# Patient Record
Sex: MALE | Race: BLACK OR AFRICAN AMERICAN | Employment: OTHER | URBAN - METROPOLITAN AREA
[De-identification: names, ages, dates, MRNs, and addresses within clinical notes are randomized per-mention and may not be internally consistent; named-entity substitution may affect disease eponyms.]

---

## 2017-02-01 RX ORDER — METOPROLOL TARTRATE 50 MG/1
TABLET ORAL
Qty: 60 TAB | Refills: 4 | Status: SHIPPED | OUTPATIENT
Start: 2017-02-01 | End: 2017-06-08 | Stop reason: SDUPTHER

## 2017-02-10 RX ORDER — LEVETIRACETAM 1000 MG/1
TABLET ORAL
Qty: 60 TAB | Refills: 3 | Status: SHIPPED | OUTPATIENT
Start: 2017-02-10 | End: 2017-06-08 | Stop reason: SDUPTHER

## 2017-03-08 ENCOUNTER — OFFICE VISIT (OUTPATIENT)
Dept: INTERNAL MEDICINE CLINIC | Age: 72
End: 2017-03-08

## 2017-03-08 VITALS
RESPIRATION RATE: 16 BRPM | HEART RATE: 58 BPM | DIASTOLIC BLOOD PRESSURE: 66 MMHG | WEIGHT: 171 LBS | TEMPERATURE: 97.6 F | BODY MASS INDEX: 27.48 KG/M2 | OXYGEN SATURATION: 98 % | HEIGHT: 66 IN | SYSTOLIC BLOOD PRESSURE: 130 MMHG

## 2017-03-08 DIAGNOSIS — E66.9 DIABETES MELLITUS TYPE 2 IN OBESE (HCC): Primary | ICD-10-CM

## 2017-03-08 DIAGNOSIS — I10 ESSENTIAL HYPERTENSION, MALIGNANT: ICD-10-CM

## 2017-03-08 DIAGNOSIS — C61 PROSTATE CANCER (HCC): ICD-10-CM

## 2017-03-08 DIAGNOSIS — E11.69 DIABETES MELLITUS TYPE 2 IN OBESE (HCC): Primary | ICD-10-CM

## 2017-03-08 DIAGNOSIS — R56.9 SEIZURE (HCC): ICD-10-CM

## 2017-03-08 LAB
ALBUMIN UR QL STRIP: 10 MG/L
ANION GAP SERPL CALC-SCNC: NORMAL MMOL/L
CHOLEST SERPL-MCNC: 113 MG/DL
CREATININE, URINE POC: 50 MG/DL
HBA1C MFR BLD HPLC: 6.6 %
HDLC SERPL-MCNC: 49 MG/DL
LDL CHOLESTEROL POC: 45
MICROALBUMIN/CREAT RATIO POC: <30 MG/G
NON-HDL GOAL (POC): 64
TCHOL/HDL RATIO (POC): 2.3
TRIGL SERPL-MCNC: 94 MG/DL

## 2017-03-08 NOTE — PROGRESS NOTES
Taran Maldonado is a 67 y.o. male and presents with Diabetes; Cholesterol Problem; and Hypertension  . Subjective:    Diabetes Mellitus Review:  He has diabetes mellitus. Diabetic ROS - medication compliance: compliant all of the time, diabetic diet compliance: compliant all of the time, home glucose monitoring: is performed. Known diabetic complications: none  Cardiovascular risk factors: family history, dyslipidemia, diabetes mellitus, obesity, hypertension  Current diabetic medications include oral agents/insulin   Eye exam current (within one year): no  Weight trend: stable  Prior visit with dietician: no  Current diet: \"healthy\" diet  in general  Current exercise: walking  Current monitoring regimen: home blood tests - daily  Home blood sugar records: trend: stable  Any episodes of hypoglycemia? no  Is He on ACE inhibitor or angiotensin II receptor blocker? Yes     Hypertension Review:  The patient has hypertension . Diet and Lifestyle: generally follows a low sodium diet, exercises sporadically  Home BP Monitoring: is not measured at home. Pertinent ROS: taking medications as instructed, no medication side effects noted, no TIA's, no chest pain on exertion, no dyspnea on exertion, no swelling of ankles. Dyslipidemia Review:  Patient presents for evaluation of lipids. Compliance with treatment thus far has been excellent. A repeat fasting lipid profile was done. The patient does not use medications that may worsen dyslipidemias . The patient exercises sporadically. The patient is not known to have coexisting coronary artery disease    Seizure Review:  Patient presents for evaluation of seizures. Patient has been stable without any new seizures reported. Patient has tolerated his medication thus far. Gout Review:  Patient has gout, primarily affecting the foot. The patient has been stable with no recent joint pains  Symptoms onset: problem is longstanding.   Rheumatological ROS: using NSAID medication regularly, daily. Response to treatment plan: symptoms have progressed to a point and plateaued. The most recent uric acid was normal.       Review of Systems  Constitutional: negative for fevers, chills, anorexia and weight loss  Eyes:   negative for visual disturbance and irritation  ENT:   negative for tinnitus,sore throat,nasal congestion,ear pains. hoarseness  Respiratory:  negative for cough, hemoptysis, dyspnea,wheezing  CV:   negative for chest pain, palpitations, lower extremity edema  GI:   negative for nausea, vomiting, diarrhea, abdominal pain,melena  Endo:               negative for polyuria,polydipsia,polyphagia,heat intolerance  Genitourinary: negative for frequency, dysuria and hematuria  Integument:  negative for rash and pruritus  Hematologic:  negative for easy bruising and gum/nose bleeding  Musculoskel: negative for myalgias, arthralgias, back pain, muscle weakness, joint pain  Neurological:  negative for headaches, dizziness, vertigo, memory problems and gait   Behavl/Psych: negative for feelings of anxiety, depression, mood changes    Past Medical History:   Diagnosis Date    Allergic rhinitis, cause unspecified 4/18/2011    Alzheimer's disease     Arthritis     GOUT    Cancer [facility] 09/20/2016    PROSTATE    Chronic obstructive pulmonary disease ([address]     CHRONIC BRONCHITIS    Congestive heart failure, unspecified 4/18/2011    (ON 9/20/16 PT & DTR STATE THEY DON'T REMEMBER THIS DIAGNOSIS)    Coronary Artery Disease 4/18/2011    Diabetes Mellitus Type I 4/18/2011    Erectile Dysfunction 4/18/2011    Hemorrhoids 4/18/2011    Hypertension     Seizures ([address]     STARTED 2005; \"BLACK-OUT SEIZURES,ABSENCE SEIZURES\", DTR SATES    Stroke ([facility] [address]     MULTIPLE MINISTROKES, DTR REPORTS; LEFT-SIDED WEAKNESS    Thromboembolus ([address]     left leg  (NO PE)    Unspecified asthma(493.90) 4/18/2011     Past Surgical History:   Procedure Laterality Date    CARDIAC SURG PROCEDURE UNLIST 2006    CABG    VASCULAR SURGERY PROCEDURE UNLIST      PERIPHERAL ANGIOGRAM, STENTS LEFT LEG     Social History     Social History    Marital status:      Spouse name: N/A    Number of children: N/A    Years of education: N/A     Social History Main Topics    Smoking status: Former Smoker     Packs/day: 2.00     Years: 30.00    Smokeless tobacco: Former User     Quit date: 9/20/1990    Alcohol use No      Comment: IN PAST, MODERATE ALCOHOL.  WEEKENDS ONLY    Drug use: No    Sexual activity: Yes     Partners: Female     Birth control/ protection: None     Other Topics Concern    None     Social History Narrative     Family History   Problem Relation Age of Onset    Diabetes Mother     Hypertension Mother     Diabetes Father     Anesth Problems Neg Hx      Current Outpatient Prescriptions   Medication Sig Dispense Refill    levETIRAcetam 1,000 mg tablet take 1 tablet by mouth twice a day 60 Tab 3    metoprolol tartrate (LOPRESSOR) 50 mg tablet take 1 tablet by mouth twice a day 60 Tab 4    bumetanide (BUMEX) 1 mg tablet take 1 tablet by mouth once daily 30 Tab 12    VIMPAT 100 mg tab tablet take 1 tablet by mouth twice a day 60 Tab 5    amLODIPine (NORVASC) 5 mg tablet take 1 tablet by mouth once daily 90 Tab 3    metFORMIN (GLUCOPHAGE) 1,000 mg tablet take 1 tablet twice a day with food 60 Tab 12    insulin glargine (LANTUS SOLOSTAR) 100 unit/mL (3 mL) pen inject 20 units subcutaneously daily 30 mL 6    glucose blood VI test strips (ONETOUCH ULTRA TEST) strip Test 2 times daily    ONETOUCH ULTRA 2 TEST STRIPS ONLY 100 Strip 11    allopurinol (ZYLOPRIM) 100 mg tablet take 1 tablet by mouth once daily 30 Tab 12    pravastatin (PRAVACHOL) 40 mg tablet take 1 tablet once daily 30 Tab 9    losartan (COZAAR) 100 mg tablet take 1 tablet by mouth once daily 30 Tab 10    ACCU-CHEK ALEXIS PLUS TEST STRP strip TEST BLOOD SUGARS 2 TIMES DAILY 100 Strip PRN    Lancets misc Patient test 3 x daily Dm 250 1 Package 11    HYDROcodone-acetaminophen (NORCO) 5-325 mg per tablet Take 1 Tab by mouth every four (4) hours as needed for Pain. Max Daily Amount: 6 Tabs. 20 Tab 0     No Known Allergies    Objective:  Visit Vitals    /66    Pulse (!) 58    Temp 97.6 °F (36.4 °C) (Oral)    Resp 16    Ht 5' 6\" (1.676 m)    Wt 171 lb (77.6 kg)    SpO2 98%    BMI 27.6 kg/m2     Physical Exam:   General appearance - alert, well appearing, and in no distress  Mental status - alert, oriented to person, place, and time  EYE-CAROL, EOMI, corneas normal, no foreign bodies  ENT-ENT exam normal, no neck nodes or sinus tenderness  Nose - normal and patent, no erythema, discharge or polyps  Mouth - mucous membranes moist, pharynx normal without lesions  Neck - supple, no significant adenopathy   Chest - clear to auscultation, no wheezes, rales or rhonchi, symmetric air entry   Heart - normal rate, regular rhythm, normal S1, S2, no murmurs, rubs, clicks or gallops   Abdomen - soft, nontender, nondistended, no masses or organomegaly  Lymph- no adenopathy palpable  Ext-peripheral pulses normal, no pedal edema, no clubbing or cyanosis  Skin-Warm and dry. no hyperpigmentation, vitiligo, or suspicious lesions  Neuro -alert, oriented, normal speech, no focal findings or movement disorder noted  Neck-normal C-spine, no tenderness, full ROM without pain  Several abrasions to the hands and knees noted.     Results for orders placed or performed in visit on 03/08/17   AMB POC GLUCOSE BLOOD, BY GLUCOSE MONITORING DEVICE   Result Value Ref Range    Anion gap     AMB POC HEMOGLOBIN A1C   Result Value Ref Range    Hemoglobin A1c (POC) 6.6 %   AMB POC LIPID PROFILE   Result Value Ref Range    Cholesterol (POC) 113     Triglycerides (POC) 94     HDL Cholesterol (POC) 49     LDL Cholesterol (POC) 45     Non-HDL Goal (POC) 64     TChol/HDL Ratio (POC) 2.3    AMB POC URINE, MICROALBUMIN, SEMIQUANT (3 RESULTS)   Result Value Ref Range ALBUMIN, URINE POC 10 Negative mg/L    CREATININE, URINE POC 50 mg/dL    Microalbumin/creat ratio (POC) <30 mg/g       Assessment/Plan:    ICD-10-CM ICD-9-CM    1. Diabetes mellitus type 2 in obese (HCC) E11.9 250.00 AMB POC GLUCOSE BLOOD, BY GLUCOSE MONITORING DEVICE    E66.9 278.00 AMB POC HEMOGLOBIN A1C      AMB POC LIPID PROFILE      AMB POC URINE, MICROALBUMIN, SEMIQUANT (3 RESULTS)   2. Essential hypertension, malignant I10 401.0 AMB POC GLUCOSE BLOOD, BY GLUCOSE MONITORING DEVICE      AMB POC HEMOGLOBIN A1C      AMB POC LIPID PROFILE      AMB POC URINE, MICROALBUMIN, SEMIQUANT (3 RESULTS)   3. Seizure ([facility]) R56.9 780.39    4. Prostate cancer ([facility] C61 185      Orders Placed This Encounter    AMB POC GLUCOSE BLOOD, BY GLUCOSE MONITORING DEVICE    AMB POC HEMOGLOBIN A1C    AMB POC LIPID PROFILE    AMB POC URINE, MICROALBUMIN, SEMIQUANT (3 RESULTS)     lose weight, increase physical activity, follow low fat diet, follow low salt diet,Take 81mg aspirin daily  Patient Instructions   Compliance 360 Activation    Thank you for requesting access to Compliance 360. Please follow the instructions below to securely access and download your online medical record. Compliance 360 allows you to send messages to your doctor, view your test results, renew your prescriptions, schedule appointments, and more. How Do I Sign Up? 1. In your internet browser, go to [email or web address]  2. Click on the First Time User? Click Here link in the Sign In box. You will be redirect to the New Member Sign Up page. 3. Enter your Compliance 360 Access Code exactly as it appears below. You will not need to use this code after youve completed the sign-up process. If you do not sign up before the expiration date, you must request a new code. Compliance 360 Access Code: Activation code not generated  Current Compliance 360 Status: Patient Declined (This is the date your Compliance 360 access code will )    4.  Enter the last four digits of your Social Security Number (xxxx) and Date of Birth (mm/dd/yyyy) as indicated and click Submit. You will be taken to the next sign-up page. 5. Create a EZMovet ID. This will be your Higgle login ID and cannot be changed, so think of one that is secure and easy to remember. 6. Create a Higgle password. You can change your password at any time. 7. Enter your Password Reset Question and Answer. This can be used at a later time if you forget your password. 8. Enter your e-mail address. You will receive e-mail notification when new information is available in [address] 9. Click Sign Up. You can now view and download portions of your medical record. 10. Click the Download Summary menu link to download a portable copy of your medical information. Additional Information    If you have questions, please visit the Frequently Asked Questions section of the Higgle website at [email or web address] Remember, Higgle is NOT to be used for urgent needs. For medical emergencies, dial 911. Follow-up Disposition:  Return in about 3 months (around 6/8/2017), or if symptoms worsen or fail to improve. I have reviewed with the patient details of the assessment and plan and all questions were answered. Relevent patient education was performed    An After Visit Summary was printed and given to the patient.

## 2017-03-08 NOTE — MR AVS SNAPSHOT
Visit Information Date & Time Provider Department Dept. Phone Encounter #  
 3/8/2017  9:15 AM Wing Ashanti MD [facility] [phone] [id number] Follow-up Instructions Return in about 3 months (around 6/8/2017), or if symptoms worsen or fail to improve. Upcoming Health Maintenance Date Due  
 EYE EXAM RETINAL OR DILATED Q1 5/28/2016 MICROALBUMIN Q1 10/30/2016 FOBT Q 1 YEAR AGE 50-75 4/12/2017 FOOT EXAM Q1 4/12/2017 MEDICARE YEARLY EXAM 4/13/2017 HEMOGLOBIN A1C Q6M 4/30/2017 GLAUCOMA SCREENING Q2Y 5/28/2017 LIPID PANEL Q1 10/31/2017 Pneumococcal 65+ High/Highest Risk (2 of 2 - PPSV23) 11/1/2017 DTaP/Tdap/Td series (2 - Td) 12/3/2024 Allergies as of 3/8/2017  Review Complete On: 3/8/2017 By: Wing Ashanti MD  
 No Known Allergies Current Immunizations  Reviewed on 10/31/2016 Name Date Influenza High Dose Vaccine PF 10/31/2016, 10/30/2015, 12/3/2014 Influenza Vaccine Split 11/1/2012 Pneumococcal Vaccine (Unspecified Type) 11/1/2012 TB Skin Test (PPD) Intradermal 8/22/2014 Tdap 12/3/2014 Not reviewed this visit You Were Diagnosed With   
  
 Codes Comments Diabetes mellitus type 2 in obese (HCC)    -  Primary ICD-10-CM: E11.9, E66.9 ICD-9-CM: 250.00, 278.00 Essential hypertension, malignant     ICD-10-CM: I10 
ICD-9-CM: 401.0 Seizure ([address]     ICD-10-CM: R56.9 ICD-9-CM: 780.39 Prostate cancer [facility])     ICD-10-CM: X97 ICD-9-CM: 230 Vitals BP Pulse Temp Resp Height(growth percentile) Weight(growth percentile) 130/66 (!) 58 97.6 °F (36.4 °C) (Oral) 16 5' 6\" (1.676 m) 171 lb (77.6 kg) SpO2 BMI Smoking Status 98% 27.6 kg/m2 Former Smoker BMI and BSA Data Body Mass Index Body Surface Area  
 27.6 kg/m 2 1.9 m 2 Preferred Pharmacy Pharmacy Name Phone [id number] [address] Nikita Mota [phone] Your Updated Medication List  
  
   
This list is accurate as of: 3/8/17 11:02 AM.  Always use your most recent med list.  
  
  
  
  
 * ACCU-CHEK ALEXIS PLUS TEST STRP strip Generic drug:  glucose blood VI test strips TEST BLOOD SUGARS 2 TIMES DAILY * glucose blood VI test strips strip Commonly known as:  ONETOUCH ULTRA TEST Test 2 times daily  ONETOUCH ULTRA 2 TEST STRIPS ONLY  
  
 allopurinol 100 mg tablet Commonly known as:  ZYLOPRIM  
take 1 tablet by mouth once daily  
  
 amLODIPine 5 mg tablet Commonly known as:  NORVASC  
take 1 tablet by mouth once daily  
  
 bumetanide 1 mg tablet Commonly known as:  BUMEX  
take 1 tablet by mouth once daily HYDROcodone-acetaminophen 5-325 mg per tablet Commonly known as:  Wayna Glaze Take 1 Tab by mouth every four (4) hours as needed for Pain. Max Daily Amount: 6 Tabs. insulin glargine 100 unit/mL (3 mL) pen Commonly known as:  LANTUS SOLOSTAR  
inject 20 units subcutaneously daily Lancets Misc Patient test 3 x daily  Dm 250  
  
 levETIRAcetam 1,000 mg tablet  
take 1 tablet by mouth twice a day  
  
 losartan 100 mg tablet Commonly known as:  COZAAR  
take 1 tablet by mouth once daily  
  
 metFORMIN 1,000 mg tablet Commonly known as:  GLUCOPHAGE  
take 1 tablet twice a day with food  
  
 metoprolol tartrate 50 mg tablet Commonly known as:  LOPRESSOR  
take 1 tablet by mouth twice a day  
  
 pravastatin 40 mg tablet Commonly known as:  PRAVACHOL  
take 1 tablet once daily VIMPAT 100 mg Tab tablet Generic drug:  lacosamide  
take 1 tablet by mouth twice a day * Notice: This list has 2 medication(s) that are the same as other medications prescribed for you. Read the directions carefully, and ask your doctor or other care provider to review them with you. We Performed the Following AMB POC GLUCOSE BLOOD, BY GLUCOSE MONITORING DEVICE [17635 CPT(R)] AMB POC HEMOGLOBIN A1C [80023 CPT(R)] AMB POC LIPID PROFILE [26632 CPT(R)] AMB POC URINE, MICROALBUMIN, SEMIQUANT (3 RESULTS) [69816 CPT(R)] Follow-up Instructions Return in about 3 months (around 2017), or if symptoms worsen or fail to improve. Patient Instructions SearchForcehart Activation Thank you for requesting access to Conversion Logic. Please follow the instructions below to securely access and download your online medical record. Conversion Logic allows you to send messages to your doctor, view your test results, renew your prescriptions, schedule appointments, and more. How Do I Sign Up? 1. In your internet browser, go to [email or web address] 
2. Click on the First Time User? Click Here link in the Sign In box. You will be redirect to the New Member Sign Up page. 3. Enter your Conversion Logic Access Code exactly as it appears below. You will not need to use this code after youve completed the sign-up process. If you do not sign up before the expiration date, you must request a new code. Conversion Logic Access Code: Activation code not generated Current Conversion Logic Status: Patient Declined (This is the date your Conversion Logic access code will ) 4. Enter the last four digits of your Social Security Number (xxxx) and Date of Birth (mm/dd/yyyy) as indicated and click Submit. You will be taken to the next sign-up page. 5. Create a Conversion Logic ID. This will be your Conversion Logic login ID and cannot be changed, so think of one that is secure and easy to remember. 6. Create a Conversion Logic password. You can change your password at any time. 7. Enter your Password Reset Question and Answer. This can be used at a later time if you forget your password. 8. Enter your e-mail address. You will receive e-mail notification when new information is available in [address] 9. Click Sign Up. You can now view and download portions of your medical record. 10. Click the Download Summary menu link to download a portable copy of your medical information. Additional Information If you have questions, please visit the Frequently Asked Questions section of the Business Exchange website at [email or web address] [email or web address] Remember, Business Exchange is NOT to be used for urgent needs. For medical emergencies, dial 911. Please provide this summary of care documentation to your next provider. Your [facility] care clinician [facility]. If you have any questions after today's visit, please call [phone].

## 2017-03-08 NOTE — PATIENT INSTRUCTIONS
[email or web address][email or web address] Activation    Thank you for requesting access to Image Metrics. Please follow the instructions below to securely access and download your online medical record. Image Metrics allows you to send messages to your doctor, view your test results, renew your prescriptions, schedule appointments, and more. How Do I Sign Up? 1. In your internet browser, go to [email or web address]  2. Click on the First Time User? Click Here link in the Sign In box. You will be redirect to the New Member Sign Up page. 3. Enter your Image Metrics Access Code exactly as it appears below. You will not need to use this code after youve completed the sign-up process. If you do not sign up before the expiration date, you must request a new code. Image Metrics Access Code: Activation code not generated  Current Image Metrics Status: Patient Declined (This is the date your Image Metrics access code will )    4. Enter the last four digits of your Social Security Number (xxxx) and Date of Birth (mm/dd/yyyy) as indicated and click Submit. You will be taken to the next sign-up page. 5. Create a Image Metrics ID. This will be your Image Metrics login ID and cannot be changed, so think of one that is secure and easy to remember. 6. Create a Image Metrics password. You can change your password at any time. 7. Enter your Password Reset Question and Answer. This can be used at a later time if you forget your password. 8. Enter your e-mail address. You will receive e-mail notification when new information is available in [address] 9. Click Sign Up. You can now view and download portions of your medical record. 10. Click the Download Summary menu link to download a portable copy of your medical information. Additional Information    If you have questions, please visit the Frequently Asked Questions section of the Image Metrics website at [email or web address] Remember, Image Metrics is NOT to be used for urgent needs. For medical emergencies, dial 911.

## 2017-03-13 RX ORDER — LOSARTAN POTASSIUM 100 MG/1
TABLET ORAL
Qty: 30 TAB | Refills: 10 | Status: SHIPPED | OUTPATIENT
Start: 2017-03-13 | End: 2017-08-25 | Stop reason: SDUPTHER

## 2017-06-08 ENCOUNTER — OFFICE VISIT (OUTPATIENT)
Dept: INTERNAL MEDICINE CLINIC | Age: 72
End: 2017-06-08

## 2017-06-08 VITALS
DIASTOLIC BLOOD PRESSURE: 70 MMHG | HEART RATE: 60 BPM | BODY MASS INDEX: 27.16 KG/M2 | SYSTOLIC BLOOD PRESSURE: 140 MMHG | OXYGEN SATURATION: 97 % | TEMPERATURE: 97.7 F | WEIGHT: 169 LBS | RESPIRATION RATE: 19 BRPM | HEIGHT: 66 IN

## 2017-06-08 DIAGNOSIS — R56.9 SEIZURE (HCC): ICD-10-CM

## 2017-06-08 DIAGNOSIS — E11.69 DIABETES MELLITUS TYPE 2 IN OBESE (HCC): Primary | ICD-10-CM

## 2017-06-08 DIAGNOSIS — I10 ESSENTIAL HYPERTENSION, MALIGNANT: ICD-10-CM

## 2017-06-08 DIAGNOSIS — C61 PROSTATE CANCER (HCC): ICD-10-CM

## 2017-06-08 DIAGNOSIS — E66.9 DIABETES MELLITUS TYPE 2 IN OBESE (HCC): Primary | ICD-10-CM

## 2017-06-08 DIAGNOSIS — Z00.00 MEDICARE ANNUAL WELLNESS VISIT, SUBSEQUENT: ICD-10-CM

## 2017-06-08 DIAGNOSIS — Z12.11 SCREENING FOR COLON CANCER: ICD-10-CM

## 2017-06-08 LAB
CHOLEST SERPL-MCNC: <100 MG/DL
GLUCOSE POC: 188 MG/DL
HBA1C MFR BLD HPLC: 5.8 %
HDLC SERPL-MCNC: 42 MG/DL
LDL CHOLESTEROL POC: NORMAL
NON-HDL GOAL (POC): NORMAL
TCHOL/HDL RATIO (POC): NORMAL
TRIGL SERPL-MCNC: 130 MG/DL

## 2017-06-08 RX ORDER — LEVETIRACETAM 1000 MG/1
TABLET ORAL
Qty: 60 TAB | Refills: 3 | Status: ON HOLD | OUTPATIENT
Start: 2017-06-08 | End: 2020-08-10

## 2017-06-08 RX ORDER — PRAVASTATIN SODIUM 40 MG/1
TABLET ORAL
Qty: 30 TAB | Refills: 9 | Status: SHIPPED | OUTPATIENT
Start: 2017-06-08

## 2017-06-08 RX ORDER — METOPROLOL TARTRATE 50 MG/1
TABLET ORAL
Qty: 60 TAB | Refills: 4 | Status: ON HOLD | OUTPATIENT
Start: 2017-06-08 | End: 2020-09-04 | Stop reason: SDUPTHER

## 2017-06-08 RX ORDER — LACOSAMIDE 100 MG/1
TABLET ORAL
Qty: 60 TAB | Refills: 5 | Status: ON HOLD | OUTPATIENT
Start: 2017-06-08 | End: 2020-08-10

## 2017-06-08 NOTE — PROGRESS NOTES
Christie Stoddard is a 67 y.o. male and presents with Diabetes; Coronary Artery Disease; and Annual Wellness Visit  . Subjective:    Diabetes Mellitus Review:  He has diabetes mellitus. Diabetic ROS - medication compliance: compliant all of the time, diabetic diet compliance: compliant all of the time, home glucose monitoring: is performed. Known diabetic complications: none  Cardiovascular risk factors: family history, dyslipidemia, diabetes mellitus, obesity, hypertension  Current diabetic medications include oral agents/insulin   Eye exam current (within one year): no  Weight trend: stable  Prior visit with dietician: no  Current diet: \"healthy\" diet  in general  Current exercise: walking  Current monitoring regimen: home blood tests - daily  Home blood sugar records: trend: stable  Any episodes of hypoglycemia? no  Is He on ACE inhibitor or angiotensin II receptor blocker? Yes     Hypertension Review:  The patient has hypertension . Diet and Lifestyle: generally follows a low sodium diet, exercises sporadically  Home BP Monitoring: is not measured at home. Pertinent ROS: taking medications as instructed, no medication side effects noted, no TIA's, no chest pain on exertion, no dyspnea on exertion, no swelling of ankles. Dyslipidemia Review:  Patient presents for evaluation of lipids. Compliance with treatment thus far has been excellent. A repeat fasting lipid profile was done. The patient does not use medications that may worsen dyslipidemias . The patient exercises sporadically. The patient is not known to have coexisting coronary artery disease    Seizure Review:  Patient presents for evaluation of seizures. Patient has been stable without any new seizures reported. Patient has tolerated his medication thus far. Gout Review:  Patient has gout, primarily affecting the foot. The patient has been stable with no recent joint pains  Symptoms onset: problem is longstanding.   Rheumatological ROS: using NSAID medication regularly, daily. Response to treatment plan: symptoms have progressed to a point and plateaued. The most recent uric acid was normal.     He has a history of prostate cancer. he has been treated. Coronary Disease Review:  Patient complains of no chest pain today. There has not been the need to use NTG. Previous cardiac testing has included: Electrocardiogram (EKG), Echocardiogram, valve replacement. Veda Garcia is a 67 y.o. male and presents for annual Medicare Wellness Visit. Problem List: Reviewed with patient and discussed risk factors. Patient Active Problem List   Diagnosis Code    Allergic rhinitis, cause unspecified J30.9    Hemorrhoids K64.9    Unspecified asthma J45.909    Coronary Artery Disease I25.9    Erectile Dysfunction N52.9    Diabetes mellitus type 2 in obese (HCC) E11.9, E66.9    Encephalopathy, unspecified G93.40    Hx of completed stroke Z86.73    Seizure ([address] R56.9    Acute respiratory failure ([address] H28.60    Diastolic CHF, chronic ([address] I50.32    Essential hypertension, malignant I10    Stroke ([address] I63.9    Complex partial seizures evolving to generalized tonic-clonic seizures ([address] G40.209    Prostate cancer ([address] C61       Current medical providers:  Patient Care Team:  Reyes Covert., MD as PCP - General (Internal Medicine)  Reuben Landa LPN as Nurse Navigator  Zion Don RN as Nurse Navigator    PSH: Reviewed with patient  Past Surgical History:   Procedure Laterality Date    CARDIAC SURG PROCEDURE UNLIST  2006    CABG    VASCULAR SURGERY PROCEDURE UNLIST      PERIPHERAL ANGIOGRAM, STENTS LEFT LEG        SH: Reviewed with patient  Social History   Substance Use Topics    Smoking status: Former Smoker     Packs/day: 2.00     Years: 30.00    Smokeless tobacco: Former User     Quit date: 9/20/1990    Alcohol use No      Comment: IN PAST, MODERATE ALCOHOL.  WEEKENDS ONLY       FH: Reviewed with patient  Family History Problem Relation Age of Onset    Diabetes Mother     Hypertension Mother     Diabetes Father    [de-identified] Anesth Problems Neg Hx        Medications/Allergies: Reviewed with patient  Current Outpatient Prescriptions on File Prior to Visit   Medication Sig Dispense Refill    losartan (COZAAR) 100 mg tablet take 1 tablet by mouth once daily 30 Tab 10    levETIRAcetam 1,000 mg tablet take 1 tablet by mouth twice a day 60 Tab 3    metoprolol tartrate (LOPRESSOR) 50 mg tablet take 1 tablet by mouth twice a day 60 Tab 4    bumetanide (BUMEX) 1 mg tablet take 1 tablet by mouth once daily 30 Tab 12    VIMPAT 100 mg tab tablet take 1 tablet by mouth twice a day 60 Tab 5    amLODIPine (NORVASC) 5 mg tablet take 1 tablet by mouth once daily 90 Tab 3    metFORMIN (GLUCOPHAGE) 1,000 mg tablet take 1 tablet twice a day with food 60 Tab 12    insulin glargine (LANTUS SOLOSTAR) 100 unit/mL (3 mL) pen inject 20 units subcutaneously daily 30 mL 6    glucose blood VI test strips (ONETOUCH ULTRA TEST) strip Test 2 times daily    ONETOUCH ULTRA 2 TEST STRIPS ONLY 100 Strip 11    allopurinol (ZYLOPRIM) 100 mg tablet take 1 tablet by mouth once daily 30 Tab 12    pravastatin (PRAVACHOL) 40 mg tablet take 1 tablet once daily 30 Tab 9    ACCU-CHEK ALEXIS PLUS TEST STRP strip TEST BLOOD SUGARS 2 TIMES DAILY 100 Strip PRN    Lancets misc Patient test 3 x daily  Dm 250 1 Package 11    HYDROcodone-acetaminophen (NORCO) 5-325 mg per tablet Take 1 Tab by mouth every four (4) hours as needed for Pain. Max Daily Amount: 6 Tabs. 20 Tab 0     No current facility-administered medications on file prior to visit. No Known Allergies    Objective:  Visit Vitals    /70    Pulse 60    Temp 97.7 °F (36.5 °C) (Oral)    Resp 19    Ht 5' 6\" (1.676 m)    Wt 169 lb (76.7 kg)    SpO2 97%    BMI 27.28 kg/m2    Body mass index is 27.28 kg/(m^2).     Assessment of cognitive impairment: Alert and oriented x 3    Depression Screen:   PHQ over the last two weeks 6/8/2017   PHQ Not Done Patient Decline   Little interest or pleasure in doing things Not at all   Feeling down, depressed or hopeless Not at all   Total Score PHQ 2 0       Fall Risk Assessment:    Fall Risk Assessment, last 12 mths 6/8/2017   Able to walk? Yes   Fall in past 12 months? No       Functional Ability:   Does the patient exhibit a steady gait? yes   How long did it take the patient to get up and walk from a sitting position? seconds   Is the patient self reliant?  (ie can do own laundry, meals, household chores)  no     Does the patient handle his/her own medications?  no     Does the patient handle his/her own money? no     Is the patients home safe (ie good lighting, handrails on stairs and bath, etc.)? yes     Did you notice or did patient express any hearing difficulties? no     Did you notice or did patient express any vision difficulties?   no     Were distance and reading eye charts used? yes       Advance Care Planning:   Patient was offered the opportunity to discuss advance care planning:  yes     Does patient have an Advance Directive:  yes   If no, did you provide information on Caring Connections?   yes       Plan:      Orders Placed This Encounter    OCCULT BLOOD, IMMUNOASSAY (FIT)    AMB POC LIPID PROFILE    AMB POC GLUCOSE BLOOD, BY GLUCOSE MONITORING DEVICE    AMB POC HEMOGLOBIN A1C       Health Maintenance   Topic Date Due    EYE EXAM RETINAL OR DILATED Q1  05/28/2016    FOOT EXAM Q1  04/12/2017    FOBT Q 1 YEAR AGE 50-75  04/12/2017    GLAUCOMA SCREENING Q2Y  05/28/2017    INFLUENZA AGE 9 TO ADULT  08/01/2017    HEMOGLOBIN A1C Q6M  09/08/2017    Pneumococcal 65+ High/Highest Risk (2 of 2 - PPSV23) 11/01/2017    MICROALBUMIN Q1  03/08/2018    LIPID PANEL Q1  03/08/2018    MEDICARE YEARLY EXAM  06/09/2018    DTaP/Tdap/Td series (2 - Td) 12/03/2024    Hepatitis C Screening  Completed    ZOSTER VACCINE AGE 60>  Addressed       *Patient verbalized understanding and agreement with the plan. A copy of the After Visit Summary with personalized health plan was given to the patient today. Review of Systems  Constitutional: negative for fevers, chills, anorexia and weight loss  Eyes:   negative for visual disturbance and irritation  ENT:   negative for tinnitus,sore throat,nasal congestion,ear pains. hoarseness  Respiratory:  negative for cough, hemoptysis, dyspnea,wheezing  CV:   negative for chest pain, palpitations, lower extremity edema  GI:   negative for nausea, vomiting, diarrhea, abdominal pain,melena  Endo:               negative for polyuria,polydipsia,polyphagia,heat intolerance  Genitourinary: negative for frequency, dysuria and hematuria  Integument:  negative for rash and pruritus  Hematologic:  negative for easy bruising and gum/nose bleeding  Musculoskel: negative for myalgias, arthralgias, back pain, muscle weakness, joint pain  Neurological:  negative for headaches, dizziness, vertigo, memory problems and gait   Behavl/Psych: negative for feelings of anxiety, depression, mood changes    Past Medical History:   Diagnosis Date    Allergic rhinitis, cause unspecified 4/18/2011    Alzheimer's disease     Arthritis     GOUT    Cancer ([address] 09/20/2016    PROSTATE    Chronic obstructive pulmonary disease ([address]     CHRONIC BRONCHITIS    Congestive heart failure, unspecified 4/18/2011    (ON 9/20/16 PT & DTR STATE THEY DON'T REMEMBER THIS DIAGNOSIS)    Coronary Artery Disease 4/18/2011    Diabetes Mellitus Type I 4/18/2011    Erectile Dysfunction 4/18/2011    Hemorrhoids 4/18/2011    Hypertension     Seizures ([address]     STARTED 2005; \"BLACK-OUT SEIZURES,ABSENCE SEIZURES\", DTR SATES    Stroke ([address]     MULTIPLE MINISTROKES, DTR REPORTS; LEFT-SIDED WEAKNESS    Thromboembolus ([address]     left leg  (NO PE)    Unspecified asthma 4/18/2011     Past Surgical History:   Procedure Laterality Date    CARDIAC SURG PROCEDURE UNLIST  2006 CABG    VASCULAR SURGERY PROCEDURE UNLIST      PERIPHERAL ANGIOGRAM, STENTS LEFT LEG     Social History     Social History    Marital status:      Spouse name: N/A    Number of children: N/A    Years of education: N/A     Social History Main Topics    Smoking status: Former Smoker     Packs/day: 2.00     Years: 30.00    Smokeless tobacco: Former User     Quit date: 9/20/1990    Alcohol use No      Comment: IN PAST, MODERATE ALCOHOL.  WEEKENDS ONLY    Drug use: No    Sexual activity: Yes     Partners: Female     Birth control/ protection: None     Other Topics Concern    None     Social History Narrative     Family History   Problem Relation Age of Onset    Diabetes Mother     Hypertension Mother     Diabetes Father     Anesth Problems Neg Hx      Current Outpatient Prescriptions   Medication Sig Dispense Refill    losartan (COZAAR) 100 mg tablet take 1 tablet by mouth once daily 30 Tab 10    levETIRAcetam 1,000 mg tablet take 1 tablet by mouth twice a day 60 Tab 3    metoprolol tartrate (LOPRESSOR) 50 mg tablet take 1 tablet by mouth twice a day 60 Tab 4    bumetanide (BUMEX) 1 mg tablet take 1 tablet by mouth once daily 30 Tab 12    VIMPAT 100 mg tab tablet take 1 tablet by mouth twice a day 60 Tab 5    amLODIPine (NORVASC) 5 mg tablet take 1 tablet by mouth once daily 90 Tab 3    metFORMIN (GLUCOPHAGE) 1,000 mg tablet take 1 tablet twice a day with food 60 Tab 12    insulin glargine (LANTUS SOLOSTAR) 100 unit/mL (3 mL) pen inject 20 units subcutaneously daily 30 mL 6    glucose blood VI test strips (ONETOUCH ULTRA TEST) strip Test 2 times daily    ONETOUCH ULTRA 2 TEST STRIPS ONLY 100 Strip 11    allopurinol (ZYLOPRIM) 100 mg tablet take 1 tablet by mouth once daily 30 Tab 12    pravastatin (PRAVACHOL) 40 mg tablet take 1 tablet once daily 30 Tab 9    ACCU-CHEK ALEXIS PLUS TEST STRP strip TEST BLOOD SUGARS 2 TIMES DAILY 100 Strip PRN    Lancets misc Patient test 3 x daily  Dm 250 1 Package 11    HYDROcodone-acetaminophen (NORCO) 5-325 mg per tablet Take 1 Tab by mouth every four (4) hours as needed for Pain. Max Daily Amount: 6 Tabs. 20 Tab 0     No Known Allergies    Objective:  Visit Vitals    /70    Pulse 60    Temp 97.7 °F (36.5 °C) (Oral)    Resp 19    Ht 5' 6\" (1.676 m)    Wt 169 lb (76.7 kg)    SpO2 97%    BMI 27.28 kg/m2     Physical Exam:   General appearance - alert, well appearing, and in no distress  Mental status - alert, oriented to person, place, and time  EYE-CAROL, EOMI, corneas normal, no foreign bodies  ENT-ENT exam normal, no neck nodes or sinus tenderness  Nose - normal and patent, no erythema, discharge or polyps  Mouth - mucous membranes moist, pharynx normal without lesions  Neck - supple, no significant adenopathy   Chest - clear to auscultation, no wheezes, rales or rhonchi, symmetric air entry   Heart - normal rate, regular rhythm, normal S1, S2, no murmurs, rubs, clicks or gallops   Abdomen - soft, nontender, nondistended, no masses or organomegaly  Lymph- no adenopathy palpable  Ext-peripheral pulses normal, no pedal edema, no clubbing or cyanosis  Skin-Warm and dry. no hyperpigmentation, vitiligo, or suspicious lesions  Neuro -alert, oriented, normal speech, no focal findings or movement disorder noted  Neck-normal C-spine, no tenderness, full ROM without pain  Several abrasions to the hands and knees noted. Results for orders placed or performed in visit on 06/08/17   AMB POC GLUCOSE BLOOD, BY GLUCOSE MONITORING DEVICE   Result Value Ref Range    Glucose  mg/dL   AMB POC HEMOGLOBIN A1C   Result Value Ref Range    Hemoglobin A1c (POC) 5.8 %       Assessment/Plan:    ICD-10-CM ICD-9-CM    1. Diabetes mellitus type 2 in obese (HCC) E11.9 250.00 AMB POC GLUCOSE BLOOD, BY GLUCOSE MONITORING DEVICE    E66.9 278.00 AMB POC HEMOGLOBIN A1C   2.  Essential hypertension, malignant I10 401.0 AMB POC LIPID PROFILE   3. Screening for colon cancer Z12.11 V76.51 OCCULT BLOOD, IMMUNOASSAY (FIT)   4. Seizure ([facility] R56.9 780.39    5. Prostate cancer ([facility] C61 185    6. Medicare annual wellness visit, subsequent Z00.00 V70.0      Orders Placed This Encounter    OCCULT BLOOD, IMMUNOASSAY (FIT)    AMB POC LIPID PROFILE    AMB POC GLUCOSE BLOOD, BY GLUCOSE MONITORING DEVICE    AMB POC HEMOGLOBIN A1C     lose weight, increase physical activity, follow low fat diet, follow low salt diet,Take 81mg aspirin daily  Patient Instructions   MyChart Activation    Thank you for requesting access to [facility]. Please follow the instructions below to securely access and download your online medical record. [facility] allows you to send messages to your doctor, view your test results, renew your prescriptions, schedule appointments, and more. How Do I Sign Up? 1. In your internet browser, go to [email or web address]  2. Click on the First Time User? Click Here link in the Sign In box. You will be redirect to the New Member Sign Up page. 3. Enter your [facility] Access Code exactly as it appears below. You will not need to use this code after youve completed the sign-up process. If you do not sign up before the expiration date, you must request a new code. [facility] Access Code: Activation code not generated  Current [facility] Status: Patient Declined (This is the date your [facility] access code will )    4. Enter the last four digits of your Social Security Number (xxxx) and Date of Birth (mm/dd/yyyy) as indicated and click Submit. You will be taken to the next sign-up page. 5. Create a [facility] ID. This will be your [employer] login ID and cannot be changed, so think of one that is secure and easy to remember. 6. Create a [facility] password. You can change your password at any time. 7. Enter your Password Reset Question and Answer. This can be used at a later time if you forget your password. 8. Enter your e-mail address.  You will receive e-mail notification when new information is available in [employer]. 9. Click Sign Up. You can now view and download portions of your medical record. 10. Click the Download Summary menu link to download a portable copy of your medical information. Additional Information    If you have questions, please visit the Frequently Asked Questions section of the [facility] website at [email or web address] Remember, [facility] is NOT to be used for urgent needs. For medical emergencies, dial 911. Follow-up Disposition:  Return in about 3 months (around 9/8/2017), or if symptoms worsen or fail to improve. I have reviewed with the patient details of the assessment and plan and all questions were answered. Relevent patient education was performed    An After Visit Summary was printed and given to the patient.

## 2017-06-08 NOTE — PATIENT INSTRUCTIONS
[employer] Activation    Thank you for requesting access to [employer] [facility]. Please follow the instructions below to securely access and download your online medical record. [employer] [facility] allows you to send messages to your doctor, view your test results, renew your prescriptions, schedule appointments, and more. How Do I Sign Up? 1. In your internet browser, go to [email or web address]  2. Click on the First Time User? Click Here link in the Sign In box. You will be redirect to the New Member Sign Up page. 3. Enter your [employer] [facility] Access Code exactly as it appears below. You will not need to use this code after youve completed the sign-up process. If you do not sign up before the expiration date, you must request a new code. [employer] [facility] Access Code: Activation code not generated  Current [facility] Status: Patient Declined (This is the date your [employer] access code will )    4. Enter the last four digits of your Social Security Number (xxxx) and Date of Birth (mm/dd/yyyy) as indicated and click Submit. You will be taken to the next sign-up page. 5. Create a [employer] [facility] ID. This will be your [employer] login ID and cannot be changed, so think of one that is secure and easy to remember. 6. Create a [employer] [facility] password. You can change your password at any time. 7. Enter your Password Reset Question and Answer. This can be used at a later time if you forget your password. 8. Enter your e-mail address. You will receive e-mail notification when new information is available in [address] 9. Click Sign Up. You can now view and download portions of your medical record. 10. Click the Download Summary menu link to download a portable copy of your medical information. Additional Information    If you have questions, please visit the Frequently Asked Questions section of the [employer] website at [email or web address] Remember, [employer] [facility] is NOT to be used for urgent needs. For medical emergencies, dial 911.

## 2017-06-08 NOTE — MR AVS SNAPSHOT
Visit Information Date & Time Provider Department Dept. Phone Encounter #  
 6/8/2017  9:30 AM Marycruz Dash MD [facility] [address] [phone] [facility] [phone] [id number] Follow-up Instructions Return in about 3 months (around 9/8/2017), or if symptoms worsen or fail to improve. Follow-up and Disposition History Upcoming Health Maintenance Date Due  
 EYE EXAM RETINAL OR DILATED Q1 5/28/2016 FOOT EXAM Q1 4/12/2017 FOBT Q 1 YEAR AGE 50-75 4/12/2017 GLAUCOMA SCREENING Q2Y 5/28/2017 INFLUENZA AGE 9 TO ADULT 8/1/2017 HEMOGLOBIN A1C Q6M 9/8/2017 Pneumococcal 65+ High/Highest Risk (2 of 2 - PPSV23) 11/1/2017 MICROALBUMIN Q1 3/8/2018 LIPID PANEL Q1 3/8/2018 MEDICARE YEARLY EXAM 6/9/2018 DTaP/Tdap/Td series (2 - Td) 12/3/2024 Allergies as of 6/8/2017  Review Complete On: 6/8/2017 By: Marycruz Dash MD  
 No Known Allergies Current Immunizations  Reviewed on 10/31/2016 Name Date Influenza High Dose Vaccine PF 10/31/2016, 10/30/2015, 12/3/2014 Influenza Vaccine Split 11/1/2012 Pneumococcal Vaccine (Unspecified Type) 11/1/2012 TB Skin Test (PPD) Intradermal 8/22/2014 Tdap 12/3/2014 Not reviewed this visit You Were Diagnosed With   
  
 Codes Comments Diabetes mellitus type 2 in obese (HCC)    -  Primary ICD-10-CM: E11.9, E66.9 ICD-9-CM: 250.00, 278.00 Essential hypertension, malignant     ICD-10-CM: I10 
ICD-9-CM: 401.0 Screening for colon cancer     ICD-10-CM: Z12.11 ICD-9-CM: V76.51 Seizure ([address]     ICD-10-CM: R56.9 ICD-9-CM: 780.39 Prostate cancer [facility])     ICD-10-CM: E08 ICD-9-CM: 185 Medicare annual wellness visit, subsequent     ICD-10-CM: Z00.00 ICD-9-CM: V70.0 Vitals BP Pulse Temp Resp Height(growth percentile) Weight(growth percentile) 140/70 60 97.7 °F (36.5 °C) (Oral) 19 5' 6\" (1.676 m) 169 lb (76.7 kg) SpO2 BMI Smoking Status 97% 27.28 kg/m2 Former Smoker Vitals History BMI and BSA Data Body Mass Index Body Surface Area  
 27.28 kg/m 2 1.89 m 2 Preferred Pharmacy Pharmacy Name Phone [id number] [address] Jose Padilla [phone] Your Updated Medication List  
  
   
This list is accurate as of: 6/8/17 10:34 AM.  Always use your most recent med list.  
  
  
  
  
 * ACCU-CHEK ALEXIS PLUS TEST STRP strip Generic drug:  glucose blood VI test strips TEST BLOOD SUGARS 2 TIMES DAILY * glucose blood VI test strips strip Commonly known as:  ONETOUCH ULTRA TEST Test 2 times daily  ONETOUCH ULTRA 2 TEST STRIPS ONLY  
  
 allopurinol 100 mg tablet Commonly known as:  ZYLOPRIM  
take 1 tablet by mouth once daily  
  
 amLODIPine 5 mg tablet Commonly known as:  NORVASC  
take 1 tablet by mouth once daily  
  
 bumetanide 1 mg tablet Commonly known as:  BUMEX  
take 1 tablet by mouth once daily HYDROcodone-acetaminophen 5-325 mg per tablet Commonly known as:  Irma Mandril Take 1 Tab by mouth every four (4) hours as needed for Pain. Max Daily Amount: 6 Tabs. insulin glargine 100 unit/mL (3 mL) Inpn Commonly known as:  LANTUS SOLOSTAR  
inject 20 units subcutaneously daily Lancets Misc Patient test 3 x daily  Dm 250  
  
 levETIRAcetam 1,000 mg tablet  
take 1 tablet by mouth twice a day  
  
 losartan 100 mg tablet Commonly known as:  COZAAR  
take 1 tablet by mouth once daily  
  
 metFORMIN 1,000 mg tablet Commonly known as:  GLUCOPHAGE  
take 1 tablet twice a day with food  
  
 metoprolol tartrate 50 mg tablet Commonly known as:  LOPRESSOR  
take 1 tablet by mouth twice a day  
  
 pravastatin 40 mg tablet Commonly known as:  PRAVACHOL  
take 1 tablet once daily VIMPAT 100 mg Tab tablet Generic drug:  lacosamide  
take 1 tablet by mouth twice a day * Notice: This list has 2 medication(s) that are the same as other medications prescribed for you. Read the directions carefully, and ask your doctor or other care provider to review them with you. We Performed the Following AMB POC GLUCOSE BLOOD, BY GLUCOSE MONITORING DEVICE [77258 CPT(R)] AMB POC HEMOGLOBIN A1C [11113 CPT(R)] AMB POC LIPID PROFILE [14672 CPT(R)] OCCULT BLOOD, IMMUNOASSAY (FIT) [id number] CPT(R)] Follow-up Instructions Return in about 3 months (around 2017), or if symptoms worsen or fail to improve. Patient Instructions MyChart Activation Thank you for requesting access to INCHRON. Please follow the instructions below to securely access and download your online medical record. INCHRON allows you to send messages to your doctor, view your test results, renew your prescriptions, schedule appointments, and more. How Do I Sign Up? 1. In your internet browser, go to [email or web address]" 
2. Click on the First Time User? Click Here link in the Sign In box. You will be redirect to the New Member Sign Up page. 3. Enter your INCHRON Access Code exactly as it appears below. You will not need to use this code after youve completed the sign-up process. If you do not sign up before the expiration date, you must request a new code. INCHRON Access Code: Activation code not generated Current INCHRON Status: Patient Declined (This is the date your [employer] access code will ) 4. Enter the last four digits of your Social Security Number (xxxx) and Date of Birth (mm/dd/yyyy) as indicated and click Submit. You will be taken to the next sign-up page. 5. Create a INCHRON ID. This will be your INCHRON login ID and cannot be changed, so think of one that is secure and easy to remember. 6. Create a INCHRON password. You can change your password at any time. 7. Enter your Password Reset Question and Answer.  This can be used at a later time if you forget your password. 8. Enter your e-mail address. You will receive e-mail notification when new information is available in [address] 9. Click Sign Up. You can now view and download portions of your medical record. 10. Click the Download Summary menu link to download a portable copy of your medical information. Additional Information If you have questions, please visit the Frequently Asked Questions section of the evocatal website at [email or web address] Remember, evocatal is NOT to be used for urgent needs. For medical emergencies, dial 911. Please provide this summary of care documentation to your next provider. Your primary care clinician is listed as Minerva Wayne. If you have any questions after today's visit, please call [phone].

## 2017-06-16 LAB — HEMOCCULT STL QL IA: POSITIVE

## 2017-06-19 ENCOUNTER — HOSPITAL ENCOUNTER (EMERGENCY)
Age: 72
Discharge: HOME | End: 2017-06-19
Attending: EMERGENCY MEDICINE
Payer: MEDICARE

## 2017-06-19 ENCOUNTER — TELEPHONE (OUTPATIENT)
Dept: INTERNAL MEDICINE CLINIC | Age: 72
End: 2017-06-19

## 2017-06-19 VITALS
WEIGHT: 169 LBS | OXYGEN SATURATION: 100 % | RESPIRATION RATE: 16 BRPM | TEMPERATURE: 98.7 F | DIASTOLIC BLOOD PRESSURE: 59 MMHG | SYSTOLIC BLOOD PRESSURE: 129 MMHG | BODY MASS INDEX: 25.61 KG/M2 | HEIGHT: 68 IN | HEART RATE: 58 BPM

## 2017-06-19 DIAGNOSIS — E16.2 HYPOGLYCEMIA: Primary | ICD-10-CM

## 2017-06-19 DIAGNOSIS — Z86.39 H/O DIABETES MELLITUS: ICD-10-CM

## 2017-06-19 LAB
ANION GAP BLD CALC-SCNC: 17 MMOL/L (ref 5–15)
BUN BLD-MCNC: 24 MG/DL (ref 9–20)
CA-I BLD-MCNC: 0.99 MMOL/L (ref 1.12–1.32)
CHLORIDE BLD-SCNC: 102 MMOL/L (ref 98–107)
CO2 BLD-SCNC: 24 MMOL/L (ref 21–32)
CREAT BLD-MCNC: 1.2 MG/DL (ref 0.6–1.3)
GLUCOSE BLD STRIP.AUTO-MCNC: 137 MG/DL (ref 65–100)
GLUCOSE BLD STRIP.AUTO-MCNC: 138 MG/DL (ref 65–100)
GLUCOSE BLD-MCNC: 147 MG/DL (ref 65–100)
HCT VFR BLD CALC: 46 % (ref 36.6–50.3)
HGB BLD-MCNC: 15.6 GM/DL (ref 12.1–17)
POTASSIUM BLD-SCNC: 4 MMOL/L (ref 3.5–5.1)
SERVICE CMNT-IMP: ABNORMAL
SODIUM BLD-SCNC: 138 MMOL/L (ref 136–145)

## 2017-06-19 PROCEDURE — 99284 EMERGENCY DEPT VISIT MOD MDM: CPT

## 2017-06-19 PROCEDURE — 80047 BASIC METABLC PNL IONIZED CA: CPT

## 2017-06-19 PROCEDURE — 82962 GLUCOSE BLOOD TEST: CPT

## 2017-06-19 RX ORDER — ASPIRIN 325 MG
325 TABLET ORAL DAILY
COMMUNITY
End: 2020-09-04

## 2017-06-19 NOTE — ED TRIAGE NOTES
Pt c/o BG of 48, earlier today, pt states he couldn't get anything to eat so he came here.  Pt denies any other symptoms

## 2017-06-19 NOTE — DISCHARGE INSTRUCTIONS
Learning About Low Blood Sugar (Hypoglycemia) in Diabetes  What is low blood sugar (hypoglycemia)? Hypoglycemia means that your blood sugar is low and your body (especially your brain) is not getting enough fuel. If you have diabetes, your blood sugar can go too low if you take too much of some diabetes medicines. It can also go too low if you miss a meal. And it can happen if you exercise too hard without eating enough food. Some medicines used to treat other health problems can cause low blood sugar too. What are the symptoms? Symptoms of low blood sugar can start quickly. It may take just 10 to 15 minutes. If you have had diabetes for many years, you may not realize that your blood sugar is low until it drops very low. · If your blood sugar level drops below 70 (mild low blood sugar), you may feel tired, anxious, dizzy, weak, shaky, or sweaty. You may have a fast heartbeat or blurry vision. · If your blood sugar level continues to drop (usually below 40), your behavior may change. You may feel more irritable. You may find it hard to concentrate or talk. And you may feel unsteady when you stand or walk. You may become too weak or confused to eat something with sugar to raise your blood sugar level. · If your blood sugar level drops very low (usually below 20), you may pass out (lose consciousness). Or you may have a seizure or stroke. If you have symptoms of severe low blood sugar, you need to get medical care right away. If you had a low blood sugar level during the night, you may wake up tired or with a headache. Or you may sweat so much during the night that your pajamas or sheets are damp when you wake up. How is low blood sugar treated? You can treat low blood sugar by eating or drinking something that has 15 grams of carbohydrate. These should be quick-sugar foods.  Check your blood sugar level again 15 minutes after having a quick-sugar food to make sure your level is getting back to your target range. Here are examples of quick-sugar foods that have 15 grams of carbohydrate:  · 3 to 4 glucose tablets  · 1 tube of glucose gel  · Hard candy (such as 3 Jolly Ranchers or 5 to 7 Life Savers)  · 1 tablespoon honey  · 2 tablespoons of raisins  · ½ cup to ¾ cup (4 to 6 ounces) of fruit juice or regular (not diet) soda  · 1 tablespoon of sugar  · 1 cup of fat-free milk  If you have problems with severe low blood sugar, someone else may have to give you a shot of glucagon. This is a hormone that raises blood sugar levels quickly. How can you prevent low blood sugar? You can take steps to prevent low blood sugar. · Follow your treatment plan. Take your insulin or other diabetes medicine exactly as your doctor prescribed it. Talk with your doctor if you're having low blood sugar often. Your medicine may need to be adjusted if it's causing your low blood sugar. · Check your blood sugar levels often. This helps you find early changes before an emergency happens. · Keep a quick-sugar food with you in case your blood sugar level drops low. · Eat small meals more often so that you don't get too hungry between meals. Don't skip meals. · Balance extra exercise with eating more. Check your blood sugar and learn how it changes after exercise. If your blood sugar stays at a normal level, you may not need to eat after you exercise. · Limit how much alcohol you drink. Alcohol can make low blood sugar go even lower. Don't drink alcohol if you have problems recognizing the early signs of low blood sugar. · Keep a diary of your symptoms. This helps you learn when changes in your body may signal low blood sugar. And keep track of how often you have low blood sugar, including when you last ate and what you ate. This will help you learn what causes your blood sugar to drop. · Learn about diabetes and low blood sugar.  Support groups or a diabetes education center can help you understand how medicines, diet, and exercise affect your blood sugar levels. Since low blood sugar levels can quickly become an emergency, be sure to wear medical alert jewelry, such as a medical alert bracelet. This is to let people know you have diabetes so they can get help for you. You can buy this at most drugstores. And make sure your family, friends, and coworkers know the symptoms of low blood sugar. Teach them what to do to get your sugar level up. Follow-up care is a key part of your treatment and safety. Be sure to make and go to all appointments, and call your doctor if you are having problems. It's also a good idea to know your test results and keep a list of the medicines you take. Where can you learn more? Go to http://benjamin-arcadio.info/. Enter C795 in the search box to learn more about \"Learning About Low Blood Sugar (Hypoglycemia) in Diabetes. \"  Current as of: March 13, 2017  Content Version: 11.3  © 4026-9487 [employer]. Care instructions adapted under license by [employer] (which disclaims liability or warranty for this information). If you have questions about a medical condition or this instruction, always ask your healthcare professional. [place] [address] any warranty or liability for your use of this information. Metformin (By mouth)     Metformin Hydrochloride (met-FOR-min cornelia-droe-KLOR-kandi)  Treats type 2 diabetes. Brand Name(s): Fortamet, Glucophage, Glucophage XR, Glumetza, Riomet   There may be other brand names for this medicine. When This Medicine Should Not Be Used: This medicine is not right for everyone. Do not use if you had an allergic reaction to metformin, or if you have severe kidney problems or metabolic acidosis. How to Use This Medicine:   Liquid, Tablet, Long Acting Tablet  · Take your medicine as directed. Your dose may need to be changed several times to find what works best for you.   · It is best to take this medicine with food or milk.  · Swallow the extended-release tablet whole. Do not crush, break, or chew it. Tell your doctor if you have trouble swallowing the tablets whole. · Measure the oral liquid medicine with a marked measuring spoon, oral syringe, or medicine cup. · Read and follow the patient instructions that come with this medicine. Talk to your doctor or pharmacist if you have any questions. · Missed dose: Take a dose as soon as you remember. If it is almost time for your next dose, wait until then and take a regular dose. Do not take extra medicine to make up for a missed dose. · Store the medicine in a closed container at room temperature, away from heat, moisture, and direct light. Drugs and Foods to Avoid:   Ask your doctor or pharmacist before using any other medicine, including over-the-counter medicines, vitamins, and herbal products. · Some medicines can affect how metformin works. Tell your doctor if you are using any of the following:  ¨ Acetazolamide  ¨ Dichlorphenamide  ¨ Diuretics (water pills)  ¨ Estrogen or birth control pills  ¨ Heart or blood pressure medicine  ¨ Isoniazid  ¨ Nicotinic acid  ¨ Phenothiazine medicine  ¨ Phenytoin  ¨ Steroid medicine  ¨ Thyroid medicine  ¨ Topiramate  ¨ Zonisamide  Warnings While Using This Medicine:   · Tell your doctor if you are pregnant or breastfeeding, or if you have heart or blood vessel disease, heart failure, blood circulation problems, kidney disease, liver disease, anemia, an adrenal gland or pituitary gland disorder, or a vitamin B12 deficiency. Tell your doctor if you had a heart attack. Tell your doctor if you drink alcohol. · Too much of this medicine can cause a rare, but serious condition called lactic acidosis. · Part of the extended-release tablet may pass in your stool. This is normal.  · Make sure any doctor or dentist who treats you knows that you are using this medicine.  You may need to stop using this medicine before you have surgery, an x-ray, CT scan, or other medical test.  · Your doctor will do lab tests at regular visits to check on the effects of this medicine. Keep all appointments. · Keep all medicine out of the reach of children. Never share your medicine with anyone. Possible Side Effects While Using This Medicine:   Call your doctor right away if you notice any of these side effects:  · Allergic reaction: Itching or hives, swelling in your face or hands, swelling or tingling in your mouth or throat, chest tightness, trouble breathing  · Confusion, fast heartbeat, increased hunger, shakiness  · Fever or chills  · Stomach pain, nausea, vomiting, muscle pain or cramping  · Trouble breathing, slow heartbeat, lightheadedness, dizziness  · Unusual tiredness or weakness  If you notice these less serious side effects, talk with your doctor:   · Diarrhea, gas, nausea  If you notice other side effects that you think are caused by this medicine, tell your doctor. Call your doctor for medical advice about side effects. You may report side effects to FDA at 3-035-FDA-0205  © 2017 [address] [employer] Information is for End User's use only and may not be sold, redistributed or otherwise used for commercial purposes. The above information is an  only. It is not intended as medical advice for individual conditions or treatments. Talk to your doctor, nurse or pharmacist before following any medical regimen to see if it is safe and effective for you.

## 2017-06-19 NOTE — ED PROVIDER NOTES
HPI Comments: Robbin Hardin is a 67 y.o. male who presents ambulatory to [facility] ED with cc of dizziness. Pt's wife stated that the pt was ambulating with an abnormal gait and c/o some slight dizziness earlier this afternoon, prompting her to check the pt's blood glucose level. She states that BGL was 30 mg/dL at that time, and subsequently gave the pt some sugar water and orange juice to drink. Pt's wife states noted improvement in BGL to 48 mg/dL, but states that she was referred to the ED by Primary Care Physician for further evaluation. Pt states that he had not eaten lunch this afternoon and notes that last oral intake was breakfast this morning at approximately 0900. Pt states that he takes Metformin 1000 mg BID (last dose at 0930 this morning) as well as Lantus 40 units daily (last dose at 0830 this morning). He denies any recent medication changes and specifically denies any pain, swelling, nausea, or vomiting. PMHx: DM, HTN, CVA, CAD, CHF, COPD, gout, prostate CA, Alzheimer's Disease  Social Hx: - smoking; - EtOH; - illicit drug use    There are no other complains, changes, or physical findings at this time. The history is provided by a relative.         Past Medical History:   Diagnosis Date    Allergic rhinitis, cause unspecified 4/18/2011    Alzheimer's disease     Arthritis     GOUT    Cancer [address] [address] 09/20/2016    PROSTATE    Chronic obstructive pulmonary disease (HCC)     CHRONIC BRONCHITIS    Congestive heart failure, unspecified 4/18/2011    (ON 9/20/16 PT & DTR STATE THEY DON'T REMEMBER THIS DIAGNOSIS)    Coronary Artery Disease 4/18/2011    Diabetes Mellitus Type I 4/18/2011    Erectile Dysfunction 4/18/2011    Hemorrhoids 4/18/2011    Hypertension     Seizures ([address]     STARTED 2005; \"BLACK-OUT Juan Less SEIZURES\", DTR SATES    Stroke ([address]     MULTIPLE MINISTROKES, DTR REPORTS; LEFT-SIDED WEAKNESS    Thromboembolus ([address]     left leg  (NO PE)    Unspecified asthma 4/18/2011 Past Surgical History:   Procedure Laterality Date    CARDIAC SURG PROCEDURE UNLIST  2006    CABG    VASCULAR SURGERY PROCEDURE UNLIST      PERIPHERAL ANGIOGRAM, STENTS LEFT LEG         Family History:   Problem Relation Age of Onset    Diabetes Mother     Hypertension Mother     Diabetes Father     Anesth Problems Neg Hx        Social History     Social History    Marital status:      Spouse name: N/A    Number of children: N/A    Years of education: N/A     Occupational History    Not on file. Social History Main Topics    Smoking status: Former Smoker     Packs/day: 2.00     Years: 30.00    Smokeless tobacco: Former User     Quit date: 9/20/1990    Alcohol use No      Comment: IN PAST, MODERATE ALCOHOL. WEEKENDS ONLY    Drug use: No    Sexual activity: Yes     Partners: Female     Birth control/ protection: None     Other Topics Concern    Not on file     Social History Narrative         ALLERGIES: Review of patient's allergies indicates no known allergies. Review of Systems   Constitutional: Negative for chills and fever. HENT: Negative for congestion and rhinorrhea. Eyes: Negative for visual disturbance. Respiratory: Negative for cough and shortness of breath. Cardiovascular: Negative for chest pain and leg swelling. Gastrointestinal: Negative for abdominal pain, diarrhea, nausea and vomiting. Genitourinary: Negative for difficulty urinating and dysuria. Musculoskeletal: Positive for gait problem. Negative for arthralgias, back pain, joint swelling and myalgias. Skin: Negative for rash. Neurological: Positive for dizziness. Negative for light-headedness and headaches. Vitals:    06/19/17 1537 06/19/17 1634   BP: 170/72 129/59   Pulse: 60 (!) 58   Resp: 16 16   Temp: 98.7 °F (37.1 °C)    SpO2: 100%    Weight: 76.7 kg (169 lb)    Height: 5' 8\" (1.727 m)             Physical Exam   Constitutional: He is oriented to person, place, and time.  He appears well-developed and well-nourished. Awake and alert   Cardiovascular: Normal rate, regular rhythm, normal heart sounds and intact distal pulses. Pulmonary/Chest: Effort normal and breath sounds normal. No respiratory distress. Abdominal: Soft. There is no tenderness. Musculoskeletal: He exhibits no edema. Neurological: He is alert and oriented to person, place, and time. Skin: Skin is warm and dry. Nursing note and vitals reviewed. MDM  Number of Diagnoses or Management Options  H/O diabetes mellitus:   Hypoglycemia:   Diagnosis management comments: DDx: diabetic complication, infection       Amount and/or Complexity of Data Reviewed  Clinical lab tests: ordered and reviewed  Review and summarize past medical records: yes    Patient Progress  Patient progress: stable    ED Course       Procedures    LABORATORY TESTS:  Recent Results (from the past 12 hour(s))   GLUCOSE, POC    Collection Time: 06/19/17  3:39 PM   Result Value Ref Range    Glucose (POC) 138 (H) 65 - 100 mg/dL    Performed by Lauren Wray    [facility] [place]    Collection Time: 06/19/17  3:57 PM   Result Value Ref Range    Calcium, ionized (POC) 0.99 (L) 1.12 - 1.32 MMOL/L    Sodium (POC) 138 136 - 145 MMOL/L    Potassium (POC) 4.0 3.5 - 5.1 MMOL/L    Chloride (POC) 102 98 - 107 MMOL/L    CO2 (POC) 24 21 - 32 MMOL/L    Anion gap (POC) 17 (H) 5 - 15 mmol/L    Glucose (POC) 147 (H) 65 - 100 MG/DL    BUN (POC) 24 (H) 9 - 20 MG/DL    Creatinine (POC) 1.2 0.6 - 1.3 MG/DL    GFR-AA (POC) >60 >60 ml/min/1.73m2    GFR, non-AA (POC) 60 (L) >60 ml/min/1.73m2    Hemoglobin (POC) 15.6 12.1 - 17.0 GM/DL    Hematocrit (POC) 46 36.6 - 50.3 %    Comment Comment Not Indicated.      GLUCOSE, POC    Collection Time: 06/19/17  4:42 PM   Result Value Ref Range    Glucose (POC) 137 (H) 65 - 100 mg/dL    Performed by Leah Johns        VITALS:  Patient Vitals for the past 12 hrs:   Temp Pulse Resp BP SpO2   06/19/17 1634 - (!) 58 16 129/59 -   06/19/17 1537 98.7 °F (37.1 °C) 60 16 170/72 100 %       IMPRESSION:  1. Hypoglycemia    2. H/O diabetes mellitus        PLAN:  1. Discharge Medication List as of 6/19/2017  4:45 PM        2. Follow-up Information     Follow up With Details Comments Contact Info    [facility] EMERGENCY DEPT  If symptoms worsen [address] Дмитрий Dash MD In 1 day to discuss your diabetes medicines [facility]  [phone]          3. Return to ED if worse     Discharge Note:  4:45 PM  Pt and daughter at bedside instructed to not take metformin tonight and decrease am dose of lantus to 20 units after breakfast, and restart metformin in am or call Dr Moiz Haley in am to discuss medications. The patient has been re-evaluated and is ready for discharge. Reviewed available results with patient. Counseled patient on diagnosis and care plan. Patient has expressed understanding, and all questions have been answered. Patient agrees with plan and agrees to follow up as recommended, or to return to the ED if their symptoms worsen. Discharge instructions have been provided and explained to the patient, along with reasons to return to the ED. This note is prepared by Zeny Banegas, acting as Scribe for Renzo Oro MD.    Renzo Oro MD: The scribe's documentation has been prepared under my direction and personally reviewed by me in its entirety. I confirm that the note above accurately reflects all work, treatment, procedures, and medical decision making performed by me.

## 2017-06-19 NOTE — ED NOTES
Pt arrives in ED with his daughter, pt's daughter reports pt's glucose was in the 30's this morning, she rechecked after giving him sugar water and Orange Juice and it was 48. Pt's daughter states, \"I think I rechecked it too soon. I don't think the orange juice had time to get in there. But, I called his doctor and he said to go ahead and bring him in here. \"      Emergency Department Nursing Plan of Care       The Nursing Plan of Care is developed from the Nursing assessment and Emergency Department Attending provider initial evaluation. The plan of care may be reviewed in the ED Provider note.     The Plan of Care was developed with the following considerations:   Patient / Family readiness to learn indicated by:verbalized understanding  Persons(s) to be included in education: patient and family  Barriers to Learning/Limitations:No    [address], RN    6/19/2017   3:59 PM

## 2017-06-19 NOTE — TELEPHONE ENCOUNTER
Pt. Notified office, blood sugar is 48. PLEASE HAVE FAMILY MEMBER OR FRIEND DRIVE YOU TO THE NEARSEST EMERGENCY ROOM ASAP. FAMILY WILL TAKE HIM TO [facility]. FOR FUTHER EVALUATION.

## 2017-06-19 NOTE — ED NOTES
Pt and daughter advised by Dr. Jack Lane that we would like to watch him for approximately and hour to be sure his glucose does not decrease again. Pt's MAR indicates pt to take 20 units of Lantus, pt's daughter reports pt takes 40 units.

## 2017-06-19 NOTE — ED NOTES
Pt given printed discharge instructions and 0 script(s). Pt verbalized understanding of instructions and script(s). Pt verbalized importance of following up with Dr. Tom Gonzales. Pt alert and oriented, in no acute distress, ambulatory with his daughter. Patient offered wheelchair from treatment area to hospital entrance, patient declined wheelchair. Pt and daughter understand need to hold Metformin this evening and check with PCP in the morning about changing medications.

## 2017-06-20 ENCOUNTER — PATIENT OUTREACH (OUTPATIENT)
Dept: INTERNAL MEDICINE CLINIC | Age: 72
End: 2017-06-20

## 2017-06-20 NOTE — PROGRESS NOTES
[facility] Discharge Follow-Up      Date/Time:  2017 9:19 AM    Patient listed on discharge [facility]) report on . Patient discharged from [facility] ED for hypoglycemia. RRAT score:   not available    Medical History:     Past Medical History:   Diagnosis Date    Allergic rhinitis, cause unspecified 2011    Alzheimer's disease     Arthritis     GOUT    Cancer ([facility] 2016    PROSTATE    Chronic obstructive pulmonary disease (HCC)     CHRONIC BRONCHITIS    Congestive heart failure, unspecified 2011    (ON 16 PT & DTR STATE THEY DON'T REMEMBER THIS DIAGNOSIS)    Coronary Artery Disease 2011    Diabetes Mellitus Type I 2011    Erectile Dysfunction 2011    Hemorrhoids 2011    Hypertension     Seizures ([address]     STARTED ; \"BLACK-OUT Riky Purpura SEIZURES\", DTR SATES    Stroke ([facility]     MULTIPLE MINISTROKES, DTR REPORTS; LEFT-SIDED WEAKNESS    Thromboembolus ([address]     left leg  (NO PE)    Unspecified asthma 2011       Nurse Navigator(NN) contacted the patient by telephone to perform post ED discharge assessment. Spoke with the patient's daughter, Ms. Angel Leon  Verified  and address with patient as identifiers. Provided introduction to self, and explanation of the Nurses Navigator role. Ms. Ally Trevino states that the patient is resting at this time and will speak with me regarding the patient's health. She reports that the patient's blood sugar was 122mg/dl this morning. Diet:   Ms. Ally Trevino reports: Diabetic Diet. And that he was able to eat breakfast without any difficulty. I encouraged Ms. Ally Trevino to ensure that the patient had a mid-morning snack. She agreed to do so and stated that she shall provide the patient with a snack at around 1100. She plans to serve the patient lunch around 1300.     Activity:    Ms. Ally Trevino reports: mostly moving around the house and somewalking outside the house    Medication:   Ms. Jonathan Johns gives the patient his daily medication. Performed medication reconciliation with Ms. Jonathan Johns, and she verbalizes understanding of administration of home medications. There were no barriers to obtaining medications identified at this time. Support system:  Patient, and extended family     DME:  Ms. Jonathan Johns states that the patient is need of diabetic shoes. Navigator identified that the patient does not assess his blood pressure at home. The patient is dual eligible for [place] and [place] Medicaid. A blood pressure cuff for home use should be covered benefit. The previous pair of diabetic shoes was obtained at: [address] [phone]. I requested for Ms. Jonathan Johns to [employer] and inquire if they could supply the b/p and diabetic shoes through the patient's [address] [place] [address] Rd. She agreed to do so today. She plans to request a prescription for the b/p cuff and diabetic shoes during the transition of care office visit scheduled for [id number]. Discharge Instructions :  Reviewed discharge instructions with Ms. Jonathan Johns and she verbalizes understanding of discharge instructions and follow-up care. Red Flags:  S/s hypoglycemia    Labs Reviewed:   Results for Tristen Ahumada (MRN [id number]) as of 6/20/2017 09:17   Ref.  Range 6/19/2017 15:57   GLUCOSE,FAST - POC Latest Ref Range: 65 - 100 MG/ (H)   Sodium (POC) Latest Ref Range: 136 - 145 MMOL/L 138   Potassium (POC) Latest Ref Range: 3.5 - 5.1 MMOL/L 4.0   Chloride (POC) Latest Ref Range: 98 - 107 MMOL/L 102   CO2 (POC) Latest Ref Range: 21 - 32 MMOL/L 24   BUN (POC) Latest Ref Range: 9 - 20 MG/DL 24 (H)   Anion gap (POC) Latest Ref Range: 5 - 15 mmol/L 17 (H)   Creatinine (POC) Latest Ref Range: 0.6 - 1.3 MG/DL 1.2   Calcium, ionized (POC) Latest Ref Range: 1.12 - 1.32 MMOL/L 0.99 (L)   GFR, non-AA (POC) Latest Ref Range: >60 ml/min/1.73m2 60 (L)   GFR-AA (POC) Latest Ref Range: >60 ml/min/1.73m2 >60   Hemoglobin (POC) Latest Ref Range: 12.1 - 17.0 GM/DL 15.6   Hematocrit (POC) Latest Ref Range: 36.6 - 50.3 % 46   Comment Latest Units:   Comment Not Indic. .. Imaging results reviewed:  No new studies    PCP/Specialist follow up: Patient scheduled to follow up with Kristen Bassett MD on [facility]. Reviewed red flags with Ms. Jose Zaldivar, and she verbalizes understanding. Ms. Jose Zaldivar was given an opportunity to ask questions. No other clinical/social/functional needs noted. The patient and family agreed to contact the PCP office for questions related to their healthcare. They expressed thanks, offered no additional questions and ended the call. Case management plan:   Shall attempt to contact the patient by telephone or during office visit within the next 7-10 days. Was Ms. Jose Zaldivar successful with contacting the DME supplier? Will continue to follow as necessary for the next 30 days. Will reassess for case management needs prior to discharge from case management service on or about 30 days.

## 2017-06-22 ENCOUNTER — OFFICE VISIT (OUTPATIENT)
Dept: INTERNAL MEDICINE CLINIC | Age: 72
End: 2017-06-22

## 2017-06-22 VITALS
HEART RATE: 66 BPM | OXYGEN SATURATION: 96 % | RESPIRATION RATE: 14 BRPM | TEMPERATURE: 97.8 F | SYSTOLIC BLOOD PRESSURE: 104 MMHG | BODY MASS INDEX: 25.46 KG/M2 | WEIGHT: 168 LBS | HEIGHT: 68 IN | DIASTOLIC BLOOD PRESSURE: 72 MMHG

## 2017-06-22 DIAGNOSIS — R56.9 SEIZURE (HCC): ICD-10-CM

## 2017-06-22 DIAGNOSIS — R19.5 GUAIAC POSITIVE STOOLS: ICD-10-CM

## 2017-06-22 DIAGNOSIS — E11.69 DIABETES MELLITUS TYPE 2 IN OBESE (HCC): Primary | ICD-10-CM

## 2017-06-22 DIAGNOSIS — E66.9 DIABETES MELLITUS TYPE 2 IN OBESE (HCC): Primary | ICD-10-CM

## 2017-06-22 LAB — GLUCOSE POC: 130 MG/DL

## 2017-06-22 NOTE — MR AVS SNAPSHOT
Visit Information Date & Time Provider Department Dept. Phone Encounter #  
 6/22/2017 11:45 AM Carolyn Chen MD [facility] [phone] [id number] Follow-up Instructions Return in about 4 weeks (around 7/20/2017), or if symptoms worsen or fail to improve. Follow-up and Disposition History Your Appointments 9/8/2017  9:30 AM  
ROUTINE CARE with Carolyn Chen MD  
[facility] - [address] [place] [address]) Appt Note: 3 month fu  
 [address] [facility],[address] [facility] [phone]  
[phone]  
  
   
 [address] [facility],[address] [place] [phone] Upcoming Health Maintenance Date Due  
 EYE EXAM RETINAL OR DILATED Q1 5/28/2016 FOOT EXAM Q1 4/12/2017 GLAUCOMA SCREENING Q2Y 5/28/2017 INFLUENZA AGE 9 TO ADULT 8/1/2017 Pneumococcal 65+ High/Highest Risk (2 of 2 - PPSV23) 11/1/2017 HEMOGLOBIN A1C Q6M 12/8/2017 MICROALBUMIN Q1 3/8/2018 LIPID PANEL Q1 6/8/2018 FOBT Q 1 YEAR AGE 50-75 6/8/2018 MEDICARE YEARLY EXAM 6/9/2018 DTaP/Tdap/Td series (2 - Td) 12/3/2024 Allergies as of 6/22/2017  Review Complete On: 6/22/2017 By: Sandy Cat LPN No Known Allergies Current Immunizations  Reviewed on 10/31/2016 Name Date Influenza High Dose Vaccine PF 10/31/2016, 10/30/2015, 12/3/2014 Influenza Vaccine Split 11/1/2012 Pneumococcal Vaccine (Unspecified Type) 11/1/2012 TB Skin Test (PPD) Intradermal 8/22/2014 Tdap 12/3/2014 Not reviewed this visit You Were Diagnosed With   
  
 Codes Comments Diabetes mellitus type 2 in obese (HCC)    -  Primary ICD-10-CM: E11.9, E66.9 ICD-9-CM: 250.00, 278.00 Guaiac positive stools     ICD-10-CM: R19.5 ICD-9-CM: 792.1 Seizure ([address]     ICD-10-CM: R56.9 ICD-9-CM: 780.39 Vitals BP Pulse Temp Resp Height(growth percentile) Weight(growth percentile) 104/72 66 97.8 °F (36.6 °C) (Oral) 14 5' 8\" (1.727 m) 168 lb (76.2 kg) SpO2 BMI Smoking Status 96% 25.54 kg/m2 Former Smoker BMI and BSA Data Body Mass Index Body Surface Area 25.54 kg/m 2 1.91 m 2 Preferred Pharmacy Pharmacy Name Phone [id number] [address] [address] [place] [phone] Your Updated Medication List  
  
   
This list is accurate as of: 6/22/17 12:37 PM.  Always use your most recent med list.  
  
  
  
  
 * ACCU-CHEK ALEXIS PLUS TEST STRP strip Generic drug:  glucose blood VI test strips TEST BLOOD SUGARS 2 TIMES DAILY * glucose blood VI test strips strip Commonly known as:  ONETOUCH ULTRA TEST Test 2 times daily  ONETOUCH ULTRA 2 TEST STRIPS ONLY  
  
 allopurinol 100 mg tablet Commonly known as:  ZYLOPRIM  
take 1 tablet by mouth once daily  
  
 amLODIPine 5 mg tablet Commonly known as:  NORVASC  
take 1 tablet by mouth once daily  
  
 aspirin 325 mg tablet Commonly known as:  ASPIRIN Take 325 mg by mouth daily. bumetanide 1 mg tablet Commonly known as:  BUMEX  
take 1 tablet by mouth once daily  
  
 insulin glargine 100 unit/mL (3 mL) Inpn Commonly known as:  LANTUS SOLOSTAR  
inject 20 units subcutaneously daily  
  
 lacosamide 100 mg Tab tablet Commonly known as:  VIMPAT  
take 1 tablet by mouth twice a day Lancets Misc Patient test 3 x daily  Dm 250  
  
 levETIRAcetam 1,000 mg tablet  
take 1 tablet by mouth twice a day  
  
 losartan 100 mg tablet Commonly known as:  COZAAR  
take 1 tablet by mouth once daily  
  
 metFORMIN 1,000 mg tablet Commonly known as:  GLUCOPHAGE  
take 1 tablet twice a day with food  
  
 metoprolol tartrate 50 mg tablet Commonly known as:  LOPRESSOR  
take 1 tablet by mouth twice a day  
  
 pravastatin 40 mg tablet Commonly known as:  PRAVACHOL  
take 1 tablet once daily * Notice: This list has 2 medication(s) that are the same as other medications prescribed for you. Read the directions carefully, and ask your doctor or other care provider to review them with you. We Performed the Following AMB POC GLUCOSE BLOOD, BY GLUCOSE MONITORING DEVICE [13442 CPT(R)] CBC W/O DIFF [09314 CPT(R)] METABOLIC PANEL, COMPREHENSIVE [59274 CPT(R)] REFERRAL TO GASTROENTEROLOGY [MQX33 Custom] Follow-up Instructions Return in about 4 weeks (around 2017), or if symptoms worsen or fail to improve. Referral Information Referral ID Referred By Referred To  
  
 [id number] Cathy Ochoa MD   
   [address] [facility] [address] [place], [place]ith [address]oncalves Phone: [phone] Fax: [phone] Visits Status Start Date End Date 1 New Request 17 If your referral has a status of pending review or denied, additional information will be sent to support the outcome of this decision. Patient Instructions Egoscue Activation Thank you for requesting access to Egoscue. Please follow the instructions below to securely access and download your online medical record. Egoscue allows you to send messages to your doctor, view your test results, renew your prescriptions, schedule appointments, and more. How Do I Sign Up? 1. In your internet browser, go to [email or web address] 
2. Click on the First Time User? Click Here link in the Sign In box. You will be redirect to the New Member Sign Up page. 3. Enter your Egoscue Access Code exactly as it appears below. You will not need to use this code after youve completed the sign-up process. If you do not sign up before the expiration date, you must request a new code. Egoscue Access Code: Activation code not generated Current Egoscue Status: Patient Declined (This is the date your Egoscue access code will ) 4. Enter the last four digits of your Social Security Number (xxxx) and Date of Birth (mm/dd/yyyy) as indicated and click Submit. You will be taken to the next sign-up page. 5. Create a Neuralieve ID. This will be your Neuralieve login ID and cannot be changed, so think of one that is secure and easy to remember. 6. Create a Neuralieve password. You can change your password at any time. 7. Enter your Password Reset Question and Answer. This can be used at a later time if you forget your password. 8. Enter your e-mail address. You will receive e-mail notification when new information is available in [address] 9. Click Sign Up. You can now view and download portions of your medical record. 10. Click the Download Summary menu link to download a portable copy of your medical information. Additional Information If you have questions, please visit the Frequently Asked Questions section of the Neuralieve website at [email or web address] Remember, Neuralieve is NOT to be used for urgent needs. For medical emergencies, dial 911. Please provide this summary of care documentation to your next provider. Your primary care clinician is listed as Aminata Grant. If you have any questions after today's visit, please call [phone].

## 2017-06-22 NOTE — PATIENT INSTRUCTIONS
[email or web address] Activation    Thank you for requesting access to UserTesting. Please follow the instructions below to securely access and download your online medical record. UserTesting allows you to send messages to your doctor, view your test results, renew your prescriptions, schedule appointments, and more. How Do I Sign Up? 1. In your internet browser, go to [email or web address]  2. Click on the First Time User? Click Here link in the Sign In box. You will be redirect to the New Member Sign Up page. 3. Enter your UserTesting Access Code exactly as it appears below. You will not need to use this code after youve completed the sign-up process. If you do not sign up before the expiration date, you must request a new code. UserTesting Access Code: Activation code not generated  Current UserTesting Status: Patient Declined (This is the date your UserTesting access code will )    4. Enter the last four digits of your Social Security Number (xxxx) and Date of Birth (mm/dd/yyyy) as indicated and click Submit. You will be taken to the next sign-up page. 5. Create a UserTesting ID. This will be your UserTesting login ID and cannot be changed, so think of one that is secure and easy to remember. 6. Create a UserTesting password. You can change your password at any time. 7. Enter your Password Reset Question and Answer. This can be used at a later time if you forget your password. 8. Enter your e-mail address. You will receive e-mail notification when new information is available in [address] 9. Click Sign Up. You can now view and download portions of your medical record. 10. Click the Download Summary menu link to download a portable copy of your medical information. Additional Information    If you have questions, please visit the Frequently Asked Questions section of the UserTesting website at [email or web address] Remember, UserTesting is NOT to be used for urgent needs. For medical emergencies, dial 911.

## 2017-06-22 NOTE — PROGRESS NOTES
Yelena Burroughs is a 67 y.o. male and presents with ED Follow-up (hypoglycemic) and Blood sugar problem  . Subjective:  He has positive stools for blood    Diabetes Mellitus Review:  He has diabetes mellitus. Diabetic ROS - medication compliance: compliant all of the time, diabetic diet compliance: compliant all of the time, home glucose monitoring: is performed. Known diabetic complications: hypoglycemia  Cardiovascular risk factors: family history, dyslipidemia, diabetes mellitus, obesity, hypertension  Current diabetic medications include /insulin   Eye exam current (within one year): no  Weight trend: stable  Prior visit with dietician: no  Current diet: \"healthy\" diet  in general  Current exercise: walking  Current monitoring regimen: home blood tests - daily  Home blood sugar records: trend: stable  Any episodes of hypoglycemia? no  Is He on ACE inhibitor or angiotensin II receptor blocker? Yes     Hypertension Review:  The patient has hypertension . Diet and Lifestyle: generally follows a low sodium diet, exercises sporadically  Home BP Monitoring: is not measured at home. Pertinent ROS: taking medications as instructed, no medication side effects noted, no TIA's, no chest pain on exertion, no dyspnea on exertion, no swelling of ankles. Dyslipidemia Review:  Patient presents for evaluation of lipids. Compliance with treatment thus far has been excellent. A repeat fasting lipid profile was done. The patient does not use medications that may worsen dyslipidemias . The patient exercises sporadically. The patient is not known to have coexisting coronary artery disease    Seizure Review:  Patient presents for evaluation of seizures. Patient has been stable without any new seizures reported. Patient has tolerated his medication thus far. Gout Review:  Patient has gout, primarily affecting the foot.  The patient has been stable with no recent joint pains  Symptoms onset: problem is longstanding. Rheumatological ROS: using NSAID medication regularly, daily. Response to treatment plan: symptoms have progressed to a point and plateaued. The most recent uric acid was normal.             Review of Systems  Constitutional: negative for fevers, chills, anorexia and weight loss  Eyes:   negative for visual disturbance and irritation  ENT:   negative for tinnitus,sore throat,nasal congestion,ear pains. hoarseness  Respiratory:  negative for cough, hemoptysis, dyspnea,wheezing  CV:   negative for chest pain, palpitations, lower extremity edema  GI:   negative for nausea, vomiting, diarrhea, abdominal pain,melena  Endo:               negative for polyuria,polydipsia,polyphagia,heat intolerance  Genitourinary: negative for frequency, dysuria and hematuria  Integument:  negative for rash and pruritus  Hematologic:  negative for easy bruising and gum/nose bleeding  Musculoskel: negative for myalgias, arthralgias, back pain, muscle weakness, joint pain  Neurological:  negative for headaches, dizziness, vertigo, memory problems and gait   Behavl/Psych: negative for feelings of anxiety, depression, mood changes    Past Medical History:   Diagnosis Date    Allergic rhinitis, cause unspecified 4/18/2011    Alzheimer's disease     Arthritis     GOUT    Cancer ([facility] [address] 09/20/2016    PROSTATE    Chronic obstructive pulmonary disease ([facility] [address]     CHRONIC BRONCHITIS    Congestive heart failure, unspecified 4/18/2011    (ON 9/20/16 PT & DTR STATE THEY DON'T REMEMBER THIS DIAGNOSIS)    Coronary Artery Disease 4/18/2011    Diabetes Mellitus Type I 4/18/2011    Erectile Dysfunction 4/18/2011    Hemorrhoids 4/18/2011    Hypertension     Seizures ([facility] [address]     STARTED 2005; \"BLACK-OUT [employer] SEIZURES\", DTR SATES    Stroke ([facility] [address]     MULTIPLE MINISTROKES, DTR REPORTS; LEFT-SIDED WEAKNESS    Thromboembolus ([facility] [address]     left leg  (NO PE)    Unspecified asthma 4/18/2011     Past Surgical History:   Procedure Laterality Date    CARDIAC SURG PROCEDURE UNLIST  2006    CABG    VASCULAR SURGERY PROCEDURE UNLIST      PERIPHERAL ANGIOGRAM, STENTS LEFT LEG     Social History     Social History    Marital status:      Spouse name: N/A    Number of children: N/A    Years of education: N/A     Social History Main Topics    Smoking status: Former Smoker     Packs/day: 2.00     Years: 30.00    Smokeless tobacco: Former User     Quit date: 9/20/1990    Alcohol use No      Comment: IN PAST, MODERATE ALCOHOL. WEEKENDS ONLY    Drug use: No    Sexual activity: Yes     Partners: Female     Birth control/ protection: None     Other Topics Concern    None     Social History Narrative     Family History   Problem Relation Age of Onset    Diabetes Mother     Hypertension Mother     Diabetes Father     Anesth Problems Neg Hx      Current Outpatient Prescriptions   Medication Sig Dispense Refill    aspirin (ASPIRIN) 325 mg tablet Take 325 mg by mouth daily.  lacosamide (VIMPAT) 100 mg tab tablet take 1 tablet by mouth twice a day 60 Tab 5    levETIRAcetam 1,000 mg tablet take 1 tablet by mouth twice a day 60 Tab 3    pravastatin (PRAVACHOL) 40 mg tablet take 1 tablet once daily 30 Tab 9    metoprolol tartrate (LOPRESSOR) 50 mg tablet take 1 tablet by mouth twice a day 60 Tab 4    losartan (COZAAR) 100 mg tablet take 1 tablet by mouth once daily 30 Tab 10    bumetanide (BUMEX) 1 mg tablet take 1 tablet by mouth once daily 30 Tab 12    amLODIPine (NORVASC) 5 mg tablet take 1 tablet by mouth once daily 90 Tab 3    metFORMIN (GLUCOPHAGE) 1,000 mg tablet take 1 tablet twice a day with food 60 Tab 12    insulin glargine (LANTUS SOLOSTAR) 100 unit/mL (3 mL) pen inject 20 units subcutaneously daily (Patient taking differently: 40 Units.  inject 20 units subcutaneously daily) 30 mL 6    glucose blood VI test strips (ONETOUCH ULTRA TEST) strip Test 2 times daily    ONETOUCH ULTRA 2 TEST STRIPS ONLY 100 Strip 11    allopurinol (ZYLOPRIM) 100 mg tablet take 1 tablet by mouth once daily 30 Tab 12    ACCU-CHEK ALEXIS PLUS TEST STRP strip TEST BLOOD SUGARS 2 TIMES DAILY 100 Strip PRN    Lancets misc Patient test 3 x daily  Dm 250 1 Package 11     No Known Allergies    Objective:  Visit Vitals    /72    Pulse 66    Temp 97.8 °F (36.6 °C) (Oral)    Resp 14    Ht 5' 8\" (1.727 m)    Wt 168 lb (76.2 kg)    SpO2 96%    BMI 25.54 kg/m2     Physical Exam:   General appearance - alert, well appearing, and in no distress  Mental status - alert, oriented to person, place, and time  EYE-CAROL, EOMI, corneas normal, no foreign bodies  ENT-ENT exam normal, no neck nodes or sinus tenderness  Nose - normal and patent, no erythema, discharge or polyps  Mouth - mucous membranes moist, pharynx normal without lesions  Neck - supple, no significant adenopathy   Chest - clear to auscultation, no wheezes, rales or rhonchi, symmetric air entry   Heart - normal rate, regular rhythm, normal S1, S2, no murmurs, rubs, clicks or gallops   Abdomen - soft, nontender, nondistended, no masses or organomegaly  Lymph- no adenopathy palpable  Ext-peripheral pulses normal, no pedal edema, no clubbing or cyanosis  Skin-Warm and dry. no hyperpigmentation, vitiligo, or suspicious lesions  Neuro -alert, oriented, normal speech, no focal findings or movement disorder noted  Neck-normal C-spine, no tenderness, full ROM without pain  Several abrasions to the hands and knees noted. Results for orders placed or performed in visit on 06/22/17   AMB POC GLUCOSE BLOOD, BY GLUCOSE MONITORING DEVICE   Result Value Ref Range    Glucose  mg/dL       Assessment/Plan:    ICD-10-CM ICD-9-CM    1. Diabetes mellitus type 2 in obese (HCC) E11.9 250.00 AMB POC GLUCOSE BLOOD, BY GLUCOSE MONITORING DEVICE    P48.4 744.02 METABOLIC PANEL, COMPREHENSIVE      CBC W/O DIFF   2.  Guaiac positive stools R19.5 792.1 REFERRAL TO GASTROENTEROLOGY   3. Seizure ([address] R56.9 780.39      Orders Placed This Encounter    METABOLIC PANEL, COMPREHENSIVE    CBC W/O DIFF    REFERRAL TO GASTROENTEROLOGY     Referral Priority:   Routine     Referral Type:   Consultation     Referral Reason:   Specialty Services Required     Referred to Provider:   Brad Sharp MD     Number of Visits Requested:   1    AMB POC GLUCOSE BLOOD, BY GLUCOSE MONITORING DEVICE     lose weight, increase physical activity, follow low fat diet, follow low salt diet,Take 81mg aspirin daily  Patient Instructions   [employer] Activation    Thank you for requesting access to L2. Please follow the instructions below to securely access and download your online medical record. L2 allows you to send messages to your doctor, view your test results, renew your prescriptions, schedule appointments, and more. How Do I Sign Up? 1. In your internet browser, go to [email or web address]  2. Click on the First Time User? Click Here link in the Sign In box. You will be redirect to the New Member Sign Up page. 3. Enter your L2 Access Code exactly as it appears below. You will not need to use this code after youve completed the sign-up process. If you do not sign up before the expiration date, you must request a new code. L2 Access Code: Activation code not generated  Current L2 Status: Patient Declined (This is the date your L2 access code will )    4. Enter the last four digits of your Social Security Number (xxxx) and Date of Birth (mm/dd/yyyy) as indicated and click Submit. You will be taken to the next sign-up page. 5. Create a L2 ID. This will be your L2 login ID and cannot be changed, so think of one that is secure and easy to remember. 6. Create a L2 password. You can change your password at any time. 7. Enter your Password Reset Question and Answer. This can be used at a later time if you forget your password. 8. Enter your e-mail address.  You will receive e-mail notification when new information is available in [address] 9. Click Sign Up. You can now view and download portions of your medical record. 10. Click the Download Summary menu link to download a portable copy of your medical information. Additional Information    If you have questions, please visit the Frequently Asked Questions section of the [employer] website at [email or web address] Remember, [employer] is NOT to be used for urgent needs. For medical emergencies, dial 911. Follow-up Disposition:  Return in about 4 weeks (around 7/20/2017), or if symptoms worsen or fail to improve. I have reviewed with the patient details of the assessment and plan and all questions were answered. Relevent patient education was performed    An After Visit Summary was printed and given to the patient.

## 2017-06-23 LAB
ALBUMIN SERPL-MCNC: 4 G/DL (ref 3.5–4.8)
ALBUMIN/GLOB SERPL: 1.5 {RATIO} (ref 1.2–2.2)
ALP SERPL-CCNC: 50 IU/L (ref 39–117)
ALT SERPL-CCNC: 10 IU/L (ref 0–44)
AST SERPL-CCNC: 15 IU/L (ref 0–40)
BILIRUB SERPL-MCNC: 0.3 MG/DL (ref 0–1.2)
BUN SERPL-MCNC: 31 MG/DL (ref 8–27)
BUN/CREAT SERPL: 23 (ref 10–24)
CALCIUM SERPL-MCNC: 10 MG/DL (ref 8.6–10.2)
CHLORIDE SERPL-SCNC: 101 MMOL/L (ref 96–106)
CO2 SERPL-SCNC: 25 MMOL/L (ref 18–29)
CREAT SERPL-MCNC: 1.37 MG/DL (ref 0.76–1.27)
ERYTHROCYTE [DISTWIDTH] IN BLOOD BY AUTOMATED COUNT: 14.1 % (ref 12.3–15.4)
GLOBULIN SER CALC-MCNC: 2.7 G/DL (ref 1.5–4.5)
GLUCOSE SERPL-MCNC: 112 MG/DL (ref 65–99)
HCT VFR BLD AUTO: 41.1 % (ref 37.5–51)
HGB BLD-MCNC: 12.9 G/DL (ref 12.6–17.7)
INTERPRETATION: NORMAL
Lab: NORMAL
MCH RBC QN AUTO: 27.7 PG (ref 26.6–33)
MCHC RBC AUTO-ENTMCNC: 31.4 G/DL (ref 31.5–35.7)
MCV RBC AUTO: 88 FL (ref 79–97)
PLATELET # BLD AUTO: 238 X10E3/UL (ref 150–379)
POTASSIUM SERPL-SCNC: 5.4 MMOL/L (ref 3.5–5.2)
PROT SERPL-MCNC: 6.7 G/DL (ref 6–8.5)
RBC # BLD AUTO: 4.66 X10E6/UL (ref 4.14–5.8)
SODIUM SERPL-SCNC: 144 MMOL/L (ref 134–144)
WBC # BLD AUTO: 7.2 X10E3/UL (ref 3.4–10.8)

## 2017-07-13 RX ORDER — INSULIN GLARGINE 100 [IU]/ML
40 INJECTION, SOLUTION SUBCUTANEOUS DAILY
Qty: 15 ML | Refills: 12 | Status: SHIPPED | OUTPATIENT
Start: 2017-07-13 | End: 2017-11-20 | Stop reason: ALTCHOICE

## 2017-07-17 RX ORDER — PEN NEEDLE, DIABETIC 31 GX3/16"
NEEDLE, DISPOSABLE MISCELLANEOUS
Qty: 100 PEN NEEDLE | Refills: 11 | Status: ON HOLD | OUTPATIENT
Start: 2017-07-17 | End: 2021-07-15

## 2017-07-25 ENCOUNTER — HOSPITAL ENCOUNTER (EMERGENCY)
Age: 72
Discharge: HOME | End: 2017-07-25
Attending: EMERGENCY MEDICINE
Payer: MEDICARE

## 2017-07-25 ENCOUNTER — APPOINTMENT (OUTPATIENT)
Dept: CT IMAGING | Age: 72
End: 2017-07-25
Attending: EMERGENCY MEDICINE
Payer: MEDICARE

## 2017-07-25 VITALS
DIASTOLIC BLOOD PRESSURE: 74 MMHG | RESPIRATION RATE: 16 BRPM | BODY MASS INDEX: 23.51 KG/M2 | WEIGHT: 164.24 LBS | HEART RATE: 65 BPM | OXYGEN SATURATION: 98 % | HEIGHT: 70 IN | SYSTOLIC BLOOD PRESSURE: 137 MMHG | TEMPERATURE: 97.7 F

## 2017-07-25 DIAGNOSIS — S76.012A HIP STRAIN, LEFT, INITIAL ENCOUNTER: Primary | ICD-10-CM

## 2017-07-25 LAB
APPEARANCE UR: CLEAR
BACTERIA URNS QL MICRO: NEGATIVE /HPF
BILIRUB UR QL: NEGATIVE
COLOR UR: NORMAL
EPITH CASTS URNS QL MICRO: NORMAL /LPF
GLUCOSE UR STRIP.AUTO-MCNC: NEGATIVE MG/DL
HGB UR QL STRIP: NEGATIVE
HYALINE CASTS URNS QL MICRO: NORMAL /LPF (ref 0–5)
KETONES UR QL STRIP.AUTO: NEGATIVE MG/DL
LEUKOCYTE ESTERASE UR QL STRIP.AUTO: NEGATIVE
NITRITE UR QL STRIP.AUTO: NEGATIVE
PH UR STRIP: 5 [PH] (ref 5–8)
PROT UR STRIP-MCNC: NEGATIVE MG/DL
RBC #/AREA URNS HPF: NORMAL /HPF (ref 0–5)
SP GR UR REFRACTOMETRY: 1.01 (ref 1–1.03)
UROBILINOGEN UR QL STRIP.AUTO: 0.2 EU/DL (ref 0.2–1)
WBC URNS QL MICRO: NORMAL /HPF (ref 0–4)

## 2017-07-25 PROCEDURE — 81001 URINALYSIS AUTO W/SCOPE: CPT | Performed by: EMERGENCY MEDICINE

## 2017-07-25 PROCEDURE — 74011250637 HC RX REV CODE- 250/637: Performed by: EMERGENCY MEDICINE

## 2017-07-25 PROCEDURE — 74176 CT ABD & PELVIS W/O CONTRAST: CPT

## 2017-07-25 PROCEDURE — 99283 EMERGENCY DEPT VISIT LOW MDM: CPT

## 2017-07-25 RX ORDER — HYDROCODONE BITARTRATE AND ACETAMINOPHEN 5; 325 MG/1; MG/1
1 TABLET ORAL
Qty: 20 TAB | Refills: 0 | Status: ON HOLD | OUTPATIENT
Start: 2017-07-25 | End: 2021-07-15

## 2017-07-25 RX ORDER — HYDROCODONE BITARTRATE AND ACETAMINOPHEN 7.5; 325 MG/1; MG/1
1 TABLET ORAL
Status: COMPLETED | OUTPATIENT
Start: 2017-07-25 | End: 2017-07-25

## 2017-07-25 RX ADMIN — HYDROCODONE BITARTRATE AND ACETAMINOPHEN 1 TABLET: 7.5; 325 TABLET ORAL at 14:25

## 2017-07-25 NOTE — ED PROVIDER NOTES
HPI Comments: Shreyas Castillo, 67 y.o. male, presents ambulatory to ED [facility] ED with cc of 5/10 progressively worsening left flank pain that radiates to his left groin x 2 days. He denies any exacerbating factors for his pain. He reports that he has had normal bowel movements, but he states that they are green in color. He states that he takes insulin daily. He denies a history of kidney stones or taking medications PTA. He specifically denies nausea, fevers, hematuria, testicular pain, testicular swelling, dysuria, or frequency. PCP: Ayala Clement MD    PMHx significant for: DM, CAD, erectile dysfunction, HTN, COPD, stroke, CHF, prostate cancer, Alzheimer's disease  Social history significant for: - Tobacco (former), - EtOH, - Illicit Drug Use    There are no other complaints, changes, or physical findings at this time. Written by EDER Castellanos, as dictated by Joaquin Kaye DO. The history is provided by the patient. No  was used.         Past Medical History:   Diagnosis Date    Allergic rhinitis, cause unspecified 4/18/2011    Alzheimer's disease     Arthritis     GOUT    Cancer ([address] 09/20/2016    PROSTATE    Chronic obstructive pulmonary disease (HCC)     CHRONIC BRONCHITIS    Congestive heart failure, unspecified 4/18/2011    (ON 9/20/16 PT & DTR STATE THEY DON'T REMEMBER THIS DIAGNOSIS)    Coronary Artery Disease 4/18/2011    Diabetes Mellitus Type I 4/18/2011    Erectile Dysfunction 4/18/2011    Hemorrhoids 4/18/2011    Hypertension     Seizures ([address]     STARTED 2005; \"BLACK-OUT [employer] SEIZURES\", DTR SATES    Stroke ([address]     MULTIPLE MINISTROKES, DTR REPORTS; LEFT-SIDED WEAKNESS    Thromboembolus ([address]     left leg  (NO PE)    Unspecified asthma 4/18/2011       Past Surgical History:   Procedure Laterality Date    CARDIAC SURG PROCEDURE UNLIST  2006    CABG    VASCULAR SURGERY PROCEDURE UNLIST      PERIPHERAL ANGIOGRAM, STENTS LEFT LEG Family History:   Problem Relation Age of Onset    Diabetes Mother     Hypertension Mother     Diabetes Father     Anesth Problems Neg Hx        Social History     Social History    Marital status:      Spouse name: N/A    Number of children: N/A    Years of education: N/A     Occupational History    Not on file. Social History Main Topics    Smoking status: Former Smoker     Packs/day: 2.00     Years: 30.00    Smokeless tobacco: Former User     Quit date: 9/20/1990    Alcohol use No      Comment: IN PAST, MODERATE ALCOHOL. WEEKENDS ONLY    Drug use: No    Sexual activity: Yes     Partners: Female     Birth control/ protection: None     Other Topics Concern    Not on file     Social History Narrative         ALLERGIES: Review of patient's allergies indicates no known allergies. Review of Systems   Constitutional: Negative. Negative for appetite change, chills, fatigue and fever. HENT: Negative. Negative for congestion, rhinorrhea, sinus pressure and sore throat. Eyes: Negative. Respiratory: Negative. Negative for cough, choking, chest tightness, shortness of breath and wheezing. Cardiovascular: Negative. Negative for chest pain, palpitations and leg swelling. Gastrointestinal: Negative for abdominal pain, constipation, diarrhea, nausea and vomiting. Endocrine: Negative. Genitourinary: Positive for flank pain. Negative for difficulty urinating, dysuria, frequency, hematuria, penile swelling, scrotal swelling, testicular pain and urgency. (+) Left-sided groin pain   Skin: Negative. Neurological: Negative. Negative for dizziness, speech difficulty, weakness, light-headedness, numbness and headaches. Psychiatric/Behavioral: Negative. All other systems reviewed and are negative.       Vitals:    07/25/17 1259   BP: 137/74   Pulse: 65   Resp: 16   Temp: 97.7 °F (36.5 °C)   SpO2: 98%   Weight: 74.5 kg (164 lb 3.9 oz)   Height: 5' 10\" (1.778 m) Physical Exam   Constitutional: He is oriented to person, place, and time. He appears well-developed and well-nourished. No distress. HENT:   Head: Normocephalic and atraumatic. Mouth/Throat: Oropharynx is clear and moist. No oropharyngeal exudate. Eyes: Conjunctivae and EOM are normal. Pupils are equal, round, and reactive to light. Neck: Normal range of motion. Neck supple. No JVD present. No tracheal deviation present. Cardiovascular: Normal rate, regular rhythm, normal heart sounds and intact distal pulses. No murmur heard. Pulmonary/Chest: Effort normal and breath sounds normal. No stridor. No respiratory distress. He has no wheezes. He has no rales. He exhibits no tenderness. Abdominal: Soft. He exhibits no distension. There is tenderness (LLQ). There is no rebound and no guarding. Musculoskeletal: Normal range of motion. He exhibits no edema or tenderness. Neurological: He is alert and oriented to person, place, and time. No cranial nerve deficit. No gross motor or sensory deficits    Skin: Skin is warm and dry. He is not diaphoretic. Psychiatric: He has a normal mood and affect. His behavior is normal.   Nursing note and vitals reviewed. MDM  Number of Diagnoses or Management Options  Diagnosis management comments:   DDx: UTI, kidney stone, pyelonephritis, musculoskeletal pain       Amount and/or Complexity of Data Reviewed  Clinical lab tests: reviewed and ordered  Tests in the radiology section of CPT®: ordered and reviewed  Review and summarize past medical records: yes    Patient Progress  Patient progress: stable       ED Course       Procedures    Progress Note:  3:46 PM  Discussed with the patient his lab and imaging results. He conveys his understanding. The pt's daughter is at bedside. He states that he is feeling better. Will discharge.    Written by Zuleyka Huertas ED Scribxochilt, as dictated by Megan Middleton DO.    LABORATORY TESTS:  Recent Results (from the past 12 hour(s))   URINALYSIS W/MICROSCOPIC    Collection Time: 07/25/17  2:11 PM   Result Value Ref Range    Color YELLOW/STRAW      Appearance CLEAR CLEAR      Specific gravity 1.010 1.003 - 1.030      pH (UA) 5.0 5.0 - 8.0      Protein NEGATIVE  NEG mg/dL    Glucose NEGATIVE  NEG mg/dL    Ketone NEGATIVE  NEG mg/dL    Bilirubin NEGATIVE  NEG      Blood NEGATIVE  NEG      Urobilinogen 0.2 0.2 - 1.0 EU/dL    Nitrites NEGATIVE  NEG      Leukocyte Esterase NEGATIVE  NEG      WBC 0-4 0 - 4 /hpf    RBC 0-5 0 - 5 /hpf    Epithelial cells FEW FEW /lpf    Bacteria NEGATIVE  NEG /hpf    Hyaline cast 2-5 0 - 5 /lpf       IMAGING RESULTS:  CT Results  (Last 48 hours)               07/25/17 1457  CT ABD PELV WO CONT Final result    Impression:  IMPRESSION:       1. No urinary tract calculi or hydronephrosis. No acute findings in the abdomen   or pelvis. 2. Cholelithiasis. Narrative:  EXAM:  CT ABD PELV WO CONT       INDICATION: Left flank pain       COMPARISON: 8/21/2016       CONTRAST:  None. TECHNIQUE:    Thin axial images were obtained through the abdomen and pelvis. Coronal and   sagittal reconstructions were generated. Oral contrast was not administered. CT   dose reduction was achieved through use of a standardized protocol tailored for   this examination and automatic exposure control for dose modulation. The absence of intravenous contrast material reduces the sensitivity for   evaluation of the solid parenchymal organs of the abdomen. FINDINGS:    LUNG BASES: Clear. INCIDENTALLY IMAGED HEART AND MEDIASTINUM: Aortic valve prosthesis. LIVER: No mass or biliary dilatation. GALLBLADDER: Small stones in the dependent portion. SPLEEN: No mass. PANCREAS: No mass or ductal dilatation. ADRENALS: Unremarkable. KIDNEYS/URETERS: No mass, calculus, or hydronephrosis. STOMACH: Unremarkable. SMALL BOWEL: No dilatation or wall thickening. COLON: No dilatation or wall thickening.  No significant diverticular disease. APPENDIX: Unremarkable. PERITONEUM: No ascites or pneumoperitoneum. RETROPERITONEUM: Atherosclerotic aorta without significant dilatation. Unchanged   ectasia of the common iliac arteries, 1.7 cm on the left and 1.3 cm on the   right. No retroperitoneal adenopathy. Left-sided inferior vena cava, a normal   anatomic variant. REPRODUCTIVE ORGANS: Mild prostatic enlargement. URINARY BLADDER: No mass or calculus. BONES: No destructive bone lesion. ADDITIONAL COMMENTS: N/A                 MEDICATIONS GIVEN:  Medications   HYDROcodone-acetaminophen (NORCO) 7.5-325 mg per tablet 1 Tab (1 Tab Oral Given 7/25/17 1425)       IMPRESSION:  1. Hip strain, left, initial encounter        PLAN:  1. Current Discharge Medication List      START taking these medications    Details   HYDROcodone-acetaminophen (NORCO) 5-325 mg per tablet Take 1 Tab by mouth every four (4) hours as needed for Pain. Max Daily Amount: 6 Tabs. Qty: 20 Tab, Refills: 0           2. Follow-up Information     Follow up With Details Comments [address] [place] [address]. Wesly Price., MD   [address] [facility] [address]  [phone]          Return to ED if worse     Discharge Note:  3:46 PM  The pt is ready for discharge. The pt's signs, symptoms, diagnosis, and discharge instructions have been discussed and pt has conveyed their understanding. The pt is to follow up as recommended or return to ER should their symptoms worsen. Plan has been discussed and pt is in agreement. This note is prepared by Jessica Strong, acting as a Scribe for Bindu Salinas, [address] [facility], DO: The scribe's documentation has been prepared under my direction and personally reviewed by me in its entirety. I confirm that the notes above accurately reflects all work, treatment, procedures, and medical decision making performed by me.

## 2017-07-25 NOTE — DISCHARGE INSTRUCTIONS
Hip Flexor Strain: Rehab Exercises  Your Care Instructions  Here are some examples of typical rehabilitation exercises for your condition. Start each exercise slowly. Ease off the exercise if you start to have pain. Your doctor or physical therapist will tell you when you can start these exercises and which ones will work best for you. How to do the exercises  Pelvic tilt with marching    1. Lie on your back with your knees bent and your feet flat on the floor. 2. Tighten your belly muscles and buttocks, and press your lower back to the floor. 3. Keeping your knees bent, lift and then lower one leg up off the floor, and then lift and lower your other leg like you are marching. Each time you lift your leg, hold that position for about 6 seconds before lowering your leg. 4. Repeat 8 to 12 times. Scissors    1. Lie on your back with your knees bent at a 90-degree angle and your feet off the floor. 2. Tighten your belly muscles and buttocks, and press your lower back to the floor. 3. Slowly straighten one leg, and hold that position for about 6 seconds. Your leg should be about 12 inches off the floor. Bring that leg back to the starting position, and then straighten your other leg. Hold that position for about 6 seconds, and then switch legs again. 4. Repeat 8 to 12 times. Hamstring stretch (lying down)    1. Lie flat on your back with your legs straight. If you feel discomfort in your back, place a small towel roll under your lower back. 2. Holding the back of your affected leg for support, lift your leg straight up and toward your body until you feel a stretch at the back of your thigh. 3. Hold the stretch for at least 30 seconds. 4. Repeat 2 to 4 times. Quadricep and hip flexor stretch (lying on side)    1. Lie on your side with your good leg flat on the floor and your hand supporting your head.   2. Bend your top leg, and reach behind you to grab the front of that foot or ankle with your other hand.  3. Stretch your leg back by pulling your foot toward your buttock. You will feel the stretch in the front of your thigh. If this causes stress on your knee, do not do this stretch. 4. Hold the stretch for at least 15 to 30 seconds. 5. Repeat 2 to 4 times. Hip flexor stretch (kneeling)    1. Kneel on your affected leg and bend your good leg out in front of you, with that foot flat on the floor. If you feel discomfort in the front of your knee, place a towel under your knee. 2. Keeping your back straight, slowly push your hips forward until you feel a stretch in the upper thigh of your back leg and hip. 3. Hold the stretch for at least 15 to 30 seconds. 4. Repeat 2 to 4 times. Hip flexor stretch (edge of table)    1. Lie flat on your back on a table or flat bench, with your knees and lower legs hanging off the edge of the table. 2. Grab your good leg at the knee, and pull that knee back toward your chest. Relax your affected leg and let it hang down toward the floor until you feel a stretch in the upper thigh of your affected leg and hip. 3. Hold the stretch for at least 15 to 30 seconds. 4. Repeat 2 to 4 times. Follow-up care is a key part of your treatment and safety. Be sure to make and go to all appointments, and call your doctor if you are having problems. It's also a good idea to know your test results and keep a list of the medicines you take. Where can you learn more? Go to http://benjamin-arcadio.info/. Enter G109 in the search box to learn more about \"Hip Flexor Strain: Rehab Exercises. \"  Current as of: March 21, 2017  Content Version: 11.3  © 6659-8736 [employer]. Care instructions adapted under license by [employer] (which disclaims liability or warranty for this information).  If you have questions about a medical condition or this instruction, always ask your healthcare professional. Enio Bernards disclaims any warranty or liability for your use of this information.

## 2017-07-25 NOTE — ED NOTES
Discharge instructions reviewed w/ pt and copy given by Dr. Karo Quintanilla. Pt discharged ambulatory from ED to care of daughter.

## 2017-07-27 ENCOUNTER — OFFICE VISIT (OUTPATIENT)
Dept: INTERNAL MEDICINE CLINIC | Age: 72
End: 2017-07-27

## 2017-07-27 VITALS
BODY MASS INDEX: 23.62 KG/M2 | SYSTOLIC BLOOD PRESSURE: 114 MMHG | HEIGHT: 70 IN | TEMPERATURE: 97.6 F | RESPIRATION RATE: 16 BRPM | HEART RATE: 64 BPM | WEIGHT: 165 LBS | OXYGEN SATURATION: 98 % | DIASTOLIC BLOOD PRESSURE: 68 MMHG

## 2017-07-27 DIAGNOSIS — E11.69 DIABETES MELLITUS TYPE 2 IN OBESE (HCC): Primary | ICD-10-CM

## 2017-07-27 DIAGNOSIS — C61 PROSTATE CANCER (HCC): ICD-10-CM

## 2017-07-27 DIAGNOSIS — I10 ESSENTIAL HYPERTENSION: ICD-10-CM

## 2017-07-27 DIAGNOSIS — R19.5 GUAIAC POSITIVE STOOLS: ICD-10-CM

## 2017-07-27 DIAGNOSIS — E66.9 DIABETES MELLITUS TYPE 2 IN OBESE (HCC): Primary | ICD-10-CM

## 2017-07-27 LAB — GLUCOSE POC: 135 MG/DL

## 2017-07-27 NOTE — PATIENT INSTRUCTIONS
[employer] Activation    Thank you for requesting access to [employer]. Please follow the instructions below to securely access and download your online medical record. [employer] allows you to send messages to your doctor, view your test results, renew your prescriptions, schedule appointments, and more. How Do I Sign Up? 1. In your internet browser, go to [email or web address]  2. Click on the First Time User? Click Here link in the Sign In box. You will be redirect to the New Member Sign Up page. 3. Enter your [employer] Access Code exactly as it appears below. You will not need to use this code after youve completed the sign-up process. If you do not sign up before the expiration date, you must request a new code. [employer] Access Code: Activation code not generated  Current [employer] Status: Patient Declined (This is the date your [employer] access code will )    4. Enter the last four digits of your Social Security Number (xxxx) and Date of Birth (mm/dd/yyyy) as indicated and click Submit. You will be taken to the next sign-up page. 5. Create a [employer] ID. This will be your [employer] login ID and cannot be changed, so think of one that is secure and easy to remember. 6. Create a [employer] password. You can change your password at any time. 7. Enter your Password Reset Question and Answer. This can be used at a later time if you forget your password. 8. Enter your e-mail address. You will receive e-mail notification when new information is available in [address] 9. Click Sign Up. You can now view and download portions of your medical record. 10. Click the Download Summary menu link to download a portable copy of your medical information. Additional Information    If you have questions, please visit the Frequently Asked Questions section of the [employer] website at [email or web address] Remember, [employer] is NOT to be used for urgent needs. For medical emergencies, dial 911.

## 2017-07-27 NOTE — MR AVS SNAPSHOT
Visit Information Date & Time Provider Department Dept. Phone Encounter #  
 7/27/2017  9:30 AM MD Shekhar Naylor[address] [place] [phone] [id number] Follow-up Instructions Return in about 4 weeks (around 8/24/2017), or if symptoms worsen or fail to improve. Follow-up and Disposition History Your Appointments 9/8/2017  9:30 AM  
ROUTINE CARE with Brigette Tatum MD  
[facility] - [place]ja Nu[facility]ez ([facility]) Appt Note: 3 month fu  
 [address] [facility],[address] [place] [address] 7 [id number]  
[phone]  
  
   
 [address] [place] [address] [phone] Upcoming Health Maintenance Date Due  
 EYE EXAM RETINAL OR DILATED Q1 5/28/2016 FOOT EXAM Q1 4/12/2017 GLAUCOMA SCREENING Q2Y 5/28/2017 INFLUENZA AGE 9 TO ADULT 8/1/2017 Pneumococcal 65+ High/Highest Risk (2 of 2 - PPSV23) 11/1/2017 HEMOGLOBIN A1C Q6M 12/8/2017 MICROALBUMIN Q1 3/8/2018 LIPID PANEL Q1 6/8/2018 FOBT Q 1 YEAR AGE 50-75 6/8/2018 MEDICARE YEARLY EXAM 6/9/2018 DTaP/Tdap/Td series (2 - Td) 12/3/2024 Allergies as of 7/27/2017  Review Complete On: 7/27/2017 By: Brigette Tatum MD  
 No Known Allergies Current Immunizations  Reviewed on 10/31/2016 Name Date Influenza High Dose Vaccine PF 10/31/2016, 10/30/2015, 12/3/2014 Influenza Vaccine Split 11/1/2012 TB Skin Test (PPD) Intradermal 8/22/2014 Tdap 12/3/2014 ZZZ-RETIRED (DO NOT USE) Pneumococcal Vaccine (Unspecified Type) 11/1/2012 Not reviewed this visit You Were Diagnosed With   
  
 Codes Comments Diabetes mellitus type 2 in obese (HCC)    -  Primary ICD-10-CM: E11.9, E66.9 ICD-9-CM: 250.00, 278.00 Essential hypertension     ICD-10-CM: I10 
ICD-9-CM: 401.9 Prostate cancer [facility])     ICD-10-CM: L52 ICD-9-CM: 627 Guaiac positive stools     ICD-10-CM: R19.5 ICD-9-CM: 792.1 Vitals BP Pulse Temp Resp Height(growth percentile) Weight(growth percentile) 114/68 64 97.6 °F (36.4 °C) (Oral) 16 5' 10\" (1.778 m) 165 lb (74.8 kg) SpO2 BMI Smoking Status 98% 23.68 kg/m2 Former Smoker BMI and BSA Data Body Mass Index Body Surface Area  
 23.68 kg/m 2 1.92 m 2 Preferred Pharmacy Pharmacy Name Phone [id number] [facility] [phone] Your Updated Medication List  
  
   
This list is accurate as of: 7/27/17 11:59 PM.  Always use your most recent med list.  
  
  
  
  
 * ACCU-CHEK ALEXIS PLUS TEST STRP strip Generic drug:  glucose blood VI test strips TEST BLOOD SUGARS 2 TIMES DAILY * glucose blood VI test strips strip Commonly known as:  ONETOUCH ULTRA TEST Test 2 times daily  ONETOUCH ULTRA 2 TEST STRIPS ONLY  
  
 allopurinol 100 mg tablet Commonly known as:  ZYLOPRIM  
take 1 tablet by mouth once daily  
  
 amLODIPine 5 mg tablet Commonly known as:  NORVASC  
take 1 tablet by mouth once daily  
  
 aspirin 325 mg tablet Commonly known as:  ASPIRIN Take 325 mg by mouth daily. bumetanide 1 mg tablet Commonly known as:  BUMEX  
take 1 tablet by mouth once daily HYDROcodone-acetaminophen 5-325 mg per tablet Commonly known as:  Rhenda Thomas Take 1 Tab by mouth every four (4) hours as needed for Pain. Max Daily Amount: 6 Tabs. insulin glargine 100 unit/mL (3 mL) Inpn Commonly known as:  LANTUS SOLOSTAR  
40 Units by SubCUTAneous route daily. inject 20 units subcutaneously daily Insulin [place] (Disposable) 32 gauge x 5/32\" Ndle Commonly known as:  Joseline Pen Needle Use daily as directed with insulin pen.  
  
 lacosamide 100 mg Tab tablet Commonly known as:  VIMPAT  
take 1 tablet by mouth twice a day Lancets Misc Patient test 3 x daily  Dm 250  
  
 levETIRAcetam 1,000 mg tablet  
take 1 tablet by mouth twice a day  
  
 losartan 100 mg tablet Commonly known as:  COZAAR  
take 1 tablet by mouth once daily  
  
 metFORMIN 1,000 mg tablet Commonly known as:  GLUCOPHAGE  
take 1 tablet twice a day with food  
  
 metoprolol tartrate 50 mg tablet Commonly known as:  LOPRESSOR  
take 1 tablet by mouth twice a day  
  
 pravastatin 40 mg tablet Commonly known as:  PRAVACHOL  
take 1 tablet once daily * Notice: This list has 2 medication(s) that are the same as other medications prescribed for you. Read the directions carefully, and ask your doctor or other care provider to review them with you. We Performed the Following AMB POC GLUCOSE BLOOD, BY GLUCOSE MONITORING DEVICE [36422 CPT(R)] Follow-up Instructions Return in about 4 weeks (around 2017), or if symptoms worsen or fail to improve. Patient Instructions [employer] Activation Thank you for requesting access to [employer]. Please follow the instructions below to securely access and download your online medical record. [employer] allows you to send messages to your doctor, view your test results, renew your prescriptions, schedule appointments, and more. How Do I Sign Up? 1. In your internet browser, go to [email or web address] 
2. Click on the First Time User? Click Here link in the Sign In box. You will be redirect to the New Member Sign Up page. 3. Enter your [employer] Access Code exactly as it appears below. You will not need to use this code after youve completed the sign-up process. If you do not sign up before the expiration date, you must request a new code. [employer] Access Code: Activation code not generated Current [employer] Status: Patient Declined (This is the date your [employer] access code will ) 4. Enter the last four digits of your Social Security Number (xxxx) and Date of Birth (mm/dd/yyyy) as indicated and click Submit. You will be taken to the next sign-up page. 5. Create a [employer] ID. This will be your [employer] login ID and cannot be changed, so think of one that is secure and easy to remember. 6. Create a [employer] password. You can change your password at any time. 7. Enter your Password Reset Question and Answer. This can be used at a later time if you forget your password. 8. Enter your e-mail address. You will receive e-mail notification when new information is available in [address] 9. Click Sign Up. You can now view and download portions of your medical record. 10. Click the Download Summary menu link to download a portable copy of your medical information. Additional Information If you have questions, please visit the Frequently Asked Questions section of the [employer] website at [email or web address] Remember, [email or web address] is NOT to be used for urgent needs. For medical emergencies, dial 911. Introducing [facility] SERVICES! Dear Carl Lee: 
Thank you for requesting a [employer] account. Our records indicate that you have previously registered for a [employer] account but its currently inactive. Please call our [employer] support line at [phone]. Additional Information If you have questions, please visit the Frequently Asked Questions section of the [employer] website at [email or web address] Remember, [email or web address] is NOT to be used for urgent needs. For medical emergencies, dial 911. Now available from your iPhone and Android! Please provide this summary of care documentation to your next provider. Your primary care clinician is listed as Shabnam Turner. If you have any questions after today's visit, please call [phone].

## 2017-07-27 NOTE — PROGRESS NOTES
Opal Nunez is a 67 y.o. male and presents with ED Follow-up; Spasms; and Diabetes  . Subjective:  He was recently seen in the emergency room for muscle spasms    He has had blood in the stools  He could not see gi as yet. He had a recent ct of abdomen that revealed gallstones. he has no symptoms however      Diabetes Mellitus Review:  He has diabetes mellitus. Diabetic ROS - medication compliance: compliant all of the time, diabetic diet compliance: compliant all of the time, home glucose monitoring: is performed. Known diabetic complications: hypoglycemia  Cardiovascular risk factors: family history, dyslipidemia, diabetes mellitus, obesity, hypertension  Current diabetic medications include /insulin   Eye exam current (within one year): no  Weight trend: stable  Prior visit with dietician: no  Current diet: \"healthy\" diet  in general  Current exercise: walking  Current monitoring regimen: home blood tests - daily  Home blood sugar records: trend: stable  Any episodes of hypoglycemia? no  Is He on ACE inhibitor or angiotensin II receptor blocker? Yes     Hypertension Review:  The patient has hypertension . Diet and Lifestyle: generally follows a low sodium diet, exercises sporadically  Home BP Monitoring: is not measured at home. Pertinent ROS: taking medications as instructed, no medication side effects noted, no TIA's, no chest pain on exertion, no dyspnea on exertion, no swelling of ankles. Dyslipidemia Review:  Patient presents for evaluation of lipids. Compliance with treatment thus far has been excellent. A repeat fasting lipid profile was done. The patient does not use medications that may worsen dyslipidemias . The patient exercises sporadically. The patient is not known to have coexisting coronary artery disease    Seizure Review:  Patient presents for evaluation of seizures. Patient has been stable without any new seizures reported. Patient has tolerated his medication thus far.    Gout Review:  Patient has gout, primarily affecting the foot. The patient has been stable with no recent joint pains  Symptoms onset: problem is longstanding. Rheumatological ROS: using NSAID medication regularly, daily. Response to treatment plan: symptoms have progressed to a point and plateaued. The most recent uric acid was normal.             Review of Systems  Constitutional: negative for fevers, chills, anorexia and weight loss  Eyes:   negative for visual disturbance and irritation  ENT:   negative for tinnitus,sore throat,nasal congestion,ear pains. hoarseness  Respiratory:  negative for cough, hemoptysis, dyspnea,wheezing  CV:   negative for chest pain, palpitations, lower extremity edema  GI:   negative for nausea, vomiting, diarrhea, abdominal pain,melena  Endo:               negative for polyuria,polydipsia,polyphagia,heat intolerance  Genitourinary: negative for frequency, dysuria and hematuria  Integument:  negative for rash and pruritus  Hematologic:  negative for easy bruising and gum/nose bleeding  Musculoskel: negative for myalgias, arthralgias, back pain, muscle weakness, joint pain  Neurological:  negative for headaches, dizziness, vertigo, memory problems and gait   Behavl/Psych: negative for feelings of anxiety, depression, mood changes    Past Medical History:   Diagnosis Date    Allergic rhinitis, cause unspecified 4/18/2011    Alzheimer's disease     Arthritis     GOUT    [facility] 09/20/2016    PROSTATE    Chronic obstructive pulmonary disease ([facility] [facility]     CHRONIC BRONCHITIS    Congestive heart failure, unspecified 4/18/2011    (ON 9/20/16 PT & DTR STATE THEY DON'T REMEMBER THIS DIAGNOSIS)    Coronary Artery Disease 4/18/2011    Diabetes Mellitus Type I 4/18/2011    Erectile Dysfunction 4/18/2011    Hemorrhoids 4/18/2011    Hypertension     Seizures ([address]     STARTED 2005; \"BLACK-OUT Juan Antonio Presser SEIZURES\", DTR SATES    [facility]     MULTIPLE MINISTROKES, DTR REPORTS; LEFT-SIDED WEAKNESS    Thromboembolus ([address]     left leg  (NO PE)    Unspecified asthma 4/18/2011     Past Surgical History:   Procedure Laterality Date    CARDIAC SURG PROCEDURE UNLIST  2006    CABG    VASCULAR SURGERY PROCEDURE UNLIST      PERIPHERAL ANGIOGRAM, STENTS LEFT LEG     Social History     Social History    Marital status:      Spouse name: N/A    Number of children: N/A    Years of education: N/A     Social History Main Topics    Smoking status: Former Smoker     Packs/day: 2.00     Years: 30.00    Smokeless tobacco: Former User     Quit date: 9/20/1990    Alcohol use No      Comment: IN PAST, MODERATE ALCOHOL. WEEKENDS ONLY    Drug use: No    Sexual activity: Yes     Partners: Female     Birth control/ protection: None     Other Topics Concern    None     Social History Narrative     Family History   Problem Relation Age of Onset    Diabetes Mother     Hypertension Mother     Diabetes Father     Anesth Problems Neg Hx      Current Outpatient Prescriptions   Medication Sig Dispense Refill    HYDROcodone-acetaminophen (NORCO) 5-325 mg per tablet Take 1 Tab by mouth every four (4) hours as needed for Pain. Max Daily Amount: 6 Tabs. 20 Tab 0    Insulin Needles, Disposable, (JAZMYN PEN NEEDLE) 32 gauge x 5/32\" ndle Use daily as directed with insulin pen. 100 Pen Needle 11    insulin glargine (LANTUS SOLOSTAR) 100 unit/mL (3 mL) inpn 40 Units by SubCUTAneous route daily. inject 20 units subcutaneously daily 15 mL 12    aspirin (ASPIRIN) 325 mg tablet Take 325 mg by mouth daily.       lacosamide (VIMPAT) 100 mg tab tablet take 1 tablet by mouth twice a day 60 Tab 5    levETIRAcetam 1,000 mg tablet take 1 tablet by mouth twice a day 60 Tab 3    pravastatin (PRAVACHOL) 40 mg tablet take 1 tablet once daily 30 Tab 9    metoprolol tartrate (LOPRESSOR) 50 mg tablet take 1 tablet by mouth twice a day 60 Tab 4    losartan (COZAAR) 100 mg tablet take 1 tablet by mouth once daily 30 Tab 10    bumetanide (BUMEX) 1 mg tablet take 1 tablet by mouth once daily 30 Tab 12    amLODIPine (NORVASC) 5 mg tablet take 1 tablet by mouth once daily 90 Tab 3    metFORMIN (GLUCOPHAGE) 1,000 mg tablet take 1 tablet twice a day with food 60 Tab 12    glucose blood VI test strips (ONETOUCH ULTRA TEST) strip Test 2 times daily    ONETOUCH ULTRA 2 TEST STRIPS ONLY 100 Strip 11    allopurinol (ZYLOPRIM) 100 mg tablet take 1 tablet by mouth once daily 30 Tab 12    ACCU-CHEK ALEXIS PLUS TEST STRP strip TEST BLOOD SUGARS 2 TIMES DAILY 100 Strip PRN    Lancets misc Patient test 3 x daily  Dm 250 1 Package 11     No Known Allergies    Objective:  Visit Vitals    /68    Pulse 64    Temp 97.6 °F (36.4 °C) (Oral)    Resp 16    Ht 5' 10\" (1.778 m)    Wt 165 lb (74.8 kg)    SpO2 98%    BMI 23.68 kg/m2     Physical Exam:   General appearance - alert, well appearing, and in no distress  Mental status - alert, oriented to person, place, and time  EYE-CAROL, EOMI, corneas normal, no foreign bodies  ENT-ENT exam normal, no neck nodes or sinus tenderness  Nose - normal and patent, no erythema, discharge or polyps  Mouth - mucous membranes moist, pharynx normal without lesions  Neck - supple, no significant adenopathy   Chest - clear to auscultation, no wheezes, rales or rhonchi, symmetric air entry   Heart - normal rate, regular rhythm, normal S1, S2, no murmurs, rubs, clicks or gallops   Abdomen - soft, nontender, nondistended, no masses or organomegaly  Lymph- no adenopathy palpable  Ext-peripheral pulses normal, no pedal edema, no clubbing or cyanosis  Skin-Warm and dry. no hyperpigmentation, vitiligo, or suspicious lesions  Neuro -alert, oriented, normal speech, no focal findings or movement disorder noted  Neck-normal C-spine, no tenderness, full ROM without pain  Several abrasions to the hands and knees noted.     Results for orders placed or performed in visit on 07/27/17   AMB POC GLUCOSE BLOOD, BY GLUCOSE MONITORING DEVICE   Result Value Ref Range    Glucose  mg/dL       Assessment/Plan:    ICD-10-CM ICD-9-CM    1. Diabetes mellitus type 2 in obese (HCC) E11.9 250.00 AMB POC GLUCOSE BLOOD, BY GLUCOSE MONITORING DEVICE    E66.9 278.00    2. Essential hypertension I10 401.9    3. Prostate cancer ([facility] C61 185    4. Guaiac positive stools R19.5 792.1      Orders Placed This Encounter    AMB POC GLUCOSE BLOOD, BY GLUCOSE MONITORING DEVICE     lose weight, increase physical activity, follow low fat diet, follow low salt diet,Take 81mg aspirin daily  Patient Instructions   MyChart Activation    Thank you for requesting access to [employer]. Please follow the instructions below to securely access and download your online medical record. [employer] allows you to send messages to your doctor, view your test results, renew your prescriptions, schedule appointments, and more. How Do I Sign Up? 1. In your internet browser, go to [email or web address]  2. Click on the First Time User? Click Here link in the Sign In box. You will be redirect to the New Member Sign Up page. 3. Enter your [employer] Access Code exactly as it appears below. You will not need to use this code after youve completed the sign-up process. If you do not sign up before the expiration date, you must request a new code. [employer] Access Code: Activation code not generated  Current [employer] Status: Patient Declined (This is the date your [employer]es[employer]ob[employer] access code will )    4. Enter the last four digits of your Social Security Number (xxxx) and Date of Birth (mm/dd/yyyy) as indicated and click Submit. You will be taken to the next sign-up page. 5. Create a [employer] ID. This will be your [employer] login ID and cannot be changed, so think of one that is secure and easy to remember. 6. Create a [employer] password. You can change your password at any time. 7. Enter your Password Reset Question and Answer.  This can be used at a later time if you forget your password. 8. Enter your e-mail address. You will receive e-mail notification when new information is available in [address] 9. Click Sign Up. You can now view and download portions of your medical record. 10. Click the Download Summary menu link to download a portable copy of your medical information. Additional Information    If you have questions, please visit the Frequently Asked Questions section of the [employer] website at [email or web address] Remember, [employer] is NOT to be used for urgent needs. For medical emergencies, dial 911. Follow-up Disposition:  Return in about 4 weeks (around 8/24/2017), or if symptoms worsen or fail to improve. I have reviewed with the patient details of the assessment and plan and all questions were answered. Relevent patient education was performed    An After Visit Summary was printed and given to the patient.

## 2017-08-24 ENCOUNTER — OFFICE VISIT (OUTPATIENT)
Dept: INTERNAL MEDICINE CLINIC | Age: 72
End: 2017-08-24

## 2017-08-24 VITALS
WEIGHT: 164 LBS | DIASTOLIC BLOOD PRESSURE: 70 MMHG | OXYGEN SATURATION: 94 % | HEIGHT: 70 IN | TEMPERATURE: 98 F | RESPIRATION RATE: 16 BRPM | HEART RATE: 58 BPM | SYSTOLIC BLOOD PRESSURE: 100 MMHG | BODY MASS INDEX: 23.48 KG/M2

## 2017-08-24 DIAGNOSIS — E66.9 DIABETES MELLITUS TYPE 2 IN OBESE (HCC): ICD-10-CM

## 2017-08-24 DIAGNOSIS — G40.209 COMPLEX PARTIAL SEIZURES EVOLVING TO GENERALIZED TONIC-CLONIC SEIZURES (HCC): ICD-10-CM

## 2017-08-24 DIAGNOSIS — L85.3 XEROSIS OF SKIN: ICD-10-CM

## 2017-08-24 DIAGNOSIS — E11.69 DIABETES MELLITUS TYPE 2 IN OBESE (HCC): ICD-10-CM

## 2017-08-24 RX ORDER — AMMONIUM LACTATE 12 G/100G
CREAM TOPICAL 2 TIMES DAILY
Qty: 280 G | Refills: 3 | Status: SHIPPED | OUTPATIENT
Start: 2017-08-24 | End: 2017-10-06

## 2017-08-24 NOTE — MR AVS SNAPSHOT
Visit Information Date & Time Provider Department Dept. Phone Encounter #  
 8/24/2017  9:30 AM Jori Coreas MD [id number] [address] - [facility] [phone] [id number] Follow-up Instructions Return in about 3 months (around 11/24/2017), or if symptoms worsen or fail to improve. Your Appointments 9/8/2017  9:30 AM  
ROUTINE CARE with Jori Coreas MD  
[facility] ([facility]) Appt Note: 3 month fu  
 [address] [facility],[address] [place] [address] 7 [phone]  
[phone]  
  
   
 [address] [place] [address] 7 [address] Upcoming Health Maintenance Date Due  
 EYE EXAM RETINAL OR DILATED Q1 5/28/2016 FOOT EXAM Q1 4/12/2017 GLAUCOMA SCREENING Q2Y 5/28/2017 INFLUENZA AGE 9 TO ADULT 8/1/2017 Pneumococcal 65+ High/Highest Risk (2 of 2 - PPSV23) 11/1/2017 HEMOGLOBIN A1C Q6M 12/8/2017 MICROALBUMIN Q1 3/8/2018 LIPID PANEL Q1 6/8/2018 FOBT Q 1 YEAR AGE 50-75 6/8/2018 MEDICARE YEARLY EXAM 6/9/2018 DTaP/Tdap/Td series (2 - Td) 12/3/2024 Allergies as of 8/24/2017  Review Complete On: 8/24/2017 By: Sandy Bradshaw LPN No Known Allergies Current Immunizations  Reviewed on 10/31/2016 Name Date Influenza High Dose Vaccine PF 10/31/2016, 10/30/2015, 12/3/2014 Influenza Vaccine Split 11/1/2012 TB Skin Test (PPD) Intradermal 8/22/2014 Tdap 12/3/2014 ZZZ-RETIRED (DO NOT USE) Pneumococcal Vaccine (Unspecified Type) 11/1/2012 Not reviewed this visit You Were Diagnosed With   
  
 Codes Comments Stool blood    -  Primary ICD-10-CM: K92.1 ICD-9-CM: 578.1 Diabetes mellitus type 2 in obese (HCC)     ICD-10-CM: E11.9, E66.9 ICD-9-CM: 250.00, 278.00 Complex partial seizures evolving to generalized tonic-clonic seizures ([address]     ICD-10-CM: V83.037 ICD-9-CM: 345.40 Xerosis of skin     ICD-10-CM: L85.3 ICD-9-CM: 706.8 Vitals BP Pulse Temp Resp Height(growth percentile) Weight(growth percentile) 100/70 (!) 58 98 °F (36.7 °C) (Oral) 16 5' 10\" (1.778 m) 164 lb (74.4 kg) SpO2 BMI Smoking Status 94% 23.53 kg/m2 Former Smoker BMI and BSA Data Body Mass Index Body Surface Area  
 23.53 kg/m 2 1.92 m 2 Preferred Pharmacy Pharmacy Name Phone [id number] [address] [place] [phone] Your Updated Medication List  
  
   
This list is accurate as of: 8/24/17 10:30 AM.  Always use your most recent med list.  
  
  
  
  
 * ACCU-CHEK ALEXIS PLUS TEST STRP strip Generic drug:  glucose blood VI test strips TEST BLOOD SUGARS 2 TIMES DAILY * glucose blood VI test strips strip Commonly known as:  ONETOUCH ULTRA TEST Test 2 times daily  ONETOUCH ULTRA 2 TEST STRIPS ONLY  
  
 allopurinol 100 mg tablet Commonly known as:  ZYLOPRIM  
take 1 tablet by mouth once daily  
  
 amLODIPine 5 mg tablet Commonly known as:  NORVASC  
take 1 tablet by mouth once daily  
  
 ammonium lactate 12 % topical cream  
Commonly known as:  AMLACTIN Apply  to affected area two (2) times a day. aspirin 325 mg tablet Commonly known as:  ASPIRIN Take 325 mg by mouth daily. bumetanide 1 mg tablet Commonly known as:  BUMEX  
take 1 tablet by mouth once daily HYDROcodone-acetaminophen 5-325 mg per tablet Commonly known as:  Benson Montanez Take 1 Tab by mouth every four (4) hours as needed for Pain. Max Daily Amount: 6 Tabs. insulin glargine 100 unit/mL (3 mL) Inpn Commonly known as:  LANTUS SOLOSTAR  
40 Units by SubCUTAneous route daily. inject 20 units subcutaneously daily Insulin [place] (Disposable) 32 gauge x 5/32\" Ndle Commonly known as:  Joseline Pen Needle Use daily as directed with insulin pen.  
  
 lacosamide 100 mg Tab tablet Commonly known as:  VIMPAT  
take 1 tablet by mouth twice a day Lancets Misc Patient test 3 x daily  Dm 250  
  
 levETIRAcetam 1,000 mg tablet  
take 1 tablet by mouth twice a day  
  
 losartan 100 mg tablet Commonly known as:  COZAAR  
take 1 tablet by mouth once daily  
  
 metFORMIN 1,000 mg tablet Commonly known as:  GLUCOPHAGE  
take 1 tablet twice a day with food  
  
 metoprolol tartrate 50 mg tablet Commonly known as:  LOPRESSOR  
take 1 tablet by mouth twice a day  
  
 pravastatin 40 mg tablet Commonly known as:  PRAVACHOL  
take 1 tablet once daily * Notice: This list has 2 medication(s) that are the same as other medications prescribed for you. Read the directions carefully, and ask your doctor or other care provider to review them with you. Prescriptions Sent to Pharmacy Refills  
 ammonium lactate (AMLACTIN) 12 % topical cream 3 Sig: Apply  to affected area two (2) times a day. Class: Normal  
 [id number] [address] [place], [place] [address]: [phone] Route: Topical  
  
We Performed the Following REFERRAL TO GASTROENTEROLOGY [UCR24 Custom] Follow-up Instructions Return in about 3 months (around 11/24/2017), or if symptoms worsen or fail to improve. Referral Information Referral ID Referred By Referred To  
  
 [id number] [facility])   
   [address] [place] [address], [address] [facility] Status Start Date End Date 1 New Request 8/24/17 8/24/18 If your referral has a status of pending review or denied, additional information will be sent to support the outcome of this decision. Patient Instructions "[employer] Activation Thank you for requesting access to "[employer]. Please follow the instructions below to securely access and download your online medical record. "[employer] allows you to send messages to your doctor, view your test results, renew your prescriptions, schedule appointments, and more. How Do I Sign Up? 1. In your internet browser, go to [email or web address] 
2. Click on the First Time User? Click Here link in the Sign In box. You will be redirect to the New Member Sign Up page. 3. Enter your Shuttle[employer] Access Code exactly as it appears below. You will not need to use this code after youve completed the sign-up process. If you do not sign up before the expiration date, you must request a new code. [employer] Access Code: [email or web address] Expires: 2017 10:30 AM (This is the date your [employer] access code will ) 4. Enter the last four digits of your Social Security Number (xxxx) and Date of Birth (mm/dd/yyyy) as indicated and click Submit. You will be taken to the next sign-up page. 5. Create a ShuttleCloud ID. This will be your [employer] login ID and cannot be changed, so think of one that is secure and easy to remember. 6. Create a [employer]Cloud password. You can change your password at any time. 7. Enter your Password Reset Question and Answer. This can be used at a later time if you forget your password. 8. Enter your e-mail address. You will receive e-mail notification when new information is available in [address] 9. Click Sign Up. You can now view and download portions of your medical record. 10. Click the Download Summary menu link to download a portable copy of your medical information. Additional Information If you have questions, please visit the Frequently Asked Questions section of the [employer] website at [email or web address] Remember, [employer] is NOT to be used for urgent needs. For medical emergencies, dial 911. Introducing [facility]! Isidra Levy introduces ShuttleCloud patient portal. Now you can access parts of your medical record, email your doctor's office, and request medication refills online. 1. In your internet browser, go to [email or web address] 2. Click on the First Time User? Click Here link in the Sign In box. You will see the New Member Sign Up page. 3. Enter your [employer] Access Code exactly as it appears below. You will not need to use this code after youve completed the sign-up process. If you do not sign up before the expiration date, you must request a new code. · [employer] Access Code: [email or web address] Expires: 11/22/2017 10:30 AM 
 
4. Enter the last four digits of your Social Security Number (xxxx) and Date of Birth (mm/dd/yyyy) as indicated and click Submit. You will be taken to the next sign-up page. 5. Create a [employer] ID. This will be your [employer] login ID and cannot be changed, so think of one that is secure and easy to remember. 6. Create a [employer] password. You can change your password at any time. 7. Enter your Password Reset Question and Answer. This can be used at a later time if you forget your password. 8. Enter your e-mail address. You will receive e-mail notification when new information is available in [address] 9. Click Sign Up. You can now view and download portions of your medical record. 10. Click the Download Summary menu link to download a portable copy of your medical information. If you have questions, please visit the Frequently Asked Questions section of the [employer] website. Remember, [employer] is NOT to be used for urgent needs. For medical emergencies, dial 911. Now available from your iPhone and Android! Please provide this summary of care documentation to your next provider. Your primary care clinician is listed as Casper Dwyer. If you have any questions after today's visit, please call [phone].

## 2017-08-24 NOTE — PATIENT INSTRUCTIONS
[employer] Activation    Thank you for requesting access to [employer]. Please follow the instructions below to securely access and download your online medical record. [employer] allows you to send messages to your doctor, view your test results, renew your prescriptions, schedule appointments, and more. How Do I Sign Up? 1. In your internet browser, go to [email or web address]  2. Click on the First Time User? Click Here link in the Sign In box. You will be redirect to the New Member Sign Up page. 3. Enter your Catch[employer]ire Access Code exactly as it appears below. You will not need to use this code after youve completed the sign-up process. If you do not sign up before the expiration date, you must request a new code. C[employer] Access Code: [email or web address]  Expires: 2017 10:30 AM (This is the date your C[employer] access code will )    4. Enter the last four digits of your Social Security Number (xxxx) and Date of Birth (mm/dd/yyyy) as indicated and click Submit. You will be taken to the next sign-up page. 5. Create a Catchafire ID. This will be your Catch[employer]ire login ID and cannot be changed, so think of one that is secure and easy to remember. 6. Create a Catchafire password. You can change your password at any time. 7. Enter your Password Reset Question and Answer. This can be used at a later time if you forget your password. 8. Enter your e-mail address. You will receive e-mail notification when new information is available in [address] 9. Click Sign Up. You can now view and download portions of your medical record. 10. Click the Download Summary menu link to download a portable copy of your medical information. Additional Information    If you have questions, please visit the Frequently Asked Questions section of the C[employer] website at [email or web address] Remember, Catch[employer]ire is NOT to be used for urgent needs. For medical emergencies, dial 911.

## 2017-08-24 NOTE — PROGRESS NOTES
Alan Dunlap is a 67 y.o. male and presents with Referral Request (colonoscopy) and Leg Problem (left)    Subjective:  He needs a colonoscopy he has blood in stools    Diabetes Mellitus Review:  He has diabetes mellitus. Diabetic ROS - medication compliance: compliant all of the time, diabetic diet compliance: compliant all of the time, home glucose monitoring: is performed. Known diabetic complications: hypoglycemia  Cardiovascular risk factors: family history, dyslipidemia, diabetes mellitus, obesity, hypertension  Current diabetic medications include /insulin   Eye exam current (within one year): no  Weight trend: stable  Prior visit with dietician: no  Current diet: \"healthy\" diet  in general  Current exercise: walking  Current monitoring regimen: home blood tests - daily  Home blood sugar records: trend: stable  Any episodes of hypoglycemia? no  Is He on ACE inhibitor or angiotensin II receptor blocker? Yes     Hypertension Review:  The patient has hypertension . Diet and Lifestyle: generally follows a low sodium diet, exercises sporadically  Home BP Monitoring: is not measured at home. Pertinent ROS: taking medications as instructed, no medication side effects noted, no TIA's, no chest pain on exertion, no dyspnea on exertion, no swelling of ankles. Dyslipidemia Review:  Patient presents for evaluation of lipids. Compliance with treatment thus far has been excellent. A repeat fasting lipid profile was done. The patient does not use medications that may worsen dyslipidemias . The patient exercises sporadically. The patient is not known to have coexisting coronary artery disease    Seizure Review:  Patient presents for evaluation of seizures. Patient has been stable without any new seizures reported. Patient has tolerated his medication thus far. Gout Review:  Patient has gout, primarily affecting the foot.  The patient has been stable with no recent joint pains  Symptoms onset: problem is longstanding. Rheumatological ROS: using NSAID medication regularly, daily. Response to treatment plan: symptoms have progressed to a point and plateaued. The most recent uric acid was normal.       He has lt. thigh pain    he has dry skin      Review of Systems  Constitutional: negative for fevers, chills, anorexia and weight loss  Eyes:   negative for visual disturbance and irritation  ENT:   negative for tinnitus,sore throat,nasal congestion,ear pains. hoarseness  Respiratory:  negative for cough, hemoptysis, dyspnea,wheezing  CV:   negative for chest pain, palpitations, lower extremity edema  GI:   negative for nausea, vomiting, diarrhea, abdominal pain,melena  Endo:               negative for polyuria,polydipsia,polyphagia,heat intolerance  Genitourinary: negative for frequency, dysuria and hematuria  Integument:  negative for rash and pruritus  Hematologic:  negative for easy bruising and gum/nose bleeding  Musculoskel: negative for myalgias, arthralgias, back pain, muscle weakness, joint pain  Neurological:  negative for headaches, dizziness, vertigo, memory problems and gait   Behavl/Psych: negative for feelings of anxiety, depression, mood changes    Past Medical History:   Diagnosis Date    Allergic rhinitis, cause unspecified 4/18/2011    Alzheimer's disease     Arthritis     GOUT    Cancer ([facility] [address] 09/20/2016    PROSTATE    Chronic obstructive pulmonary disease ([facility] [address]     CHRONIC BRONCHITIS    Congestive heart failure, unspecified 4/18/2011    (ON 9/20/16 PT & DTR STATE THEY DON'T REMEMBER THIS DIAGNOSIS)    Coronary Artery Disease 4/18/2011    Diabetes Mellitus Type I 4/18/2011    Erectile Dysfunction 4/18/2011    Hemorrhoids 4/18/2011    Hypertension     Seizures ([facility] [address]     STARTED 2005; \"BLACK-OUT SEIZURES,ABSENCE SEIZURES\", DTR SATES    Stroke ([facility] [address]     MULTIPLE MINISTROKES, DTR REPORTS; LEFT-SIDED WEAKNESS    Thromboembolus ([facility] [address]     left leg  (NO PE)    Unspecified asthma 4/18/2011 Past Surgical History:   Procedure Laterality Date    CARDIAC SURG PROCEDURE UNLIST  2006    CABG    VASCULAR SURGERY PROCEDURE UNLIST      PERIPHERAL ANGIOGRAM, STENTS LEFT LEG     Social History     Social History    Marital status:      Spouse name: N/A    Number of children: N/A    Years of education: N/A     Social History Main Topics    Smoking status: Former Smoker     Packs/day: 2.00     Years: 30.00    Smokeless tobacco: Former User     Quit date: 9/20/1990    Alcohol use No      Comment: IN PAST, MODERATE ALCOHOL. WEEKENDS ONLY    Drug use: No    Sexual activity: Yes     Partners: Female     Birth control/ protection: None     Other Topics Concern    None     Social History Narrative     Family History   Problem Relation Age of Onset    Diabetes Mother     Hypertension Mother     Diabetes Father     Anesth Problems Neg Hx      Current Outpatient Prescriptions   Medication Sig Dispense Refill    ammonium lactate (AMLACTIN) 12 % topical cream Apply  to affected area two (2) times a day. 280 g 3    HYDROcodone-acetaminophen (NORCO) 5-325 mg per tablet Take 1 Tab by mouth every four (4) hours as needed for Pain. Max Daily Amount: 6 Tabs. 20 Tab 0    Insulin Needles, Disposable, (JAZMYN PEN NEEDLE) 32 gauge x 5/32\" ndle Use daily as directed with insulin pen. 100 Pen Needle 11    insulin glargine (LANTUS SOLOSTAR) 100 unit/mL (3 mL) inpn 40 Units by SubCUTAneous route daily. inject 20 units subcutaneously daily 15 mL 12    aspirin (ASPIRIN) 325 mg tablet Take 325 mg by mouth daily.       lacosamide (VIMPAT) 100 mg tab tablet take 1 tablet by mouth twice a day 60 Tab 5    levETIRAcetam 1,000 mg tablet take 1 tablet by mouth twice a day 60 Tab 3    pravastatin (PRAVACHOL) 40 mg tablet take 1 tablet once daily 30 Tab 9    metoprolol tartrate (LOPRESSOR) 50 mg tablet take 1 tablet by mouth twice a day 60 Tab 4    losartan (COZAAR) 100 mg tablet take 1 tablet by mouth once daily 30 Tab 10    bumetanide (BUMEX) 1 mg tablet take 1 tablet by mouth once daily 30 Tab 12    amLODIPine (NORVASC) 5 mg tablet take 1 tablet by mouth once daily 90 Tab 3    metFORMIN (GLUCOPHAGE) 1,000 mg tablet take 1 tablet twice a day with food 60 Tab 12    glucose blood VI test strips (ONETOUCH ULTRA TEST) strip Test 2 times daily    ONETOUCH ULTRA 2 TEST STRIPS ONLY 100 Strip 11    allopurinol (ZYLOPRIM) 100 mg tablet take 1 tablet by mouth once daily 30 Tab 12    ACCU-CHEK ALEXIS PLUS TEST STRP strip TEST BLOOD SUGARS 2 TIMES DAILY 100 Strip PRN    Lancets misc Patient test 3 x daily  Dm 250 1 Package 11     No Known Allergies    Objective:  Visit Vitals    /70    Pulse (!) 58    Temp 98 °F (36.7 °C) (Oral)    Resp 16    Ht 5' 10\" (1.778 m)    Wt 164 lb (74.4 kg)    SpO2 94%    BMI 23.53 kg/m2     Physical Exam:   General appearance - alert, well appearing, and in no distress  Mental status - alert, oriented to person, place, and time  EYE-CAROL, EOMI, corneas normal, no foreign bodies  ENT-ENT exam normal, no neck nodes or sinus tenderness  Nose - normal and patent, no erythema, discharge or polyps  Mouth - mucous membranes moist, pharynx normal without lesions  Neck - supple, no significant adenopathy   Chest - clear to auscultation, no wheezes, rales or rhonchi, symmetric air entry   Heart - normal rate, regular rhythm, normal S1, S2, no murmurs, rubs, clicks or gallops   Abdomen - soft, nontender, nondistended, no masses or organomegaly  Lymph- no adenopathy palpable  Ext-peripheral pulses normal, no pedal edema, no clubbing or cyanosis  Skin-Warm and dry. no hyperpigmentation, vitiligo, or suspicious lesions  Neuro -alert, oriented, normal speech, no focal findings or movement disorder noted  Neck-normal C-spine, no tenderness, full ROM without pain  Several abrasions to the hands and knees noted.     Results for orders placed or performed in visit on 07/27/17   AMB POC GLUCOSE BLOOD, BY GLUCOSE MONITORING DEVICE   Result Value Ref Range    Glucose  mg/dL       Assessment/Plan:    ICD-10-CM ICD-9-CM    1. Stool blood K92.1 578.1 REFERRAL TO GASTROENTEROLOGY   2. Diabetes mellitus type 2 in obese (HCC) E11.9 250.00     E66.9 278.00    3. Complex partial seizures evolving to generalized tonic-clonic seizures ([facility] [address] G40.209 345.40    4. Xerosis of skin L85.3 706.8      Orders Placed This Encounter    REFERRAL TO GASTROENTEROLOGY     Referral Priority:   Routine     Referral Type:   Consultation     Referral Reason:   Specialty Services Required     Referral Location:   [facility]     Referred to Provider:   Dewayne Wray MD     Number of Visits Requested:   1    ammonium lactate (AMLACTIN) 12 % topical cream     Sig: Apply  to affected area two (2) times a day. Dispense:  280 g     Refill:  3     lose weight, increase physical activity, follow low fat diet, follow low salt diet,Take 81mg aspirin daily  Patient Instructions   Content [employer]en Activation    Thank you for requesting access to Content Ramen. Please follow the instructions below to securely access and download your online medical record. Content Ramen allows you to send messages to your doctor, view your test results, renew your prescriptions, schedule appointments, and more. How Do I Sign Up? 1. In your internet browser, go to [email or web address]  2. Click on the First Time User? Click Here link in the Sign In box. You will be redirect to the New Member Sign Up page. 3. Enter your Content [employer]en Access Code exactly as it appears below. You will not need to use this code after youve completed the sign-up process. If you do not sign up before the expiration date, you must request a new code. [employer] Access Code: [email or web address]  Expires: 2017 10:30 AM (This is the date your [employer] access code will )    4.  Enter the last four digits of your Social Security Number (xxxx) and Date of Birth (mm/dd/yyyy) as indicated and click Submit. You will be taken to the next sign-up page. 5. Create a Frontier Toxicologyt ID. This will be your Modern Message login ID and cannot be changed, so think of one that is secure and easy to remember. 6. Create a Modern Message password. You can change your password at any time. 7. Enter your Password Reset Question and Answer. This can be used at a later time if you forget your password. 8. Enter your e-mail address. You will receive e-mail notification when new information is available in [address] 9. Click Sign Up. You can now view and download portions of your medical record. 10. Click the Download Summary menu link to download a portable copy of your medical information. Additional Information    If you have questions, please visit the Frequently Asked Questions section of the Modern Message website at [email or web address] Remember, Modern Message is NOT to be used for urgent needs. For medical emergencies, dial 911. Follow-up Disposition:  Return in about 3 months (around 11/24/2017), or if symptoms worsen or fail to improve. I have reviewed with the patient details of the assessment and plan and all questions were answered. Relevent patient education was performed    An After Visit Summary was printed and given to the patient.

## 2017-08-28 RX ORDER — LOSARTAN POTASSIUM 100 MG/1
TABLET ORAL
Qty: 90 TAB | Refills: 1 | Status: ON HOLD | OUTPATIENT
Start: 2017-08-28 | End: 2020-09-04 | Stop reason: SDUPTHER

## 2017-09-05 RX ORDER — CLOPIDOGREL BISULFATE 75 MG/1
TABLET ORAL
Qty: 30 TAB | Refills: 12 | Status: SHIPPED | OUTPATIENT
Start: 2017-09-05 | End: 2021-07-16

## 2017-10-02 RX ORDER — ALLOPURINOL 100 MG/1
TABLET ORAL
Qty: 30 TAB | Refills: 12 | Status: ON HOLD | OUTPATIENT
Start: 2017-10-02 | End: 2017-10-09

## 2017-10-03 ENCOUNTER — PATIENT OUTREACH (OUTPATIENT)
Dept: INTERNAL MEDICINE CLINIC | Age: 72
End: 2017-10-03

## 2017-10-03 NOTE — PROGRESS NOTES
Case management    The patient's daughter, Arjun Best requested to speak with the Navigator regarding an authorization for home care. The patient's HIPAA was verified by name and . Ms. Remedios Sánchez states that she is having a problem getting home care authorized for the patient. After discussion, I found that there is a problem with authorization for personal care not home care. There has been a change to the patient's Medicaid from [facility] to [place]. The patient has been authorized to receive personal care for 40 hours per week. Ms. Remedios Sánchez found out that the personal care aid has not been getting paid by her company. The personal care provider was referred by [facility]. I explained to Ms. Remedios Sánchez that the best course of action at this point is to continue to bring her issue to [address] [place] [address] because the personal care benefit is not managed through the PCP office. Ms. Remedios Sánchez agreed to do so, expressed thanks and left the office.

## 2017-10-09 ENCOUNTER — HOSPITAL ENCOUNTER (OUTPATIENT)
Age: 72
Setting detail: OUTPATIENT SURGERY
Discharge: HOME | End: 2017-10-09
Attending: INTERNAL MEDICINE | Admitting: INTERNAL MEDICINE
Payer: MEDICARE

## 2017-10-09 ENCOUNTER — ANESTHESIA (OUTPATIENT)
Dept: ENDOSCOPY | Age: 72
End: 2017-10-09
Payer: MEDICARE

## 2017-10-09 ENCOUNTER — ANESTHESIA EVENT (OUTPATIENT)
Dept: ENDOSCOPY | Age: 72
End: 2017-10-09
Payer: MEDICARE

## 2017-10-09 VITALS
BODY MASS INDEX: 26.4 KG/M2 | HEIGHT: 66 IN | TEMPERATURE: 97.5 F | SYSTOLIC BLOOD PRESSURE: 120 MMHG | OXYGEN SATURATION: 97 % | WEIGHT: 164.25 LBS | HEART RATE: 57 BPM | DIASTOLIC BLOOD PRESSURE: 70 MMHG | RESPIRATION RATE: 16 BRPM

## 2017-10-09 LAB
GLUCOSE BLD STRIP.AUTO-MCNC: 81 MG/DL (ref 65–100)
SERVICE CMNT-IMP: NORMAL

## 2017-10-09 PROCEDURE — 82962 GLUCOSE BLOOD TEST: CPT

## 2017-10-09 PROCEDURE — 74011250636 HC RX REV CODE- 250/636: Performed by: INTERNAL MEDICINE

## 2017-10-09 PROCEDURE — 74011250637 HC RX REV CODE- 250/637: Performed by: INTERNAL MEDICINE

## 2017-10-09 PROCEDURE — 74011250636 HC RX REV CODE- 250/636

## 2017-10-09 PROCEDURE — 76060000031 HC ANESTHESIA FIRST 0.5 HR: Performed by: INTERNAL MEDICINE

## 2017-10-09 PROCEDURE — 76040000019: Performed by: INTERNAL MEDICINE

## 2017-10-09 RX ORDER — SODIUM CHLORIDE 0.9 % (FLUSH) 0.9 %
5-10 SYRINGE (ML) INJECTION AS NEEDED
Status: CANCELLED | OUTPATIENT
Start: 2017-10-09 | End: 2017-10-09

## 2017-10-09 RX ORDER — SODIUM CHLORIDE 9 MG/ML
50 INJECTION, SOLUTION INTRAVENOUS CONTINUOUS
Status: DISCONTINUED | OUTPATIENT
Start: 2017-10-09 | End: 2017-10-09 | Stop reason: HOSPADM

## 2017-10-09 RX ORDER — PROPOFOL 10 MG/ML
INJECTION, EMULSION INTRAVENOUS AS NEEDED
Status: DISCONTINUED | OUTPATIENT
Start: 2017-10-09 | End: 2017-10-09 | Stop reason: HOSPADM

## 2017-10-09 RX ORDER — SODIUM CHLORIDE 0.9 % (FLUSH) 0.9 %
5-10 SYRINGE (ML) INJECTION EVERY 8 HOURS
Status: CANCELLED | OUTPATIENT
Start: 2017-10-09 | End: 2017-10-09

## 2017-10-09 RX ORDER — DEXTROMETHORPHAN/PSEUDOEPHED 2.5-7.5/.8
1.2 DROPS ORAL
Status: DISCONTINUED | OUTPATIENT
Start: 2017-10-09 | End: 2017-10-09 | Stop reason: HOSPADM

## 2017-10-09 RX ORDER — SODIUM CHLORIDE 0.9 % (FLUSH) 0.9 %
5-10 SYRINGE (ML) INJECTION EVERY 8 HOURS
Status: DISCONTINUED | OUTPATIENT
Start: 2017-10-09 | End: 2017-10-09 | Stop reason: HOSPADM

## 2017-10-09 RX ORDER — MIDAZOLAM HYDROCHLORIDE 1 MG/ML
1-2 INJECTION, SOLUTION INTRAMUSCULAR; INTRAVENOUS
Status: DISCONTINUED | OUTPATIENT
Start: 2017-10-09 | End: 2017-10-09 | Stop reason: HOSPADM

## 2017-10-09 RX ORDER — SODIUM CHLORIDE 0.9 % (FLUSH) 0.9 %
5-10 SYRINGE (ML) INJECTION AS NEEDED
Status: DISCONTINUED | OUTPATIENT
Start: 2017-10-09 | End: 2017-10-09 | Stop reason: HOSPADM

## 2017-10-09 RX ADMIN — PROPOFOL 70 MG: 10 INJECTION, EMULSION INTRAVENOUS at 07:50

## 2017-10-09 RX ADMIN — SIMETHICONE 80 MG: 20 SUSPENSION/ DROPS ORAL at 07:54

## 2017-10-09 RX ADMIN — PROPOFOL 30 MG: 10 INJECTION, EMULSION INTRAVENOUS at 07:55

## 2017-10-09 RX ADMIN — PROPOFOL 30 MG: 10 INJECTION, EMULSION INTRAVENOUS at 08:00

## 2017-10-09 RX ADMIN — PROPOFOL 30 MG: 10 INJECTION, EMULSION INTRAVENOUS at 07:52

## 2017-10-09 RX ADMIN — SODIUM CHLORIDE 50 ML/HR: 900 INJECTION, SOLUTION INTRAVENOUS at 07:39

## 2017-10-09 RX ADMIN — PROPOFOL 30 MG: 10 INJECTION, EMULSION INTRAVENOUS at 07:57

## 2017-10-09 NOTE — ANESTHESIA POSTPROCEDURE EVALUATION
Post-Anesthesia Evaluation and Assessment    Patient: Karla Lawson MRN: [id number]  SSN: [id number]    YOB: 1945  Age: 67 y.o. Sex: male       Cardiovascular Function/Vital Signs  Visit Vitals    /70    Pulse (!) 57    Temp 36.4 °C (97.5 °F)    Resp 16    Ht 5' 6\" (1.676 m)    Wt 74.5 kg (164 lb 4 oz)    SpO2 97%    BMI 26.51 kg/m2       Patient is status post total IV anesthesia anesthesia for Procedure(s):  COLONOSCOPY. Nausea/Vomiting: None    Postoperative hydration reviewed and adequate. Pain:  Pain Scale 1: Numeric (0 - 10) (10/09/17 4353)  Pain Intensity 1: 0 (10/09/17 4260)   Managed    Neurological Status: At baseline    Mental Status and Level of Consciousness: Arousable    Pulmonary Status:   O2 Device: Room air (10/09/17 1618)   Adequate oxygenation and airway patent    Complications related to anesthesia: None    Post-anesthesia assessment completed.  No concerns    Signed By: Cassidy Tse DO     October 9, 2017

## 2017-10-09 NOTE — DISCHARGE INSTRUCTIONS
Myrtle Bedoyaos  [id number]  1945    COLON DISCHARGE INSTRUCTIONS  Discomfort:  Redness at IV site- apply warm compress to area; if redness or soreness persist- contact your physician  There may be a slight amount of blood passed from the rectum  Gaseous discomfort- walking, belching will help relieve any discomfort  You may not operate a vehicle for 12 hours  You may not engage in an occupation involving machinery or appliances for rest of today  You may not drink alcoholic beverages for at least 12 hours  Avoid making any critical decisions for at least 24 hour  DIET:   Regular diet    - however -  remember your colon is empty and a heavy meal will produce gas. ACTIVITY:  It is recommended that you spend the remainder of the day resting -  avoid any strenuous activity. CALL M.D. ANY SIGN OF:   Increasing pain, nausea, vomiting  Abdominal distension (swelling)  New increased bleeding (oral or rectal)  Fever (chills)    Follow-up Instructions:   Call Dr. Sendy Tillman    Telephone # [phone]  Brief Findings:hemorrhoids        Mirtha Hayes from Nurse    The following personal items collected during your admission are returned to you:   Dental Appliance: Dental Appliances: None  Vision: Visual Aid: Glasses  Hearing Aid:    Jewelry:    Clothing:    Other Valuables:    Valuables sent to safe: [employer] Activation    Thank you for requesting access to RebelMouse. Please follow the instructions below to securely access and download your online medical record. RebelMouse allows you to send messages to your doctor, view your test results, renew your prescriptions, schedule appointments, and more. How Do I Sign Up? 1. In your internet browser, go to [email or web address]  2. Click on the First Time User? Click Here link in the Sign In box. You will be redirect to the New Member Sign Up page. 3. Enter your RebelMouse Access Code exactly as it appears below.  You will not need to use this code after youve completed the sign-up process. If you do not sign up before the expiration date, you must request a new code. "[employer]" Access Code: [email or web address]  Expires: 2017 10:30 AM (This is the date your "[employer]" access code will )    4. Enter the last four digits of your Social Security Number (xxxx) and Date of Birth (mm/dd/yyyy) as indicated and click Submit. You will be taken to the next sign-up page. 5. Create a MOOVIAt ID. This will be your "[employer]" login ID and cannot be changed, so think of one that is secure and easy to remember. 6. Create a "[employer]" password. You can change your password at any time. 7. Enter your Password Reset Question and Answer. This can be used at a later time if you forget your password. 8. Enter your e-mail address. You will receive e-mail notification when new information is available in [address] 9. Click Sign Up. You can now view and download portions of your medical record. 10. Click the Download Summary menu link to download a portable copy of your medical information. Additional Information    If you have questions, please visit the Frequently Asked Questions section of the "[employer]" website at [email or web address] Remember, "[employer]" is NOT to be used for urgent needs. For medical emergencies, dial 911.

## 2017-10-09 NOTE — PROGRESS NOTES
With pt's permission, daughter in for teaching review. Both deny unanswered questions or unaddressed concerns. Pt oob to w/c - to br - tolerated well. pt denies needing assistance to dress - pt & daughter report pt able to dress self. To be in w/c afterwards with assistance.

## 2017-10-09 NOTE — H&P
Gastroenterology History and Physical    Patient: Norm Ro    Physician: Franki Coates MD    Vital Signs: Blood pressure 141/77, pulse (!) 55, temperature 97.8 °F (36.6 °C), resp. rate 20, height 5' 6\" (1.676 m), weight 74.5 kg (164 lb 4 oz). Allergies: No Known Allergies    Indication: heme pos stool    History:  Past Medical History:   Diagnosis Date    Allergic rhinitis, cause unspecified 4/18/2011    Alzheimer's disease     Arthritis     GOUT    Cancer ([facility] [address] 09/20/2016    PROSTATE    Chronic obstructive pulmonary disease (HCC)     CHRONIC BRONCHITIS    Congestive heart failure, unspecified 4/18/2011    (ON 9/20/16 PT & DTR STATE THEY DON'T REMEMBER THIS DIAGNOSIS)    Coronary Artery Disease 4/18/2011    Diabetes Mellitus Type I 4/18/2011    Erectile Dysfunction 4/18/2011    Hemorrhoids 4/18/2011    Hypertension     Seizures ([facility] [address]     STARTED 2005; \"BLACK-OUT Jasper Spencer SEIZURES\", DTR SATES    Stroke ([facility] [address]     MULTIPLE MINISTROKES, DTR REPORTS; LEFT-SIDED WEAKNESS    Thromboembolus ([facility] [address]     left leg  (NO PE)    Unspecified asthma(493.90) 4/18/2011      Past Surgical History:   Procedure Laterality Date    CARDIAC SURG PROCEDURE UNLIST  2006    CABG    VASCULAR SURGERY PROCEDURE UNLIST      PERIPHERAL ANGIOGRAM, STENTS LEFT LEG      Social History     Social History    Marital status:      Spouse name: N/A    Number of children: N/A    Years of education: N/A     Social History Main Topics    Smoking status: Former Smoker     Packs/day: 2.00     Years: 30.00    Smokeless tobacco: Former User     Quit date: 9/20/1990    Alcohol use No      Comment: IN PAST, MODERATE ALCOHOL.  WEEKENDS ONLY    Drug use: No    Sexual activity: Yes     Partners: Female     Birth control/ protection: None     Other Topics Concern    None     Social History Narrative      Family History   Problem Relation Age of Onset    Diabetes Mother     Hypertension Mother     Diabetes Father    Jermain Mejia Anesth Problems Neg Hx        Medications:   Prior to Admission medications    Medication Sig Start Date End Date Taking? Authorizing Provider   clopidogrel (PLAVIX) 75 mg tab take 1 tablet by mouth once daily 9/5/17  Yes Preet Tompkins MD   losartan (COZAAR) 100 mg tablet take 1 tablet by mouth once daily 8/28/17  Yes Preet Tompkins MD   Insulin Needles, Disposable, (JAZMYN PEN NEEDLE) 32 gauge x 5/32\" ndle Use daily as directed with insulin pen. 7/17/17  Yes Preet Tompkins MD   insulin glargine (LANTUS SOLOSTAR) 100 unit/mL (3 mL) inpn 40 Units by SubCUTAneous route daily. inject 20 units subcutaneously daily 7/13/17  Yes Preet Tompkins MD   aspirin (ASPIRIN) 325 mg tablet Take 325 mg by mouth daily.    Yes Phys Other, MD   lacosamide (VIMPAT) 100 mg tab tablet take 1 tablet by mouth twice a day 6/8/17  Yes Preet Tompkins MD   levETIRAcetam 1,000 mg tablet take 1 tablet by mouth twice a day 6/8/17  Yes Preet Tompkins MD   pravastatin (PRAVACHOL) 40 mg tablet take 1 tablet once daily 6/8/17  Yes Preet Tompkins MD   metoprolol tartrate (LOPRESSOR) 50 mg tablet take 1 tablet by mouth twice a day 6/8/17  Yes Preet Tompkins MD   bumetanide [facility]) 1 mg tablet take 1 tablet by mouth once daily 12/10/16  Yes Preet Tompkins MD   amLODIPine [facility]) 5 mg tablet take 1 tablet by mouth once daily 12/5/16  Yes Preet Tompkins MD   metFORMIN (GLUCOPHAGE) 1,000 mg tablet take 1 tablet twice a day with food 11/14/16  Yes Preet Tompknis MD   glucose blood VI test strips (ONETOUCH ULTRA TEST) strip Test 2 times daily    ONETOUCH ULTRA 2 TEST STRIPS ONLY 10/7/16  Yes Preet Tompkins MD   allopurinol (ZYLOPRIM) 100 mg tablet take 1 tablet by mouth once daily 9/15/16  Yes Preet Tompkins MD   ACCU-CHEK ALEXIS PLUS TEST STRP strip TEST BLOOD SUGARS 2 TIMES DAILY 9/29/15  Yes Preet Tompkins MD   Lancets misc Patient test 3 x daily  Dm 250 7/8/15  Yes Preet Tompkins MD Walker misc Rollator walker 8/24/17   Sanya Lemos., MD   HYDROcodone-acetaminophen [facility]) 5-325 mg per tablet Take 1 Tab by mouth every four (4) hours as needed for Pain. Max Daily Amount: 6 Tabs. 7/25/17   Rocael Glover,        Physical Exam:   HEENT: Head: Normocephalic, no lesions, without obvious abnormality.    Heart: regular rate and rhythm, S1, S2 normal, no murmur, click, rub or gallop   Lungs: chest clear, no wheezing, rales, normal symmetric air entry, Heart exam - S1, S2 normal, no murmur, no gallop, rate regular   Abdominal: Bowel sounds are normal, liver is not enlarged, spleen is not enlarged     Signed:  Doug Long MD 10/9/2017

## 2017-10-09 NOTE — PROGRESS NOTES
Lizette Dami  1945  [id number]    Situation:  Verbal report received from: RAYO Roche RN  Procedure: Procedure(s):  COLONOSCOPY    Background:    Preoperative diagnosis: ABNORMAL STOOL CONTENTS FINDINGS  Postoperative diagnosis:     hemorrhoids    :  Dr. Ania Montes  Assistant(s): Endoscopy Technician-1: Raghavendra Ang  Endoscopy RN-1: Rakan Rodriguez    Specimens: * No specimens in log *  H. Pylori  no    Assessment:  Intra-procedure medications :  Propofol 190 mg      Anesthesia gave intra-procedure sedation and medications, see anesthesia flow sheet yes    Intravenous fluids: NS@ KVO     Vital signs stable     Abdominal assessment: round and soft     Recommendation:  Discharge patient per MD order. Family here.   Permission to share finding with family or friend yes

## 2017-10-09 NOTE — IP AVS SNAPSHOT
[address] [facility] 
[phone] Patient: Myrtle Monzon MRN: [id number] :1945 You are allergic to the following No active allergies Recent Documentation Height Weight BMI Smoking Status 1.676 m 74.5 kg 26.51 kg/m2 Former Smoker Emergency Contacts Name Discharge Info Relation Home Work Mobile Soto Jesus CAREGIVER [3] Daughter [21] [phone] About your hospitalization You were admitted on:  2017 You last received care in the:  [facility] ENDOSCOPY You were discharged on:  2017 Unit phone number:  [phone] Why you were hospitalized Your primary diagnosis was:  Not on File Providers Seen During Your Hospitalizations Provider Role Specialty Primary office phone Diane Barnes MD Attending Provider Gastroenterology [phone] Your Primary Care Physician (PCP) Primary Care Physician Office Phone Office Fax [address] [facility], [address] [phone] Follow-up Information None Current Discharge Medication List  
  
CONTINUE these medications which have NOT CHANGED Dose & Instructions Dispensing Information Comments Morning Noon Evening Bedtime * ACCU-CHEK ALEXIS PLUS TEST STRP strip Generic drug:  glucose blood VI test strips Your last dose was: Your next dose is:    
   
   
 TEST BLOOD SUGARS 2 TIMES DAILY Quantity:  100 Strip Refills:  PRN  
     
   
   
   
  
 * glucose blood VI test strips strip Commonly known as:  ONETOUCH ULTRA TEST Your last dose was: Your next dose is:    
   
   
 Test 2 times daily  ONETOUCH ULTRA 2 TEST STRIPS ONLY Quantity:  100 Strip Refills:  11  
     
   
   
   
  
 allopurinol 100 mg tablet Commonly known as:  Melanie Stanley Your last dose was: Your next dose is: take 1 tablet by mouth once daily Quantity:  30 Tab Refills:  12 amLODIPine 5 mg tablet Commonly known as:  Alaina Parrish Your last dose was: Your next dose is:    
   
   
 take 1 tablet by mouth once daily Quantity:  90 Tab Refills:  3  
     
   
   
   
  
 aspirin 325 mg tablet Commonly known as:  ASPIRIN Your last dose was: Your next dose is:    
   
   
 Dose:  325 mg Take 325 mg by mouth daily. Refills:  0  
     
   
   
   
  
 bumetanide 1 mg tablet Commonly known as:  Milton Nu Your last dose was: Your next dose is:    
   
   
 take 1 tablet by mouth once daily Quantity:  30 Tab Refills:  12  
     
   
   
   
  
 clopidogrel 75 mg Tab Commonly known as:  PLAVIX Your last dose was: Your next dose is:    
   
   
 take 1 tablet by mouth once daily Quantity:  30 Tab Refills:  12 HYDROcodone-acetaminophen 5-325 mg per tablet Commonly known as:  Anne-Marie Escobar Your last dose was: Your next dose is:    
   
   
 Dose:  1 Tab Take 1 Tab by mouth every four (4) hours as needed for Pain. Max Daily Amount: 6 Tabs. Quantity:  20 Tab Refills:  0  
     
   
   
   
  
 insulin glargine 100 unit/mL (3 mL) Inpn Commonly known as:  LANTUS SOLOSTAR Your last dose was: Your next dose is:    
   
   
 Dose:  40 Units 40 Units by SubCUTAneous route daily. inject 20 units subcutaneously daily Quantity:  15 mL Refills:  12 Insulin Needles (Disposable) 32 gauge x 5/32\" Ndle Commonly known as:  Joseline Pen Needle Your last dose was: Your next dose is:    
   
   
 Use daily as directed with insulin pen. Quantity:  100 Pen Needle Refills:  11  
     
   
   
   
  
 lacosamide 100 mg Tab tablet Commonly known as:  VIMPAT Your last dose was: Your next dose is: take 1 tablet by mouth twice a day Quantity:  60 Tab Refills:  5 Lancets Misc Your last dose was: Your next dose is:    
   
   
 Patient test 3 x daily  Dm 250 Quantity:  1 Package Refills:  11  
     
   
   
   
  
 levETIRAcetam 1,000 mg tablet Your last dose was: Your next dose is:    
   
   
 take 1 tablet by mouth twice a day Quantity:  60 Tab Refills:  3  
     
   
   
   
  
 losartan 100 mg tablet Commonly known as:  COZAAR Your last dose was: Your next dose is:    
   
   
 take 1 tablet by mouth once daily Quantity:  90 Tab Refills:  1  
     
   
   
   
  
 metFORMIN 1,000 mg tablet Commonly known as:  GLUCOPHAGE Your last dose was: Your next dose is:    
   
   
 take 1 tablet twice a day with food Quantity:  60 Tab Refills:  12  
     
   
   
   
  
 metoprolol tartrate 50 mg tablet Commonly known as:  LOPRESSOR Your last dose was: Your next dose is:    
   
   
 take 1 tablet by mouth twice a day Quantity:  60 Tab Refills:  4  
     
   
   
   
  
 pravastatin 40 mg tablet Commonly known as:  PRAVACHOL Your last dose was: Your next dose is:    
   
   
 take 1 tablet once daily Quantity:  30 Tab Refills:  9 Jettie Daughters Your last dose was: Your next dose is:    
   
   
 Rollator walker Quantity:  1 Each Refills:  0  
     
   
   
   
  
 * Notice: This list has 2 medication(s) that are the same as other medications prescribed for you. Read the directions carefully, and ask your doctor or other care provider to review them with you. Discharge Instructions Doretha Dhillon [id number] 
1945 COLON DISCHARGE INSTRUCTIONS Discomfort: 
Redness at IV site- apply warm compress to area; if redness or soreness persist- contact your physician There may be a slight amount of blood passed from the rectum Gaseous discomfort- walking, belching will help relieve any discomfort You may not operate a vehicle for 12 hours You may not engage in an occupation involving machinery or appliances for rest of today You may not drink alcoholic beverages for at least 12 hours Avoid making any critical decisions for at least 24 hour DIET: 
 Regular diet  however -  remember your colon is empty and a heavy meal will produce gas. ACTIVITY: It is recommended that you spend the remainder of the day resting -  avoid any strenuous activity. CALL M.D. ANY SIGN OF: Increasing pain, nausea, vomiting Abdominal distension (swelling) New increased bleeding (oral or rectal) Fever (chills) Follow-up Instructions: 
 Call Dr. Jaya Aguirre Telephone # [phone] Brief Findings:hemorrhoids DISCHARGE SUMMARY from Nurse The following personal items collected during your admission are returned to you:  
Dental Appliance: Dental Appliances: None Vision: Visual Aid: Glasses Hearing Aid:   
Jewelry:   
Clothing:   
Other Valuables:   
Valuables sent to safe: [employer] Activation Thank you for requesting access to [employer]. Please follow the instructions below to securely access and download your online medical record. [employer] allows you to send messages to your doctor, view your test results, renew your prescriptions, schedule appointments, and more. How Do I Sign Up? 1. In your internet browser, go to [email or web address] 
2. Click on the First Time User? Click Here link in the Sign In box. You will be redirect to the New Member Sign Up page. 3. Enter your [employer] Access Code exactly as it appears below. You will not need to use this code after youve completed the sign-up process. If you do not sign up before the expiration date, you must request a new code.  
 
[employer] Access Code: [email or web address] 
 Expires: 2017 10:30 AM (This is the date your [employer] access code will ) 4. Enter the last four digits of your Social Security Number (xxxx) and Date of Birth (mm/dd/yyyy) as indicated and click Submit. You will be taken to the next sign-up page. 5. Create a DinersGroupt ID. This will be your [employer] login ID and cannot be changed, so think of one that is secure and easy to remember. 6. Create a [employer] password. You can change your password at any time. 7. Enter your Password Reset Question and Answer. This can be used at a later time if you forget your password. 8. Enter your e-mail address. You will receive e-mail notification when new information is available in [address] 9. Click Sign Up. You can now view and download portions of your medical record. 10. Click the Download Summary menu link to download a portable copy of your medical information. Additional Information If you have questions, please visit the Frequently Asked Questions section of the [employer] website at [email or web address] Remember, [employer] is NOT to be used for urgent needs. For medical emergencies, dial 911. Discharge Orders None Introducing [facility] SERVICES! [employer] introduces [employer] patient portal. Now you can access parts of your medical record, email your doctor's office, and request medication refills online. 1. In your internet browser, go to [email or web address] 2. Click on the First Time User? Click Here link in the Sign In box. You will see the New Member Sign Up page. 3. Enter your [employer] Access Code exactly as it appears below. You will not need to use this code after youve completed the sign-up process. If you do not sign up before the expiration date, you must request a new code. · [employer] Access Code: [email or web address] Expires: 2017 10:30 AM 
 
4.  Enter the last four digits of your Social Security Number (xxxx) and Date of Birth (mm/dd/yyyy) as indicated and click Submit. You will be taken to the next sign-up page. 5. Create a [employer] ID. This will be your [employer] login ID and cannot be changed, so think of one that is secure and easy to remember. 6. Create a [employer] password. You can change your password at any time. 7. Enter your Password Reset Question and Answer. This can be used at a later time if you forget your password. 8. Enter your e-mail address. You will receive e-mail notification when new information is available in [address] 9. Click Sign Up. You can now view and download portions of your medical record. 10. Click the Download Summary menu link to download a portable copy of your medical information. If you have questions, please visit the Frequently Asked Questions section of the [employer] website. Remember, [employer] is NOT to be used for urgent needs. For medical emergencies, dial 911. Now available from your iPhone and Android! General Information Please provide this summary of care documentation to your next provider. Patient Signature:  ____________________________________________________________ Date:  ____________________________________________________________  
  
Marie Hero Provider Signature:  ____________________________________________________________ Date:  ____________________________________________________________

## 2017-10-09 NOTE — PERIOP NOTES
Anesthesia reports 190 mg Propofol,  and 350 mL NS given during procedure. Received report from anesthesia staff on vital signs and status of patient.

## 2017-10-09 NOTE — PROCEDURES
Colonoscopy Operative Report  Phong Carranza  1945  [id number]      Procedure Type:   Colonoscopy --diagnostic     Indications:     Occult blood in stool    Pre-operative Diagnosis: see indication above    Post-operative Diagnosis:  See findings below    : Lupe Ojeda MD    Referring Provider: Sy Enrique    Sedation:  MAC anesthesia Propofol    Pre-Procedural Exam:      Airway: clear, Malimpati 2   Heart: RRR, without gallops or rubs  Lungs: clear bilaterally without wheezes, crackles, or rhonchi  Abdomen: soft, nontender, nondistended, bowel sounds present     Procedure Details:  After informed consent was obtained with all risks and benefits of procedure explained and preoperative exam completed, the patient was taken to the endoscopy suite and placed in the left lateral decubitus position. Upon sequential sedation as per above, a digital rectal exam was performed. The [address] was inserted in the rectum and carefully advanced to the cecum, which was identified by the ileocecal valve and appendiceal orifice. The quality of preparation was excellent. The colonoscope was slowly withdrawn with careful evaluation between folds. Retoflexion in the rectum was completed. Findings/Impression: ANUS: Anal exam reveals no masses or hemorrhoids, sphincter tone is normal.   RECTUM: Rectal exam reveals no masses. Grade 3 hemorrhoids. SIGMOID COLON: The mucosa is normal with good vascular pattern and without ulcers, diverticula, and polyps. DESCENDING COLON: The mucosa is normal with good vascular pattern and without ulcers, diverticula, and polyps. SPLENIC FLEXURE: The splenic flexure is normal.   TRANSVERSE COLON: The mucosa is normal with good vascular pattern and without ulcers, diverticula, and polyps. HEPATIC FLEXURE: The hepatic flexure is normal.   ASCENDING COLON: The mucosa is normal with good vascular pattern and without ulcers, diverticula, and polyps.    CECUM: The appendiceal orifice appears normal. The ileocecal valve appears normal.   TERMINAL ILEUM: The terminal ileum was not entered. Specimen Removed:  none    Complications: None. EBL:  None. Recommendations: --Follow up with primary care physician. , -Blood probably from hemorrhoids on Plavix. No routine colonoscopy follow up due to age  Regular diet. Resume normal medication(s). Discharge Disposition:  Home in the company of a  when able to ambulate.

## 2017-10-09 NOTE — ANESTHESIA PREPROCEDURE EVALUATION
Anesthetic History   No history of anesthetic complications            Review of Systems / Medical History  Patient summary reviewed, nursing notes reviewed and pertinent labs reviewed    Pulmonary    COPD      Undiagnosed apnea  Asthma     Comments: Ex 60 pk yr smoker   Neuro/Psych     seizures (last sz was in 2016): well controlled  CVA (left side weakness)  Psychiatric history    Comments: Alzheimer's dis Cardiovascular    Hypertension  Valvular problems/murmurs    CHF    CAD, PAD, CABG (2006) and hyperlipidemia    Exercise tolerance: <4 METS  Comments: 9-2016 EKG: SR, RBBB, LAFB    4-2013 ECHO: 60% EF, mild AS, AR, TR   GI/Hepatic/Renal               Comments: Abnormal stool contents Endo/Other    Diabetes: type 1    Obesity, blood dyscrasia, arthritis and cancer (Prostate Ca with surgery)     Other Findings   Comments: Hx of DVT    Gout         Physical Exam    Airway  Mallampati: III  TM Distance: > 6 cm  Neck ROM: normal range of motion   Mouth opening: Normal     Cardiovascular  Regular rate and rhythm,  S1 and S2 normal,  no murmur, click, rub, or gallop  Rhythm: regular  Rate: normal         Dental      Comments: Upper edentulous, lower poor dentition with many missing, none loose   Pulmonary  Breath sounds clear to auscultation               Abdominal  GI exam deferred       Other Findings            Anesthetic Plan    ASA: 3  Anesthesia type: total IV anesthesia          Induction: Intravenous  Anesthetic plan and risks discussed with: Patient      /77                             HR 56    Did NOT take BP med this am

## 2017-11-06 RX ORDER — AMLODIPINE BESYLATE 5 MG/1
TABLET ORAL
Qty: 90 TAB | Refills: 3 | Status: SHIPPED | OUTPATIENT
Start: 2017-11-06 | End: 2020-09-04

## 2017-11-20 ENCOUNTER — OFFICE VISIT (OUTPATIENT)
Dept: INTERNAL MEDICINE CLINIC | Age: 72
End: 2017-11-20

## 2017-11-20 VITALS
SYSTOLIC BLOOD PRESSURE: 138 MMHG | BODY MASS INDEX: 26.57 KG/M2 | OXYGEN SATURATION: 98 % | TEMPERATURE: 97.5 F | HEART RATE: 68 BPM | WEIGHT: 165.3 LBS | DIASTOLIC BLOOD PRESSURE: 78 MMHG | HEIGHT: 66 IN | RESPIRATION RATE: 16 BRPM

## 2017-11-20 DIAGNOSIS — E66.9 DIABETES MELLITUS TYPE 2 IN OBESE (HCC): Primary | ICD-10-CM

## 2017-11-20 DIAGNOSIS — I10 ESSENTIAL HYPERTENSION, MALIGNANT: ICD-10-CM

## 2017-11-20 DIAGNOSIS — E11.69 DIABETES MELLITUS TYPE 2 IN OBESE (HCC): Primary | ICD-10-CM

## 2017-11-20 DIAGNOSIS — E78.00 HYPERCHOLESTEREMIA: ICD-10-CM

## 2017-11-20 LAB
CHOLEST SERPL-MCNC: 101 MG/DL
GLUCOSE POC: 50 MG/DL
HBA1C MFR BLD HPLC: 2.4 %
HDLC SERPL-MCNC: 43 MG/DL
LDL CHOLESTEROL POC: 35 MG/DL
NON-HDL CHOLESTEROL, 011976: 58
TCHOL/HDL RATIO (POC): 2.4
TRIGL SERPL-MCNC: 119 MG/DL

## 2017-11-20 RX ORDER — INSULIN GLARGINE 100 [IU]/ML
INJECTION, SOLUTION SUBCUTANEOUS
Qty: 1 PEN | Refills: 12
Start: 2017-11-20 | End: 2020-08-13

## 2017-11-20 NOTE — PATIENT INSTRUCTIONS
[employer] Activation    Thank you for requesting access to [employer]. Please follow the instructions below to securely access and download your online medical record. [employer] allows you to send messages to your doctor, view your test results, renew your prescriptions, schedule appointments, and more. How Do I Sign Up? 1. In your internet browser, go to [email or web address]  2. Click on the First Time User? Click Here link in the Sign In box. You will be redirect to the New Member Sign Up page. 3. Enter your [employer] Access Code exactly as it appears below. You will not need to use this code after youve completed the sign-up process. If you do not sign up before the expiration date, you must request a new code. [employer] Access Code: [email or web address]  Expires: 2017  9:30 AM (This is the date your [employer] access code will )    4. Enter the last four digits of your Social Security Number (xxxx) and Date of Birth (mm/dd/yyyy) as indicated and click Submit. You will be taken to the next sign-up page. 5. Create a [employer] ID. This will be your [employer] login ID and cannot be changed, so think of one that is secure and easy to remember. 6. Create a [employer] password. You can change your password at any time. 7. Enter your Password Reset Question and Answer. This can be used at a later time if you forget your password. 8. Enter your e-mail address. You will receive e-mail notification when new information is available in [address] 9. Click Sign Up. You can now view and download portions of your medical record. 10. Click the Download Summary menu link to download a portable copy of your medical information. Additional Information    If you have questions, please visit the Frequently Asked Questions section of the [employer] website at [email or web address] Remember, [employer] is NOT to be used for urgent needs. For medical emergencies, dial 911.

## 2017-11-20 NOTE — MR AVS SNAPSHOT
Visit Information Date & Time Provider Department Dept. Phone Encounter #  
 11/20/2017 12:15 PM Susan Domingo MD [address] [place] [address] [phone] [id number] Follow-up Instructions Return in about 4 weeks (around 12/18/2017), or if symptoms worsen or fail to improve. Upcoming Health Maintenance Date Due  
 FOOT EXAM Q1 4/12/2017 Influenza Age 5 to Adult 8/1/2017 Pneumococcal 65+ High/Highest Risk (2 of 2 - PPSV23) 11/1/2017 HEMOGLOBIN A1C Q6M 12/8/2017 MICROALBUMIN Q1 3/8/2018 LIPID PANEL Q1 6/8/2018 FOBT Q 1 YEAR AGE 50-75 6/8/2018 MEDICARE YEARLY EXAM 6/9/2018 EYE EXAM RETINAL OR DILATED Q1 8/9/2018 GLAUCOMA SCREENING Q2Y 8/9/2019 DTaP/Tdap/Td series (2 - Td) 12/3/2024 Allergies as of 11/20/2017  Review Complete On: 11/20/2017 By: Susan Domingo MD  
 No Known Allergies Current Immunizations  Reviewed on 11/20/2017 Name Date Influenza High Dose Vaccine PF 10/31/2016, 10/30/2015, 12/3/2014 Influenza Vaccine Split 11/1/2012 TB Skin Test (PPD) Intradermal 8/22/2014 Tdap 12/3/2014 ZZZ-RETIRED (DO NOT USE) Pneumococcal Vaccine (Unspecified Type) 11/1/2012 Reviewed by Paola Burkett LPN on 68/61/1511 at 12:45 PM  
You Were Diagnosed With   
  
 Codes Comments Diabetes mellitus type 2 in obese [facility])    -  Primary ICD-10-CM: E11.69, E66.9 ICD-9-CM: 250.00, 278.00 Essential hypertension, malignant     ICD-10-CM: I10 
ICD-9-CM: 401.0 Hypercholesteremia     ICD-10-CM: E78.00 ICD-9-CM: 272.0 Vitals BP Pulse Temp Resp Height(growth percentile) Weight(growth percentile) 138/78 (BP 1 Location: Left arm, BP Patient Position: Sitting) 68 97.5 °F (36.4 °C) (Oral) 16 5' 6\" (1.676 m) 165 lb 4.8 oz (75 kg) SpO2 BMI Smoking Status 98% 26.68 kg/m2 Former Smoker BMI and BSA Data  Body Mass Index Body Surface Area  
 26.68 kg/m 2 1.87 m 2  
  
  
 Preferred Pharmacy Pharmacy Name Phone [facility] [place] - [address] [phone] Your Updated Medication List  
  
   
This list is accurate as of: 11/20/17 12:57 PM.  Always use your most recent med list.  
  
  
  
  
 * ACCU-CHEK ALEXIS PLUS TEST STRP strip Generic drug:  glucose blood VI test strips TEST BLOOD SUGARS 2 TIMES DAILY * glucose blood VI test strips strip Commonly known as:  ONETOUCH ULTRA TEST Test 2 times daily  ONETOUCH ULTRA 2 TEST STRIPS ONLY  
  
 allopurinol 100 mg tablet Commonly known as:  ZYLOPRIM  
take 1 tablet by mouth once daily  
  
 amLODIPine 5 mg tablet Commonly known as:  NORVASC  
take 1 tablet by mouth once daily  
  
 aspirin 325 mg tablet Commonly known as:  ASPIRIN Take 325 mg by mouth daily. bumetanide 1 mg tablet Commonly known as:  BUMEX  
take 1 tablet by mouth once daily  
  
 clopidogrel 75 mg Tab Commonly known as:  PLAVIX  
take 1 tablet by mouth once daily HYDROcodone-acetaminophen 5-325 mg per tablet Commonly known as:  Anne-Marie Barrs Take 1 Tab by mouth every four (4) hours as needed for Pain. Max Daily Amount: 6 Tabs. insulin glargine 100 unit/mL (3 mL) Inpn Commonly known as:  LANTUS SOLOSTAR  
10 units sc daily Insulin Needles (Disposable) 32 gauge x 5/32\" Ndle Commonly known as:  Joseline Pen Needle Use daily as directed with insulin pen.  
  
 lacosamide 100 mg Tab tablet Commonly known as:  VIMPAT  
take 1 tablet by mouth twice a day Lancets Misc Patient test 3 x daily  Dm 250  
  
 levETIRAcetam 1,000 mg tablet  
take 1 tablet by mouth twice a day  
  
 losartan 100 mg tablet Commonly known as:  COZAAR  
take 1 tablet by mouth once daily  
  
 metFORMIN 1,000 mg tablet Commonly known as:  GLUCOPHAGE  
take 1 tablet twice a day with food  
  
 metoprolol tartrate 50 mg tablet Commonly known as:  LOPRESSOR  
take 1 tablet by mouth twice a day pravastatin 40 mg tablet Commonly known as:  PRAVACHOL  
take 1 tablet once daily Mere kinney * Notice: This list has 2 medication(s) that are the same as other medications prescribed for you. Read the directions carefully, and ask your doctor or other care provider to review them with you. We Performed the Following AMB POC GLUCOSE BLOOD, BY GLUCOSE MONITORING DEVICE [97015 CPT(R)] AMB POC HEMOGLOBIN A1C [62531 CPT(R)] AMB POC LIPID PROFILE [96458 CPT(R)] Follow-up Instructions Return in about 4 weeks (around 2017), or if symptoms worsen or fail to improve. Patient Instructions Ostarahart Activation Thank you for requesting access to Socialcast. Please follow the instructions below to securely access and download your online medical record. Socialcast allows you to send messages to your doctor, view your test results, renew your prescriptions, schedule appointments, and more. How Do I Sign Up? 1. In your internet browser, go to [email or web address] 
2. Click on the First Time User? Click Here link in the Sign In box. You will be redirect to the New Member Sign Up page. 3. Enter your Socialcast Access Code exactly as it appears below. You will not need to use this code after youve completed the sign-up process. If you do not sign up before the expiration date, you must request a new code. Socialcast Access Code: [email or web address] Expires: 2017  9:30 AM (This is the date your Socialcast access code will ) 4. Enter the last four digits of your Social Security Number (xxxx) and Date of Birth (mm/dd/yyyy) as indicated and click Submit. You will be taken to the next sign-up page. 5. Create a Socialcast ID. This will be your Socialcast login ID and cannot be changed, so think of one that is secure and easy to remember. 6. Create a Socialcast password. You can change your password at any time. 7. Enter your Password Reset Question and Answer. This can be used at a later time if you forget your password. 8. Enter your e-mail address. You will receive e-mail notification when new information is available in [address] 9. Click Sign Up. You can now view and download portions of your medical record. 10. Click the Download Summary menu link to download a portable copy of your medical information. Additional Information If you have questions, please visit the Frequently Asked Questions section of the Blyk website at [email or web address] Remember, Blyk is NOT to be used for urgent needs. For medical emergencies, dial 911. Introducing [facility] SERVICES! Levon Henderson introduces Blyk patient portal. Now you can access parts of your medical record, email your doctor's office, and request medication refills online. 1. In your internet browser, go to [email or web address] 2. Click on the First Time User? Click Here link in the Sign In box. You will see the New Member Sign Up page. 3. Enter your Blyk Access Code exactly as it appears below. You will not need to use this code after youve completed the sign-up process. If you do not sign up before the expiration date, you must request a new code. · Blyk Access Code: [email or web address] Expires: 11/22/2017  9:30 AM 
 
4. Enter the last four digits of your Social Security Number (xxxx) and Date of Birth (mm/dd/yyyy) as indicated and click Submit. You will be taken to the next sign-up page. 5. Create a Blyk ID. This will be your Blyk login ID and cannot be changed, so think of one that is secure and easy to remember. 6. Create a Blyk password. You can change your password at any time. 7. Enter your Password Reset Question and Answer. This can be used at a later time if you forget your password. 8. Enter your e-mail address.  You will receive e-mail notification when new information is available in [employer]. 9. Click Sign Up. You can now view and download portions of your medical record. 10. Click the Download Summary menu link to download a portable copy of your medical information. If you have questions, please visit the Frequently Asked Questions section of the Quizrr website. Remember, Quizrr is NOT to be used for urgent needs. For medical emergencies, dial 911. Now available from your iPhone and Android! Please provide this summary of care documentation to your next provider. Your primary care clinician is listed as Jean Haque. If you have any questions after today's visit, please call [phone].

## 2017-11-20 NOTE — PROGRESS NOTES
Branden Ma is a 67 y.o. male and presents with Diabetes; Cholesterol Problem; Hypertension; and Foot Exam    Subjective:      Diabetes Mellitus Review:  He has diabetes mellitus. Diabetic ROS - medication compliance: compliant all of the time, diabetic diet compliance: compliant all of the time, home glucose monitoring: is performed. Known diabetic complications: hypoglycemia bouts noted. Cardiovascular risk factors: family history, dyslipidemia, diabetes mellitus, obesity, hypertension  Current diabetic medications include /insulin   Eye exam current (within one year): no  Weight trend: stable  Prior visit with dietician: no  Current diet: \"healthy\" diet  in general  Current exercise: walking  Current monitoring regimen: home blood tests - daily  Home blood sugar records: trend: stable  Any episodes of hypoglycemia? no  Is He on ACE inhibitor or angiotensin II receptor blocker? Yes     Hypertension Review:  The patient has hypertension . Diet and Lifestyle: generally follows a low sodium diet, exercises sporadically  Home BP Monitoring: is not measured at home. Pertinent ROS: taking medications as instructed, no medication side effects noted, no TIA's, no chest pain on exertion, no dyspnea on exertion, no swelling of ankles. Dyslipidemia Review:  Patient presents for evaluation of lipids. Compliance with treatment thus far has been excellent. A repeat fasting lipid profile was done. The patient does not use medications that may worsen dyslipidemias . The patient exercises sporadically. The patient is not known to have coexisting coronary artery disease    Seizure Review:  Patient presents for evaluation of seizures. Patient has been stable without any new seizures reported. Patient has tolerated his medication thus far. Gout Review:  Patient has gout, primarily affecting the foot. The patient has been stable with no recent joint pains  Symptoms onset: problem is longstanding.   Rheumatological ROS: using NSAID medication regularly, daily. Response to treatment plan: symptoms have progressed to a point and plateaued. The most recent uric acid was normal.       He has lt. thigh pain    he has dry skin      Review of Systems  Constitutional: negative for fevers, chills, anorexia and weight loss  Eyes:   negative for visual disturbance and irritation  ENT:   negative for tinnitus,sore throat,nasal congestion,ear pains. hoarseness  Respiratory:  negative for cough, hemoptysis, dyspnea,wheezing  CV:   negative for chest pain, palpitations, lower extremity edema  GI:   negative for nausea, vomiting, diarrhea, abdominal pain,melena  Endo:               negative for polyuria,polydipsia,polyphagia,heat intolerance  Genitourinary: negative for frequency, dysuria and hematuria  Integument:  negative for rash and pruritus  Hematologic:  negative for easy bruising and gum/nose bleeding  Musculoskel: negative for myalgias, arthralgias, back pain, muscle weakness, joint pain  Neurological:  negative for headaches, dizziness, vertigo, memory problems and gait   Behavl/Psych: negative for feelings of anxiety, depression, mood changes    Past Medical History:   Diagnosis Date    Allergic rhinitis, cause unspecified 4/18/2011    Alzheimer's disease     Arthritis     GOUT    Cancer ([facility] 09/20/2016    PROSTATE    Chronic obstructive pulmonary disease ([facility]     CHRONIC BRONCHITIS    Congestive heart failure, unspecified 4/18/2011    (ON 9/20/16 PT & DTR STATE THEY DON'T REMEMBER THIS DIAGNOSIS)    Coronary Artery Disease 4/18/2011    Diabetes Mellitus Type I 4/18/2011    Erectile Dysfunction 4/18/2011    Hemorrhoids 4/18/2011    Hypertension     Seizures ([facility]     STARTED 2005; \"BLACK-OUT SEIZURES,ABSENCE SEIZURES\", DTR SATES    Stroke ([facility]     MULTIPLE MINISTROKES, DTR REPORTS; LEFT-SIDED WEAKNESS    Thromboembolus ([facility]     left leg  (NO PE)    Unspecified asthma(493.90) 4/18/2011     Past Surgical History: Procedure Laterality Date    CARDIAC SURG PROCEDURE UNLIST  2006    CABG    COLONOSCOPY N/A 10/9/2017    COLONOSCOPY performed by Alyssa Solomon MD at [facility], STENTS LEFT LEG     Social History     Social History    Marital status:      Spouse name: N/A    Number of children: N/A    Years of education: N/A     Social History Main Topics    Smoking status: Former Smoker     Packs/day: 2.00     Years: 30.00    Smokeless tobacco: Former User     Quit date: 9/20/1990    Alcohol use No      Comment: IN PAST, MODERATE ALCOHOL. WEEKENDS ONLY    Drug use: No    Sexual activity: Yes     Partners: Female     Birth control/ protection: None     Other Topics Concern    None     Social History Narrative     Family History   Problem Relation Age of Onset    Diabetes Mother     Hypertension Mother     Diabetes Father     Anesth Problems Neg Hx      Current Outpatient Prescriptions   Medication Sig Dispense Refill    insulin glargine (LANTUS SOLOSTAR) 100 unit/mL (3 mL) inpn 10 units sc daily 1 Pen 12    amLODIPine (NORVASC) 5 mg tablet take 1 tablet by mouth once daily 90 Tab 3    clopidogrel (PLAVIX) 75 mg tab take 1 tablet by mouth once daily 30 Tab 12    losartan (COZAAR) 100 mg tablet take 1 tablet by mouth once daily 90 Tab 1    Walker misc Rollator walker 1 Each 0    HYDROcodone-acetaminophen (NORCO) 5-325 mg per tablet Take 1 Tab by mouth every four (4) hours as needed for Pain. Max Daily Amount: 6 Tabs. 20 Tab 0    Insulin Needles, Disposable, (JAZMYN PEN NEEDLE) 32 gauge x 5/32\" ndle Use daily as directed with insulin pen. 100 Pen Needle 11    aspirin (ASPIRIN) 325 mg tablet Take 325 mg by mouth daily.       lacosamide (VIMPAT) 100 mg tab tablet take 1 tablet by mouth twice a day 60 Tab 5    levETIRAcetam 1,000 mg tablet take 1 tablet by mouth twice a day 60 Tab 3    pravastatin (PRAVACHOL) 40 mg tablet take 1 tablet once daily 30 Tab 9    metoprolol tartrate (LOPRESSOR) 50 mg tablet take 1 tablet by mouth twice a day 60 Tab 4    bumetanide (BUMEX) 1 mg tablet take 1 tablet by mouth once daily 30 Tab 12    metFORMIN (GLUCOPHAGE) 1,000 mg tablet take 1 tablet twice a day with food 60 Tab 12    glucose blood VI test strips (ONETOUCH ULTRA TEST) strip Test 2 times daily    ONETOUCH ULTRA 2 TEST STRIPS ONLY 100 Strip 11    allopurinol (ZYLOPRIM) 100 mg tablet take 1 tablet by mouth once daily 30 Tab 12    ACCU-CHEK ALEXIS PLUS TEST STRP strip TEST BLOOD SUGARS 2 TIMES DAILY 100 Strip PRN    Lancets misc Patient test 3 x daily  Dm 250 1 Package 11     No Known Allergies    Objective:  Visit Vitals    /78 (BP 1 Location: Left arm, BP Patient Position: Sitting)    Pulse 68    Temp 97.5 °F (36.4 °C) (Oral)    Resp 16    Ht 5' 6\" (1.676 m)    Wt 165 lb 4.8 oz (75 kg)    SpO2 98%    BMI 26.68 kg/m2     Physical Exam:   General appearance - alert, well appearing, and in no distress  Mental status - alert, oriented to person, place, and time  EYE-CAROL, EOMI, corneas normal, no foreign bodies  ENT-ENT exam normal, no neck nodes or sinus tenderness  Nose - normal and patent, no erythema, discharge or polyps  Mouth - mucous membranes moist, pharynx normal without lesions  Neck - supple, no significant adenopathy   Chest - clear to auscultation, no wheezes, rales or rhonchi, symmetric air entry   Heart - normal rate, regular rhythm, normal S1, S2, no murmurs, rubs, clicks or gallops   Abdomen - soft, nontender, nondistended, no masses or organomegaly  Lymph- no adenopathy palpable  Ext-peripheral pulses normal, no pedal edema, no clubbing or cyanosis  Skin-Warm and dry. no hyperpigmentation, vitiligo, or suspicious lesions  Neuro -alert, oriented, normal speech, no focal findings or movement disorder noted  Neck-normal C-spine, no tenderness, full ROM without pain  Several abrasions to the hands and knees noted.     Results for orders placed or performed in visit on 11/20/17   AMB POC LIPID PROFILE   Result Value Ref Range    Cholesterol (POC) 101     Triglycerides (POC) 119     HDL Cholesterol (POC) 43     Non-HDL Cholesterol 58     LDL Cholesterol (POC) 35 MG/DL    TChol/HDL Ratio (POC) 2.4    AMB POC GLUCOSE BLOOD, BY GLUCOSE MONITORING DEVICE   Result Value Ref Range    Glucose POC 50 mg/dL   AMB POC HEMOGLOBIN A1C   Result Value Ref Range    Hemoglobin A1c (POC) 2.4 %       Assessment/Plan:    ICD-10-CM ICD-9-CM    1. Diabetes mellitus type 2 in obese (HCC) E11.69 250.00 AMB POC LIPID PROFILE    E66.9 278.00 AMB POC GLUCOSE BLOOD, BY GLUCOSE MONITORING DEVICE      AMB POC HEMOGLOBIN A1C   2. Essential hypertension, malignant I10 401.0 AMB POC LIPID PROFILE      AMB POC GLUCOSE BLOOD, BY GLUCOSE MONITORING DEVICE      AMB POC HEMOGLOBIN A1C   3. Hypercholesteremia E78.00 272.0 AMB POC LIPID PROFILE      AMB POC GLUCOSE BLOOD, BY GLUCOSE MONITORING DEVICE      AMB POC HEMOGLOBIN A1C     Orders Placed This Encounter    AMB POC LIPID PROFILE    AMB POC GLUCOSE BLOOD, BY GLUCOSE MONITORING DEVICE    AMB POC HEMOGLOBIN A1C    insulin glargine (LANTUS SOLOSTAR) 100 unit/mL (3 mL) inpn     Sig: 10 units sc daily     Dispense:  1 Pen     Refill:  12     lose weight, increase physical activity, follow low fat diet, follow low salt diet,Take 81mg aspirin daily  Patient Instructions   Qwbcghart Activation    Thank you for requesting access to [employer]. Please follow the instructions below to securely access and download your online medical record. [employer] allows you to send messages to your doctor, view your test results, renew your prescriptions, schedule appointments, and more. How Do I Sign Up? 1. In your internet browser, go to [email or web address]  2. Click on the First Time User? Click Here link in the Sign In box. You will be redirect to the New Member Sign Up page.   3. Enter your Avancert Access Code exactly as it appears below. You will not need to use this code after youve completed the sign-up process. If you do not sign up before the expiration date, you must request a new code. [employer] Access Code: [email or web address]  Expires: 2017  9:30 AM (This is the date your [employer] access code will )    4. Enter the last four digits of your Social Security Number (xxxx) and Date of Birth (mm/dd/yyyy) as indicated and click Submit. You will be taken to the next sign-up page. 5. Create a Clinical Insightt ID. This will be your [employer] login ID and cannot be changed, so think of one that is secure and easy to remember. 6. Create a [employer] password. You can change your password at any time. 7. Enter your Password Reset Question and Answer. This can be used at a later time if you forget your password. 8. Enter your e-mail address. You will receive e-mail notification when new information is available in [address] 9. Click Sign Up. You can now view and download portions of your medical record. 10. Click the Download Summary menu link to download a portable copy of your medical information. Additional Information    If you have questions, please visit the Frequently Asked Questions section of the [employer] website at [email or web address] Remember, [employer] is NOT to be used for urgent needs. For medical emergencies, dial 911. Follow-up Disposition:  Return in about 4 weeks (around 2017), or if symptoms worsen or fail to improve. I have reviewed with the patient details of the assessment and plan and all questions were answered. Relevent patient education was performed    An After Visit Summary was printed and given to the patient.

## 2017-11-28 DIAGNOSIS — E11.69 DIABETES MELLITUS TYPE 2 IN OBESE (HCC): ICD-10-CM

## 2017-11-28 DIAGNOSIS — E66.9 DIABETES MELLITUS TYPE 2 IN OBESE (HCC): ICD-10-CM

## 2017-12-01 RX ORDER — METFORMIN HYDROCHLORIDE 1000 MG/1
TABLET ORAL
Qty: 60 TAB | Refills: 12 | Status: SHIPPED | OUTPATIENT
Start: 2017-12-01 | End: 2020-08-13

## 2017-12-14 ENCOUNTER — PATIENT OUTREACH (OUTPATIENT)
Dept: INTERNAL MEDICINE CLINIC | Age: 72
End: 2017-12-14

## 2017-12-14 RX ORDER — INSULIN GLARGINE 100 [IU]/ML
60 INJECTION, SOLUTION SUBCUTANEOUS DAILY
COMMUNITY
Start: 2017-12-13 | End: 2020-08-13

## 2017-12-14 RX ORDER — AMLODIPINE BESYLATE 2.5 MG/1
2.5 TABLET ORAL DAILY
Status: ON HOLD | COMMUNITY
Start: 2017-12-13 | End: 2020-08-10

## 2017-12-14 RX ORDER — CARVEDILOL 6.25 MG/1
6.25 TABLET ORAL 2 TIMES DAILY WITH MEALS
Status: ON HOLD | COMMUNITY
End: 2021-07-15

## 2017-12-14 RX ORDER — LACOSAMIDE 200 MG/1
200 TABLET ORAL 2 TIMES DAILY
COMMUNITY
Start: 2017-12-13

## 2017-12-14 RX ORDER — LEVETIRACETAM 500 MG/1
500 TABLET ORAL 3 TIMES DAILY
COMMUNITY
Start: 2017-12-13

## 2017-12-14 RX ORDER — TOPIRAMATE 100 MG/1
100 TABLET, FILM COATED ORAL 2 TIMES DAILY
COMMUNITY
Start: 2017-12-13

## 2017-12-14 NOTE — PROGRESS NOTES
Transition of Care    Patient on combined discharge report dated 2017  The patient was discharged from [facility] after a 17 day stay for: epileptic seizures requiring intubation and PNA. The [facility] record shows d/c disposition was [address]([facility]). Navigator has contacted the patient's daughter, Arjun Best by telephone. HIPAA was verified by name and . Anna Osei confirms that her father has been admitted to the [facility] and is due to start therapy today. I provided my contact information and requested for Anna Osei to contact me as needed with updates of her father's rehab progress as well as to facilitate an appointment with the PCP office post d/c from the [facility]. She agrees to do so. Anna Osei related a concern with the patient's d/c from [facility]. Anna Osei states that all of the patient home medications, glasses and slippers were lost at the hospital.  I have provided Anna Osei with the contact number for the [facility] patient relations. Navigator has contacted [facility] by telephone. Spoke with the patient's nurse, Champ Shabazz. HIPAA was verified by name and . Completed medication reconciliation. Champ Shabazz states that the patient had an uneventful night and confirmed that the patient's therapy shall start today. I requested for her unit to contact the PCP office prior to discharge to arrange PCP post discharge appointment. Plan: contact the facility and the patient's daughter in 7-10 days for rehab progress and ELOS.

## 2018-01-04 DIAGNOSIS — I10 ESSENTIAL HYPERTENSION: ICD-10-CM

## 2018-01-04 RX ORDER — BUMETANIDE 1 MG/1
TABLET ORAL
Qty: 30 TAB | Refills: 12 | Status: ON HOLD | OUTPATIENT
Start: 2018-01-04 | End: 2021-07-15

## 2018-01-30 ENCOUNTER — EXTERNAL NURSING HOME DOCUMENTATION (OUTPATIENT)
Dept: INTERNAL MEDICINE CLINIC | Age: 73
End: 2018-01-30
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
## 2018-01-30 DIAGNOSIS — G40.209 COMPLEX PARTIAL SEIZURES EVOLVING TO GENERALIZED TONIC-CLONIC SEIZURES (HCC): ICD-10-CM

## 2018-01-30 DIAGNOSIS — I95.1 ORTHOSTASIS: ICD-10-CM

## 2018-01-30 DIAGNOSIS — R10.9 ABDOMINAL PAIN, UNSPECIFIED ABDOMINAL LOCATION: Primary | ICD-10-CM

## 2018-01-30 DIAGNOSIS — E11.69 DIABETES MELLITUS TYPE 2 IN OBESE (HCC): ICD-10-CM

## 2018-01-30 DIAGNOSIS — R26.81 GAIT INSTABILITY: ICD-10-CM

## 2018-01-30 DIAGNOSIS — I50.32 DIASTOLIC CHF, CHRONIC (HCC): Chronic | ICD-10-CM

## 2018-01-30 DIAGNOSIS — E66.9 DIABETES MELLITUS TYPE 2 IN OBESE (HCC): ICD-10-CM

## 2018-01-31 NOTE — PROGRESS NOTES
History of Present Illness: This is a 59-year-old [facility] American male with past medical history significant for cerebrovascular accident (CVA), coronary artery disease, hypertension, type 2 diabetes mellitus, and seizure disorder, was admitted to [facility] for a ground-level fall. The patient was also found to have acute abdominal pain. Family reports there has been significant decline, as per the patients conversations and p.o. intake and increased confusion. So, the patient was admitted to [facility].  CT head was done, showed left parietal encephalomalacia, as well as right frontal lobe encephalomalacia and chronic infarctions in the bilateral cerebellum also noted. A CT abdomen was done with questionable early appendicitis. General Surgery was consulted and no surgical intervention was advised. The patient had orthostatic hypotension and his blood pressure medications were readjusted. Because of the increased confusion, his seizure medication, Keppra, was reduced. He was evaluated by neurologist and MRI of the brain was not recommended. An EEG was done, which was abnormal but negative for epilepsy. The patient was found to have Influenza A for which, he was treated with Tamiflu. He was also started on Physical Therapy and Occupational Therapy and was transferred here to [facility]. I am seeing him here. He is doing well right now and not in distress. Past Medical History:  1. Coronary artery disease status post bypass. 2. Aortic valve stenosis status post aortic valve replacement. 3. Type 2 diabetes mellitus. 4. Hypertension. 5. Seizure disorder. 6. Cerebrovascular accident (CVA). Past Surgical History:  1. Bypass surgery. 2. Aortic valve replacement. Allergies: The patient is not allergic to any medications. Medications: The patient is on:  1. Allopurinol 100 mg once a day. 2. Amlodipine 2.5 mg once a day. 3. Aspirin 325 mg twice a day.   4. Bumex 1 mg tablet once a day. 5. Sinemet 25/100 mg 1 tablet 3 times a day. 6. Coreg 6.25 mg twice a day. 7. Plavix 75 mg once a day. 8. Lantus insulin 50 units subcutaneously once a day. 9. Lacosamide 200 mg tablet every 12 hours. 10. Keppra 1500 mg twice a day. 11. Losartan 100 mg once a day. 12. Metformin 1000 mg twice a day. 13. Pravastatin 40 mg once a day. 14. Topamax 100 mg twice a day. Social History:  The patient was staying at home. He is not a smoker or drinker. Family History:  Noncontributory. Review of Systems:  A complete review of systems was done and right now, is essentially negative. Physical Examination:  GENERAL:  This is a pleasant  male not apparently in distress. VITAL SIGNS:  Temperature is 97.3 degrees Fahrenheit, pulse 82 per minute, blood pressure 124/76 mmHg, SaO2 is 97% on room air and weight is 138 pounds. HEENT:  No abnormalities detected. NECK:  Supple. No JVD. No carotid bruits. No thyromegaly. CHEST:  Clear to auscultation bilaterally. No rales, no rhonchi. CARDIAC:  S1 and S2 normal.  Regular rate and rhythm. ABDOMEN:  Soft, nontender, nondistended. Bowel sounds present. EXTREMITIES:  No edema noted. Dorsalis pedis pulse normal.  NEUROLOGIC:  Alert and oriented x2. Cranial nerves II through XII grossly intact. Motor 5/5 bilaterally. Sensory within normal limits. Assessment and Plan:  1. Frequent falls secondary to orthostatic hypotension. Blood pressure medication has been readjusted. The patient is doing well. 2. Abdominal pain. Negative for appendicitis. The patients pain has improved. He is doing well right now. 3. Influenza A. The patient has finished Tamiflu 30 mg twice a day. 4. Progressive dementia. The patient is Nursing home-bound right now. 5. Gait weakness. The patient is to continue Physical Therapy and Occupational Therapy. 6. Seizure disorder. The patient is on Keppra, lacosamide, and Topamax.   Topamax dose has been reduced. 7. Type 2 diabetes mellitus. The patient is on Lantus insulin. Will monitor her blood sugars 2 times a day. 8. Coronary artery disease status post coronary artery bypass graft (CABG). The patient is taking aspirin, Plavix, beta-blocker, and a statin. 9. Hyperlipidemia. The patient is on a statin. THIS IS NOT A COMPLETE MEDICAL RECORD ON THIS PATIENT. THIS IS A PATIENT AT [facility]. PLEASE CONTACT THE FACILITY LISTED BELOW FOR MORE INFORMATION ON THIS PATIENT.     THE COMPLETE RECORD RESIDES WITH THIS LONG TERM CARE FACILITY  _______________________________________      _______________________________________   MD Jeanie Hoffman/martinez; D: 01/30/2018; T: 01/30/2018; Job

## 2018-02-05 ENCOUNTER — EXTERNAL NURSING HOME DOCUMENTATION (OUTPATIENT)
Dept: INTERNAL MEDICINE CLINIC | Age: 73
End: 2018-02-05

## 2018-02-05 DIAGNOSIS — R05.9 COUGH: ICD-10-CM

## 2018-02-05 DIAGNOSIS — E11.8 TYPE 2 DIABETES MELLITUS WITH COMPLICATION, UNSPECIFIED LONG TERM INSULIN USE STATUS: ICD-10-CM

## 2018-02-05 DIAGNOSIS — I10 ESSENTIAL HYPERTENSION: ICD-10-CM

## 2018-02-05 DIAGNOSIS — G40.909 SEIZURE DISORDER (HCC): ICD-10-CM

## 2018-02-05 DIAGNOSIS — I25.83 CORONARY ARTERY DISEASE DUE TO LIPID RICH PLAQUE: ICD-10-CM

## 2018-02-05 DIAGNOSIS — I25.10 CORONARY ARTERY DISEASE DUE TO LIPID RICH PLAQUE: ICD-10-CM

## 2018-02-05 DIAGNOSIS — F03.90 DEMENTIA WITHOUT BEHAVIORAL DISTURBANCE, UNSPECIFIED DEMENTIA TYPE: ICD-10-CM

## 2018-02-05 DIAGNOSIS — Z86.73 HISTORY OF CVA (CEREBROVASCULAR ACCIDENT): ICD-10-CM

## 2018-02-05 DIAGNOSIS — E78.5 HYPERLIPIDEMIA, UNSPECIFIED HYPERLIPIDEMIA TYPE: ICD-10-CM

## 2018-02-05 DIAGNOSIS — R09.81 NASAL CONGESTION: Primary | ICD-10-CM

## 2018-02-05 NOTE — PROGRESS NOTES
THIS IS NOT A COMPLETE MEDICAL RECORD ON THIS PATIENT. THIS IS A PATIENT AT [facility]. PLEASE CONTACT THE FACILITY LISTED BELOW FOR MORE INFORMATION ON THIS PATIENT. THE COMPLETE RECORD RESIDES WITH THIS LONG TERM CARE FACILITY. HISTORY OF PRESENT ILLNESS  Alex Allen is a 67 y.o. male. This patient is currently under the care of Dr. Marge Way at [facility].  The patient was admitted to this facility following hospitalization related to fall and influenza A. The patient's past medical history includes seizure disorder, dementia, diabetes, hyperlipidemia, hypertension, CAD, and CVA. The patient is seen today for follow-up. He states he is feeling well at time of visit aside from cold symptoms. He describes non-productive cough and runny nose. He is ordered Robitussin DM q4h PRN cough, per chart review. The patients vitals have been stable, per nursing. HPI    Review of Systems   Constitutional: Negative for chills and fever. HENT: Positive for congestion. Respiratory: Positive for cough. Negative for shortness of breath and wheezing. Cardiovascular: Negative for chest pain and leg swelling. Gastrointestinal: Negative for abdominal pain. Genitourinary: Negative. Musculoskeletal: Negative. Neurological: Negative for headaches. Physical Exam   Constitutional: He appears well-developed. No distress. HENT:   Head: Normocephalic and atraumatic. Mild rhinorrhea   Neck: Neck supple. Cardiovascular: Normal rate and regular rhythm. Pulmonary/Chest: Effort normal and breath sounds normal. No respiratory distress. He has no wheezes. He has no rales. Abdominal: Soft. Bowel sounds are normal. He exhibits no distension. There is no tenderness. Musculoskeletal: He exhibits no edema. Neurological: He is alert. Answering simple questions appropriately   Skin: Skin is warm and dry. Psychiatric: He has a normal mood and affect.  His behavior is normal. ASSESSMENT and PLAN  Encounter Diagnoses   Name Primary?  Nasal congestion Yes    Cough     Seizure disorder (HCC)     Dementia without behavioral disturbance, unspecified dementia type     Type 2 diabetes mellitus with complication, unspecified long term insulin use status (HCC)     Hyperlipidemia, unspecified hyperlipidemia type     Essential hypertension     Coronary artery disease due to lipid rich plaque     History of CVA (cerebrovascular accident)      Will order Flonase 2 sprays to each nostril daily x 7 days. Will order CBC, CMP next lab day. Continue PT/OT as tolerated. Reviewed medications and side effects. Continue current medications at this time. Nursing to continue to monitor closely and notify MD/NP of any change in condition.

## 2018-02-09 ENCOUNTER — EXTERNAL NURSING HOME DOCUMENTATION (OUTPATIENT)
Dept: INTERNAL MEDICINE CLINIC | Age: 73
End: 2018-02-09

## 2018-02-09 DIAGNOSIS — G30.9 ALZHEIMER'S DEMENTIA WITHOUT BEHAVIORAL DISTURBANCE, UNSPECIFIED TIMING OF DEMENTIA ONSET: ICD-10-CM

## 2018-02-09 DIAGNOSIS — F02.80 ALZHEIMER'S DEMENTIA WITHOUT BEHAVIORAL DISTURBANCE, UNSPECIFIED TIMING OF DEMENTIA ONSET: ICD-10-CM

## 2018-02-09 DIAGNOSIS — I50.32 DIASTOLIC CHF, CHRONIC (HCC): Chronic | ICD-10-CM

## 2018-02-09 DIAGNOSIS — I10 ORTHOSTATIC HYPERTENSION: Primary | ICD-10-CM

## 2018-02-09 DIAGNOSIS — E11.69 DIABETES MELLITUS TYPE 2 IN OBESE (HCC): ICD-10-CM

## 2018-02-09 DIAGNOSIS — I25.9 CHRONIC ISCHEMIC HEART DISEASE: ICD-10-CM

## 2018-02-09 DIAGNOSIS — R56.9 SEIZURE (HCC): ICD-10-CM

## 2018-02-09 DIAGNOSIS — E66.9 DIABETES MELLITUS TYPE 2 IN OBESE (HCC): ICD-10-CM

## 2018-02-09 NOTE — PROGRESS NOTES
Progress Note     Subjective:   Mr. Jose D Mayes was admitted in rehab after having orthostatic hypotension and frequent falls. He continued with physical therapy and occupational therapy. No further falls happened here. He had the flu, which has resolved. No chest pain, no palpitations, no shortness of breath. Objective:    VITALS:  T:  98 degrees Fahrenheit. P:  97 per minute. BP:  147/85 mmHg. SaO2:  100% on room air. Weight is 134 pounds. HEENT:  No abnormality detected. NECK:  Supple, no JVD, no carotid bruits, no thyromegaly. CHEST:  Chest is clear to auscultation bilaterally. No rales, no rhonchi. CARDIOVASCULAR:  S1, S2 normal.  Regular rate and rhythm. ABDOMEN:  Soft, nontender, nondistended, bowel sounds present. EXTREMITIES:  No edema is noticed. Dorsalis pedis pulse normal.   NEUROLOGICAL:  Alert and oriented x2. Dementia is present. Cranial nerves II - XII grossly intact. Motor and sensory are within normal limits. Assessment and Plan:   1. Orthostatic hypotension with frequent falls. Blood pressure is stable right now. No further falls happened. 2. Gait weakness. The patient is to continue physical therapy and occupational therapy. 3. Coronary artery disease, status post CABG. The patient is on aspirin and Plavix, beta-blocker and statin. We will continue it for now. 4. Seizure disorder. The patient is seizure free on Keppra, Lacosamide and Topamax. Doing well. 5. Progressive dementia. He is nursing home bound right now. 6. Type 2 diabetes mellitus. Blood sugar is stable. He is on Lantus.

## 2018-02-13 ENCOUNTER — EXTERNAL NURSING HOME DOCUMENTATION (OUTPATIENT)
Dept: INTERNAL MEDICINE CLINIC | Age: 73
End: 2018-02-13

## 2018-02-13 DIAGNOSIS — F03.90 DEMENTIA WITHOUT BEHAVIORAL DISTURBANCE, UNSPECIFIED DEMENTIA TYPE: ICD-10-CM

## 2018-02-13 DIAGNOSIS — G40.909 SEIZURE DISORDER (HCC): Primary | ICD-10-CM

## 2018-02-13 DIAGNOSIS — I10 ESSENTIAL HYPERTENSION: ICD-10-CM

## 2018-02-13 DIAGNOSIS — E11.8 TYPE 2 DIABETES MELLITUS WITH COMPLICATION, UNSPECIFIED LONG TERM INSULIN USE STATUS: ICD-10-CM

## 2018-02-13 DIAGNOSIS — G20 PARKINSON DISEASE (HCC): ICD-10-CM

## 2018-02-13 DIAGNOSIS — I25.10 CORONARY ARTERY DISEASE, ANGINA PRESENCE UNSPECIFIED, UNSPECIFIED VESSEL OR LESION TYPE, UNSPECIFIED WHETHER NATIVE OR TRANSPLANTED HEART: ICD-10-CM

## 2018-02-13 DIAGNOSIS — E78.5 HYPERLIPIDEMIA, UNSPECIFIED HYPERLIPIDEMIA TYPE: ICD-10-CM

## 2018-02-13 NOTE — PROGRESS NOTES
THIS IS NOT A COMPLETE MEDICAL RECORD ON THIS PATIENT. THIS IS A PATIENT AT [facility]. PLEASE CONTACT THE FACILITY LISTED BELOW FOR MORE INFORMATION ON THIS PATIENT. THE COMPLETE RECORD RESIDES WITH THIS LONG TERM CARE FACILITY. HISTORY OF PRESENT ILLNESS  Perry Silva is a 67 y.o. male. This patient is currently under the care of Dr. Janee Mooney at [facility].  The patient was admitted to this facility following hospitalization related to fall and influenza A. The patient's past medical history includes seizure disorder, dementia, diabetes, hyperlipidemia, hypertension, CAD, and CVA. The patient is seen today per family request for follow-up visit. Patient states he is feeling well at time of visit. He denies chest pain, shortness of breath, and abdominal pain. HPI    Review of Systems   Constitutional: Negative for chills and fever. HENT: Negative for congestion. Respiratory: Negative for cough, shortness of breath and wheezing. Cardiovascular: Negative for chest pain and leg swelling. Gastrointestinal: Negative for abdominal pain. Genitourinary: Negative. Musculoskeletal: Negative. Neurological: Negative for headaches. Physical Exam   Constitutional: He appears well-developed. No distress. HENT:   Head: Normocephalic and atraumatic. Neck: Neck supple. Cardiovascular: Normal rate and regular rhythm. Pulmonary/Chest: Effort normal and breath sounds normal. No respiratory distress. He has no wheezes. He has no rales. Abdominal: Soft. Bowel sounds are normal. He exhibits no distension. There is no tenderness. Musculoskeletal: He exhibits no edema. Neurological: He is alert. Oriented to self, place  Answering simple questions appropriately   Skin: Skin is warm and dry. Psychiatric: He has a normal mood and affect. His behavior is normal.       ASSESSMENT and PLAN  Encounter Diagnoses   Name Primary?     Seizure disorder ([facility] [address] Yes    Parkinson disease ([facility] [address]     Dementia without behavioral disturbance, unspecified dementia type     Type 2 diabetes mellitus with complication, unspecified long term insulin use status (HCC)     Hyperlipidemia, unspecified hyperlipidemia type     Essential hypertension     Coronary artery disease, angina presence unspecified, unspecified vessel or lesion type, unspecified whether native or transplanted heart      Spoke with patient's daughter, Katina Luther, via telephone, per her request.  Katina Luther states patient was recently diagnosed with Parkinson's Disease by neurology at [address] [place] [address]. She states after a fall at his previous living facility he was noted with increased tremor and evaluated by [address] [place] [address] neurology. He was started on Sinemet for a trial and symptoms improved on medication; therefore, he was diagnosed with Parkinson's disease. Katina Luther would like to ensure that this is reflected in patient record. Notified patient's daughter, that patient continues on Sinemet  mg po tid, as per hospital discharge summary. Per daughter, patient has an upcoming appointment with [facility] for follow-up and MRI of brain. Katina Luther also expresses some concerns about her father's appetite, she would like to ensure that his weight remains stable and is interested in more frequent monitoring. Will order weekly weights. Nursing to continue to encourage PO intake, as tolerated. Lab results and schedule of future lab studies reviewed. Will order CBC, CMP, HgbA1c to be drawn next lab day. Reviewed medications and side effects     Nursing to continue to monitor closely and notify MD/NP of any change in condition.

## 2018-03-01 ENCOUNTER — EXTERNAL NURSING HOME DOCUMENTATION (OUTPATIENT)
Dept: INTERNAL MEDICINE CLINIC | Age: 73
End: 2018-03-01

## 2018-03-01 VITALS
DIASTOLIC BLOOD PRESSURE: 68 MMHG | OXYGEN SATURATION: 100 % | RESPIRATION RATE: 16 BRPM | HEART RATE: 74 BPM | TEMPERATURE: 98.6 F | SYSTOLIC BLOOD PRESSURE: 116 MMHG

## 2018-03-01 DIAGNOSIS — E11.8 TYPE 2 DIABETES MELLITUS WITH COMPLICATION, UNSPECIFIED LONG TERM INSULIN USE STATUS: Primary | ICD-10-CM

## 2018-03-01 DIAGNOSIS — R09.81 NASAL CONGESTION: ICD-10-CM

## 2018-03-01 NOTE — PROGRESS NOTES
THIS IS NOT A COMPLETE MEDICAL RECORD ON THIS PATIENT. THIS IS A PATIENT AT [facility]. PLEASE CONTACT THE FACILITY LISTED BELOW FOR MORE INFORMATION ON THIS PATIENT. THE COMPLETE RECORD RESIDES WITH THIS LONG TERM CARE FACILITY. HISTORY OF PRESENT ILLNESS  Hiwot Ervin is a 68 y.o. male. This patient is currently under the care of Dr. Bunny Guadalupe at [facility].  The patient was admitted to this facility following hospitalization related to fall and influenza A. The patient's past medical history includes seizure disorder, dementia, diabetes, hyperlipidemia, hypertension, CAD, and CVA. The patient is seen today for family request.  The patient's daughter notes that he has had some continued nasal congestion and mild cough. He was previously ordered 7 days of Flonase, which helped some, but symptoms have now returned. The patient's daughter has also noted that his blood sugars have been elevated at times. He is currently ordered metformin 1000 mg po bid and well as Humalog sliding scale, with meals. The patient was previously on Lantus insulin, but this was discontinued due to hypoglycemia. Patient's daughter says she noticed dry, irritated skin under her father's arms yesterday. She applied Aveeno moisturizer; symptoms seem to have resolved today and patient denies itching. Patient denies chest pain and shortness of breath. Visit Vitals    /68    Pulse 74    Temp 98.6 °F (37 °C)    Resp 16    SpO2 100%     HPI    Review of Systems   Constitutional: Negative for chills and fever. HENT: Positive for congestion. Respiratory: Negative for cough, shortness of breath and wheezing. Cardiovascular: Negative for chest pain and leg swelling. Gastrointestinal: Negative for abdominal pain. Genitourinary: Negative. Musculoskeletal: Negative. Neurological: Negative for headaches. Physical Exam   Constitutional: He appears well-developed. No distress.    HENT: Head: Normocephalic and atraumatic. Mild nasal congestion   Neck: Neck supple. Cardiovascular: Normal rate and regular rhythm. Pulmonary/Chest: Effort normal and breath sounds normal. No respiratory distress. He has no wheezes. He has no rales. Abdominal: Soft. Bowel sounds are normal. He exhibits no distension. There is no tenderness. Musculoskeletal: He exhibits no edema. Neurological: He is alert. Oriented to self, place  Answering simple questions appropriately   Skin: Skin is warm and dry. No rash noted. No erythema. Psychiatric: He has a normal mood and affect. His behavior is normal.       ASSESSMENT and PLAN  Encounter Diagnoses   Name Primary?  Type 2 diabetes mellitus with complication, unspecified long term insulin use status (HCC) Yes    Nasal congestion       Will resume, Flonase 2 sprays to each nostril daily. Patient currently ordered regular diet. Will change to carbohydrate controlled diet. Lab results and schedule of future lab studies reviewed. HgbA1c was 7.2 on 2/14/18. Reviewed medications and side effects    Nursing to continue to monitor closely and notify MD/NP of any change in condition.

## 2018-03-05 ENCOUNTER — PATIENT OUTREACH (OUTPATIENT)
Dept: INTERNAL MEDICINE CLINIC | Age: 73
End: 2018-03-05

## 2018-03-09 ENCOUNTER — EXTERNAL NURSING HOME DOCUMENTATION (OUTPATIENT)
Dept: INTERNAL MEDICINE CLINIC | Age: 73
End: 2018-03-09

## 2018-03-09 DIAGNOSIS — E11.69 DIABETES MELLITUS TYPE 2 IN OBESE (HCC): ICD-10-CM

## 2018-03-09 DIAGNOSIS — E66.9 DIABETES MELLITUS TYPE 2 IN OBESE (HCC): ICD-10-CM

## 2018-03-09 DIAGNOSIS — I63.9 CEREBROVASCULAR ACCIDENT (CVA), UNSPECIFIED MECHANISM (HCC): ICD-10-CM

## 2018-03-09 DIAGNOSIS — G40.209 COMPLEX PARTIAL SEIZURES EVOLVING TO GENERALIZED TONIC-CLONIC SEIZURES (HCC): ICD-10-CM

## 2018-03-09 DIAGNOSIS — I10 ESSENTIAL HYPERTENSION: ICD-10-CM

## 2018-03-09 DIAGNOSIS — I25.9 CHRONIC ISCHEMIC HEART DISEASE: ICD-10-CM

## 2018-03-09 DIAGNOSIS — R26.9 GAIT ABNORMALITY: Primary | ICD-10-CM

## 2018-03-09 NOTE — PROGRESS NOTES
Progress Note    Subjective:   Mr. Tobias Hughes is doing well in the rehab. He continues with physical therapy and occupational therapy. Blood pressure is stable for now. No further dizziness. Blood sugar is under control too. He is eating well. Objective:    VITALS:  T:  98 degrees Fahrenheit. P:  74 per minute. BP:  139/68 mmHg. SaO2:  98% on room air. Weight is 136 pounds. HEENT:  No abnormality detected. NECK:  Supple, no JVD, no carotid bruits, no thyromegaly. CHEST:  Chest is clear to auscultation bilaterally. No rales, no rhonchi. CARDIOVASCULAR:  S1, S2 normal.  Regular rate and rhythm. ABDOMEN:  Soft, nontender, nondistended, bowel sounds present. EXTREMITIES:  No edema is noticed. Dorsalis pedis pulse normal.    NEUROLOGICAL:  Alert and oriented x3. No neurological deficits. Laboratory Data:   Labs were reviewed. Recent labs, CBC showed -hemoglobin 10.7. CMP showed - creatinine was normal.  Blood sugar was 210. A1C was 7.2. Assessment and Plan:   1. Gait weakness. The patient is to continue physical therapy and occupational therapy. 2. Type 2 diabetes mellitus. A1C was 7.2. The patient is on Lantus. He is stable. 3. Coronary artery disease, status post CABG. The patient is on aspirin, Plavix, beta-blocker and statin. We will continue it. 4. Hyperlipidemia. The patient is on statin. 5. Progressive dementia. It seems mild dementia. He is functioning and able to do some ADLs. THIS IS NOT A COMPLETE MEDICAL RECORD ON THIS PATIENT.    THIS IS A PATIENT AT [facility].    PLEASE CONTACT THE FACILITY LISTED BELOW FOR MORE INFORMATION ON THIS PATIENT.    THE COMPLETE RECORD RESIDES WITH THIS LONG TERM CARE FACILITY

## 2018-03-12 PROBLEM — R26.9 GAIT ABNORMALITY: Status: ACTIVE | Noted: 2018-03-12

## 2018-03-29 ENCOUNTER — EXTERNAL NURSING HOME DOCUMENTATION (OUTPATIENT)
Dept: INTERNAL MEDICINE CLINIC | Age: 73
End: 2018-03-29

## 2018-03-29 DIAGNOSIS — E11.8 TYPE 2 DIABETES MELLITUS WITH COMPLICATION, UNSPECIFIED LONG TERM INSULIN USE STATUS: Primary | ICD-10-CM

## 2018-03-29 NOTE — PROGRESS NOTES
THIS IS NOT A COMPLETE MEDICAL RECORD ON THIS PATIENT. THIS IS A PATIENT AT [facility]. PLEASE CONTACT THE FACILITY LISTED BELOW FOR MORE INFORMATION ON THIS PATIENT. THE COMPLETE RECORD RESIDES WITH THIS LONG TERM CARE FACILITY. HISTORY OF PRESENT ILLNESS  Gt Saldaña is a 68 y.o. male. This patient is currently under the care of Dr. Woody Das at [facility].  The patient was admitted to this facility following hospitalization related to fall and influenza A. The patient's past medical history includes seizure disorder, dementia, diabetes, hyperlipidemia, hypertension, CAD, and CVA. The patient is seen today per nursing request.  Patient has been noted to have elevated blood sugars, typically running 200s-300s. Lowest blood sugar over the last week noted at 178. Patient is currently receiving metformin 1000 mg po bid and Humalog sliding scale with meals. He was previously on Lantus insulin 60 units daily prior to hospitalization; however this was discontinued due to hypoglycemia. HPI    Review of Systems   Constitutional: Negative for chills and fever. HENT: Negative for congestion. Respiratory: Negative for cough, shortness of breath and wheezing. Cardiovascular: Negative for chest pain and leg swelling. Gastrointestinal: Negative for abdominal pain. Genitourinary: Negative. Musculoskeletal: Negative. Neurological: Negative for headaches. Physical Exam   Constitutional: He appears well-developed. No distress. HENT:   Head: Normocephalic and atraumatic. Neck: Neck supple. Cardiovascular: Normal rate and regular rhythm. Pulmonary/Chest: Effort normal and breath sounds normal. No respiratory distress. He has no wheezes. He has no rales. Abdominal: Soft. He exhibits no distension. Musculoskeletal: He exhibits no edema. Neurological: He is alert. Oriented to self, place  Answering simple questions appropriately   Skin: Skin is warm and dry. Psychiatric: He has a normal mood and affect. His behavior is normal.       ASSESSMENT and PLAN  Encounter Diagnoses   Name Primary?  Type 2 diabetes mellitus with complication, unspecified long term insulin use status (HCC) Yes     Resume Lantus insulin at 10 units sq qhs. May hold is blood sugar <100. Nursing to continue to monitor blood sugar ACHS, notify MD/NP if sugar <60 or >400. Continue carbohydrate controlled diet. Lab results and schedule of future lab studies reviewed. CBC, CMP HgbA1c next lab day. Reviewed medications and side effects    Nursing to continue to monitor closely and notify MD/NP of any change in condition.

## 2018-04-12 ENCOUNTER — EXTERNAL NURSING HOME DOCUMENTATION (OUTPATIENT)
Dept: INTERNAL MEDICINE CLINIC | Age: 73
End: 2018-04-12

## 2018-04-12 DIAGNOSIS — I10 ESSENTIAL HYPERTENSION, MALIGNANT: ICD-10-CM

## 2018-04-12 DIAGNOSIS — E11.69 DIABETES MELLITUS TYPE 2 IN OBESE (HCC): Primary | ICD-10-CM

## 2018-04-12 DIAGNOSIS — E66.9 DIABETES MELLITUS TYPE 2 IN OBESE (HCC): Primary | ICD-10-CM

## 2018-04-12 DIAGNOSIS — G40.209 COMPLEX PARTIAL SEIZURES EVOLVING TO GENERALIZED TONIC-CLONIC SEIZURES (HCC): ICD-10-CM

## 2018-04-12 DIAGNOSIS — I63.9 CEREBROVASCULAR ACCIDENT (CVA), UNSPECIFIED MECHANISM (HCC): ICD-10-CM

## 2018-04-12 DIAGNOSIS — I25.10 CORONARY ARTERY DISEASE INVOLVING NATIVE HEART WITHOUT ANGINA PECTORIS, UNSPECIFIED VESSEL OR LESION TYPE: ICD-10-CM

## 2018-04-12 NOTE — PROGRESS NOTES
Progress Note     Subjective:   Mr. Nikolay Maria is doing well. He continues with physical therapy and occupational therapy. His blood sugar was elevated. Lantus dosage was adjusted. Sugar came down to normal.  No chest pain, no palpitations, no shortness of breath. Objective:    VITALS:  T:  98.1 degrees Fahrenheit. P:  76 per minute. BP:  138/70 mmHg. SaO2:  97% on room air. Weight is 144 pounds. HEENT:  No abnormality detected. NECK:  Supple, no JVD, no carotid bruits, no thyromegaly. CHEST:  Chest is clear to auscultation bilaterally. No rales, no rhonchi. CARDIOVASCULAR:  S1, S2 normal.  Regular rate and rhythm. ABDOMEN:  Soft, nontender, nondistended, bowel sounds present. EXTREMITIES:  No edema is noticed. Dorsalis pedis pulse normal.    NEUROLOGICAL:  Alert and oriented x2. No neurological deficits. Laboratory Data:   Labs were reviewed. BMP done, seems okay. Blood sugar was elevated at 269. Assessment and Plan:   1. Type 2 diabetes mellitus. The patient is on Lantus insulin. Dosage has been increased. Blood sugar came down to normal.    2. Coronary artery disease with CABG. The patient is on Plavix, aspirin, beta-blocker and statin, doing well. 3. Hyperlipidemia. He is on a statin. 4. Progressive dementia. He is a nursing home resident right now. 5. Gait weakness. He will continue with physical therapy and occupational therapy. THIS IS NOT A COMPLETE MEDICAL RECORD ON THIS PATIENT.    THIS IS A PATIENT AT [facility].    PLEASE CONTACT THE FACILITY LISTED BELOW FOR MORE INFORMATION ON THIS PATIENT.    THE COMPLETE RECORD RESIDES WITH THIS LONG TERM CARE FACILITY

## 2018-05-09 ENCOUNTER — EXTERNAL NURSING HOME DOCUMENTATION (OUTPATIENT)
Dept: INTERNAL MEDICINE CLINIC | Age: 73
End: 2018-05-09

## 2018-05-09 DIAGNOSIS — F03.90 DEMENTIA WITHOUT BEHAVIORAL DISTURBANCE, UNSPECIFIED DEMENTIA TYPE: ICD-10-CM

## 2018-05-09 DIAGNOSIS — R41.0 CONFUSION: Primary | ICD-10-CM

## 2018-05-09 NOTE — PROGRESS NOTES
THIS IS NOT A COMPLETE MEDICAL RECORD ON THIS PATIENT. THIS IS A PATIENT AT [facility]. PLEASE CONTACT THE FACILITY LISTED BELOW FOR MORE INFORMATION ON THIS PATIENT. THE COMPLETE RECORD RESIDES WITH THIS LONG TERM CARE FACILITY. HISTORY OF PRESENT ILLNESS  Juan Verdugo is a 68 y.o. male. This patient is currently under the care of Dr. Katherin Gustafson at [facility].  The patient was admitted to this facility following hospitalization related to fall and influenza A. The patient's past medical history includes seizure disorder, dementia, diabetes, hyperlipidemia, hypertension, CAD, and CVA. The patient is seen today per nursing request. Per nursing, patient has been noted to have increased confusion over the last couple of days. Vitals stable, per nursing. HPI    Review of Systems   Constitutional: Negative for chills and fever. HENT: Negative for congestion. Respiratory: Negative for cough, shortness of breath and wheezing. Cardiovascular: Negative for chest pain and leg swelling. Gastrointestinal: Negative for abdominal pain. Genitourinary: Negative. Musculoskeletal: Negative. Neurological: Negative for headaches. Physical Exam   Constitutional: He appears well-developed. No distress. HENT:   Head: Normocephalic and atraumatic. Neck: Neck supple. Cardiovascular: Normal rate and regular rhythm. Pulmonary/Chest: Effort normal and breath sounds normal. No respiratory distress. He has no wheezes. He has no rales. Abdominal: Soft. He exhibits no distension. Musculoskeletal: He exhibits no edema. Neurological: He is alert. Oriented to self, place  Answering simple questions appropriately   Skin: Skin is warm and dry. Psychiatric: He has a normal mood and affect. His behavior is normal.       ASSESSMENT and PLAN  Encounter Diagnoses   Name Primary?     Confusion Yes    Dementia without behavioral disturbance, unspecified dementia type      Will order U/A C&S now. CBC, CMP next lab day. Reviewed medications and side effects    Nursing to continue to monitor closely and notify MD/NP of any change in condition.

## 2018-06-08 ENCOUNTER — EXTERNAL NURSING HOME DOCUMENTATION (OUTPATIENT)
Dept: INTERNAL MEDICINE CLINIC | Age: 73
End: 2018-06-08

## 2018-06-08 DIAGNOSIS — I10 ESSENTIAL HYPERTENSION: ICD-10-CM

## 2018-06-08 DIAGNOSIS — G40.209 COMPLEX PARTIAL SEIZURES EVOLVING TO GENERALIZED TONIC-CLONIC SEIZURES (HCC): Primary | ICD-10-CM

## 2018-06-08 DIAGNOSIS — E66.9 DIABETES MELLITUS TYPE 2 IN OBESE (HCC): ICD-10-CM

## 2018-06-08 DIAGNOSIS — E11.69 DIABETES MELLITUS TYPE 2 IN OBESE (HCC): ICD-10-CM

## 2018-06-08 DIAGNOSIS — R26.9 GAIT ABNORMALITY: ICD-10-CM

## 2018-06-08 DIAGNOSIS — I50.32 DIASTOLIC CHF, CHRONIC (HCC): Chronic | ICD-10-CM

## 2018-06-08 DIAGNOSIS — I25.10 CORONARY ARTERY DISEASE INVOLVING NATIVE HEART WITHOUT ANGINA PECTORIS, UNSPECIFIED VESSEL OR LESION TYPE: ICD-10-CM

## 2018-06-08 NOTE — PROGRESS NOTES
Progress Note     Subjective:  Mr. Maliha Mcknight is a nursing home resident since January. He has seizure disorder and gait weakness. He is doing well here. He is eating well. No chest pain, no palpitations, no shortness of breath. Objective:    VITALS:  T:  98.2 degrees Fahrenheit. P:  80 per minute. BP:  152/80 mmHg. SaO2:  98% on room air. Weight is 146 pounds. HEENT:  No abnormality detected. NECK:  Supple, no JVD, no carotid bruits, no thyromegaly. CHEST:  Chest is clear to auscultation bilaterally. No rales, no rhonchi. CARDIOVASCULAR:  S1, S2 normal.  Regular rate and rhythm. ABDOMEN:  Soft, nontender, nondistended, bowel sounds present. EXTREMITIES:  No edema is noticed. Dorsalis pedis pulse normal.   NEUROLOGICAL:  Alert and oriented x3. No neurological deficits. Laboratory Data:   Labs were reviewed. CBC and CMP are stable. Assessment and Plan:   1. Seizure disorder. The patient is on Keppra, Lacosamide and Topamax. He is seizure free. 2. Type 2 diabetes mellitus. He is on Lantus insulin. Blood sugar is stable. 3. Coronary artery disease with history of bypass. The patient is on aspirin and Plavix and beta-blocker and statin, doing well. 4. Progressive dementia. The patient seems stable for now. THIS IS NOT A COMPLETE MEDICAL RECORD ON THIS PATIENT.    THIS IS A PATIENT AT [facility].    PLEASE CONTACT THE FACILITY LISTED BELOW FOR MORE INFORMATION ON THIS PATIENT.    THE COMPLETE RECORD RESIDES WITH THIS LONG TERM CARE FACILITY

## 2018-06-26 PROBLEM — I25.10 CORONARY ARTERY DISEASE INVOLVING NATIVE HEART WITHOUT ANGINA PECTORIS: Status: ACTIVE | Noted: 2018-06-26

## 2018-08-03 ENCOUNTER — EXTERNAL NURSING HOME DOCUMENTATION (OUTPATIENT)
Dept: INTERNAL MEDICINE CLINIC | Age: 73
End: 2018-08-03

## 2018-08-03 DIAGNOSIS — G40.209 COMPLEX PARTIAL SEIZURES EVOLVING TO GENERALIZED TONIC-CLONIC SEIZURES (HCC): ICD-10-CM

## 2018-08-03 DIAGNOSIS — E66.9 DIABETES MELLITUS TYPE 2 IN OBESE (HCC): Primary | ICD-10-CM

## 2018-08-03 DIAGNOSIS — E11.69 DIABETES MELLITUS TYPE 2 IN OBESE (HCC): Primary | ICD-10-CM

## 2018-08-03 DIAGNOSIS — I50.32 DIASTOLIC CHF, CHRONIC (HCC): Chronic | ICD-10-CM

## 2018-08-03 DIAGNOSIS — I10 ESSENTIAL HYPERTENSION, MALIGNANT: ICD-10-CM

## 2018-08-03 DIAGNOSIS — R56.9 SEIZURE (HCC): ICD-10-CM

## 2018-08-03 NOTE — PROGRESS NOTES
Progress Note Subjective:   Mr. Marla Campoverde is a nursing home resident. He is doing well. He is diabetic. He also has hyperlipidemia. Blood sugar is under control. No chest pain, palpitations, no shortness of breath. He has a good appetite. Objective: VITALS:  T:  98 degrees Fahrenheit. P:  71 per minute. BP:  133/72 mmHg. SaO2:  99% on room air. Weight is 149 pounds. HEENT:  No abnormality detected. NECK:  Supple, no JVD, no carotid bruits, no thyromegaly. CHEST:  Chest is clear to auscultation bilaterally. No rales, no rhonchi. CARDIOVASCULAR:  S1, S2 normal.  Regular rate and rhythm. ABDOMEN:  Soft, nontender, nondistended, bowel sounds present. EXTREMITIES:  No edema is noticed. Dorsalis pedis pulse normal.   
NEUROLOGICAL:  Alert and oriented x3. No neurological deficits. Laboratory Data:   No recent labs done. Assessment and Plan: 1. Coronary artery disease with CABG. The patient is on Plavix, aspirin, beta-blocker and statin doing well. We will check CBC, CMP and lipid panel. 2. Hyperlipidemia. He is on statin. We will check lipid panel. 3. Type 2 diabetes mellitus. He is on Lantus insulin. We will check A1C.  
4. Gait weakness. He is mostly wheelchair bound. THIS IS NOT A COMPLETE MEDICAL RECORD ON THIS PATIENT.   
THIS IS A PATIENT AT [facility]. PLEASE CONTACT THE FACILITY LISTED BELOW FOR MORE INFORMATION ON THIS PATIENT.   
THE COMPLETE RECORD RESIDES WITH THIS LONG TERM CARE FACILITY

## 2018-09-24 ENCOUNTER — EXTERNAL NURSING HOME DOCUMENTATION (OUTPATIENT)
Dept: INTERNAL MEDICINE CLINIC | Age: 73
End: 2018-09-24

## 2018-09-24 DIAGNOSIS — R05.9 COUGH: Primary | ICD-10-CM

## 2018-09-24 DIAGNOSIS — R09.81 NASAL CONGESTION: ICD-10-CM

## 2018-09-24 NOTE — PROGRESS NOTES
THIS IS NOT A COMPLETE MEDICAL RECORD ON THIS PATIENT. THIS IS A PATIENT AT [facility]. PLEASE CONTACT THE FACILITY LISTED BELOW FOR MORE INFORMATION ON THIS PATIENT. THE COMPLETE RECORD RESIDES WITH THIS LONG TERM CARE FACILITY. HISTORY OF PRESENT ILLNESS Nydia Yañez is a 68 y.o. male. This patient is currently under the care of Dr. Erika Redmond at [facility]. 
The patient's past medical history includes seizure disorder, dementia, diabetes, hyperlipidemia, hypertension, CAD, and CVA. The patient is seen today per nursing request. Patient has been noted to have mild cough and congestion. He denies fever, chest pain, and shortness of breath. Cough is non-productive. HPI Review of Systems Constitutional: Negative for chills and fever. HENT: Positive for congestion. Respiratory: Positive for cough. Negative for sputum production, shortness of breath and wheezing. Cardiovascular: Negative for chest pain and leg swelling. Gastrointestinal: Negative for abdominal pain. Genitourinary: Negative. Musculoskeletal: Negative. Neurological: Negative for headaches. Physical Exam  
Constitutional: He appears well-developed. No distress. HENT:  
Head: Normocephalic and atraumatic. Mild nasal congestion Neck: Neck supple. Cardiovascular: Normal rate and regular rhythm. Pulmonary/Chest: Effort normal and breath sounds normal. No respiratory distress. He has no wheezes. He has no rales. Abdominal: Soft. Bowel sounds are normal. He exhibits no distension. There is no tenderness. Musculoskeletal: He exhibits no edema. Neurological: He is alert. Oriented to self, place Answering simple questions appropriately Skin: Skin is warm and dry. No rash noted. No erythema. Psychiatric: He has a normal mood and affect. His behavior is normal.  
 
 
ASSESSMENT and PLAN Encounter Diagnoses Name Primary?  Cough Yes  Nasal congestion Will order Mucinex 600 mg po bid x 7 days. Reviewed medications and side effects Nursing to continue to monitor closely and notify MD/NP of any change in condition.

## 2018-09-28 ENCOUNTER — EXTERNAL NURSING HOME DOCUMENTATION (OUTPATIENT)
Dept: INTERNAL MEDICINE CLINIC | Age: 73
End: 2018-09-28

## 2018-09-28 DIAGNOSIS — E11.69 DIABETES MELLITUS TYPE 2 IN OBESE (HCC): Primary | ICD-10-CM

## 2018-09-28 DIAGNOSIS — I50.32 DIASTOLIC CHF, CHRONIC (HCC): Chronic | ICD-10-CM

## 2018-09-28 DIAGNOSIS — C61 PROSTATE CANCER (HCC): ICD-10-CM

## 2018-09-28 DIAGNOSIS — E66.9 DIABETES MELLITUS TYPE 2 IN OBESE (HCC): Primary | ICD-10-CM

## 2018-09-28 DIAGNOSIS — I25.10 CORONARY ARTERY DISEASE INVOLVING NATIVE HEART WITHOUT ANGINA PECTORIS, UNSPECIFIED VESSEL OR LESION TYPE: ICD-10-CM

## 2018-11-30 ENCOUNTER — EXTERNAL NURSING HOME DOCUMENTATION (OUTPATIENT)
Dept: INTERNAL MEDICINE CLINIC | Age: 73
End: 2018-11-30

## 2018-11-30 DIAGNOSIS — E66.9 DIABETES MELLITUS TYPE 2 IN OBESE (HCC): Primary | ICD-10-CM

## 2018-11-30 DIAGNOSIS — I10 ESSENTIAL HYPERTENSION: ICD-10-CM

## 2018-11-30 DIAGNOSIS — I25.10 CORONARY ARTERY DISEASE INVOLVING NATIVE HEART WITHOUT ANGINA PECTORIS, UNSPECIFIED VESSEL OR LESION TYPE: ICD-10-CM

## 2018-11-30 DIAGNOSIS — E11.69 DIABETES MELLITUS TYPE 2 IN OBESE (HCC): Primary | ICD-10-CM

## 2018-11-30 DIAGNOSIS — I10 ESSENTIAL HYPERTENSION, MALIGNANT: ICD-10-CM

## 2018-11-30 DIAGNOSIS — F03.90 DEMENTIA WITHOUT BEHAVIORAL DISTURBANCE, UNSPECIFIED DEMENTIA TYPE: ICD-10-CM

## 2018-11-30 NOTE — PROGRESS NOTES
Progress Note Subjective:  Mr. Travis Serrano is doing well in the nursing home. He is complaining of cold symptoms, mild cough and nasal congestion. He has a good appetite. No other discomfort. Objective: VITALS:  T:  98 degrees Fahrenheit. P:  60 per minute. BP:  140/76 mmHg. SaO2:  99% on room air. Weight is 153 pounds. HEENT:  No abnormality detected. NECK:  Supple, no JVD, no carotid bruits, no thyromegaly. CHEST:  Chest is clear to auscultation bilaterally. No rales, no rhonchi. CARDIOVASCULAR:  S1, S2 normal.  Regular rate and rhythm. ABDOMEN:  Soft, nontender, nondistended, bowel sounds present. EXTREMITIES:  No edema is noted. Dorsalis pedis pulse normal.   
NEUROLOGICAL:  Alert and oriented x3. No neurological deficits. Laboratory Data:   Labs were reviewed. CBC and CMP were stable in August.  A1c was 7. Assessment and Plan: 1. Type 2 diabetes mellitus. On Lantus insulin. We will repeat A1c right now. 2. Glaucoma. The patient is seeing ophthalmologist at [facility].  He is on eye drops doing well. 3. Coronary artery disease with CABG. He is on aspirin, Plavix, statin and beta-blocker. We will repeat his CMP and lipid panel. 4. Gait weakness. He ambulates with a walker and wheelchair. 5. Dementia. Mild to moderate. He is doing well. THIS IS NOT A COMPLETE MEDICAL RECORD ON THIS PATIENT.   
THIS IS A PATIENT AT [facility]. PLEASE CONTACT THE FACILITY LISTED BELOW FOR MORE INFORMATION ON THIS PATIENT.   
THE COMPLETE RECORD RESIDES WITH THIS LONG TERM CARE FACILITY

## 2019-01-25 ENCOUNTER — EXTERNAL NURSING HOME DOCUMENTATION (OUTPATIENT)
Dept: INTERNAL MEDICINE CLINIC | Age: 74
End: 2019-01-25

## 2019-01-25 DIAGNOSIS — E11.69 DIABETES MELLITUS TYPE 2 IN OBESE (HCC): Primary | ICD-10-CM

## 2019-01-25 DIAGNOSIS — G40.909 SEIZURE DISORDER (HCC): ICD-10-CM

## 2019-01-25 DIAGNOSIS — F03.90 DEMENTIA WITHOUT BEHAVIORAL DISTURBANCE, UNSPECIFIED DEMENTIA TYPE: ICD-10-CM

## 2019-01-25 DIAGNOSIS — I10 ESSENTIAL HYPERTENSION: ICD-10-CM

## 2019-01-25 DIAGNOSIS — E66.9 DIABETES MELLITUS TYPE 2 IN OBESE (HCC): Primary | ICD-10-CM

## 2019-01-25 DIAGNOSIS — R26.9 GAIT ABNORMALITY: ICD-10-CM

## 2019-01-25 NOTE — PROGRESS NOTES
Progress Note Subjective:  Mr. Jacki Hanna is in the nursing home facility. He is complaining of mild cough and postnasal drip for the last several days. No wheezing. No shortness of breath. No fever. Otherwise, he is having a good appetite. No other discomfort. Objective: VITALS:  T:  97.5 degrees Fahrenheit. P:  55 per minute. BP:  140/75 mmHg. SaO2:  99% on room air. Weight is 148 pounds. HEENT:  No abnormality detected. NECK:  Supple, no JVD, no carotid bruits, no thyromegaly. CHEST:  Chest is clear to auscultation bilaterally. No rales, no rhonchi. CARDIOVASCULAR:  S1, S2 normal.  Regular rate and rhythm. ABDOMEN:  Soft, nontender, nondistended, bowel sounds present. EXTREMITIES:  No edema is noticed. Dorsalis pedis pulse normal.   
NEUROLOGICAL:  Alert and oriented x3. No neurological deficits. Laboratory Data:   Labs were reviewed. CBC and CMP are stable. A1C is 7.5. Assessment and Plan: 1. Mild cough. Possible allergic rhinitis. We will start him on Claritin 10 mg once a day for ten days. If cough gets worse, we will get a chest x-ray. 2. Type 2 diabetes mellitus. The patient's A1C is 7.5 on Lantus insulin. Advised to be on an ADA diet and exercise. 3. Coronary artery disease with history of CABG. The patient is on aspirin, statin, Plavix and beta-blocker. He is stable. 4. Gait weakness. He is doing well. He walks with a walker. 5. Glaucoma. The patient is seeing ophthalmologist on eye drops twice a day. Eye pressure came down. THIS IS NOT A COMPLETE MEDICAL RECORD ON THIS PATIENT.   
THIS IS A PATIENT AT [facility]. PLEASE CONTACT THE FACILITY LISTED BELOW FOR MORE INFORMATION ON THIS PATIENT.   
THE COMPLETE RECORD RESIDES WITH THIS LONG TERM CARE FACILITY

## 2019-02-22 ENCOUNTER — EXTERNAL NURSING HOME DOCUMENTATION (OUTPATIENT)
Dept: INTERNAL MEDICINE CLINIC | Age: 74
End: 2019-02-22

## 2019-02-22 DIAGNOSIS — I63.9 CEREBROVASCULAR ACCIDENT (CVA), UNSPECIFIED MECHANISM (HCC): ICD-10-CM

## 2019-02-22 DIAGNOSIS — I50.32 DIASTOLIC CHF, CHRONIC (HCC): ICD-10-CM

## 2019-02-22 DIAGNOSIS — E11.69 DIABETES MELLITUS TYPE 2 IN OBESE (HCC): ICD-10-CM

## 2019-02-22 DIAGNOSIS — E66.9 DIABETES MELLITUS TYPE 2 IN OBESE (HCC): ICD-10-CM

## 2019-02-22 DIAGNOSIS — J40 BRONCHITIS: Primary | ICD-10-CM

## 2019-02-22 NOTE — PROGRESS NOTES
Progress Note Subjective:  Mr. Bethanie Gracia has been coughing for the last one week. His cough is getting worse. He has been coughing up some yellow sputum. Mild wheezing . No fever. Otherwise, he is eating well. No other discomfort. Objective: VITALS:  T:  97.1 degrees Fahrenheit. P:  72 per minute. BP:  146/80 mmHg. SaO2:  98% on room air. Weight is 148 pounds. HEENT:  No abnormality detected. Nasal turbinates are red and inflamed. Cobblestoning present in the back of the throat. Tonsillar area looks healthy. NECK:  Supple, no JVD, no carotid bruits, no thyromegaly. CHEST:  The patient has sporadic wheezing bilaterally in chest.     
CARDIOVASCULAR:  S1, S2 normal.  Regular rate and rhythm. ABDOMEN:  Soft, nontender, nondistended, bowel sounds present. EXTREMITIES:  No edema is noticed. Dorsalis pedis pulse normal.   
NEUROLOGICAL:  Alert and oriented x3. No neurological deficits. Assessment and Plan: 1. Acute bronchitis. The patient has mild wheezing. We will start him on Bactrim DS one tablet twice a day for one week. If cough is not getting better, we will get a chest x-ray. 2. Type 2 diabetes mellitus. The patient is on Lantus. Blood sugar seems within normal limits. 3. Coronary artery disease. The patient is on Plavix, aspirin and beta-blocker and statin. He is doing well. 4. Dementia. The patient is stable for now. THIS IS NOT A COMPLETE MEDICAL RECORD ON THIS PATIENT.   
THIS IS A PATIENT AT [facility]. PLEASE CONTACT THE FACILITY LISTED BELOW FOR MORE INFORMATION ON THIS PATIENT.   
THE COMPLETE RECORD RESIDES WITH THIS LONG TERM CARE FACILITY

## 2019-03-01 ENCOUNTER — EXTERNAL NURSING HOME DOCUMENTATION (OUTPATIENT)
Dept: INTERNAL MEDICINE CLINIC | Age: 74
End: 2019-03-01

## 2019-03-01 VITALS — HEIGHT: 66 IN | BODY MASS INDEX: 26.68 KG/M2

## 2019-03-01 DIAGNOSIS — J40 BRONCHITIS: Primary | ICD-10-CM

## 2019-03-01 NOTE — PROGRESS NOTES
THIS IS NOT A COMPLETE MEDICAL RECORD ON THIS PATIENT. THIS IS A PATIENT AT [facility]. PLEASE CONTACT THE FACILITY LISTED BELOW FOR MORE INFORMATION ON THIS PATIENT. THE COMPLETE RECORD RESIDES WITH THIS LONG TERM CARE FACILITY. HISTORY OF PRESENT ILLNESS Elizabeth Alberts is a 76 y.o. male. This patient is currently under the care of Dr. Umesh Arnett at [facility]. 
The patient's past medical history includes seizure disorder, dementia, diabetes, hyperlipidemia, hypertension, CAD, and CVA. The patient is seen today per nursing request.  Patient has had complaints of cough and congestion. He will complete 7 days of Bactrim DS today for bronchitis. Patient states he feels a bit better, but continues with cough. No fever or chills. No chest pain. Wheezing intermittently. HPI Review of Systems Constitutional: Negative for chills and fever. HENT: Positive for congestion. Respiratory: Positive for cough, sputum production and wheezing. Cardiovascular: Negative for chest pain and leg swelling. Gastrointestinal: Negative for abdominal pain. Genitourinary: Negative. Musculoskeletal: Negative. Neurological: Negative for headaches. Physical Exam  
Constitutional: He appears well-developed and well-nourished. No distress. HENT:  
Head: Normocephalic and atraumatic. Mild nasal congestion Neck: Neck supple. Cardiovascular: Normal rate and regular rhythm. Pulmonary/Chest: Effort normal. No respiratory distress. He has wheezes. He has no rales. Few expiratory wheezes Abdominal: Soft. Bowel sounds are normal. He exhibits no distension. There is no tenderness. Musculoskeletal: He exhibits no edema. Neurological: He is alert. Oriented to self, place Answering simple questions appropriately Skin: Skin is warm and dry. No rash noted. No erythema. Psychiatric: He has a normal mood and affect.  His behavior is normal.  
 
 
ASSESSMENT and PLAN 
 Encounter Diagnoses Name Primary?  Bronchitis Yes Continue Bactrim DS 1 tab po bid x 3 more days (total of 10 days). Will order Mucinex 600 mg po bid x 5 days. Albuterol neb q6h PRN wheezing. Will order x-ray of chest. 
 
Nursing to continue to monitor closely and notify MD/NP of any change in condition.

## 2019-03-22 ENCOUNTER — EXTERNAL NURSING HOME DOCUMENTATION (OUTPATIENT)
Dept: INTERNAL MEDICINE CLINIC | Age: 74
End: 2019-03-22

## 2019-03-22 DIAGNOSIS — I50.32 DIASTOLIC CHF, CHRONIC (HCC): ICD-10-CM

## 2019-03-22 DIAGNOSIS — E66.9 DIABETES MELLITUS TYPE 2 IN OBESE (HCC): ICD-10-CM

## 2019-03-22 DIAGNOSIS — E11.69 DIABETES MELLITUS TYPE 2 IN OBESE (HCC): ICD-10-CM

## 2019-03-22 DIAGNOSIS — F03.90 DEMENTIA WITHOUT BEHAVIORAL DISTURBANCE, UNSPECIFIED DEMENTIA TYPE: ICD-10-CM

## 2019-03-22 DIAGNOSIS — R56.9 SEIZURE (HCC): ICD-10-CM

## 2019-03-22 DIAGNOSIS — I10 ESSENTIAL HYPERTENSION: Primary | ICD-10-CM

## 2019-03-22 NOTE — PROGRESS NOTES
Progress Note Subjective:  Mr. Selina Messer is doing well. He is having some nasal congestion and postnasal drip. Cough has subsided. The patient has received antibiotics last month. He is having a good appetite. Blood sugars seem under control right now. No other discomfort. Objective: VITALS:  T:  96.5 degrees Fahrenheit. P:  64 per minute. BP:  136/68 mmHg. SaO2:  97% on room air. Weight is 148 pounds. HEENT:  No abnormality detected. NECK:  Supple, no JVD, no carotid bruits, no thyromegaly. CHEST:  Chest is clear to auscultation bilaterally. No rales, no rhonchi. CARDIOVASCULAR:  S1, S2 normal.  Regular rate and rhythm. ABDOMEN:  Soft, nontender, nondistended, bowel sounds present. EXTREMITIES:  No edema is noticed. Dorsalis pedis pulse normal.   
NEUROLOGICAL:  Alert and oriented x3. No neurological deficits. Laboratory Data:   Labs were reviewed. Vitamin D level was low. Creatinine was 1.34. A1C is 7.5. Assessment and Plan: 1. Type 2 diabetes mellitus. He is on Lantus insulin. A1C is 7.5. We will continue to monitor blood sugar. 2. Hyperlipidemia. The patient is on statin. CMP seems stable. 3. Coronary artery disease with CABG. The patient is on Plavix, aspirin, beta-blocker and statin. We will continue it for now. 4. Allergic rhinitis. We will put the patient on Claritin 10 mg once a day for the next ten days. 5. Progressive dementia. The patient is a nursing home resident right now. THIS IS NOT A COMPLETE MEDICAL RECORD ON THIS PATIENT.   
THIS IS A PATIENT AT [facility]. PLEASE CONTACT THE FACILITY LISTED BELOW FOR MORE INFORMATION ON THIS PATIENT.   
THE COMPLETE RECORD RESIDES WITH THIS LONG TERM CARE FACILITY

## 2019-04-19 ENCOUNTER — EXTERNAL NURSING HOME DOCUMENTATION (OUTPATIENT)
Dept: INTERNAL MEDICINE CLINIC | Age: 74
End: 2019-04-19

## 2019-04-19 DIAGNOSIS — I10 ESSENTIAL HYPERTENSION, MALIGNANT: ICD-10-CM

## 2019-04-19 DIAGNOSIS — I50.32 DIASTOLIC CHF, CHRONIC (HCC): ICD-10-CM

## 2019-04-19 DIAGNOSIS — J30.1 ALLERGIC RHINITIS DUE TO POLLEN, UNSPECIFIED SEASONALITY: Primary | ICD-10-CM

## 2019-04-19 DIAGNOSIS — I25.10 CORONARY ARTERY DISEASE INVOLVING NATIVE HEART WITHOUT ANGINA PECTORIS, UNSPECIFIED VESSEL OR LESION TYPE: ICD-10-CM

## 2019-04-19 NOTE — PROGRESS NOTES
Progress Note Subjective:   Mr. Marla Campoverde has been having a runny nose and nasal congestion for the last one week. He is having a mild cough also and a lot of postnasal drip. No wheezing right now. No fever. Otherwise, he has a good appetite. He is sleeping well also. Objective: VITALS:  T:  98.1 degrees Fahrenheit. P:  68 per minute. BP:  140/70 mmHg. SaO2:  97% on room air. Weight is 154 pounds. HEENT:  No abnormality detected. The patient's nasal turbinates are red and inflamed. Cobblestoning is present in the back of the throat. NECK:  Supple, no JVD, no carotid bruits, no thyromegaly. CHEST:  Chest is clear to auscultation bilaterally. No rales, no rhonchi. CARDIOVASCULAR:  S1, S2 normal.  Regular rate and rhythm. ABDOMEN:  Soft, nontender, nondistended, bowel sounds present. EXTREMITIES:  No edema is noticed. Dorsalis pedis pulse normal with equal flow. Assessment and Plan: 1. Allergic rhinitis. We will start him on Prednisone 5 mg once a day for seven days. We will give him Zyrtec 10 mg h.s. for the next ten days and Flonase two sprays in each nostril once a day. 2. Type 2 diabetes mellitus. He is on Lantus. Blood sugar is under control. 3. Coronary artery disease. He is on Plavix, aspirin and beta-blocker doing well. THIS IS NOT A COMPLETE MEDICAL RECORD ON THIS PATIENT.   
THIS IS A PATIENT AT [facility]. PLEASE CONTACT THE FACILITY LISTED BELOW FOR MORE INFORMATION ON THIS PATIENT.   
THE COMPLETE RECORD RESIDES WITH THIS LONG TERM CARE FACILITY

## 2019-06-14 ENCOUNTER — EXTERNAL NURSING HOME DOCUMENTATION (OUTPATIENT)
Dept: INTERNAL MEDICINE CLINIC | Age: 74
End: 2019-06-14

## 2019-06-14 DIAGNOSIS — G20 PARKINSON DISEASE (HCC): ICD-10-CM

## 2019-06-14 DIAGNOSIS — F03.90 DEMENTIA WITHOUT BEHAVIORAL DISTURBANCE, UNSPECIFIED DEMENTIA TYPE: ICD-10-CM

## 2019-06-14 DIAGNOSIS — L98.9 SKIN LESION: ICD-10-CM

## 2019-06-14 DIAGNOSIS — I25.10 CORONARY ARTERY DISEASE INVOLVING NATIVE HEART WITHOUT ANGINA PECTORIS, UNSPECIFIED VESSEL OR LESION TYPE: Primary | ICD-10-CM

## 2019-06-14 DIAGNOSIS — E11.69 DIABETES MELLITUS TYPE 2 IN OBESE (HCC): ICD-10-CM

## 2019-06-14 DIAGNOSIS — I10 ESSENTIAL HYPERTENSION: ICD-10-CM

## 2019-06-14 DIAGNOSIS — E66.9 DIABETES MELLITUS TYPE 2 IN OBESE (HCC): ICD-10-CM

## 2019-08-09 ENCOUNTER — EXTERNAL NURSING HOME DOCUMENTATION (OUTPATIENT)
Dept: INTERNAL MEDICINE CLINIC | Age: 74
End: 2019-08-09

## 2019-08-09 DIAGNOSIS — G20 PARKINSON DISEASE (HCC): ICD-10-CM

## 2019-08-09 DIAGNOSIS — I25.10 CORONARY ARTERY DISEASE INVOLVING NATIVE HEART WITHOUT ANGINA PECTORIS, UNSPECIFIED VESSEL OR LESION TYPE: ICD-10-CM

## 2019-08-09 DIAGNOSIS — F03.90 DEMENTIA WITHOUT BEHAVIORAL DISTURBANCE, UNSPECIFIED DEMENTIA TYPE: ICD-10-CM

## 2019-08-09 DIAGNOSIS — I10 ESSENTIAL HYPERTENSION: Primary | ICD-10-CM

## 2019-08-09 DIAGNOSIS — E11.69 DIABETES MELLITUS TYPE 2 IN OBESE (HCC): ICD-10-CM

## 2019-08-09 DIAGNOSIS — E66.9 DIABETES MELLITUS TYPE 2 IN OBESE (HCC): ICD-10-CM

## 2019-08-09 NOTE — PROGRESS NOTES
Progress Note     Subjective:   Mr. Genoveva Aceves is doing well in the nursing home. He underwent left cataract surgery, which has healed up and he is doing well. Also, his glaucoma is getting better. He is on new ophthalmic drops, which is helping him. No issues today. He is sleeping well. He is eating well too. Objective:    VITALS:  T:  98.2 degrees Fahrenheit. P:  64 per minute. BP:  138/62 mmHg. SaO2:  98% on room air. Weight is 160 pounds. HEENT:  No abnormality detected. NECK:  Supple, no JVD, no carotid bruits, no thyromegaly. CHEST:  Chest is clear to auscultation bilaterally. No rales, no rhonchi. CARDIOVASCULAR:  S1, S2 normal.  Regular rate and rhythm. ABDOMEN:  Soft, nontender, nondistended, bowel sounds present. EXTREMITIES:  No edema is noticed. Dorsalis pedis pulse normal.    NEUROLOGICAL:  Alert and oriented x3. No neurological deficits. Laboratory Data:   Labs were reviewed. CBC and CMP were stable. Last A1c was 8.1. Assessment and Plan:   1. Type 2 diabetes mellitus. The patient's A1c is going up. We will increase his Lantus dosage. We will monitor blood sugar closely. 2. Coronary artery disease with CABG. The patient is on aspirin and Plavix and beta-blocker and statin doing well. 3. Hyperlipidemia. She is on a statin doing well. 4. Glaucoma. He is on eye drops doing well. 5. Status post left cataract surgery. He is stable for now. 6. Gait weakness. He is stable for now. THIS IS NOT A COMPLETE MEDICAL RECORD ON THIS PATIENT.    THIS IS A PATIENT AT [facility].    PLEASE CONTACT THE FACILITY LISTED BELOW FOR MORE INFORMATION ON THIS PATIENT.    THE COMPLETE RECORD RESIDES WITH THIS LONG TERM CARE FACILITY

## 2019-09-27 ENCOUNTER — EXTERNAL NURSING HOME DOCUMENTATION (OUTPATIENT)
Dept: INTERNAL MEDICINE CLINIC | Age: 74
End: 2019-09-27

## 2019-09-27 VITALS — HEIGHT: 66 IN | BODY MASS INDEX: 26.68 KG/M2

## 2019-09-27 DIAGNOSIS — I25.10 CORONARY ARTERY DISEASE INVOLVING NATIVE HEART WITHOUT ANGINA PECTORIS, UNSPECIFIED VESSEL OR LESION TYPE: ICD-10-CM

## 2019-09-27 DIAGNOSIS — E11.8 TYPE 2 DIABETES MELLITUS WITH COMPLICATION, UNSPECIFIED WHETHER LONG TERM INSULIN USE: ICD-10-CM

## 2019-09-27 DIAGNOSIS — E78.5 HYPERLIPIDEMIA, UNSPECIFIED HYPERLIPIDEMIA TYPE: ICD-10-CM

## 2019-09-27 DIAGNOSIS — G40.909 SEIZURE DISORDER (HCC): ICD-10-CM

## 2019-09-27 DIAGNOSIS — Z01.818 PREOP EXAMINATION: Primary | ICD-10-CM

## 2019-09-27 DIAGNOSIS — F03.90 DEMENTIA WITHOUT BEHAVIORAL DISTURBANCE, UNSPECIFIED DEMENTIA TYPE: ICD-10-CM

## 2019-09-27 DIAGNOSIS — Z86.73 HISTORY OF CVA (CEREBROVASCULAR ACCIDENT): ICD-10-CM

## 2019-09-27 DIAGNOSIS — I10 ESSENTIAL HYPERTENSION: ICD-10-CM

## 2019-09-27 NOTE — PROGRESS NOTES
THIS IS NOT A COMPLETE MEDICAL RECORD ON THIS PATIENT. THIS IS A PATIENT AT [facility]. PLEASE CONTACT THE FACILITY LISTED BELOW FOR MORE INFORMATION ON THIS PATIENT. THE COMPLETE RECORD RESIDES WITH THIS LONG TERM CARE FACILITY. HISTORY OF PRESENT ILLNESS  David Gray is a 76 y.o. male. This patient is currently under the care of Dr. Angeles Pearson at [facility].  The patient's past medical history includes seizure disorder, dementia, diabetes, hyperlipidemia, hypertension, CAD, and CVA. The patient is seen today for pre-op exam.  The patient is scheduled to have procedure with ophthalmology. The patient states that he is generally feeling well at the time of today's visit. He denies chest pain, shortness of breath, dizziness, and GI/ problems. Nursing does not report any concerns or changes in condition at this time. HPI    Review of Systems   Constitutional: Negative for chills and fever. HENT: Negative for congestion. Respiratory: Negative for cough, shortness of breath and wheezing. Cardiovascular: Negative for chest pain and leg swelling. Gastrointestinal: Negative for abdominal pain. Genitourinary: Negative. Musculoskeletal: Negative. Skin: Negative. Neurological: Negative for headaches. Endo/Heme/Allergies: Negative. Psychiatric/Behavioral: Negative. Physical Exam   Constitutional: He appears well-developed. No distress. HENT:   Head: Normocephalic and atraumatic. Eyes: Conjunctivae are normal.   Neck: Neck supple. Cardiovascular: Normal rate and regular rhythm. Pulmonary/Chest: Effort normal and breath sounds normal. No respiratory distress. He has no wheezes. He has no rales. Abdominal: Soft. Bowel sounds are normal. He exhibits no distension. There is no tenderness. Musculoskeletal: He exhibits no edema. Neurological: He is alert. Oriented to self, place  Answering simple questions appropriately   Skin: Skin is warm and dry. Psychiatric: He has a normal mood and affect. His behavior is normal.   Vitals reviewed. ASSESSMENT and PLAN  Encounter Diagnoses   Name Primary?  Preop examination Yes    Essential hypertension     Coronary artery disease involving native heart without angina pectoris, unspecified vessel or lesion type     Dementia without behavioral disturbance, unspecified dementia type     Type 2 diabetes mellitus with complication, unspecified whether long term insulin use (HCC)     Seizure disorder (HCC)     Hyperlipidemia, unspecified hyperlipidemia type     History of CVA (cerebrovascular accident)      Pre-op H&P form completed, per request.  Continue to follow with ophthalmology. Reviewed medications and side effects in detail. Continue current medications at this time. Nursing to continue to monitor closely and notify MD/NP of any change in condition.

## 2019-10-04 ENCOUNTER — EXTERNAL NURSING HOME DOCUMENTATION (OUTPATIENT)
Dept: INTERNAL MEDICINE CLINIC | Age: 74
End: 2019-10-04

## 2019-10-04 DIAGNOSIS — I10 ESSENTIAL HYPERTENSION: Primary | ICD-10-CM

## 2019-10-04 DIAGNOSIS — I25.10 CORONARY ARTERY DISEASE INVOLVING NATIVE HEART WITHOUT ANGINA PECTORIS, UNSPECIFIED VESSEL OR LESION TYPE: ICD-10-CM

## 2019-10-04 DIAGNOSIS — E66.9 DIABETES MELLITUS TYPE 2 IN OBESE (HCC): ICD-10-CM

## 2019-10-04 DIAGNOSIS — G40.909 SEIZURE DISORDER (HCC): ICD-10-CM

## 2019-10-04 DIAGNOSIS — F03.90 DEMENTIA WITHOUT BEHAVIORAL DISTURBANCE, UNSPECIFIED DEMENTIA TYPE: ICD-10-CM

## 2019-10-04 DIAGNOSIS — E11.69 DIABETES MELLITUS TYPE 2 IN OBESE (HCC): ICD-10-CM

## 2019-10-04 NOTE — PROGRESS NOTES
Progress Note     Subjective:   Mr. Vanessa Crigler is doing well today, no discomfort, eating well. Blood sugar seems within normal limits. Objective:    VITALS:  T:  96.8 degrees Fahrenheit. P: 80 per minute. BP:  119/66 mmHg. SaO2:  98% on room air. HEENT:  No abnormality detected. NECK:  Supple, no JVD, no carotid bruits, no thyromegaly. CHEST:  Chest is clear to auscultation bilaterally. No rales, no rhonchi. CARDIOVASCULAR:  S1, S2 normal.  Regular rate and rhythm. ABDOMEN:  Soft, nontender, nondistended, bowel sounds present. EXTREMITIES:  No edema is noted. Dorsalis pedis pulse normal.    NEUROLOGICAL:  Alert and oriented x3. No neurological deficits. Laboratory Data:  Reviewed. CBC and CMP stable. A1c was not done. Assessment and Plan:  1. Type 2 diabetes mellitus. Last A1c done several months back was 7.5. He is on Lantus. We will repeat A1c and microalbumin. 2. Hyperlipidemia, on statin. Stable CMP. 3. Coronary artery disease with history of CABG. Patient is on aspirin, Plavix, beta blocker and statin, doing well. 4. Gait weakness. He is walking with a walker, doing well. 5. Glaucoma. Patient is on eyedrops, doing well. THIS IS NOT A COMPLETE MEDICAL RECORD ON THIS PATIENT.    THIS IS A PATIENT AT [facility].    PLEASE CONTACT THE FACILITY LISTED BELOW FOR MORE INFORMATION ON THIS PATIENT.    THE COMPLETE RECORD RESIDES WITH THIS LONG TERM CARE FACILITY

## 2019-10-08 ENCOUNTER — EXTERNAL NURSING HOME DOCUMENTATION (OUTPATIENT)
Dept: INTERNAL MEDICINE CLINIC | Age: 74
End: 2019-10-08

## 2019-10-08 VITALS — BODY MASS INDEX: 26.68 KG/M2 | HEIGHT: 66 IN

## 2019-10-08 DIAGNOSIS — I10 ESSENTIAL HYPERTENSION: ICD-10-CM

## 2019-10-08 DIAGNOSIS — E78.5 HYPERLIPIDEMIA, UNSPECIFIED HYPERLIPIDEMIA TYPE: ICD-10-CM

## 2019-10-08 DIAGNOSIS — I25.10 CORONARY ARTERY DISEASE INVOLVING NATIVE HEART WITHOUT ANGINA PECTORIS, UNSPECIFIED VESSEL OR LESION TYPE: ICD-10-CM

## 2019-10-08 DIAGNOSIS — Z86.73 HISTORY OF CVA (CEREBROVASCULAR ACCIDENT): ICD-10-CM

## 2019-10-08 DIAGNOSIS — E11.65 TYPE 2 DIABETES MELLITUS WITH HYPERGLYCEMIA, WITH LONG-TERM CURRENT USE OF INSULIN (HCC): Primary | ICD-10-CM

## 2019-10-08 DIAGNOSIS — G20 PARKINSON DISEASE (HCC): ICD-10-CM

## 2019-10-08 DIAGNOSIS — G40.909 SEIZURE DISORDER (HCC): ICD-10-CM

## 2019-10-08 DIAGNOSIS — F03.90 DEMENTIA WITHOUT BEHAVIORAL DISTURBANCE, UNSPECIFIED DEMENTIA TYPE: ICD-10-CM

## 2019-10-08 DIAGNOSIS — Z79.4 TYPE 2 DIABETES MELLITUS WITH HYPERGLYCEMIA, WITH LONG-TERM CURRENT USE OF INSULIN (HCC): Primary | ICD-10-CM

## 2019-10-08 NOTE — PROGRESS NOTES
THIS IS NOT A COMPLETE MEDICAL RECORD ON THIS PATIENT. THIS IS A PATIENT AT [facility]. PLEASE CONTACT THE FACILITY LISTED BELOW FOR MORE INFORMATION ON THIS PATIENT. THE COMPLETE RECORD RESIDES WITH THIS LONG TERM CARE FACILITY. HISTORY OF PRESENT ILLNESS  Renetta Gallardo is a 76 y.o. male. This patient is currently under the care of Dr. Teagan Petit at [facility].  The patient's past medical history includes seizure disorder, dementia, diabetes, hyperlipidemia, hypertension, CAD, and CVA. Patient is seen today per nursing request for lab review. Recent HgbA1c is 8.2. Patient's current medications include Lantus 14 units sq qhs, Metformin 1000 mg po bid, and Novolog sliding scale with meals. Per chart review, patient's blood sugar over the last several days has ranged between 114 and 226. HPI    Review of Systems   Constitutional: Negative for chills and fever. HENT: Positive for congestion. Respiratory: Negative for cough, shortness of breath and wheezing. Cardiovascular: Negative for chest pain and leg swelling. Gastrointestinal: Negative for abdominal pain. Genitourinary: Negative. Musculoskeletal: Negative. Skin: Negative. Neurological: Negative for headaches. Endo/Heme/Allergies: Negative. Psychiatric/Behavioral: Negative. Physical Exam   Constitutional: He appears well-developed. No distress. HENT:   Head: Normocephalic and atraumatic. Eyes: Conjunctivae are normal.   Neck: Neck supple. Cardiovascular: Normal rate and regular rhythm. Pulmonary/Chest: Effort normal and breath sounds normal. No respiratory distress. He has no wheezes. He has no rales. Abdominal: Soft. Bowel sounds are normal. He exhibits no distension. There is no tenderness. Musculoskeletal: He exhibits no edema. Neurological: He is alert. Oriented to self, place  Answering simple questions appropriately   Skin: Skin is warm and dry.    Psychiatric: He has a normal mood and affect. His behavior is normal.   Vitals reviewed. ASSESSMENT and PLAN  Encounter Diagnoses   Name Primary?  Type 2 diabetes mellitus with hyperglycemia, with long-term current use of insulin (HCC) Yes    Dementia without behavioral disturbance, unspecified dementia type ([facility]     Coronary artery disease involving native heart without angina pectoris, unspecified vessel or lesion type     Essential hypertension     [facility]     Hyperlipidemia, unspecified hyperlipidemia type     History of CVA (cerebrovascular accident)     Parkinson disease ([facility]      Will increase, Lantus 16 units sq nightly. Reviewed medications and side effects in detail. Nursing to continue to monitor closely and notify MD/NP of any change in condition.

## 2019-11-18 ENCOUNTER — EXTERNAL NURSING HOME DOCUMENTATION (OUTPATIENT)
Dept: INTERNAL MEDICINE CLINIC | Age: 74
End: 2019-11-18

## 2019-11-18 VITALS — BODY MASS INDEX: 26.68 KG/M2 | HEIGHT: 66 IN

## 2019-11-18 DIAGNOSIS — F03.90 DEMENTIA WITHOUT BEHAVIORAL DISTURBANCE, UNSPECIFIED DEMENTIA TYPE: ICD-10-CM

## 2019-11-18 DIAGNOSIS — Z79.4 TYPE 2 DIABETES MELLITUS WITH HYPERGLYCEMIA, WITH LONG-TERM CURRENT USE OF INSULIN (HCC): ICD-10-CM

## 2019-11-18 DIAGNOSIS — E11.65 TYPE 2 DIABETES MELLITUS WITH HYPERGLYCEMIA, WITH LONG-TERM CURRENT USE OF INSULIN (HCC): ICD-10-CM

## 2019-11-18 DIAGNOSIS — I10 ESSENTIAL HYPERTENSION: ICD-10-CM

## 2019-11-18 DIAGNOSIS — I25.10 CORONARY ARTERY DISEASE INVOLVING NATIVE HEART WITHOUT ANGINA PECTORIS, UNSPECIFIED VESSEL OR LESION TYPE: ICD-10-CM

## 2019-11-18 DIAGNOSIS — R05.9 COUGH: Primary | ICD-10-CM

## 2019-11-18 NOTE — PROGRESS NOTES
THIS IS NOT A COMPLETE MEDICAL RECORD ON THIS PATIENT. THIS IS A PATIENT AT [facility]. PLEASE CONTACT THE FACILITY LISTED BELOW FOR MORE INFORMATION ON THIS PATIENT. THE COMPLETE RECORD RESIDES WITH THIS LONG TERM CARE FACILITY. HISTORY OF PRESENT ILLNESS  Anne Jerez is a 76 y.o. male. This patient is currently under the care of Dr. Nicole Hernandez at [facility].  The patient's past medical history includes seizure disorder, dementia, diabetes, hyperlipidemia, hypertension, CAD, and CVA. Patient is seen today per nursing request.  He has complaints of several days of cough and congestion. No chest pain or shortness of breath. Vitals stable, per nursing. Blood Pressure: 130 / 64 mmHg   Temperature: 98.0 °F   Pulse: 60 bpm   Respirations: 18 Breaths/min     HPI    Review of Systems   Constitutional: Negative for chills and fever. HENT: Positive for congestion. Respiratory: Positive for cough and sputum production. Cardiovascular: Negative for chest pain and leg swelling. Gastrointestinal: Negative for abdominal pain. Genitourinary: Negative. Musculoskeletal: Negative. Neurological: Negative for headaches. Physical Exam   Constitutional: He appears well-developed. No distress. HENT:   Head: Normocephalic and atraumatic. Mild nasal congestion   Eyes: Conjunctivae are normal.   Neck: Neck supple. Cardiovascular: Normal rate and regular rhythm. Pulmonary/Chest: Effort normal and breath sounds normal. No respiratory distress. He has no wheezes. He has no rales. Abdominal: Soft. He exhibits no distension. Musculoskeletal: He exhibits no edema. Neurological: He is alert. Oriented to self, place  Answering simple questions appropriately   Skin: Skin is warm and dry. Psychiatric: He has a normal mood and affect. His behavior is normal.   Vitals reviewed. ASSESSMENT and PLAN  Encounter Diagnoses   Name Primary?     Cough Yes    Type 2 diabetes mellitus with hyperglycemia, with long-term current use of insulin (HCC)     Dementia without behavioral disturbance, unspecified dementia type ([facility]     Coronary artery disease involving native heart without angina pectoris, unspecified vessel or lesion type     Essential hypertension      Will order  Mucinex 600 mg po bid x 7 days. Reviewed medications and side effects in detail. Nursing to continue to monitor closely and notify MD/NP of any change in condition.

## 2019-12-06 ENCOUNTER — EXTERNAL NURSING HOME DOCUMENTATION (OUTPATIENT)
Dept: INTERNAL MEDICINE CLINIC | Age: 74
End: 2019-12-06

## 2019-12-06 DIAGNOSIS — I10 ESSENTIAL HYPERTENSION: Primary | ICD-10-CM

## 2019-12-06 DIAGNOSIS — Z79.4 TYPE 2 DIABETES MELLITUS WITH HYPERGLYCEMIA, WITH LONG-TERM CURRENT USE OF INSULIN (HCC): ICD-10-CM

## 2019-12-06 DIAGNOSIS — I25.10 CORONARY ARTERY DISEASE INVOLVING NATIVE HEART WITHOUT ANGINA PECTORIS, UNSPECIFIED VESSEL OR LESION TYPE: ICD-10-CM

## 2019-12-06 DIAGNOSIS — J30.1 ALLERGIC RHINITIS DUE TO POLLEN, UNSPECIFIED SEASONALITY: ICD-10-CM

## 2019-12-06 DIAGNOSIS — Z86.73 HISTORY OF CVA (CEREBROVASCULAR ACCIDENT): ICD-10-CM

## 2019-12-06 DIAGNOSIS — E11.65 TYPE 2 DIABETES MELLITUS WITH HYPERGLYCEMIA, WITH LONG-TERM CURRENT USE OF INSULIN (HCC): ICD-10-CM

## 2019-12-06 NOTE — PROGRESS NOTES
Progress Note     Subjective:   Mr. Cassius Meadows is doing well in the nursing home. Reports runny nose and nasal congestion on and off for the last several weeks, severe at night, cough. No shortness of breath or wheezing. Cough without sputum. No fever. He has diabetes, sugars are running okay. He has a good appetite. No other discomfort. Objective:    VITALS:  T:  98 degrees Fahrenheit. P:  70 per minute. BP:  120/70 mmHg. SaO2:  98% on room air. WT:  156 pounds. HEENT:  Nose with inflamed nasal turbinates, mild nasal discharge present. Maxillary and frontal sinuses seem non tender. NECK:  Supple, no JVD, no carotid bruits, no thyromegaly. CHEST:  Chest is clear to auscultation bilaterally. No rales, no rhonchi. CARDIOVASCULAR:  S1, S2 normal.  Regular rate and rhythm. ABDOMEN:  Soft, nontender, nondistended, bowel sounds present. EXTREMITIES:  No edema is noted. Dorsalis pedis pulse normal.    NEUROLOGICAL:  Alert and oriented x3. No neurological deficits. Assessment and Plan:  1. Allergic rhinitis. Will give him Azelastine nasal spray, one spray each nostril twice a day for now. Also will add vitamin C 500 mg once a day for two weeks. He does not need any antibiotics right now. We advise him to drink more fluids. 2. Type 2 diabetes mellitus. Last A1c was 8. Lantus dose was increased. He is on ADA diet. 3. CAD with history of CABG. Patient is on aspirin, Plavix, beta blocker and statin, doing well. CMP stable. 4. History of cataract, status post cataract removal done. 5. Hyperlipidemia, on statin. CMP stable. THIS IS NOT A COMPLETE MEDICAL RECORD ON THIS PATIENT.    THIS IS A PATIENT AT [facility].    PLEASE CONTACT THE FACILITY LISTED BELOW FOR MORE INFORMATION ON THIS PATIENT.    THE COMPLETE RECORD RESIDES WITH THIS LONG TERM CARE FACILITY

## 2020-01-31 ENCOUNTER — EXTERNAL NURSING HOME DOCUMENTATION (OUTPATIENT)
Dept: INTERNAL MEDICINE CLINIC | Age: 75
End: 2020-01-31

## 2020-01-31 DIAGNOSIS — J30.1 ALLERGIC RHINITIS DUE TO POLLEN, UNSPECIFIED SEASONALITY: Primary | ICD-10-CM

## 2020-01-31 DIAGNOSIS — I25.10 CORONARY ARTERY DISEASE INVOLVING NATIVE HEART WITHOUT ANGINA PECTORIS, UNSPECIFIED VESSEL OR LESION TYPE: ICD-10-CM

## 2020-01-31 DIAGNOSIS — Z86.73 HISTORY OF CVA (CEREBROVASCULAR ACCIDENT): ICD-10-CM

## 2020-01-31 DIAGNOSIS — I10 ESSENTIAL HYPERTENSION: ICD-10-CM

## 2020-01-31 DIAGNOSIS — E11.65 TYPE 2 DIABETES MELLITUS WITH HYPERGLYCEMIA, WITH LONG-TERM CURRENT USE OF INSULIN (HCC): ICD-10-CM

## 2020-01-31 DIAGNOSIS — Z79.4 TYPE 2 DIABETES MELLITUS WITH HYPERGLYCEMIA, WITH LONG-TERM CURRENT USE OF INSULIN (HCC): ICD-10-CM

## 2020-01-31 NOTE — PROGRESS NOTES
Progress Note     Subjective:   Mr. Doyle French reports nasal congestion and postnasal drip and cough for the last several days. He thinks he has been suffering from a cold. No fever though. No shortness of breath. He has a good appetite. He has diabetes, blood sugar is running in the range of 150-175. He is sleeping well. No other discomfort. Objective:    VITALS:  T:  98 degrees Fahrenheit. P:  68 per minute. BP:   134/87 mmHg. SaO2:  99% on room air. Blood sugar:  157. HEENT:  Nasal turbinates slightly red and inflamed. Cobblestoning present. NECK:  Supple, no JVD, no carotid bruits, no thyromegaly. CHEST:  Chest is clear to auscultation bilaterally. No rales, no rhonchi. CARDIOVASCULAR:  S1, S2 normal.  Regular rate and rhythm. ABDOMEN:  Soft, nontender, nondistended, bowel sounds present. EXTREMITIES:  No edema is noted. Dorsalis pedis pulse normal.    NEUROLOGICAL:  Alert and oriented x3. No neurological deficits. Assessment and Plan:  1. URI. Patient does not need any antibiotics. Will continue Flonase and Azelastine. I will do prednisone 5 mg, one tablet twice a day for one week. 2. Type 2 diabetes mellitus. Last A1c was 8.2. He is on Lantus, Novolog and Metformin. Will repeat A1c and CMP. If elevated will add Jardiance 10 mg a day. 3. Coronary artery disease with history of CABG. He is on aspirin, Toprol XL, Plavix and statin, doing well. 4. Hyperlipidemia. He is on statin, doing well. 5. Glaucoma. He is on eyedrops. He is doing fine. THIS IS NOT A COMPLETE MEDICAL RECORD ON THIS PATIENT.    THIS IS A PATIENT AT [facility].    PLEASE CONTACT THE FACILITY LISTED BELOW FOR MORE INFORMATION ON THIS PATIENT.    THE COMPLETE RECORD RESIDES WITH THIS LONG TERM CARE FACILITY

## 2020-02-17 ENCOUNTER — EXTERNAL NURSING HOME DOCUMENTATION (OUTPATIENT)
Dept: INTERNAL MEDICINE CLINIC | Age: 75
End: 2020-02-17

## 2020-02-17 VITALS — BODY MASS INDEX: 26.68 KG/M2 | HEIGHT: 66 IN

## 2020-02-17 DIAGNOSIS — E11.65 TYPE 2 DIABETES MELLITUS WITH HYPERGLYCEMIA, WITH LONG-TERM CURRENT USE OF INSULIN (HCC): ICD-10-CM

## 2020-02-17 DIAGNOSIS — I10 ESSENTIAL HYPERTENSION: ICD-10-CM

## 2020-02-17 DIAGNOSIS — Z79.4 TYPE 2 DIABETES MELLITUS WITH HYPERGLYCEMIA, WITH LONG-TERM CURRENT USE OF INSULIN (HCC): ICD-10-CM

## 2020-02-17 DIAGNOSIS — F03.90 DEMENTIA WITHOUT BEHAVIORAL DISTURBANCE, UNSPECIFIED DEMENTIA TYPE: ICD-10-CM

## 2020-02-17 DIAGNOSIS — J20.8 ACUTE BACTERIAL BRONCHITIS: Primary | ICD-10-CM

## 2020-02-17 DIAGNOSIS — B96.89 ACUTE BACTERIAL BRONCHITIS: Primary | ICD-10-CM

## 2020-02-17 DIAGNOSIS — R05.9 COUGH: ICD-10-CM

## 2020-02-17 NOTE — PROGRESS NOTES
THIS IS NOT A COMPLETE MEDICAL RECORD ON THIS PATIENT. THIS IS A PATIENT AT [facility]. PLEASE CONTACT THE FACILITY LISTED BELOW FOR MORE INFORMATION ON THIS PATIENT. THE COMPLETE RECORD RESIDES WITH THIS LONG TERM CARE FACILITY. HISTORY OF PRESENT ILLNESS  Chen Castaneda is a 76 y.o. male. This patient is currently under the care of Dr. Ynes Delgado at [facility].  The patient's past medical history includes seizure disorder, dementia, diabetes, hyperlipidemia, hypertension, CAD, and CVA. Saw patient per nursing request. Patient has been having cough and congestion with green sputum production for the last week. Patient reports chills and fever. Per nursing vitals are stable   HPI    Review of Systems   Constitutional: Negative for chills and fever. HENT: Negative. Eyes: Negative. Respiratory: Positive for cough and sputum production. Cardiovascular: Negative. Negative for chest pain. Gastrointestinal: Negative. Musculoskeletal: Positive for joint pain. Skin: Negative. Neurological: Negative. Psychiatric/Behavioral: Negative. Physical Exam  Vitals signs and nursing note reviewed. HENT:      Head: Normocephalic and atraumatic. Nose: Congestion present. Mouth/Throat:      Pharynx: No oropharyngeal exudate or posterior oropharyngeal erythema. Neck:      Musculoskeletal: Neck supple. Cardiovascular:      Rate and Rhythm: Normal rate and regular rhythm. Pulses: Normal pulses. Heart sounds: Normal heart sounds. Pulmonary:      Breath sounds: Normal breath sounds. No wheezing. Chest:      Chest wall: No tenderness. Abdominal:      General: Bowel sounds are normal. There is no distension. Palpations: Abdomen is soft. Musculoskeletal: Normal range of motion. Skin:     General: Skin is warm. Neurological:      Mental Status: He is alert and oriented to person, place, and time.    Psychiatric:         Mood and Affect: Mood normal. ASSESSMENT and PLAN  Diagnoses and all orders for this visit:    1. Acute bacterial bronchitis    2. Cough    3. Type 2 diabetes mellitus with hyperglycemia, with long-term [facility]    4. Essential hypertension    5. Dementia without behavioral disturbance, unspecified dementia type ([facility]        PLAN:  1. Mucinex every 12 hours for 5 days   2. Augmentin for URI for 7 days   3.  Nursing to notify MD or NP of any changes     reviewed medications and side effects in detail

## 2020-03-27 ENCOUNTER — EXTERNAL NURSING HOME DOCUMENTATION (OUTPATIENT)
Dept: INTERNAL MEDICINE CLINIC | Age: 75
End: 2020-03-27

## 2020-03-27 DIAGNOSIS — F03.90 DEMENTIA WITHOUT BEHAVIORAL DISTURBANCE, UNSPECIFIED DEMENTIA TYPE: ICD-10-CM

## 2020-03-27 DIAGNOSIS — Z86.73 HISTORY OF CVA (CEREBROVASCULAR ACCIDENT): ICD-10-CM

## 2020-03-27 DIAGNOSIS — Z79.4 TYPE 2 DIABETES MELLITUS WITH HYPERGLYCEMIA, WITH LONG-TERM CURRENT USE OF INSULIN (HCC): ICD-10-CM

## 2020-03-27 DIAGNOSIS — J30.1 ALLERGIC RHINITIS DUE TO POLLEN, UNSPECIFIED SEASONALITY: Primary | ICD-10-CM

## 2020-03-27 DIAGNOSIS — E11.65 TYPE 2 DIABETES MELLITUS WITH HYPERGLYCEMIA, WITH LONG-TERM CURRENT USE OF INSULIN (HCC): ICD-10-CM

## 2020-03-27 NOTE — PROGRESS NOTES
Progress Note     Subjective:   Mr. Kenya Bloom is doing well in the nursing home. Reports nasal congestion and postnasal drip lately. He has chronic allergic rhinitis. No shortness of breath or wheezing. Otherwise appetite is good. Blood sugar is running slightly high. No other discomfort. Objective:    VITALS:  T:  97.6 degrees Fahrenheit. P:  62 per minute. BP:  130/76 mmHg. SaO2:  98% on room air. WT:  153 pounds. HEENT:  Patient has slightly inflamed nasal turbinates, cobblestoning present in back of the throat. Sinuses seem non tender. NECK:  Supple, no JVD, no carotid bruits, no thyromegaly. CHEST:  Chest is clear to auscultation bilaterally. No rales, no rhonchi. CARDIOVASCULAR:  S1, S2 normal.  Regular rate and rhythm. ABDOMEN:  Soft, nontender, nondistended, bowel sounds present. EXTREMITIES:  No edema is noted. Dorsalis pedis pulse normal.    NEUROLOGICAL:  Alert and oriented x3. Assessment and Plan:  1. Allergic rhinitis. Will start him on Flonase, two sprays each nostril once a day. 2. Type 2 diabetes mellitus. Last A1c was 8.2. Will repeat CBC, CMP and A1c. He is on Lantus. Will probably go up on insulin as sugars have been running high. 3. Coronary artery disease with history of CABG. Patient is on Plavix, beta blocker and statin. Will monitor lab work. 4. Hyperlipidemia. On statin. Will check CMP. 5. Gait weakness. Patient walks with a walker. 6. Glaucoma. Patient is on eyedrops, will continue it. THIS IS NOT A COMPLETE MEDICAL RECORD ON THIS PATIENT.    THIS IS A PATIENT AT [facility].    PLEASE CONTACT THE FACILITY LISTED BELOW FOR MORE INFORMATION ON THIS PATIENT.    THE COMPLETE RECORD RESIDES WITH THIS LONG TERM CARE FACILITY

## 2020-05-22 ENCOUNTER — EXTERNAL NURSING HOME DOCUMENTATION (OUTPATIENT)
Dept: INTERNAL MEDICINE CLINIC | Age: 75
End: 2020-05-22

## 2020-05-22 DIAGNOSIS — I25.10 CORONARY ARTERY DISEASE INVOLVING NATIVE HEART WITHOUT ANGINA PECTORIS, UNSPECIFIED VESSEL OR LESION TYPE: ICD-10-CM

## 2020-05-22 DIAGNOSIS — E11.65 TYPE 2 DIABETES MELLITUS WITH HYPERGLYCEMIA, WITH LONG-TERM CURRENT USE OF INSULIN (HCC): Primary | ICD-10-CM

## 2020-05-22 DIAGNOSIS — Z79.4 TYPE 2 DIABETES MELLITUS WITH HYPERGLYCEMIA, WITH LONG-TERM CURRENT USE OF INSULIN (HCC): Primary | ICD-10-CM

## 2020-05-22 DIAGNOSIS — Z86.73 HISTORY OF CVA (CEREBROVASCULAR ACCIDENT): ICD-10-CM

## 2020-05-22 DIAGNOSIS — I10 ESSENTIAL HYPERTENSION: ICD-10-CM

## 2020-05-22 NOTE — PROGRESS NOTES
Progress Note     Subjective:   Mr. Pilo Jennings is doing well in the nursing home. Allergies seem under control. Sugar is under control too. No chest pain or palpitations. Sleeping well. Objective:    VITALS:  T:  98.2 degrees Fahrenheit. P:  76 per minute. BP:  128/70 mmHg. SaO2:  98% on room air. WT:  148 pounds. HEENT:  No abnormality detected. NECK:  Supple, no JVD, no carotid bruits, no thyromegaly. CHEST:  Chest is clear to auscultation bilaterally. No rales, no rhonchi. CARDIOVASCULAR:  S1, S2 normal.  Regular rate and rhythm. ABDOMEN:  Soft, nontender, nondistended, bowel sounds present. EXTREMITIES:  No edema is noted. Dorsalis pedis pulse normal.    NEUROLOGICAL:  Alert and oriented x3. No neurological deficits. Laboratory Data:  Labs reviewed. A1c is 8.2. No new lab work. Assessment and Plan:  1. Type 2 diabetes mellitus. Blood sugar is slightly uncontrolled. He is on Lantus. I will repeat CMP, hemoglobin A1c for now. 2. Coronary artery disease with history of CABG. Patient is on Plavix, beta blocker and statin. Will repeat CBC, CMP and lipid panel. 3. Hyperlipidemia. He is on statin. We will check a lipid panel. 4. Glaucoma. He is on eyedrops, doing well. 5. Gait weakness. He is walking with a walker. He is doing well. THIS IS NOT A COMPLETE MEDICAL RECORD ON THIS PATIENT.    THIS IS A PATIENT AT [facility].    PLEASE CONTACT THE FACILITY LISTED BELOW FOR MORE INFORMATION ON THIS PATIENT.    THE COMPLETE RECORD RESIDES WITH THIS LONG TERM CARE FACILITY

## 2020-07-24 ENCOUNTER — EXTERNAL NURSING HOME DOCUMENTATION (OUTPATIENT)
Dept: INTERNAL MEDICINE CLINIC | Age: 75
End: 2020-07-24

## 2020-07-24 DIAGNOSIS — E11.65 TYPE 2 DIABETES MELLITUS WITH HYPERGLYCEMIA, WITH LONG-TERM CURRENT USE OF INSULIN (HCC): Primary | ICD-10-CM

## 2020-07-24 DIAGNOSIS — I25.10 CORONARY ARTERY DISEASE INVOLVING NATIVE HEART WITHOUT ANGINA PECTORIS, UNSPECIFIED VESSEL OR LESION TYPE: ICD-10-CM

## 2020-07-24 DIAGNOSIS — Z86.73 HISTORY OF CVA (CEREBROVASCULAR ACCIDENT): ICD-10-CM

## 2020-07-24 DIAGNOSIS — Z79.4 TYPE 2 DIABETES MELLITUS WITH HYPERGLYCEMIA, WITH LONG-TERM CURRENT USE OF INSULIN (HCC): Primary | ICD-10-CM

## 2020-07-24 DIAGNOSIS — I10 ESSENTIAL HYPERTENSION: ICD-10-CM

## 2020-07-24 NOTE — PROGRESS NOTES
Progress Note     Subjective:   Mr. Garcia Hughes is doing well. He is eating well, blood sugar seems within normal limits. He is sleeping well too. No other discomfort. Objective:    VITALS:  T:  98.4 degrees Fahrenheit. P:  64 per minute. BP:   124/70 mmHg. SaO2:  99% on room air. WT:  150 pounds. HEENT:  No abnormality detected. NECK:  Supple, no JVD, no carotid bruits, no thyromegaly. CHEST:  Chest is clear to auscultation bilaterally. No rales, no rhonchi. CARDIOVASCULAR:  S1, S2 normal.  Regular rate and rhythm. ABDOMEN:  Soft, nontender, nondistended, bowel sounds present. EXTREMITIES:  No edema is noted. Dorsalis pedis pulse normal.    NEUROLOGICAL:  Alert and oriented x3. No neurological deficits. Laboratory Data:  Labs reviewed. CBC stable. CMP showed creatinine 1.44, potassium was slightly elevated. Vitamin D was slightly low. Assessment and Plan:  1. Type 2 diabetes mellitus. Patient is on Lantus. We will repeat CMP and A1c right now. 2. Hyperlipidemia, on statin. Lipid panel seems stable. 3. Coronary artery disease. Patient is on Plavix, beta blocker and statin. We will repeat CMP and lipid panel. 4. History of glaucoma, on eyedrops. Doing well. 5. Gait weakness. Patient is mostly wheelchair bound, sometimes uses a walker to walk. THIS IS NOT A COMPLETE MEDICAL RECORD ON THIS PATIENT.    THIS IS A PATIENT AT [facility].    PLEASE CONTACT THE FACILITY LISTED BELOW FOR MORE INFORMATION ON THIS PATIENT.    THE COMPLETE RECORD RESIDES WITH THIS LONG TERM CARE FACILITY

## 2020-08-03 ENCOUNTER — EXTERNAL NURSING HOME DOCUMENTATION (OUTPATIENT)
Dept: INTERNAL MEDICINE CLINIC | Age: 75
End: 2020-08-03
Payer: MEDICARE

## 2020-08-03 VITALS — RESPIRATION RATE: 19 BRPM

## 2020-08-03 DIAGNOSIS — E11.65 TYPE 2 DIABETES MELLITUS WITH HYPERGLYCEMIA, WITH LONG-TERM CURRENT USE OF INSULIN (HCC): Primary | ICD-10-CM

## 2020-08-03 DIAGNOSIS — Z79.4 TYPE 2 DIABETES MELLITUS WITH HYPERGLYCEMIA, WITH LONG-TERM CURRENT USE OF INSULIN (HCC): Primary | ICD-10-CM

## 2020-08-03 DIAGNOSIS — Z86.73 HISTORY OF CVA (CEREBROVASCULAR ACCIDENT): ICD-10-CM

## 2020-08-03 DIAGNOSIS — I10 ESSENTIAL HYPERTENSION: ICD-10-CM

## 2020-08-03 DIAGNOSIS — R29.6 FALLS FREQUENTLY: ICD-10-CM

## 2020-08-03 PROCEDURE — 99309 SBSQ NF CARE MODERATE MDM 30: CPT | Performed by: INTERNAL MEDICINE

## 2020-08-03 NOTE — PROGRESS NOTES
THIS IS NOT A COMPLETED MEDICAL RECORD ON THIS PATIENT. THIS IS A PATIENT OF [facility]   PLEASE CONTACT THE FACILITY BELOW FOR MORE INFORMATION ON THIS PATIENT   THE COMPLETE RECORD RESIDES WITH THIS LONG TERM CARE FACILITY    HISTORY OF PRESENT ILLNESS  Chiki Collazo is a 76 y.o. male. This patient is currently under the care of Dr. Natali Blanco at [facility].  The patient's past medical history includes seizure disorder, dementia, diabetes, hyperlipidemia, hypertension, CAD, and CVA. Saw patient after nursing reports 2 falls in the last day. Patient is usually ambulatory without assistance. Nursing reports no injury and periods of confusion. HPI    Review of Systems   Unable to perform ROS: Dementia       Physical Exam  Vitals signs and nursing note reviewed. Constitutional:       Appearance: He is not ill-appearing. HENT:      Head: Normocephalic and atraumatic. Right Ear: Tympanic membrane and ear canal normal.      Left Ear: Tympanic membrane and ear canal normal.      Nose: Nose normal.   Eyes:      Pupils: Pupils are equal, round, and reactive to light. Neck:      Musculoskeletal: Neck supple. Cardiovascular:      Rate and Rhythm: Normal rate and regular rhythm. Pulmonary:      Effort: Pulmonary effort is normal.      Breath sounds: Normal breath sounds. Abdominal:      General: Bowel sounds are normal.      Palpations: Abdomen is soft. Skin:     General: Skin is warm. Neurological:      Mental Status: He is alert and oriented to person, place, and time. Psychiatric:         Mood and Affect: Mood normal.         ASSESSMENT and PLAN  No orders of the defined types were placed in this encounter. Diagnoses and all orders for this visit:    1. Type 2 diabetes mellitus with hyperglycemia, with long-term current use of insulin ([address]    2. Essential hypertension    3. History of CVA (cerebrovascular accident)    4. Falls frequently        PLAN:  1.  Will get STAT labs, CMP, CBC, TSH and UA  2. Patient to get up with nursing and walker   3.  Nursing t reports changes to MD or NP    reviewed medications and side effects in detail

## 2020-08-04 ENCOUNTER — APPOINTMENT (OUTPATIENT)
Dept: CT IMAGING | Age: 75
DRG: 871 | End: 2020-08-04
Attending: EMERGENCY MEDICINE
Payer: MEDICARE

## 2020-08-04 ENCOUNTER — APPOINTMENT (OUTPATIENT)
Dept: GENERAL RADIOLOGY | Age: 75
DRG: 871 | End: 2020-08-04
Attending: EMERGENCY MEDICINE
Payer: MEDICARE

## 2020-08-04 ENCOUNTER — HOSPITAL ENCOUNTER (INPATIENT)
Age: 75
LOS: 9 days | Discharge: NURSING, REHAB OR LONG-TERM CARE | DRG: 871 | End: 2020-08-13
Attending: EMERGENCY MEDICINE | Admitting: INTERNAL MEDICINE
Payer: MEDICARE

## 2020-08-04 DIAGNOSIS — J18.9 PNEUMONIA OF LEFT UPPER LOBE DUE TO INFECTIOUS ORGANISM: ICD-10-CM

## 2020-08-04 DIAGNOSIS — U07.1 COVID-19: Primary | ICD-10-CM

## 2020-08-04 DIAGNOSIS — G93.40 ACUTE ENCEPHALOPATHY: ICD-10-CM

## 2020-08-04 PROBLEM — N17.9 AKI (ACUTE KIDNEY INJURY) (HCC): Status: ACTIVE | Noted: 2020-08-04

## 2020-08-04 PROBLEM — A41.9 SEPSIS (HCC): Status: ACTIVE | Noted: 2020-08-04

## 2020-08-04 PROBLEM — Z20.822 SUSPECTED COVID-19 VIRUS INFECTION: Status: ACTIVE | Noted: 2020-08-04

## 2020-08-04 LAB
ALBUMIN SERPL-MCNC: 3.6 G/DL (ref 3.5–5)
ALBUMIN/GLOB SERPL: 0.7 {RATIO} (ref 1.1–2.2)
ALP SERPL-CCNC: 64 U/L (ref 45–117)
ALT SERPL-CCNC: 10 U/L (ref 12–78)
ANION GAP SERPL CALC-SCNC: 5 MMOL/L (ref 5–15)
APPEARANCE UR: CLEAR
APTT PPP: 28.5 SEC (ref 22.1–32)
AST SERPL-CCNC: 21 U/L (ref 15–37)
BACTERIA URNS QL MICRO: NEGATIVE /HPF
BASOPHILS # BLD: 0 K/UL (ref 0–0.1)
BASOPHILS NFR BLD: 0 % (ref 0–1)
BILIRUB SERPL-MCNC: 0.7 MG/DL (ref 0.2–1)
BILIRUB UR QL CFM: NEGATIVE
BUN SERPL-MCNC: 39 MG/DL (ref 6–20)
BUN/CREAT SERPL: 19 (ref 12–20)
CALCIUM SERPL-MCNC: 9.2 MG/DL (ref 8.5–10.1)
CHLORIDE SERPL-SCNC: 109 MMOL/L (ref 97–108)
CO2 SERPL-SCNC: 25 MMOL/L (ref 21–32)
COLOR UR: ABNORMAL
COVID-19 RAPID TEST, COVR: DETECTED
CREAT SERPL-MCNC: 2.02 MG/DL (ref 0.7–1.3)
DIFFERENTIAL METHOD BLD: ABNORMAL
EOSINOPHIL # BLD: 0 K/UL (ref 0–0.4)
EOSINOPHIL NFR BLD: 0 % (ref 0–7)
EPITH CASTS URNS QL MICRO: ABNORMAL /LPF
ERYTHROCYTE [DISTWIDTH] IN BLOOD BY AUTOMATED COUNT: 13.5 % (ref 11.5–14.5)
FERRITIN SERPL-MCNC: 91 NG/ML (ref 26–388)
FIBRINOGEN PPP-MCNC: 491 MG/DL (ref 200–475)
GLOBULIN SER CALC-MCNC: 5.2 G/DL (ref 2–4)
GLUCOSE BLD STRIP.AUTO-MCNC: 118 MG/DL (ref 65–100)
GLUCOSE SERPL-MCNC: 143 MG/DL (ref 65–100)
GLUCOSE UR STRIP.AUTO-MCNC: >1000 MG/DL
GRAN CASTS URNS QL MICRO: ABNORMAL /LPF
HCT VFR BLD AUTO: 45.1 % (ref 36.6–50.3)
HGB BLD-MCNC: 14.2 G/DL (ref 12.1–17)
HGB UR QL STRIP: ABNORMAL
HYALINE CASTS URNS QL MICRO: ABNORMAL /LPF (ref 0–5)
IMM GRANULOCYTES # BLD AUTO: 0 K/UL (ref 0–0.04)
IMM GRANULOCYTES NFR BLD AUTO: 0 % (ref 0–0.5)
INR PPP: 1 (ref 0.9–1.1)
KETONES UR QL STRIP.AUTO: 15 MG/DL
LACTATE SERPL-SCNC: 2 MMOL/L (ref 0.4–2)
LDH SERPL L TO P-CCNC: 246 U/L (ref 85–241)
LEUKOCYTE ESTERASE UR QL STRIP.AUTO: NEGATIVE
LYMPHOCYTES # BLD: 1.2 K/UL (ref 0.8–3.5)
LYMPHOCYTES NFR BLD: 20 % (ref 12–49)
MAGNESIUM SERPL-MCNC: 2.3 MG/DL (ref 1.6–2.4)
MCH RBC QN AUTO: 27.3 PG (ref 26–34)
MCHC RBC AUTO-ENTMCNC: 31.5 G/DL (ref 30–36.5)
MCV RBC AUTO: 86.7 FL (ref 80–99)
MONOCYTES # BLD: 0.5 K/UL (ref 0–1)
MONOCYTES NFR BLD: 9 % (ref 5–13)
MUCOUS THREADS URNS QL MICRO: ABNORMAL /LPF
NEUTS SEG # BLD: 4.1 K/UL (ref 1.8–8)
NEUTS SEG NFR BLD: 70 % (ref 32–75)
NITRITE UR QL STRIP.AUTO: NEGATIVE
NRBC # BLD: 0 K/UL (ref 0–0.01)
NRBC BLD-RTO: 0 PER 100 WBC
PH UR STRIP: 5 [PH] (ref 5–8)
PLATELET # BLD AUTO: 122 K/UL (ref 150–400)
PMV BLD AUTO: 10.8 FL (ref 8.9–12.9)
POTASSIUM SERPL-SCNC: 4.8 MMOL/L (ref 3.5–5.1)
PROCALCITONIN SERPL-MCNC: 0.15 NG/ML
PROT SERPL-MCNC: 8.8 G/DL (ref 6.4–8.2)
PROT UR STRIP-MCNC: 100 MG/DL
PROTHROMBIN TIME: 10.2 SEC (ref 9–11.1)
RBC # BLD AUTO: 5.2 M/UL (ref 4.1–5.7)
RBC #/AREA URNS HPF: ABNORMAL /HPF (ref 0–5)
SERVICE CMNT-IMP: ABNORMAL
SODIUM SERPL-SCNC: 139 MMOL/L (ref 136–145)
SOURCE, COVRS: ABNORMAL
SP GR UR REFRACTOMETRY: 1.03 (ref 1–1.03)
SPECIMEN SOURCE, FCOV2M: ABNORMAL
THERAPEUTIC RANGE,PTTT: NORMAL SECS (ref 58–77)
UA: UC IF INDICATED,UAUC: ABNORMAL
UR CULT HOLD, URHOLD: NORMAL
UROBILINOGEN UR QL STRIP.AUTO: 0.2 EU/DL (ref 0.2–1)
WBC # BLD AUTO: 5.9 K/UL (ref 4.1–11.1)
WBC URNS QL MICRO: ABNORMAL /HPF (ref 0–4)

## 2020-08-04 PROCEDURE — 65660000000 HC RM CCU STEPDOWN

## 2020-08-04 PROCEDURE — 71045 X-RAY EXAM CHEST 1 VIEW: CPT

## 2020-08-04 PROCEDURE — 74011250636 HC RX REV CODE- 250/636: Performed by: INTERNAL MEDICINE

## 2020-08-04 PROCEDURE — 71260 CT THORAX DX C+: CPT

## 2020-08-04 PROCEDURE — 84145 PROCALCITONIN (PCT): CPT

## 2020-08-04 PROCEDURE — 87635 SARS-COV-2 COVID-19 AMP PRB: CPT

## 2020-08-04 PROCEDURE — 87040 BLOOD CULTURE FOR BACTERIA: CPT

## 2020-08-04 PROCEDURE — 99285 EMERGENCY DEPT VISIT HI MDM: CPT

## 2020-08-04 PROCEDURE — 82728 ASSAY OF FERRITIN: CPT

## 2020-08-04 PROCEDURE — 81001 URINALYSIS AUTO W/SCOPE: CPT

## 2020-08-04 PROCEDURE — 82962 GLUCOSE BLOOD TEST: CPT

## 2020-08-04 PROCEDURE — 36415 COLL VENOUS BLD VENIPUNCTURE: CPT

## 2020-08-04 PROCEDURE — 74011250637 HC RX REV CODE- 250/637: Performed by: INTERNAL MEDICINE

## 2020-08-04 PROCEDURE — 74177 CT ABD & PELVIS W/CONTRAST: CPT

## 2020-08-04 PROCEDURE — 74011250636 HC RX REV CODE- 250/636: Performed by: EMERGENCY MEDICINE

## 2020-08-04 PROCEDURE — 93005 ELECTROCARDIOGRAM TRACING: CPT

## 2020-08-04 PROCEDURE — 85730 THROMBOPLASTIN TIME PARTIAL: CPT

## 2020-08-04 PROCEDURE — 80235 DRUG ASSAY LACOSAMIDE: CPT

## 2020-08-04 PROCEDURE — 85610 PROTHROMBIN TIME: CPT

## 2020-08-04 PROCEDURE — 74011000258 HC RX REV CODE- 258: Performed by: INTERNAL MEDICINE

## 2020-08-04 PROCEDURE — 74011636320 HC RX REV CODE- 636/320: Performed by: INTERNAL MEDICINE

## 2020-08-04 PROCEDURE — 80053 COMPREHEN METABOLIC PANEL: CPT

## 2020-08-04 PROCEDURE — C9254 INJECTION, LACOSAMIDE: HCPCS | Performed by: INTERNAL MEDICINE

## 2020-08-04 PROCEDURE — 80177 DRUG SCRN QUAN LEVETIRACETAM: CPT

## 2020-08-04 PROCEDURE — 96365 THER/PROPH/DIAG IV INF INIT: CPT

## 2020-08-04 PROCEDURE — 83615 LACTATE (LD) (LDH) ENZYME: CPT

## 2020-08-04 PROCEDURE — 70450 CT HEAD/BRAIN W/O DYE: CPT

## 2020-08-04 PROCEDURE — 83735 ASSAY OF MAGNESIUM: CPT

## 2020-08-04 PROCEDURE — 74011000258 HC RX REV CODE- 258: Performed by: EMERGENCY MEDICINE

## 2020-08-04 PROCEDURE — 85384 FIBRINOGEN ACTIVITY: CPT

## 2020-08-04 PROCEDURE — 85025 COMPLETE CBC W/AUTO DIFF WBC: CPT

## 2020-08-04 PROCEDURE — 83605 ASSAY OF LACTIC ACID: CPT

## 2020-08-04 RX ORDER — PROMETHAZINE HYDROCHLORIDE 25 MG/1
12.5 TABLET ORAL
Status: DISCONTINUED | OUTPATIENT
Start: 2020-08-04 | End: 2020-08-13 | Stop reason: HOSPADM

## 2020-08-04 RX ORDER — ACETAMINOPHEN 650 MG/1
650 SUPPOSITORY RECTAL
Status: DISCONTINUED | OUTPATIENT
Start: 2020-08-04 | End: 2020-08-13 | Stop reason: HOSPADM

## 2020-08-04 RX ORDER — TOPIRAMATE 25 MG/1
100 TABLET ORAL 2 TIMES DAILY
Status: DISCONTINUED | OUTPATIENT
Start: 2020-08-04 | End: 2020-08-13 | Stop reason: HOSPADM

## 2020-08-04 RX ORDER — ACETAMINOPHEN 325 MG/1
650 TABLET ORAL
Status: DISCONTINUED | OUTPATIENT
Start: 2020-08-04 | End: 2020-08-13 | Stop reason: HOSPADM

## 2020-08-04 RX ORDER — AMLODIPINE BESYLATE 5 MG/1
5 TABLET ORAL DAILY
Status: DISCONTINUED | OUTPATIENT
Start: 2020-08-05 | End: 2020-08-10

## 2020-08-04 RX ORDER — SODIUM CHLORIDE 0.9 % (FLUSH) 0.9 %
5-40 SYRINGE (ML) INJECTION AS NEEDED
Status: DISCONTINUED | OUTPATIENT
Start: 2020-08-04 | End: 2020-08-13 | Stop reason: HOSPADM

## 2020-08-04 RX ORDER — SODIUM CHLORIDE 0.9 % (FLUSH) 0.9 %
10 SYRINGE (ML) INJECTION
Status: COMPLETED | OUTPATIENT
Start: 2020-08-04 | End: 2020-08-04

## 2020-08-04 RX ORDER — MAGNESIUM SULFATE 100 %
4 CRYSTALS MISCELLANEOUS AS NEEDED
Status: DISCONTINUED | OUTPATIENT
Start: 2020-08-04 | End: 2020-08-13 | Stop reason: HOSPADM

## 2020-08-04 RX ORDER — SODIUM CHLORIDE 9 MG/ML
75 INJECTION, SOLUTION INTRAVENOUS CONTINUOUS
Status: DISCONTINUED | OUTPATIENT
Start: 2020-08-04 | End: 2020-08-05

## 2020-08-04 RX ORDER — INSULIN GLARGINE 100 [IU]/ML
60 INJECTION, SOLUTION SUBCUTANEOUS DAILY
Status: DISCONTINUED | OUTPATIENT
Start: 2020-08-05 | End: 2020-08-05

## 2020-08-04 RX ORDER — ENOXAPARIN SODIUM 100 MG/ML
40 INJECTION SUBCUTANEOUS DAILY
Status: DISCONTINUED | OUTPATIENT
Start: 2020-08-05 | End: 2020-08-10

## 2020-08-04 RX ORDER — ALLOPURINOL 100 MG/1
100 TABLET ORAL DAILY
Status: DISCONTINUED | OUTPATIENT
Start: 2020-08-05 | End: 2020-08-13 | Stop reason: HOSPADM

## 2020-08-04 RX ORDER — SODIUM CHLORIDE 0.9 % (FLUSH) 0.9 %
5-10 SYRINGE (ML) INJECTION AS NEEDED
Status: DISCONTINUED | OUTPATIENT
Start: 2020-08-04 | End: 2020-08-13 | Stop reason: HOSPADM

## 2020-08-04 RX ORDER — DEXTROSE 50 % IN WATER (D50W) INTRAVENOUS SYRINGE
12.5-25 AS NEEDED
Status: DISCONTINUED | OUTPATIENT
Start: 2020-08-04 | End: 2020-08-13 | Stop reason: HOSPADM

## 2020-08-04 RX ORDER — POLYETHYLENE GLYCOL 3350 17 G/17G
17 POWDER, FOR SOLUTION ORAL DAILY PRN
Status: DISCONTINUED | OUTPATIENT
Start: 2020-08-04 | End: 2020-08-13 | Stop reason: HOSPADM

## 2020-08-04 RX ORDER — CARVEDILOL 6.25 MG/1
6.25 TABLET ORAL 2 TIMES DAILY WITH MEALS
Status: DISCONTINUED | OUTPATIENT
Start: 2020-08-05 | End: 2020-08-13 | Stop reason: HOSPADM

## 2020-08-04 RX ORDER — ONDANSETRON 2 MG/ML
4 INJECTION INTRAMUSCULAR; INTRAVENOUS
Status: DISCONTINUED | OUTPATIENT
Start: 2020-08-04 | End: 2020-08-13 | Stop reason: HOSPADM

## 2020-08-04 RX ORDER — SODIUM CHLORIDE 0.9 % (FLUSH) 0.9 %
5-40 SYRINGE (ML) INJECTION EVERY 8 HOURS
Status: DISCONTINUED | OUTPATIENT
Start: 2020-08-04 | End: 2020-08-13 | Stop reason: HOSPADM

## 2020-08-04 RX ORDER — GUAIFENESIN 600 MG/1
600 TABLET, EXTENDED RELEASE ORAL EVERY 12 HOURS
Status: DISCONTINUED | OUTPATIENT
Start: 2020-08-04 | End: 2020-08-13 | Stop reason: HOSPADM

## 2020-08-04 RX ORDER — CLOPIDOGREL BISULFATE 75 MG/1
75 TABLET ORAL DAILY
Status: DISCONTINUED | OUTPATIENT
Start: 2020-08-05 | End: 2020-08-13 | Stop reason: HOSPADM

## 2020-08-04 RX ORDER — PRAVASTATIN SODIUM 40 MG/1
40 TABLET ORAL
Status: DISCONTINUED | OUTPATIENT
Start: 2020-08-04 | End: 2020-08-13 | Stop reason: HOSPADM

## 2020-08-04 RX ORDER — INSULIN LISPRO 100 [IU]/ML
INJECTION, SOLUTION INTRAVENOUS; SUBCUTANEOUS
Status: DISCONTINUED | OUTPATIENT
Start: 2020-08-04 | End: 2020-08-07

## 2020-08-04 RX ORDER — ASPIRIN 325 MG
325 TABLET ORAL DAILY
Status: DISCONTINUED | OUTPATIENT
Start: 2020-08-05 | End: 2020-08-05

## 2020-08-04 RX ADMIN — SODIUM CHLORIDE, SODIUM LACTATE, POTASSIUM CHLORIDE, AND CALCIUM CHLORIDE 1000 ML: 600; 310; 30; 20 INJECTION, SOLUTION INTRAVENOUS at 16:42

## 2020-08-04 RX ADMIN — GUAIFENESIN 600 MG: 600 TABLET, EXTENDED RELEASE ORAL at 21:36

## 2020-08-04 RX ADMIN — PIPERACILLIN AND TAZOBACTAM 3.38 G: 3; .375 INJECTION, POWDER, LYOPHILIZED, FOR SOLUTION INTRAVENOUS at 18:57

## 2020-08-04 RX ADMIN — PRAVASTATIN SODIUM 40 MG: 40 TABLET ORAL at 21:36

## 2020-08-04 RX ADMIN — TOPIRAMATE 100 MG: 100 TABLET, FILM COATED ORAL at 22:02

## 2020-08-04 RX ADMIN — AZITHROMYCIN 500 MG: 500 INJECTION, POWDER, LYOPHILIZED, FOR SOLUTION INTRAVENOUS at 19:41

## 2020-08-04 RX ADMIN — SODIUM CHLORIDE 750 MG: 9 INJECTION, SOLUTION INTRAVENOUS at 21:32

## 2020-08-04 RX ADMIN — SODIUM CHLORIDE 100 MG: 9 INJECTION, SOLUTION INTRAVENOUS at 22:23

## 2020-08-04 RX ADMIN — IOPAMIDOL 100 ML: 755 INJECTION, SOLUTION INTRAVENOUS at 21:11

## 2020-08-04 RX ADMIN — Medication 10 ML: at 21:12

## 2020-08-04 RX ADMIN — SODIUM CHLORIDE 75 ML/HR: 900 INJECTION, SOLUTION INTRAVENOUS at 23:15

## 2020-08-04 NOTE — REMOTE MONITORING
Spoke to primary RN regarding three hour sepsis bundle needs- antibiotics.     Tavo OCASIO, [address]  [phone]

## 2020-08-04 NOTE — ED TRIAGE NOTES
Pt presents via EMS from [facility]. Pt is unsure why he is here. EMS states they were called for altered metal status. Pt daughter was at bedside and stated pt has had two falls in last few days. Pt daughter also advised [place] care told her is kidney function blood work was off. Pt has a rectal temp of 100.9 and a cough, o2 100% room air, rr 16.  Pt alert to self, 3 points of VS.

## 2020-08-04 NOTE — ED PROVIDER NOTES
EMERGENCY DEPARTMENT HISTORY AND PHYSICAL EXAM      Date: 8/4/2020  Patient Name: Elfego Argueta    Please note that this dictation was completed with TRA, the computer voice recognition software. Quite often unanticipated grammatical, syntax, homophones, and other interpretive errors are inadvertently transcribed by the computer software. Please disregard these errors. Please excuse any errors that have escaped final proofreading. History of Presenting Illness     Chief Complaint   Patient presents with    Altered mental status       History Provided By: Daughter    HPI: Elfego Argueta, 76 y.o. male, presenting the emergency department brought in by EMS accompanied by family complaining of altered mental status and generalized weakness. He has had 2 falls in the last 2 days. His daughter reports that he seems more tired appearing may be more confused. Patient is unable to provide any clinical history. Arriving by ambulance he was not febrile with an oral temperature not significantly elevated. Rectal temp was obtained which found the patient be febrile at 100.9. [facility] Anne PCP: Zaheer Mcclellan MD    No current facility-administered medications on file prior to encounter. Current Outpatient Medications on File Prior to Encounter   Medication Sig Dispense Refill    bumetanide (BUMEX) 1 mg tablet take 1 tablet once daily 30 Tab 12    levETIRAcetam (KEPPRA) 750 mg tablet Take 750 mg by mouth two (2) times a day. 3 tab, twice daily   Indications: PARTIAL EPILEPSY TREATMENT ADJUNCT      lacosamide (VIMPAT) 200 mg tab tablet Take 200 mg by mouth two (2) times a day. Indications: PARTIAL EPILEPSY TREATMENT ADJUNCT      topiramate (TOPAMAX) 100 mg tablet Take 100 mg by mouth two (2) times a day. Indications: COMPLEX-PARTIAL EPILEPSY      amLODIPine (NORVASC) 2.5 mg tablet Take 2.5 mg by mouth daily.  Indications: hypertension      insulin glargine (LANTUS SOLOSTAR) 100 unit/mL (3 mL) inpn 60 Units by SubCUTAneous route daily. Indications: type 2 diabetes mellitus      carvedilol (COREG) 6.25 mg tablet Take 6.25 mg by mouth two (2) times daily (with meals). Indications: PREVENTION OF A. FIB POST CARDIO-THORACIC SURGERY      metFORMIN (GLUCOPHAGE) 1,000 mg tablet take 1 tablet by mouth twice a day with food 60 Tab 12    insulin glargine (LANTUS SOLOSTAR) 100 unit/mL (3 mL) inpn 10 units sc daily 1 Pen 12    amLODIPine (NORVASC) 5 mg tablet take 1 tablet by mouth once daily 90 Tab 3    clopidogrel (PLAVIX) 75 mg tab take 1 tablet by mouth once daily 30 Tab 12    losartan (COZAAR) 100 mg tablet take 1 tablet by mouth once daily 90 Tab 1    Walker misc Rollator walker 1 Each 0    HYDROcodone-acetaminophen (NORCO) 5-325 mg per tablet Take 1 Tab by mouth every four (4) hours as needed for Pain. Max Daily Amount: 6 Tabs. 20 Tab 0    Insulin Needles, Disposable, (JAZMYN PEN NEEDLE) 32 gauge x 5/32\" ndle Use daily as directed with insulin pen. 100 Pen Needle 11    aspirin (ASPIRIN) 325 mg tablet Take 325 mg by mouth daily.       lacosamide (VIMPAT) 100 mg tab tablet take 1 tablet by mouth twice a day 60 Tab 5    levETIRAcetam 1,000 mg tablet take 1 tablet by mouth twice a day 60 Tab 3    pravastatin (PRAVACHOL) 40 mg tablet take 1 tablet once daily 30 Tab 9    metoprolol tartrate (LOPRESSOR) 50 mg tablet take 1 tablet by mouth twice a day 60 Tab 4    glucose blood VI test strips (ONETOUCH ULTRA TEST) strip Test 2 times daily    ONETOUCH ULTRA 2 TEST STRIPS ONLY 100 Strip 11    allopurinol (ZYLOPRIM) 100 mg tablet take 1 tablet by mouth once daily 30 Tab 12    ACCU-CHEK ALEXIS PLUS TEST STRP strip TEST BLOOD SUGARS 2 TIMES DAILY 100 Strip PRN    Lancets misc Patient test 3 x daily  Dm 250 1 Package 11       Past History     Past Medical History:  Past Medical History:   Diagnosis Date    Allergic rhinitis, cause unspecified 4/18/2011    Alzheimer's disease     Arthritis     GOUT    Cancer ([facility] [address] 09/20/2016 PROSTATE    Chronic obstructive pulmonary disease (HCC)     CHRONIC BRONCHITIS    Congestive heart failure, unspecified 4/18/2011    (ON 9/20/16 PT & DTR STATE THEY DON'T REMEMBER THIS DIAGNOSIS)    Coronary Artery Disease 4/18/2011    Diabetes Mellitus Type I 4/18/2011    Erectile Dysfunction 4/18/2011    Hemorrhoids 4/18/2011    Hypertension     Seizures ([address]     STARTED 2005; \"BLACK-OUT Zully L[place] SEIZURES\", DTR SATES    Stroke ([address]     MULTIPLE MINISTROKES, DTR REPORTS; LEFT-SIDED WEAKNESS    Thromboembolus ([address]     left leg  (NO PE)    Unspecified asthma(493.90) 4/18/2011       Past Surgical History:  Past Surgical History:   Procedure Laterality Date    CARDIAC SURG PROCEDURE UNLIST  2006    CABG    COLONOSCOPY N/A 10/9/2017    COLONOSCOPY performed by Marylou Camacho MD at [facility], STENTS LEFT LEG       Family History:  Family History   Problem Relation Age of Onset    Diabetes Mother     Hypertension Mother     Diabetes Father     Anesth Problems Neg Hx        Social History:  Social History     Tobacco Use    Smoking status: Former Smoker     Packs/day: 2.00     Years: 30.00     Pack years: 60.00    Smokeless tobacco: Former User     Quit date: 9/20/1990   Substance Use Topics    Alcohol use: No     Comment: IN PAST, MODERATE ALCOHOL. WEEKENDS ONLY    Drug use: No       Allergies:  No Known Allergies      Review of Systems   Review of Systems   Unable to perform ROS: Mental status change       Physical Exam   Physical Exam  Vitals signs and nursing note reviewed. Constitutional:       Comments: Elderly appearing male, lying in bed   HENT:      Head: Normocephalic and atraumatic. Eyes:      General:         Right eye: No discharge. Left eye: No discharge. Conjunctiva/sclera: Conjunctivae normal.      Pupils: Pupils are equal, round, and reactive to light.    Neck:      Musculoskeletal: Normal range of motion and neck supple. Trachea: No tracheal deviation. Cardiovascular:      Rate and Rhythm: Normal rate and regular rhythm. Heart sounds: Normal heart sounds. No murmur. Pulmonary:      Effort: Pulmonary effort is normal. No respiratory distress. Breath sounds: Normal breath sounds. No wheezing or rales. Abdominal:      General: Bowel sounds are normal.      Palpations: Abdomen is soft. Tenderness: There is no abdominal tenderness. There is no guarding or rebound. Musculoskeletal: Normal range of motion. General: No tenderness or deformity. Skin:     General: Skin is warm and dry. Findings: No erythema or rash. Neurological:      Mental Status: He is alert. Comments: Patient oriented only to self. Appears somewhat somnolent, moving all extremities, opens eyes to voice. No focal deficits noted, no meningismal signs   Psychiatric:         Behavior: Behavior normal.         Diagnostic Study Results     Labs -     Recent Results (from the past 12 hour(s))   GLUCOSE, POC    Collection Time: 08/05/20  9:50 PM   Result Value Ref Range    Glucose (POC) 41 (LL) 65 - 100 mg/dL    Performed by Cj Sheldon (TRV)    GLUCOSE, POC    Collection Time: 08/05/20 10:08 PM   Result Value Ref Range    Glucose (POC) 113 (H) 65 - 100 mg/dL    Performed by Cj Sheldon (TRV)    GLUCOSE, POC    Collection Time: 08/05/20 11:47 PM   Result Value Ref Range    Glucose (POC) 82 65 - 100 mg/dL    Performed by Cj Sheldon (TRV)    PTT    Collection Time: 08/06/20  3:09 AM   Result Value Ref Range    aPTT 30.2 22.1 - 32.0 sec    aPTT, therapeutic range     58.0 - 77.0 SECS   PROTHROMBIN TIME + INR    Collection Time: 08/06/20  3:09 AM   Result Value Ref Range    INR 1.0 0.9 - 1.1      Prothrombin time 10.3 9.0 - 11.1 sec   CBC WITH AUTOMATED DIFF    Collection Time: 08/06/20  3:09 AM   Result Value Ref Range    WBC 5.6 4.1 - 11.1 K/uL    RBC 4.89 4. 10 - 5.70 M/uL    HGB 13.1 12.1 - 17.0 g/dL    HCT 43.9 36.6 - 50.3 %    MCV 89.8 80.0 - 99.0 FL    MCH 26.8 26.0 - 34.0 PG    MCHC 29.8 (L) 30.0 - 36.5 g/dL    RDW 13.7 11.5 - 14.5 %    PLATELET 351 (L) 902 - 400 K/uL    MPV 10.8 8.9 - 12.9 FL    NRBC 0.0 0  WBC    ABSOLUTE NRBC 0.00 0.00 - 0.01 K/uL    NEUTROPHILS 70 32 - 75 %    LYMPHOCYTES 22 12 - 49 %    MONOCYTES 7 5 - 13 %    EOSINOPHILS 0 0 - 7 %    BASOPHILS 0 0 - 1 %    IMMATURE GRANULOCYTES 0 0.0 - 0.5 %    ABS. NEUTROPHILS 3.9 1.8 - 8.0 K/UL    ABS. LYMPHOCYTES 1.3 0.8 - 3.5 K/UL    ABS. MONOCYTES 0.4 0.0 - 1.0 K/UL    ABS. EOSINOPHILS 0.0 0.0 - 0.4 K/UL    ABS. BASOPHILS 0.0 0.0 - 0.1 K/UL    ABS. IMM. GRANS. 0.0 0.00 - 0.04 K/UL    DF AUTOMATED     FIBRINOGEN    Collection Time: 08/06/20  3:09 AM   Result Value Ref Range    Fibrinogen 447 200 - 475 mg/dL   GLUCOSE, POC    Collection Time: 08/06/20  5:19 AM   Result Value Ref Range    Glucose (POC) 54 (L) 65 - 100 mg/dL    Performed by Ankit Leo (TRV)    GLUCOSE, POC    Collection Time: 08/06/20  5:42 AM   Result Value Ref Range    Glucose (POC) 187 (H) 65 - 100 mg/dL    Performed by Ankit Leo (TRLILLIE)        Radiologic Studies -   CT CHEST W CONT   Final Result   IMPRESSION: 2 focal patches of infiltrate in the upper lobes bilaterally   suspicious for infectious process, however, the left upper lobe opacity could   reflect neoplasm and close interval follow-up is recommended. Additional   incidentals as above with no other findings suspicious for infection. CT ABD PELV W CONT   Final Result   IMPRESSION: 2 focal patches of infiltrate in the upper lobes bilaterally   suspicious for infectious process, however, the left upper lobe opacity could   reflect neoplasm and close interval follow-up is recommended. Additional   incidentals as above with no other findings suspicious for infection. CT HEAD WO CONT   Final Result   IMPRESSION:    Interval development of multiple infarcts and atrophy as compared to the 2016   study. No acute process identified by noncontrast CT. XR CHEST PORT   Final Result   IMPRESSION: Ill-defined left upper lobe opacity. It is unclear whether this   represents a focus of alveolitis or a spiculated mass. Recommend follow-up to   resolution or CT. CT Results  (Last 48 hours)               08/04/20 2110  CT CHEST W CONT Final result    Impression:  IMPRESSION: 2 focal patches of infiltrate in the upper lobes bilaterally   suspicious for infectious process, however, the left upper lobe opacity could   reflect neoplasm and close interval follow-up is recommended. Additional   incidentals as above with no other findings suspicious for infection. Narrative:  EXAM: CT ABD PELV W CONT, CT CHEST W CONT       INDICATION: infection of uncertain etiology       COMPARISON: CT of the pelvis 7/25/2017 chest x-ray 8/14/2020. TECHNIQUE:  Following the uneventful intravenous administration of 100 cc   Isovue-300, 5 mm axial images were obtained through the chest, abdomen, and   pelvis. Oral contrast was not administered. Coronal and sagittal   reconstructions were generated. CT dose reduction was achieved through use of a   standardized protocol tailored for this examination and automatic exposure   control for dose modulation. FINDINGS:       THYROID: No nodule. MEDIASTINUM: No mass or lymphadenopathy. NICK: No mass or lymphadenopathy. THORACIC AORTA: Atherosclerotic calcification without aneurysm or dissection. Aortic valve replacement. MAIN PULMONARY ARTERY: Normal in caliber. TRACHEA/BRONCHI: Patent. ESOPHAGUS: No wall thickening or dilatation. HEART: The heart is normal in size without pericardial effusion. Coronary artery   calcifications are noted. PLEURA: No effusion or pneumothorax.    LUNGS: There is paraseptal bullous emphysematous change and a patch of airspace   and groundglass infiltrate in the left upper lobe measuring 1.5 x 2.6 cm (4,   25), a patch of groundglass opacity in the right upper lobe measuring 1.4 x 2.0   cm (4, 25), and focal linear atelectasis in the posterior costophrenic angles   bilaterally. The lungs are otherwise clear with no nodule or mass. LIVER: Unremarkable. GALLBLADDER: Unremarkable. SPLEEN: Unremarkable. PANCREAS: No mass or duct dilation. ADRENALS: Unremarkable. KIDNEYS: No mass, calculus, or hydronephrosis. STOMACH: Unremarkable. SMALL BOWEL: No dilatation or wall thickening. COLON: No dilation or wall thickening. APPENDIX: Unremarkable. PERITONEUM: No ascites or pneumoperitoneum. RETROPERITONEUM: Atherosclerotic calcification without aneurysm or dissection. No enlarged lymphadenopathy. REPRODUCTIVE ORGANS: Uterus and ovaries are surgically absent. URINARY BLADDER: No mass or calculus. BONES: Degenerative spine change. Median sternotomy wires. No acute fracture or   aggressive lesion. ADDITIONAL COMMENTS: N/A           08/04/20 2110  CT ABD PELV W CONT Final result    Impression:  IMPRESSION: 2 focal patches of infiltrate in the upper lobes bilaterally   suspicious for infectious process, however, the left upper lobe opacity could   reflect neoplasm and close interval follow-up is recommended. Additional   incidentals as above with no other findings suspicious for infection. Narrative:  EXAM: CT ABD PELV W CONT, CT CHEST W CONT       INDICATION: infection of uncertain etiology       COMPARISON: CT of the pelvis 7/25/2017 chest x-ray 8/14/2020. TECHNIQUE:  Following the uneventful intravenous administration of 100 cc   Isovue-300, 5 mm axial images were obtained through the chest, abdomen, and   pelvis. Oral contrast was not administered. Coronal and sagittal   reconstructions were generated. CT dose reduction was achieved through use of a   standardized protocol tailored for this examination and automatic exposure   control for dose modulation. FINDINGS:       THYROID: No nodule. MEDIASTINUM: No mass or lymphadenopathy. NICK: No mass or lymphadenopathy. THORACIC AORTA: Atherosclerotic calcification without aneurysm or dissection. Aortic valve replacement. MAIN PULMONARY ARTERY: Normal in caliber. TRACHEA/BRONCHI: Patent. ESOPHAGUS: No wall thickening or dilatation. HEART: The heart is normal in size without pericardial effusion. Coronary artery   calcifications are noted. PLEURA: No effusion or pneumothorax. LUNGS: There is paraseptal bullous emphysematous change and a patch of airspace   and groundglass infiltrate in the left upper lobe measuring 1.5 x 2.6 cm (4,   25), a patch of groundglass opacity in the right upper lobe measuring 1.4 x 2.0   cm (4, 25), and focal linear atelectasis in the posterior costophrenic angles   bilaterally. The lungs are otherwise clear with no nodule or mass. LIVER: Unremarkable. GALLBLADDER: Unremarkable. SPLEEN: Unremarkable. PANCREAS: No mass or duct dilation. ADRENALS: Unremarkable. KIDNEYS: No mass, calculus, or hydronephrosis. STOMACH: Unremarkable. SMALL BOWEL: No dilatation or wall thickening. COLON: No dilation or wall thickening. APPENDIX: Unremarkable. PERITONEUM: No ascites or pneumoperitoneum. RETROPERITONEUM: Atherosclerotic calcification without aneurysm or dissection. No enlarged lymphadenopathy. REPRODUCTIVE ORGANS: Uterus and ovaries are surgically absent. URINARY BLADDER: No mass or calculus. BONES: Degenerative spine change. Median sternotomy wires. No acute fracture or   aggressive lesion. ADDITIONAL COMMENTS: N/A           08/04/20 4092  CT HEAD WO CONT Final result    Impression:  IMPRESSION:    Interval development of multiple infarcts and atrophy as compared to the 2016   study. No acute process identified by noncontrast CT. Narrative:  EXAM: CT HEAD WO CONT       INDICATION: Altered level of consciousness. COMPARISON: 9/30/2016. CONTRAST: None. TECHNIQUE: Unenhanced CT of the head was performed using 5 mm images. Brain and   bone windows were generated. Coronal and sagittal reformats. CT dose reduction   was achieved through use of a standardized protocol tailored for this   examination and automatic exposure control for dose modulation. FINDINGS:   The ventricles and sulci are stable in size, shape and configuration. There is a   right frontal parietal watershed infarct. There is also a left posterior   parietal occipital watershed infarct. There is encephalomalacia in the left PICA   distribution. There is a right external capsule well-defined infarcts. There is   bilateral vertebral artery calcification. Encephalomalacia is noted in both pica   distributions. There is no intracranial hemorrhage, extra-axial collection, or   mass effect. The basilar cisterns are open. No CT evidence of acute infarct. The bone windows demonstrate no abnormalities. The visualized portions of the   paranasal sinuses and mastoid air cells are clear. CXR Results  (Last 48 hours)               08/04/20 1758  XR CHEST PORT Final result    Impression:  IMPRESSION: Ill-defined left upper lobe opacity. It is unclear whether this   represents a focus of alveolitis or a spiculated mass. Recommend follow-up to   resolution or CT. Narrative:  EXAM: XR CHEST PORT       INDICATION: meets SIRS criteria       COMPARISON: 11/26/2014       FINDINGS: A portable AP radiograph of the chest was obtained at 1745 hours. The   patient is on a cardiac monitor. There is an ill-defined 2.5 x 1 cm opacity at   the left apex. . The cardiac and mediastinal contours and pulmonary vascularity   are normal.  The bones and soft tissues are grossly within normal limits. Sternotomy wires are intact. Prosthetic valve is present. Medical Decision Making   I am the first provider for this patient.     I reviewed the vital signs, available nursing notes, past medical history, past surgical history, family history and social history. Vital Signs-Reviewed the patient's vital signs. Patient Vitals for the past 12 hrs:   Temp Pulse Resp BP SpO2   08/05/20 2000 -- -- -- -- 96 %   08/05/20 1953 99.9 °F (37.7 °C) 64 18 141/48 98 %           Records Reviewed:   Nursing notes, Prior visits     Provider Notes (Medical Decision Making):   22-year-old male presenting the emergency department with decreased mental status. Exam is nonfocal.  He was found to have a fever here. Septic work-up begun, rapid COVID test came back positive. Given patient's mental status change she should likely be hospitalized. No need for lumbar puncture at this time based off the history and physical exam and the results of the coronavirus test.  Broad work-up was initially initiated as there was no obvious source of the infection on the physical exam and patient could not provide a very good history. ED Course:   Initial assessment performed. The patients presenting problems have been discussed, and they are in agreement with the care plan formulated and outlined with them. I have encouraged them to ask questions as they arise throughout their visit. Critical Care Time:   none    Disposition:  DISCHARGE NOTE  Patients results have been reviewed with them. Patient and/or family have verbally conveyed their understanding and agreement of the patient's signs, symptoms, diagnosis, treatment and prognosis and additionally agree to follow up as recommended or return to the Emergency Room should their condition change or have any new concerns prior to their follow-up appointment. Patient verbally agrees with the care-plan and verbally conveys that all of their questions have been answered.    Discharge instructions have also been provided to the patient with some educational information regarding their diagnosis as well a list of reasons why they would want to return to the ER prior to their follow-up appointment should their condition change. PLAN:  1. Current Discharge Medication List        2. Follow-up Information     Follow up With Specialties Details Why Contact Info    [facility] [address] [facility]  [phone]          Return to ED if worse     Diagnosis     Clinical Impression:   1. COVID-19    2. Pneumonia of left upper lobe due to infectious organism    3. Acute encephalopathy        Attestations:   This note was completed by Gerald Ulloa DO

## 2020-08-04 NOTE — H&P
Hospitalist Admission Note    NAME: Ana Allred   :  1945   MRN:  [id number]     Date/Time:  2020 7:44 PM    Patient PCP: Lucy Rose MD  ______________________________________________________________________  Given the patient's current clinical presentation, I have a high level of concern for decompensation if discharged from the emergency department. Complex decision making was performed, which includes reviewing the patient's available past medical records, laboratory results, and x-ray films. My assessment of this patient's clinical condition and my plan of care is as follows. Assessment / Plan:  Sepsis with acute encephalopathy POA  Due to left upper lobe pneumonia POA  Cannot rule out COVID-19 infection POA  Chest x-ray left upper lobe airspace disease/opacity  UA positive for glucose and ketones otherwise negative  Blood culture pending  WBC normal  Lactate 2.0  Procalcitonin 0.15  Fibrinogen 491  INR 1.0  Ferritin 91  Fever of 100.9  Rapid COVID-19 test pending    Admit to telemetry bed with COVID-19 restrictions till COVID-19 test comes back negative  Check Vimpat and Keppra levels on admission  Empiric IV antibiotics to cover for pneumonia  Follow COVID-19 test  Gentle IV hydration for RAPHAEL/dehydration ketosis  Mucinex twice daily    RAPHAEL with dehydration POA  Holding Bumex, metformin  IV fluids as above  BMP in a.m.     Diabetes type 2 insulin-dependent POA-controlled  Hypertension  CAD status post CABG  History of CVAs  Alzheimer's/vascular dementia  Seizure disorder    Fingersticks q. before meals and at bedtime, diabetic diet  Continue home Lantus, SSI lispro here, holding metformin for RAPHAEL  Continue aspirin, Plavix, Coreg, statin  holding Bumex losartan for RAPHAEL  Continue Vimpat IV, Keppra IV until patient more back to his baseline mental status to take p.o. consistently  Check Vimpat and Keppra levels        Code Status: Full code  Surrogate Decision Maker: Daughter    DVT Prophylaxis: Subcu Lovenox  GI Prophylaxis: not indicated    Baseline: Patient is a long-term resident at [facility], has baseline vascular/Alzheimer's dementia but is conversant at baseline per nursing home staff      Subjective:   CHIEF COMPLAINT: Altered mental status at the nursing home    HISTORY OF PRESENT ILLNESS:     Jerad Charles is a 76 y.o.  male who presents with above complaints from nursing home/long-term care facility sent via EMS. Patient has been found to have altered mental status with decreased mental status-less conversant than usual as per the nursing. Patient was found to have fever of 100.9, with mild dehydration/RAPHAEL with ketosis on UA, was found to have left upper lobe opacity on the chest x-ray suspicious for pneumonia. Patient was otherwise confused at baseline when seen by me-jessika historian. We were asked to admit for work up and evaluation of the above problems.      Past Medical History:   Diagnosis Date    Allergic rhinitis, cause unspecified 4/18/2011    Alzheimer's disease     Arthritis     GOUT    Cancer ([address] 09/20/2016    PROSTATE    Chronic obstructive pulmonary disease ([address]     CHRONIC BRONCHITIS    Congestive heart failure, unspecified 4/18/2011    (ON 9/20/16 PT & DTR STATE THEY DON'T REMEMBER THIS DIAGNOSIS)    Coronary Artery Disease 4/18/2011    Diabetes Mellitus Type I 4/18/2011    Erectile Dysfunction 4/18/2011    Hemorrhoids 4/18/2011    Hypertension     Seizures ([address]     STARTED 2005; [facility] D[facility] SAT[facility]    Stroke [facility]     MULTIPLE MINISTROKES, DTR REPORTS; LEFT-SIDED WEAKNESS    Thromboembolus ([address]     left leg  (NO PE)    Unspecified asthma(493.90) 4/18/2011        Past Surgical History:   Procedure Laterality Date    CARDIAC SURG PROCEDURE UNLIST  2006    CABG    COLONOSCOPY N/A 10/9/2017    COLONOSCOPY performed by Teresita Montiel MD at [place] [address] PROCEDURE UNLIST      PERIPHERAL ANGIOGRAM, STENTS LEFT LEG       Social History     Tobacco Use    Smoking status: Former Smoker     Packs/day: 2.00     Years: 30.00     Pack years: 60.00    Smokeless tobacco: Former User     Quit date: 9/20/1990   Substance Use Topics    Alcohol use: No     Comment: IN PAST, MODERATE ALCOHOL. WEEKENDS ONLY        Family History   Problem Relation Age of Onset    Diabetes Mother     Hypertension Mother    Nicolás.[place] Diabetes Father    F[place]ie.[place] Anesth Problems Neg Hx      No Known Allergies     Prior to Admission medications    Medication Sig Start Date End Date Taking? Authorizing Provider   bumetanide (BUMEX) 1 mg tablet take 1 tablet once daily 1/4/18   Carlton Rosenbaum MD   levETIRAcetam (KEPPRA) 750 mg tablet Take 750 mg by mouth two (2) times a day. 3 tab, twice daily   Indications: PARTIAL EPILEPSY TREATMENT ADJUNCT 12/13/17   Oh Sahu MD   lacosamide (VIMPAT) 200 mg tab tablet Take 200 mg by mouth two (2) times a day. Indications: PARTIAL EPILEPSY TREATMENT ADJUNCT 12/13/17   Oh Sahu MD   topiramate (TOPAMAX) 100 mg tablet Take 100 mg by mouth two (2) times a day. Indications: COMPLEX-PARTIAL EPILEPSY 12/13/17   Oh Sahu MD   amLODIPine (NORVASC) 2.5 mg tablet Take 2.5 mg by mouth daily. Indications: hypertension 12/13/17   Oh Sahu MD   insulin glargine (LANTUS SOLOSTAR) 100 unit/mL (3 mL) inpn 60 Units by SubCUTAneous route daily. Indications: type 2 diabetes mellitus 12/13/17   Oh Sahu MD   carvedilol (COREG) 6.25 mg tablet Take 6.25 mg by mouth two (2) times daily (with meals). Indications: PREVENTION OF A.  FIB POST CARDIO-THORACIC SURGERY    Oh Sahu MD   metFORMIN (GLUCOPHAGE) 1,000 mg tablet take 1 tablet by mouth twice a day with food 12/1/17   Carlton Rosenbaum MD   insulin glargine (LANTUS SOLOSTAR) 100 unit/mL (3 mL) inpn 10 units sc daily 11/20/17   Carlton Rosenbaum MD   amLODIPine (NORVASC) 5 mg tablet take 1 tablet by mouth once daily 11/6/17   Tony Bradshaw MD   clopidogrel (PLAVIX) 75 mg tab take 1 tablet by mouth once daily 9/5/17   Tony Bradshaw MD   losartan (COZAAR) 100 mg tablet take 1 tablet by mouth once daily 8/28/17   MD Kalin Sweet [facility] Rollator walker 8/24/17   Tony Bradshaw MD   HYDROcodone-acetaminophen [facility]) 5-325 mg per tablet Take 1 Tab by mouth every four (4) hours as needed for Pain. Max Daily Amount: 6 Tabs. 7/25/17   Naey Gonzalez,    Insulin Needles, Disposable, (JAZMYN PEN NEEDLE) 32 gauge x 5/32\" ndle Use daily as directed with insulin pen. 7/17/17   Tony Bradshaw MD   aspirin (ASPIRIN) 325 mg tablet Take 325 mg by mouth daily. Other, Phys, MD   lacosamide (VIMPAT) 100 mg tab tablet take 1 tablet by mouth twice a day 6/8/17   Tony Bradshaw MD   levETIRAcetam 1,000 mg tablet take 1 tablet by mouth twice a day 6/8/17   Tony Bradshaw MD   pravastatin (PRAVACHOL) 40 mg tablet take 1 tablet once daily 6/8/17   Tony Bradshaw MD   metoprolol tartrate (LOPRESSOR) 50 mg tablet take 1 tablet by mouth twice a day 6/8/17   Tony Bradshaw MD   glucose blood VI test strips (ONETOUCH ULTRA TEST) strip Test 2 times daily    ONETOUCH ULTRA 2 TEST STRIPS ONLY 10/7/16   Tony Bradshaw MD   allopurinol (ZYLOPRIM) 100 mg tablet take 1 tablet by mouth once daily 9/15/16   Tony Bradshaw MD   ACCU-CHEK ALEXIS PLUS TEST STRP strip TEST BLOOD SUGARS 2 TIMES DAILY 9/29/15   MD Asha Sweet misc Patient test 3 x daily  Dm 250 7/8/15   Tony Bradshaw MD       REVIEW OF SYSTEMS:     I am not able to complete the review of systems because:    The patient is intubated and sedated    The patient has altered mental status due to his acute medical problems    The patient has baseline aphasia from prior stroke(s)   x The patient has baseline dementia and is not reliable historian    The patient is in acute medical distress and unable to provide information           Total of 12 systems reviewed as follows:       POSITIVE= underlined text  Negative = text not underlined  General:  fever, chills, sweats, generalized weakness, weight loss/gain,      loss of appetite   Eyes:    blurred vision, eye pain, loss of vision, double vision  ENT:    rhinorrhea, pharyngitis   Respiratory:   cough, sputum production, SOB, WEEKS, wheezing, pleuritic pain   Cardiology:   chest pain, palpitations, orthopnea, PND, edema, syncope   Gastrointestinal:  abdominal pain , N/V, diarrhea, dysphagia, constipation, bleeding   Genitourinary:  frequency, urgency, dysuria, hematuria, incontinence   Muskuloskeletal :  arthralgia, myalgia, back pain  Hematology:  easy bruising, nose or gum bleeding, lymphadenopathy   Dermatological: rash, ulceration, pruritis, color change / jaundice  Endocrine:   hot flashes or polydipsia   Neurological:  headache, dizziness, confusion, focal weakness, paresthesia,     Speech difficulties, memory loss, gait difficulty  Psychological: Feelings of anxiety, depression, agitation    Objective:   VITALS:    Visit Vitals  /59   Pulse 72   Temp (!) 100.9 °F (38.3 °C)   Resp 18   Ht 5' 6\" (1.676 m)   Wt 93.9 kg (207 lb)   SpO2 99%   BMI 33.41 kg/m²       PHYSICAL EXAM:    General:    Alert, cooperative, no distress, appears stated age. HEENT: Atraumatic, anicteric sclerae, pink conjunctivae     No oral ulcers, mucosa dry+, throat clear, dentition fair  Neck:  Supple, symmetrical,  thyroid: non tender  Lungs:   Clear to auscultation bilaterally. No Wheezing or Rhonchi. No rales. Chest wall:  No tenderness  No Accessory muscle use. Heart:   Regular  rhythm,  No  murmur   No edema  Abdomen:   Soft, non-tender. Not distended. Bowel sounds normal  Extremities: No cyanosis. No clubbing,      Skin turgor normal, Capillary refill normal, Radial dial pulse 2+  Skin:     Not pale.   Not Jaundiced  No rashes   Psych:  Poor insight. Not depressed. Not anxious or agitated. Advanced dementia noted+  Neurologic: EOMs intact. No facial asymmetry. No aphasia or slurred speech. Symmetrical strength, Sensation grossly intact. Alert and oriented X 1.     _______________________________________________________________________  Care Plan discussed with:    Comments   Patient x    Family      RN x    Care Manager                    Consultant:  irene Brice   _______________________________________________________________________  Expected  Disposition:   Home with Family    HH/PT/OT/RN    SNF/LTC x   AUGUST    ________________________________________________________________________  TOTAL TIME:  46 Minutes    Critical Care Provided     Minutes non procedure based      Comments    x Reviewed previous records   >50% of visit spent in counseling and coordination of care x Discussion with patient & questions answered       ________________________________________________________________________  Signed: Gavin Juares MD    Procedures: see electronic medical records for all procedures/Xrays and details which were not copied into this note but were reviewed prior to creation of Plan.     LAB DATA REVIEWED:    Recent Results (from the past 24 hour(s))   LACTIC ACID    Collection Time: 08/04/20  4:14 PM   Result Value Ref Range    Lactic acid 2.0 0.4 - 2.0 MMOL/L   URINALYSIS W/ REFLEX CULTURE    Collection Time: 08/04/20  4:14 PM    Specimen: Urine    Urine specimen   Result Value Ref Range    Color YELLOW/STRAW      Appearance CLEAR CLEAR      Specific gravity 1.026 1.003 - 1.030      pH (UA) 5.0 5.0 - 8.0      Protein 100 (A) NEG mg/dL    Glucose >1,000 (A) NEG mg/dL    Ketone 15 (A) NEG mg/dL    Blood SMALL (A) NEG      Urobilinogen 0.2 0.2 - 1.0 EU/dL    Nitrites Negative NEG      Leukocyte Esterase Negative NEG      WBC 0-4 0 - 4 /hpf    RBC 5-10 0 - 5 /hpf    Epithelial cells FEW FEW /lpf    Bacteria Negative NEG /hpf UA: UC IF INDICATED CULTURE NOT INDICATED BY UA RESULT CNI      Mucus 2+ (A) NEG /lpf    Hyaline cast 0-2 0 - 5 /lpf    Granular cast 2-5 (A) NEG /lpf   URINE CULTURE HOLD SAMPLE    Collection Time: 08/04/20  4:14 PM    Specimen: Serum; Urine   Result Value Ref Range    Urine culture hold        Urine on hold in Microbiology dept for 2 days. If unpreserved urine is submitted, it cannot be used for addtional testing after 24 hours, recollection will be required. METABOLIC PANEL, COMPREHENSIVE    Collection Time: 08/04/20  4:14 PM   Result Value Ref Range    Sodium 139 136 - 145 mmol/L    Potassium 4.8 3.5 - 5.1 mmol/L    Chloride 109 (H) 97 - 108 mmol/L    CO2 25 21 - 32 mmol/L    Anion gap 5 5 - 15 mmol/L    Glucose 143 (H) 65 - 100 mg/dL    BUN 39 (H) 6 - 20 MG/DL    Creatinine 2.02 (H) 0.70 - 1.30 MG/DL    BUN/Creatinine ratio 19 12 - 20      GFR est AA 39 (L) >60 ml/min/1.73m2    GFR est non-AA 32 (L) >60 ml/min/1.73m2    Calcium 9.2 8.5 - 10.1 MG/DL    Bilirubin, total 0.7 0.2 - 1.0 MG/DL    ALT (SGPT) 10 (L) 12 - 78 U/L    AST (SGOT) 21 15 - 37 U/L    Alk. phosphatase 64 45 - 117 U/L    Protein, total 8.8 (H) 6.4 - 8.2 g/dL    Albumin 3.6 3.5 - 5.0 g/dL    Globulin 5.2 (H) 2.0 - 4.0 g/dL    A-G Ratio 0.7 (L) 1.1 - 2.2     CBC WITH AUTOMATED DIFF    Collection Time: 08/04/20  4:14 PM   Result Value Ref Range    WBC 5.9 4.1 - 11.1 K/uL    RBC 5.20 4. 10 - 5.70 M/uL    HGB 14.2 12.1 - 17.0 g/dL    HCT 45.1 36.6 - 50.3 %    MCV 86.7 80.0 - 99.0 FL    MCH 27.3 26.0 - 34.0 PG    MCHC 31.5 30.0 - 36.5 g/dL    RDW 13.5 11.5 - 14.5 %    PLATELET 020 (L) 623 - 400 K/uL    MPV 10.8 8.9 - 12.9 FL    NRBC 0.0 0  WBC    ABSOLUTE NRBC 0.00 0.00 - 0.01 K/uL    NEUTROPHILS 70 32 - 75 %    LYMPHOCYTES 20 12 - 49 %    MONOCYTES 9 5 - 13 %    EOSINOPHILS 0 0 - 7 %    BASOPHILS 0 0 - 1 %    IMMATURE GRANULOCYTES 0 0.0 - 0.5 %    ABS. NEUTROPHILS 4.1 1.8 - 8.0 K/UL    ABS. LYMPHOCYTES 1.2 0.8 - 3.5 K/UL    ABS.  MONOCYTES 0.5 0.0 - 1.0 K/UL    ABS. EOSINOPHILS 0.0 0.0 - 0.4 K/UL    ABS. BASOPHILS 0.0 0.0 - 0.1 K/UL    ABS. IMM.  GRANS. 0.0 0.00 - 0.04 K/UL    DF AUTOMATED     FERRITIN    Collection Time: 08/04/20  4:14 PM   Result Value Ref Range    Ferritin 91 26 - 388 NG/ML   LD    Collection Time: 08/04/20  4:14 PM   Result Value Ref Range     (H) 85 - 241 U/L   PTT    Collection Time: 08/04/20  4:14 PM   Result Value Ref Range    aPTT 28.5 22.1 - 32.0 sec    aPTT, therapeutic range     58.0 - 77.0 SECS   PROTHROMBIN TIME + INR    Collection Time: 08/04/20  4:14 PM   Result Value Ref Range    INR 1.0 0.9 - 1.1      Prothrombin time 10.2 9.0 - 11.1 sec   MAGNESIUM    Collection Time: 08/04/20  4:14 PM   Result Value Ref Range    Magnesium 2.3 1.6 - 2.4 mg/dL   SARS-COV-2    Collection Time: 08/04/20  4:14 PM   Result Value Ref Range    Specimen source Nasopharyngeal      SARS-CoV-2 PENDING     SARS-CoV-2 PENDING     Specimen source Nasopharyngeal      COVID-19 rapid test PENDING     COVID-19 PENDING NEG   FIBRINOGEN    Collection Time: 08/04/20  4:14 PM   Result Value Ref Range    Fibrinogen 491 (H) 200 - 475 mg/dL   PROCALCITONIN    Collection Time: 08/04/20  4:14 PM   Result Value Ref Range    Procalcitonin 0.15 ng/mL   BILIRUBIN, CONFIRM    Collection Time: 08/04/20  4:14 PM   Result Value Ref Range    Bilirubin UA, confirm Negative NEG

## 2020-08-05 LAB
APTT PPP: 22.5 SEC (ref 22.1–32)
ATRIAL RATE: 69 BPM
CALCULATED P AXIS, ECG09: 86 DEGREES
CALCULATED R AXIS, ECG10: -82 DEGREES
CALCULATED T AXIS, ECG11: 44 DEGREES
DIAGNOSIS, 93000: NORMAL
ERYTHROCYTE [DISTWIDTH] IN BLOOD BY AUTOMATED COUNT: 13.7 % (ref 11.5–14.5)
FIBRINOGEN PPP-MCNC: 441 MG/DL (ref 200–475)
GLUCOSE BLD STRIP.AUTO-MCNC: 104 MG/DL (ref 65–100)
GLUCOSE BLD STRIP.AUTO-MCNC: 113 MG/DL (ref 65–100)
GLUCOSE BLD STRIP.AUTO-MCNC: 127 MG/DL (ref 65–100)
GLUCOSE BLD STRIP.AUTO-MCNC: 41 MG/DL (ref 65–100)
GLUCOSE BLD STRIP.AUTO-MCNC: 41 MG/DL (ref 65–100)
GLUCOSE BLD STRIP.AUTO-MCNC: 43 MG/DL (ref 65–100)
GLUCOSE BLD STRIP.AUTO-MCNC: 82 MG/DL (ref 65–100)
GLUCOSE BLD STRIP.AUTO-MCNC: 86 MG/DL (ref 65–100)
HCT VFR BLD AUTO: 46.5 % (ref 36.6–50.3)
HGB BLD-MCNC: 14.2 G/DL (ref 12.1–17)
INR PPP: 1 (ref 0.9–1.1)
MCH RBC QN AUTO: 27.3 PG (ref 26–34)
MCHC RBC AUTO-ENTMCNC: 30.5 G/DL (ref 30–36.5)
MCV RBC AUTO: 89.3 FL (ref 80–99)
NRBC # BLD: 0 K/UL (ref 0–0.01)
NRBC BLD-RTO: 0 PER 100 WBC
P-R INTERVAL, ECG05: 168 MS
PLATELET # BLD AUTO: 164 K/UL (ref 150–400)
PMV BLD AUTO: 12.4 FL (ref 8.9–12.9)
PROTHROMBIN TIME: 10 SEC (ref 9–11.1)
Q-T INTERVAL, ECG07: 400 MS
QRS DURATION, ECG06: 132 MS
QTC CALCULATION (BEZET), ECG08: 428 MS
RBC # BLD AUTO: 5.21 M/UL (ref 4.1–5.7)
SARS-COV-2, COV2NT: DETECTED
SERVICE CMNT-IMP: ABNORMAL
SERVICE CMNT-IMP: NORMAL
SERVICE CMNT-IMP: NORMAL
SOURCE, COVRS: ABNORMAL
SPECIMEN SOURCE, FCOV2M: ABNORMAL
THERAPEUTIC RANGE,PTTT: NORMAL SECS (ref 58–77)
VENTRICULAR RATE, ECG03: 69 BPM
WBC # BLD AUTO: 5.4 K/UL (ref 4.1–11.1)

## 2020-08-05 PROCEDURE — 74011000258 HC RX REV CODE- 258: Performed by: INTERNAL MEDICINE

## 2020-08-05 PROCEDURE — 74011250637 HC RX REV CODE- 250/637: Performed by: INTERNAL MEDICINE

## 2020-08-05 PROCEDURE — C9254 INJECTION, LACOSAMIDE: HCPCS | Performed by: INTERNAL MEDICINE

## 2020-08-05 PROCEDURE — 74011250636 HC RX REV CODE- 250/636: Performed by: INTERNAL MEDICINE

## 2020-08-05 PROCEDURE — 74011636637 HC RX REV CODE- 636/637: Performed by: INTERNAL MEDICINE

## 2020-08-05 PROCEDURE — 74011000250 HC RX REV CODE- 250: Performed by: INTERNAL MEDICINE

## 2020-08-05 PROCEDURE — 85027 COMPLETE CBC AUTOMATED: CPT

## 2020-08-05 PROCEDURE — 65660000000 HC RM CCU STEPDOWN

## 2020-08-05 PROCEDURE — 82962 GLUCOSE BLOOD TEST: CPT

## 2020-08-05 PROCEDURE — 85730 THROMBOPLASTIN TIME PARTIAL: CPT

## 2020-08-05 PROCEDURE — 85384 FIBRINOGEN ACTIVITY: CPT

## 2020-08-05 PROCEDURE — 36415 COLL VENOUS BLD VENIPUNCTURE: CPT

## 2020-08-05 PROCEDURE — 85610 PROTHROMBIN TIME: CPT

## 2020-08-05 RX ORDER — INSULIN GLARGINE 100 [IU]/ML
30 INJECTION, SOLUTION SUBCUTANEOUS DAILY
Status: DISCONTINUED | OUTPATIENT
Start: 2020-08-06 | End: 2020-08-05

## 2020-08-05 RX ORDER — INSULIN GLARGINE 100 [IU]/ML
15 INJECTION, SOLUTION SUBCUTANEOUS DAILY
Status: DISCONTINUED | OUTPATIENT
Start: 2020-08-06 | End: 2020-08-05

## 2020-08-05 RX ORDER — GUAIFENESIN 100 MG/5ML
81 LIQUID (ML) ORAL DAILY
Status: DISCONTINUED | OUTPATIENT
Start: 2020-08-06 | End: 2020-08-13 | Stop reason: HOSPADM

## 2020-08-05 RX ORDER — DEXTROSE MONOHYDRATE AND SODIUM CHLORIDE 5; .9 G/100ML; G/100ML
75 INJECTION, SOLUTION INTRAVENOUS CONTINUOUS
Status: DISCONTINUED | OUTPATIENT
Start: 2020-08-05 | End: 2020-08-05

## 2020-08-05 RX ORDER — SODIUM CHLORIDE 9 MG/ML
75 INJECTION, SOLUTION INTRAVENOUS CONTINUOUS
Status: DISCONTINUED | OUTPATIENT
Start: 2020-08-05 | End: 2020-08-06

## 2020-08-05 RX ADMIN — TOPIRAMATE 100 MG: 100 TABLET, FILM COATED ORAL at 17:18

## 2020-08-05 RX ADMIN — Medication 10 ML: at 21:57

## 2020-08-05 RX ADMIN — INSULIN GLARGINE 60 UNITS: 100 INJECTION, SOLUTION SUBCUTANEOUS at 10:07

## 2020-08-05 RX ADMIN — ALLOPURINOL 100 MG: 100 TABLET ORAL at 10:08

## 2020-08-05 RX ADMIN — DEXTROSE MONOHYDRATE 25 G: 500 INJECTION PARENTERAL at 21:57

## 2020-08-05 RX ADMIN — CARVEDILOL 6.25 MG: 6.25 TABLET, FILM COATED ORAL at 10:07

## 2020-08-05 RX ADMIN — SODIUM CHLORIDE 75 ML/HR: 900 INJECTION, SOLUTION INTRAVENOUS at 10:40

## 2020-08-05 RX ADMIN — TOPIRAMATE 100 MG: 100 TABLET, FILM COATED ORAL at 10:07

## 2020-08-05 RX ADMIN — GUAIFENESIN 600 MG: 600 TABLET, EXTENDED RELEASE ORAL at 21:57

## 2020-08-05 RX ADMIN — Medication 10 ML: at 23:48

## 2020-08-05 RX ADMIN — SODIUM CHLORIDE 100 MG: 9 INJECTION, SOLUTION INTRAVENOUS at 21:57

## 2020-08-05 RX ADMIN — GUAIFENESIN 600 MG: 600 TABLET, EXTENDED RELEASE ORAL at 10:07

## 2020-08-05 RX ADMIN — SODIUM CHLORIDE 750 MG: 9 INJECTION, SOLUTION INTRAVENOUS at 20:59

## 2020-08-05 RX ADMIN — CLOPIDOGREL BISULFATE 75 MG: 75 TABLET, FILM COATED ORAL at 10:07

## 2020-08-05 RX ADMIN — SODIUM CHLORIDE 100 MG: 9 INJECTION, SOLUTION INTRAVENOUS at 10:37

## 2020-08-05 RX ADMIN — DEXTROSE MONOHYDRATE 25 G: 500 INJECTION PARENTERAL at 16:45

## 2020-08-05 RX ADMIN — ENOXAPARIN SODIUM 40 MG: 40 INJECTION, SOLUTION INTRAVENOUS; SUBCUTANEOUS at 10:08

## 2020-08-05 RX ADMIN — AMLODIPINE BESYLATE 5 MG: 5 TABLET ORAL at 10:08

## 2020-08-05 RX ADMIN — AZITHROMYCIN 500 MG: 500 INJECTION, POWDER, LYOPHILIZED, FOR SOLUTION INTRAVENOUS at 19:50

## 2020-08-05 RX ADMIN — CEFTRIAXONE 1 G: 1 INJECTION, POWDER, FOR SOLUTION INTRAMUSCULAR; INTRAVENOUS at 11:20

## 2020-08-05 RX ADMIN — CARVEDILOL 6.25 MG: 6.25 TABLET, FILM COATED ORAL at 17:18

## 2020-08-05 RX ADMIN — Medication 10 ML: at 13:38

## 2020-08-05 RX ADMIN — ASPIRIN 325 MG: 325 TABLET, FILM COATED ORAL at 10:07

## 2020-08-05 RX ADMIN — SODIUM CHLORIDE 750 MG: 9 INJECTION, SOLUTION INTRAVENOUS at 10:15

## 2020-08-05 RX ADMIN — PRAVASTATIN SODIUM 40 MG: 40 TABLET ORAL at 21:57

## 2020-08-05 NOTE — PROGRESS NOTES
Bedside shift change report given to Adilene (oncoming nurse) by Roopa Bunch (offgoing nurse). Report included the following information SBAR, Kardex, Intake/Output, MAR, Recent Results and Cardiac Rhythm NSR/SB.

## 2020-08-05 NOTE — ED NOTES
TRANSFER - OUT REPORT:    Verbal report given to Adilene Rn(name) on Cyrus Reyes  being transferred to PCU(unit) for routine progression of care       Report consisted of patients Situation, Background, Assessment and   Recommendations(SBAR). Information from the following report(s) SBAR, ED Summary, Intake/Output, MAR and Recent Results was reviewed with the receiving nurse. Lines:   Peripheral IV 08/04/20 Left Antecubital (Active)        Opportunity for questions and clarification was provided.       Patient transported with:   Monitor  Registered Nurse

## 2020-08-05 NOTE — PROGRESS NOTES
Hospitalist Progress Note    NAME: Janny Colon   :  1945   MRN:  [id number]       Assessment / Plan:  Sepsis with acute encephalopathy POA  Due to left upper lobe pneumonia POA  Cannot rule out COVID-19 infection POA  Chest x-ray left upper lobe airspace disease/opacity  CT head showed Interval development of multiple infarcts and atrophy as compared to the 2016 study. No acute process identified by noncontrast CT. UA positive for glucose and ketones otherwise negative  CT chest/ap showed 2 focal patches of infiltrate in the upper lobes bilaterally suspicious for infectious process, however, the left upper lobe opacity could reflect neoplasm and close interval follow-up is recommended   Lactate 2.0, Procalcitonin 0.15, Fibrinogen 491, Ferritin 91, INR 1.0  Rapid COVID19 positive, PCR pending  con't rocephin/azithromycin  Currently not hypoxic, wbc wnl. No fever this morning  Will need repeat CT when he recovers from PNA     RAPHAEL with dehydration POA  Holding Bumex, losartan metformin  IV fluids as above  Repeat bmp     Diabetes type 2 insulin-dependent POA-controlled  BG ~80s today. Will reduce/lantus to 15units daily, adjust prn. Pt has poor po intake, d/t his dementia, he has to be reminded to eat during meals per daughter. Discussed with RN to assist with this  SSI  Metformin on hold due to RAPHAEL    Hypertension  CAD status post CABG  Hx of CVAs  Cont' coreg, asa/plavix and statin  Hold bumex/losartan    Alzheimer's/vascular dementia  Seizure disorder   cont' home keppra            Code Status: Full code  Surrogate Decision Maker: Daughter   DVT Prophylaxis: Subcu Lovenox  GI Prophylaxis: not indicated   Baseline: Patient is a long-term resident at [facility], has baseline vascular/Alzheimer's dementia but is conversant at baseline per nursing home staff     Subjective:     Chief Complaint / Reason for Physician Visit  Pt seen, lethargic in bed. NAD. Discussed with RN events overnight. Review of Systems:  Symptom Y/N Comments  Symptom Y/N Comments   Fever/Chills    Chest Pain     Poor Appetite    Edema     Cough    Abdominal Pain     Sputum    Joint Pain     SOB/WEEKS    Pruritis/Rash     Nausea/vomit    Tolerating PT/OT     Diarrhea    Tolerating Diet     Constipation    Other       Could NOT obtain due to: dementia     Objective:     VITALS:   Last 24hrs VS reviewed since prior progress note. Most recent are:  Patient Vitals for the past 24 hrs:   Temp Pulse Resp BP SpO2   08/05/20 1000 98.3 °F (36.8 °C) 63 16 160/72 94 %   08/05/20 0458 98.6 °F (37 °C) 71 16 128/75 97 %   08/05/20 0400 -- -- -- -- 94 %   08/05/20 0119 -- 74 14 130/61 93 %   08/04/20 2345 -- 75 19 142/61 95 %   08/04/20 2330 -- 75 16 132/69 95 %   08/04/20 2315 -- 77 17 128/63 92 %   08/04/20 2145 -- 92 -- 127/64 95 %   08/04/20 2130 -- 82 -- 140/71 95 %   08/04/20 2115 -- 84 -- 138/60 94 %   08/04/20 2030 -- 80 19 138/63 96 %   08/04/20 2000 -- 78 19 138/66 97 %   08/1945 -- 81 18 131/59 94 %   08/04/20 1930 -- 80 19 137/64 98 %   08/04/20 1900 -- 75 15 152/63 97 %   08/04/20 1830 -- 72 18 141/59 --   08/04/20 1730 -- 70 17 126/60 --   08/04/20 1715 -- 73 17 114/68 --   08/04/20 1615 -- 72 17 95/64 99 %   08/04/20 1607 (!) 100.9 °F (38.3 °C) 68 13 106/60 100 %   08/04/20 1600 -- 78 16 106/60 --   08/04/20 1534 (P) 99.4 °F (37.4 °C) -- -- -- --       Intake/Output Summary (Last 24 hours) at 8/5/2020 1239  Last data filed at 8/5/2020 0400  Gross per 24 hour   Intake 1450 ml   Output 400 ml   Net 1050 ml        PHYSICAL EXAM:  General: Elderly male in bed, somnolent, NAD   EENT:  EOMI. Anicteric sclerae. MMM  Resp:  CTA bilaterally, no wheezing or rales. No accessory muscle use  CV:  Regular  rhythm,  No edema  GI:  Soft, Non distended, Non tender.  +BS  Neurologic:  Moving all exts. Somnolent but wakes up to voice   Psych:   Not anxious or agitated  Skin:  No rashes.   No jaundice    Reviewed most current lab test results and cultures  YES  Reviewed most current radiology test results   YES  Review and summation of old records today    NO  Reviewed patient's current orders and MAR    YES  PMH/SH reviewed - no change compared to H&P  ________________________________________________________________________  Care Plan discussed with:    Comments   Patient x    Family  x Daughter Bello Salinas via phone   RN x    Care Manager     Consultant                        Multidiciplinary team rounds were held today with , nursing, pharmacist and clinical coordinator. Patient's plan of care was discussed; medications were reviewed and discharge planning was addressed. ________________________________________________________________________  Total NON critical care TIME:  35   Minutes    Total CRITICAL CARE TIME Spent:   Minutes non procedure based      Comments   >50% of visit spent in counseling and coordination of care     ________________________________________________________________________  Blaisedickson Pepper MD     Procedures: see electronic medical records for all procedures/Xrays and details which were not copied into this note but were reviewed prior to creation of Plan. LABS:  I reviewed today's most current labs and imaging studies.   Pertinent labs include:  Recent Labs     08/05/20 0415 08/04/20  1614   WBC 5.4 5.9   HGB 14.2 14.2   HCT 46.5 45.1    122*     Recent Labs     08/05/20  0415 08/04/20  1614   NA  --  139   K  --  4.8   CL  --  109*   CO2  --  25   GLU  --  143*   BUN  --  39*   CREA  --  2.02*   CA  --  9.2   MG  --  2.3   ALB  --  3.6   TBILI  --  0.7   ALT  --  10*   INR 1.0 1.0       Signed: Blaise Pepper MD

## 2020-08-05 NOTE — PROGRESS NOTES
HYPOGLYCEMIC EPISODE DOCUMENTATION    Patient with hypoglycemic episode(s) at 1636 (time) on 8/5/20 (date). BG value(s) pre-treatment 43;41    Was patient symptomatic?  [] yes, [x] no  Patient was treated with the following rescue medications/treatments: [x] D50                [] Glucose tablets                [] Glucagon                [] 4oz juice                [] 6oz reg soda                [] 8oz low fat milk  BG value post-treatment: 127  Once BG treated and value greater than 80mg/dl, pt was provided with the following:  [] snack  [x] meal  Name of MD notified: Tang Sanchez  The following orders were received: discontinue lantus all together and just use SSI

## 2020-08-05 NOTE — PROGRESS NOTES
Reason for Admission:   Patient came to ed for altered mental status and generalized weakness. He had a fall recently. RUR Score: 20%                   PCP: First and Last name:  Kamari Cary     Name of Practice: [facility]. Are you a current patient: Yes/No: Yes     Approximate date of last visit:  Saturday 8/1/20     Can you participate in a virtual visit if needed: No    Do you (patient/family) have any concerns for transition/discharge? No                 Plan for utilizing home health:  Patient has used hospital to home in the past thru [facility]. Current Advanced Directive/Advance Care Plan:  Patient has general power of . He has his daughter listed as healthcare decision maker. Transition of Care Plan:  Spoke with patient's daughter and she states patient is a long term resident of [facility]. She states he has been there for 2 years and the plan is for him to return when medically stable. She states he is able to feed and bath himself and uses a rollator. He does not use oxygen or cpap. He does have history vascular dementia. Referral sent to [facility] and they have accepted patient to return when medically stable. Care Management Interventions  PCP Verified by CM: Yes  Transition of Care Consult (CM Consult): Discharge Planning  Discharge Durable Medical Equipment: (Patient has a rollator. )  Current Support Network: [address] [place] [address]  Confirm Follow Up Transport: Family  Discharge Location  Discharge Placement: [facility]. Jessica YODER BSN CRM        [phone]

## 2020-08-06 LAB
ANION GAP SERPL CALC-SCNC: 6 MMOL/L (ref 5–15)
APTT PPP: 30.2 SEC (ref 22.1–32)
BASOPHILS # BLD: 0 K/UL (ref 0–0.1)
BASOPHILS NFR BLD: 0 % (ref 0–1)
BUN SERPL-MCNC: 24 MG/DL (ref 6–20)
BUN/CREAT SERPL: 18 (ref 12–20)
CALCIUM SERPL-MCNC: 8.6 MG/DL (ref 8.5–10.1)
CHLORIDE SERPL-SCNC: 112 MMOL/L (ref 97–108)
CO2 SERPL-SCNC: 24 MMOL/L (ref 21–32)
CREAT SERPL-MCNC: 1.3 MG/DL (ref 0.7–1.3)
DIFFERENTIAL METHOD BLD: ABNORMAL
EOSINOPHIL # BLD: 0 K/UL (ref 0–0.4)
EOSINOPHIL NFR BLD: 0 % (ref 0–7)
ERYTHROCYTE [DISTWIDTH] IN BLOOD BY AUTOMATED COUNT: 13.7 % (ref 11.5–14.5)
FIBRINOGEN PPP-MCNC: 447 MG/DL (ref 200–475)
GLUCOSE BLD STRIP.AUTO-MCNC: 100 MG/DL (ref 65–100)
GLUCOSE BLD STRIP.AUTO-MCNC: 134 MG/DL (ref 65–100)
GLUCOSE BLD STRIP.AUTO-MCNC: 187 MG/DL (ref 65–100)
GLUCOSE BLD STRIP.AUTO-MCNC: 54 MG/DL (ref 65–100)
GLUCOSE BLD STRIP.AUTO-MCNC: 81 MG/DL (ref 65–100)
GLUCOSE BLD STRIP.AUTO-MCNC: 91 MG/DL (ref 65–100)
GLUCOSE SERPL-MCNC: 92 MG/DL (ref 65–100)
HCT VFR BLD AUTO: 43.9 % (ref 36.6–50.3)
HGB BLD-MCNC: 13.1 G/DL (ref 12.1–17)
IMM GRANULOCYTES # BLD AUTO: 0 K/UL (ref 0–0.04)
IMM GRANULOCYTES NFR BLD AUTO: 0 % (ref 0–0.5)
INR PPP: 1 (ref 0.9–1.1)
LEVETIRACETAM SERPL-MCNC: 6.1 UG/ML (ref 10–40)
LYMPHOCYTES # BLD: 1.3 K/UL (ref 0.8–3.5)
LYMPHOCYTES NFR BLD: 22 % (ref 12–49)
MCH RBC QN AUTO: 26.8 PG (ref 26–34)
MCHC RBC AUTO-ENTMCNC: 29.8 G/DL (ref 30–36.5)
MCV RBC AUTO: 89.8 FL (ref 80–99)
MONOCYTES # BLD: 0.4 K/UL (ref 0–1)
MONOCYTES NFR BLD: 7 % (ref 5–13)
NEUTS SEG # BLD: 3.9 K/UL (ref 1.8–8)
NEUTS SEG NFR BLD: 70 % (ref 32–75)
NRBC # BLD: 0 K/UL (ref 0–0.01)
NRBC BLD-RTO: 0 PER 100 WBC
PLATELET # BLD AUTO: 114 K/UL (ref 150–400)
PMV BLD AUTO: 10.8 FL (ref 8.9–12.9)
POTASSIUM SERPL-SCNC: 4.2 MMOL/L (ref 3.5–5.1)
PROTHROMBIN TIME: 10.3 SEC (ref 9–11.1)
RBC # BLD AUTO: 4.89 M/UL (ref 4.1–5.7)
SERVICE CMNT-IMP: ABNORMAL
SERVICE CMNT-IMP: NORMAL
SODIUM SERPL-SCNC: 142 MMOL/L (ref 136–145)
THERAPEUTIC RANGE,PTTT: NORMAL SECS (ref 58–77)
WBC # BLD AUTO: 5.6 K/UL (ref 4.1–11.1)

## 2020-08-06 PROCEDURE — 65660000000 HC RM CCU STEPDOWN

## 2020-08-06 PROCEDURE — 74011250637 HC RX REV CODE- 250/637: Performed by: INTERNAL MEDICINE

## 2020-08-06 PROCEDURE — 74011000250 HC RX REV CODE- 250: Performed by: INTERNAL MEDICINE

## 2020-08-06 PROCEDURE — 80048 BASIC METABOLIC PNL TOTAL CA: CPT

## 2020-08-06 PROCEDURE — 74011000258 HC RX REV CODE- 258: Performed by: INTERNAL MEDICINE

## 2020-08-06 PROCEDURE — C9254 INJECTION, LACOSAMIDE: HCPCS | Performed by: INTERNAL MEDICINE

## 2020-08-06 PROCEDURE — 85610 PROTHROMBIN TIME: CPT

## 2020-08-06 PROCEDURE — 85025 COMPLETE CBC W/AUTO DIFF WBC: CPT

## 2020-08-06 PROCEDURE — 74011250636 HC RX REV CODE- 250/636: Performed by: INTERNAL MEDICINE

## 2020-08-06 PROCEDURE — 85384 FIBRINOGEN ACTIVITY: CPT

## 2020-08-06 PROCEDURE — 85730 THROMBOPLASTIN TIME PARTIAL: CPT

## 2020-08-06 PROCEDURE — 36415 COLL VENOUS BLD VENIPUNCTURE: CPT

## 2020-08-06 PROCEDURE — 82962 GLUCOSE BLOOD TEST: CPT

## 2020-08-06 RX ORDER — LACOSAMIDE 100 MG/1
100 TABLET ORAL EVERY 12 HOURS
Status: DISCONTINUED | OUTPATIENT
Start: 2020-08-06 | End: 2020-08-13 | Stop reason: HOSPADM

## 2020-08-06 RX ORDER — DEXTROSE MONOHYDRATE AND SODIUM CHLORIDE 5; .45 G/100ML; G/100ML
50 INJECTION, SOLUTION INTRAVENOUS CONTINUOUS
Status: DISCONTINUED | OUTPATIENT
Start: 2020-08-06 | End: 2020-08-08

## 2020-08-06 RX ADMIN — CARVEDILOL 6.25 MG: 6.25 TABLET, FILM COATED ORAL at 08:42

## 2020-08-06 RX ADMIN — CEFTRIAXONE 1 G: 1 INJECTION, POWDER, FOR SOLUTION INTRAMUSCULAR; INTRAVENOUS at 10:55

## 2020-08-06 RX ADMIN — Medication 10 ML: at 05:28

## 2020-08-06 RX ADMIN — PRAVASTATIN SODIUM 40 MG: 40 TABLET ORAL at 21:24

## 2020-08-06 RX ADMIN — ALLOPURINOL 100 MG: 100 TABLET ORAL at 08:42

## 2020-08-06 RX ADMIN — DEXTROSE MONOHYDRATE AND SODIUM CHLORIDE 75 ML/HR: 5; .45 INJECTION, SOLUTION INTRAVENOUS at 00:40

## 2020-08-06 RX ADMIN — DEXTROSE MONOHYDRATE AND SODIUM CHLORIDE 75 ML/HR: 5; .45 INJECTION, SOLUTION INTRAVENOUS at 17:48

## 2020-08-06 RX ADMIN — LEVETIRACETAM 750 MG: 500 TABLET ORAL at 21:24

## 2020-08-06 RX ADMIN — TOPIRAMATE 100 MG: 100 TABLET, FILM COATED ORAL at 08:42

## 2020-08-06 RX ADMIN — AMLODIPINE BESYLATE 5 MG: 5 TABLET ORAL at 08:42

## 2020-08-06 RX ADMIN — ENOXAPARIN SODIUM 40 MG: 40 INJECTION, SOLUTION INTRAVENOUS; SUBCUTANEOUS at 08:42

## 2020-08-06 RX ADMIN — Medication 10 ML: at 13:17

## 2020-08-06 RX ADMIN — GUAIFENESIN 600 MG: 600 TABLET, EXTENDED RELEASE ORAL at 08:42

## 2020-08-06 RX ADMIN — ASPIRIN 81 MG CHEWABLE TABLET 81 MG: 81 TABLET CHEWABLE at 08:42

## 2020-08-06 RX ADMIN — CARVEDILOL 6.25 MG: 6.25 TABLET, FILM COATED ORAL at 17:03

## 2020-08-06 RX ADMIN — CLOPIDOGREL BISULFATE 75 MG: 75 TABLET, FILM COATED ORAL at 09:00

## 2020-08-06 RX ADMIN — SODIUM CHLORIDE 750 MG: 9 INJECTION, SOLUTION INTRAVENOUS at 08:39

## 2020-08-06 RX ADMIN — AZITHROMYCIN 500 MG: 500 INJECTION, POWDER, LYOPHILIZED, FOR SOLUTION INTRAVENOUS at 19:28

## 2020-08-06 RX ADMIN — Medication 10 ML: at 21:25

## 2020-08-06 RX ADMIN — LACOSAMIDE 100 MG: 100 TABLET, FILM COATED ORAL at 21:25

## 2020-08-06 RX ADMIN — GUAIFENESIN 600 MG: 600 TABLET, EXTENDED RELEASE ORAL at 21:25

## 2020-08-06 RX ADMIN — DEXTROSE MONOHYDRATE 25 G: 500 INJECTION PARENTERAL at 05:27

## 2020-08-06 RX ADMIN — SODIUM CHLORIDE 100 MG: 9 INJECTION, SOLUTION INTRAVENOUS at 10:08

## 2020-08-06 RX ADMIN — TOPIRAMATE 100 MG: 100 TABLET, FILM COATED ORAL at 17:03

## 2020-08-06 NOTE — PROGRESS NOTES
Patients blood sugar 41, He is asymptomatic. 25g of D50W administered. Blood sugar recheck is 113. Will continue to monitor.

## 2020-08-06 NOTE — PROGRESS NOTES
Problem: Falls - Risk of  Goal: *Absence of Falls  Description: Document Mariola Davis Fall Risk and appropriate interventions in the flowsheet. Outcome: Progressing Towards Goal  Note: Fall Risk Interventions:  Mobility Interventions: Patient to call before getting OOB, Bed/chair exit alarm    Mentation Interventions: Adequate sleep, hydration, pain control, Bed/chair exit alarm, Reorient patient    Medication Interventions: Bed/chair exit alarm, Patient to call before getting OOB    Elimination Interventions: Call light in reach, Patient to call for help with toileting needs    History of Falls Interventions: Bed/chair exit alarm         Problem: Patient Education: Go to Patient Education Activity  Goal: Patient/Family Education  Outcome: Progressing Towards Goal     Problem: Pressure Injury - Risk of  Goal: *Prevention of pressure injury  Description: Document Roderick Scale and appropriate interventions in the flowsheet.   Outcome: Progressing Towards Goal  Note: Pressure Injury Interventions:  Sensory Interventions: Assess changes in LOC, Assess need for specialty bed, Float heels    Moisture Interventions: Absorbent underpads    Activity Interventions: Pressure redistribution bed/mattress(bed type)    Mobility Interventions: Pressure redistribution bed/mattress (bed type)    Nutrition Interventions: Discuss nutritional consult with provider    Friction and Shear Interventions: Apply protective barrier, creams and emollients

## 2020-08-06 NOTE — PROGRESS NOTES
Bedside shift change report given to Adilene (oncoming nurse) by Joel Lopez (offgoing nurse). Report included the following information SBAR, Kardex, Intake/Output, MAR, Recent Results and Cardiac Rhythm NSR.

## 2020-08-06 NOTE — PROGRESS NOTES
Hospitalist Progress Note    NAME: Anne Jerez   :  1945   MRN:  [id number]       Assessment / Plan:  Sepsis with acute encephalopathy POA  Due to  COVID-19 PNA  POA  Chest x-ray left upper lobe airspace disease/opacity  CT head showed Interval development of multiple infarcts and atrophy as compared to the 2016 study. No acute process identified by noncontrast CT. UA positive for glucose and ketones otherwise negative  CT chest/ap showed 2 focal patches of infiltrate in the upper lobes bilaterally suspicious for infectious process, however, the left upper lobe opacity could reflect neoplasm and close interval follow-up is recommended   Lactate 2.0, Procalcitonin 0.15, Fibrinogen 491, Ferritin 91, INR 1.0  COVID19 positive on PCR and rapid test  Con't rocephin/azithromycin  Remains stable on RA, no leukocytosis  Will need repeat CT when he recovers from PNA  PT/OT, CM to help with dispo     RAPHAEL with dehydration POA  Holding Bumex, losartan metformin  IVF changed to d5/12NS   Repeat bmppending     Diabetes type 2 insulin-dependent POA-controlled  He received 60 units lantus yesterday so BG was low throughout the day. He received treatment for hypoglycemia, although he was asymptomatic per documentation  lantus is now discontinued.   Will re-start he takes more po intake  Currently on D5  Daughter states he needs constant reminder to eat his meal d/t dementia  SSI  Metformin on hold due to RAPHAEL    Hypertension  CAD status post CABG  Hx of CVAs  Cont' coreg, asa/plavix and statin  Hold bumex/losartan    Alzheimer's/vascular dementia  Seizure disorder  Change to PO keppra            Code Status: Full code  Surrogate Decision Maker: Daughter   DVT Prophylaxis: Subcu Lovenox  GI Prophylaxis: not indicated   Baseline: Patient is a long-term resident at [facility], has baseline vascular/Alzheimer's dementia but is conversant at baseline per nursing home staff     Subjective:     Chief Complaint / Reason for Physician Visit  Pt seen, confused but much more awake today. Discussed with RN events overnight. Review of Systems:  Symptom Y/N Comments  Symptom Y/N Comments   Fever/Chills    Chest Pain     Poor Appetite    Edema     Cough    Abdominal Pain     Sputum    Joint Pain     SOB/WEEKS    Pruritis/Rash     Nausea/vomit    Tolerating PT/OT     Diarrhea    Tolerating Diet     Constipation    Other       Could NOT obtain due to: dementia     Objective:     VITALS:   Last 24hrs VS reviewed since prior progress note. Most recent are:  Patient Vitals for the past 24 hrs:   Temp Pulse Resp BP SpO2   08/05/20 2000 -- -- -- -- 96 %   08/05/20 1953 99.9 °F (37.7 °C) 64 18 141/48 98 %   08/05/20 1718 -- 65 -- 134/61 --   08/05/20 1546 98.1 °F (36.7 °C) (!) 57 16 126/52 96 %   08/05/20 1000 98.3 °F (36.8 °C) 63 16 160/72 94 %       Intake/Output Summary (Last 24 hours) at 8/6/2020 0829  Last data filed at 8/6/2020 0600  Gross per 24 hour   Intake 2518.75 ml   Output 700 ml   Net 1818.75 ml        PHYSICAL EXAM:  General: Elderly male awake in bed,, NAD   EENT:  EOMI. Anicteric sclerae. MMM  Resp:  CTA bilaterally, no wheezing or rales. No accessory muscle use  CV:  Regular  rhythm,  No edema  GI:  Soft, Non distended, Non tender.  +BS  Neurologic:  Moving all exts. awake, followed some commands  Psych:   Not anxious or agitated  Skin:  No rashes. No jaundice    Reviewed most current lab test results and cultures  YES  Reviewed most current radiology test results   YES  Review and summation of old records today    NO  Reviewed patient's current orders and MAR    YES  PMH/SH reviewed - no change compared to H&P  ________________________________________________________________________  Care Plan discussed with:    Comments   Patient x    Family      RN x    Care Manager     Consultant                        Multidiciplinary team rounds were held today with , nursing, pharmacist and clinical coordinator.   Patient's plan of care was discussed; medications were reviewed and discharge planning was addressed. ________________________________________________________________________  Total NON critical care TIME:  35   Minutes    Total CRITICAL CARE TIME Spent:   Minutes non procedure based      Comments   >50% of visit spent in counseling and coordination of care     ________________________________________________________________________  Zack Hammond MD     Procedures: see electronic medical records for all procedures/Xrays and details which were not copied into this note but were reviewed prior to creation of Plan. LABS:  I reviewed today's most current labs and imaging studies.   Pertinent labs include:  Recent Labs     08/06/20  0309 08/05/20 0415 08/04/20  1614   WBC 5.6 5.4 5.9   HGB 13.1 14.2 14.2   HCT 43.9 46.5 45.1   * 164 122*     Recent Labs     08/06/20  0309 08/05/20 0415 08/04/20  1614   NA  --   --  139   K  --   --  4.8   CL  --   --  109*   CO2  --   --  25   GLU  --   --  143*   BUN  --   --  39*   CREA  --   --  2.02*   CA  --   --  9.2   MG  --   --  2.3   ALB  --   --  3.6   TBILI  --   --  0.7   ALT  --   --  10*   INR 1.0 1.0 1.0       Signed: Zack Hammond MD

## 2020-08-06 NOTE — PROGRESS NOTES
Problem: Falls - Risk of  Goal: *Absence of Falls  Description: Document Africa Vu Fall Risk and appropriate interventions in the flowsheet. Outcome: Progressing Towards Goal  Note: Fall Risk Interventions:  Mobility Interventions: Patient to call before getting OOB    Mentation Interventions: Evaluate medications/consider consulting pharmacy, Increase mobility, More frequent rounding, Reorient patient, Room close to nurse's station, Update white board, Toileting rounds    Medication Interventions: Evaluate medications/consider consulting pharmacy, Patient to call before getting OOB, Teach patient to arise slowly    Elimination Interventions: Call light in reach, Patient to call for help with toileting needs    History of Falls Interventions: Evaluate medications/consider consulting pharmacy, Room close to nurse's station, Investigate reason for fall         Problem: Pressure Injury - Risk of  Goal: *Prevention of pressure injury  Description: Document Roderick Scale and appropriate interventions in the flowsheet.   Outcome: Progressing Towards Goal  Note: Pressure Injury Interventions:  Sensory Interventions: Assess changes in LOC, Check visual cues for pain, Float heels, Keep linens dry and wrinkle-free, Maintain/enhance activity level, Minimize linen layers, Monitor skin under medical devices, Pad between skin to skin, Pressure redistribution bed/mattress (bed type)    Moisture Interventions: Internal/External urinary devices, Limit adult briefs, Maintain skin hydration (lotion/cream), Moisture barrier, Minimize layers, Absorbent underpads, Check for incontinence Q2 hours and as needed    Activity Interventions: Increase time out of bed, Pressure redistribution bed/mattress(bed type)    Mobility Interventions: HOB 30 degrees or less, Pressure redistribution bed/mattress (bed type), Float heels    Nutrition Interventions: Document food/fluid/supplement intake, Offer support with meals,snacks and hydration, Discuss nutritional consult with provider    Friction and Shear Interventions: HOB 30 degrees or less, Lift sheet, Minimize layers

## 2020-08-07 LAB
ANION GAP SERPL CALC-SCNC: 5 MMOL/L (ref 5–15)
APTT PPP: 30.8 SEC (ref 22.1–32)
BASOPHILS # BLD: 0 K/UL (ref 0–0.1)
BASOPHILS NFR BLD: 0 % (ref 0–1)
BUN SERPL-MCNC: 20 MG/DL (ref 6–20)
BUN/CREAT SERPL: 18 (ref 12–20)
CALCIUM SERPL-MCNC: 7.9 MG/DL (ref 8.5–10.1)
CHLORIDE SERPL-SCNC: 114 MMOL/L (ref 97–108)
CO2 SERPL-SCNC: 21 MMOL/L (ref 21–32)
CREAT SERPL-MCNC: 1.12 MG/DL (ref 0.7–1.3)
DIFFERENTIAL METHOD BLD: ABNORMAL
EOSINOPHIL # BLD: 0 K/UL (ref 0–0.4)
EOSINOPHIL NFR BLD: 0 % (ref 0–7)
ERYTHROCYTE [DISTWIDTH] IN BLOOD BY AUTOMATED COUNT: 13.8 % (ref 11.5–14.5)
FIBRINOGEN PPP-MCNC: 513 MG/DL (ref 200–475)
GLUCOSE BLD STRIP.AUTO-MCNC: 110 MG/DL (ref 65–100)
GLUCOSE BLD STRIP.AUTO-MCNC: 180 MG/DL (ref 65–100)
GLUCOSE BLD STRIP.AUTO-MCNC: 189 MG/DL (ref 65–100)
GLUCOSE BLD STRIP.AUTO-MCNC: 220 MG/DL (ref 65–100)
GLUCOSE BLD STRIP.AUTO-MCNC: 268 MG/DL (ref 65–100)
GLUCOSE SERPL-MCNC: 103 MG/DL (ref 65–100)
HCT VFR BLD AUTO: 42.1 % (ref 36.6–50.3)
HGB BLD-MCNC: 13 G/DL (ref 12.1–17)
IMM GRANULOCYTES # BLD AUTO: 0 K/UL (ref 0–0.04)
IMM GRANULOCYTES NFR BLD AUTO: 0 % (ref 0–0.5)
INR PPP: 1 (ref 0.9–1.1)
LACOSAMIDE SERPL-MCNC: 13 UG/ML (ref 5–10)
LYMPHOCYTES # BLD: 1.2 K/UL (ref 0.8–3.5)
LYMPHOCYTES NFR BLD: 30 % (ref 12–49)
MCH RBC QN AUTO: 27 PG (ref 26–34)
MCHC RBC AUTO-ENTMCNC: 30.9 G/DL (ref 30–36.5)
MCV RBC AUTO: 87.3 FL (ref 80–99)
MONOCYTES # BLD: 0.3 K/UL (ref 0–1)
MONOCYTES NFR BLD: 7 % (ref 5–13)
NEUTS SEG # BLD: 2.4 K/UL (ref 1.8–8)
NEUTS SEG NFR BLD: 63 % (ref 32–75)
NRBC # BLD: 0 K/UL (ref 0–0.01)
NRBC BLD-RTO: 0 PER 100 WBC
PLATELET # BLD AUTO: 102 K/UL (ref 150–400)
PMV BLD AUTO: 11.1 FL (ref 8.9–12.9)
POTASSIUM SERPL-SCNC: 3.7 MMOL/L (ref 3.5–5.1)
PROTHROMBIN TIME: 10.3 SEC (ref 9–11.1)
RBC # BLD AUTO: 4.82 M/UL (ref 4.1–5.7)
SERVICE CMNT-IMP: ABNORMAL
SODIUM SERPL-SCNC: 140 MMOL/L (ref 136–145)
THERAPEUTIC RANGE,PTTT: NORMAL SECS (ref 58–77)
WBC # BLD AUTO: 3.8 K/UL (ref 4.1–11.1)

## 2020-08-07 PROCEDURE — 85730 THROMBOPLASTIN TIME PARTIAL: CPT

## 2020-08-07 PROCEDURE — 85384 FIBRINOGEN ACTIVITY: CPT

## 2020-08-07 PROCEDURE — 74011250637 HC RX REV CODE- 250/637: Performed by: INTERNAL MEDICINE

## 2020-08-07 PROCEDURE — 36415 COLL VENOUS BLD VENIPUNCTURE: CPT

## 2020-08-07 PROCEDURE — 82962 GLUCOSE BLOOD TEST: CPT

## 2020-08-07 PROCEDURE — 80048 BASIC METABOLIC PNL TOTAL CA: CPT

## 2020-08-07 PROCEDURE — 74011000258 HC RX REV CODE- 258: Performed by: INTERNAL MEDICINE

## 2020-08-07 PROCEDURE — 65660000000 HC RM CCU STEPDOWN

## 2020-08-07 PROCEDURE — 74011250636 HC RX REV CODE- 250/636: Performed by: INTERNAL MEDICINE

## 2020-08-07 PROCEDURE — 85610 PROTHROMBIN TIME: CPT

## 2020-08-07 PROCEDURE — 85025 COMPLETE CBC W/AUTO DIFF WBC: CPT

## 2020-08-07 PROCEDURE — 74011636637 HC RX REV CODE- 636/637: Performed by: INTERNAL MEDICINE

## 2020-08-07 RX ORDER — INSULIN LISPRO 100 [IU]/ML
INJECTION, SOLUTION INTRAVENOUS; SUBCUTANEOUS
Status: DISCONTINUED | OUTPATIENT
Start: 2020-08-07 | End: 2020-08-13 | Stop reason: HOSPADM

## 2020-08-07 RX ADMIN — LACOSAMIDE 100 MG: 100 TABLET, FILM COATED ORAL at 21:55

## 2020-08-07 RX ADMIN — Medication 10 ML: at 09:30

## 2020-08-07 RX ADMIN — ALLOPURINOL 100 MG: 100 TABLET ORAL at 09:29

## 2020-08-07 RX ADMIN — AZITHROMYCIN 500 MG: 500 INJECTION, POWDER, LYOPHILIZED, FOR SOLUTION INTRAVENOUS at 18:41

## 2020-08-07 RX ADMIN — CARVEDILOL 6.25 MG: 6.25 TABLET, FILM COATED ORAL at 09:29

## 2020-08-07 RX ADMIN — AMLODIPINE BESYLATE 5 MG: 5 TABLET ORAL at 09:28

## 2020-08-07 RX ADMIN — ASPIRIN 81 MG CHEWABLE TABLET 81 MG: 81 TABLET CHEWABLE at 09:29

## 2020-08-07 RX ADMIN — ENOXAPARIN SODIUM 40 MG: 40 INJECTION, SOLUTION INTRAVENOUS; SUBCUTANEOUS at 09:29

## 2020-08-07 RX ADMIN — CEFTRIAXONE 1 G: 1 INJECTION, POWDER, FOR SOLUTION INTRAMUSCULAR; INTRAVENOUS at 09:30

## 2020-08-07 RX ADMIN — LEVETIRACETAM 750 MG: 500 TABLET ORAL at 09:29

## 2020-08-07 RX ADMIN — PRAVASTATIN SODIUM 40 MG: 40 TABLET ORAL at 21:55

## 2020-08-07 RX ADMIN — GUAIFENESIN 600 MG: 600 TABLET, EXTENDED RELEASE ORAL at 21:55

## 2020-08-07 RX ADMIN — LACOSAMIDE 100 MG: 100 TABLET, FILM COATED ORAL at 09:29

## 2020-08-07 RX ADMIN — GUAIFENESIN 600 MG: 600 TABLET, EXTENDED RELEASE ORAL at 09:29

## 2020-08-07 RX ADMIN — TOPIRAMATE 100 MG: 100 TABLET, FILM COATED ORAL at 09:30

## 2020-08-07 RX ADMIN — LEVETIRACETAM 750 MG: 500 TABLET ORAL at 21:55

## 2020-08-07 RX ADMIN — CLOPIDOGREL BISULFATE 75 MG: 75 TABLET, FILM COATED ORAL at 09:29

## 2020-08-07 RX ADMIN — Medication 10 ML: at 15:00

## 2020-08-07 RX ADMIN — INSULIN LISPRO 3 UNITS: 100 INJECTION, SOLUTION INTRAVENOUS; SUBCUTANEOUS at 17:07

## 2020-08-07 RX ADMIN — TOPIRAMATE 100 MG: 100 TABLET, FILM COATED ORAL at 18:43

## 2020-08-07 RX ADMIN — CARVEDILOL 6.25 MG: 6.25 TABLET, FILM COATED ORAL at 17:07

## 2020-08-07 NOTE — PROGRESS NOTES
Hospitalist Progress Note    NAME: Zen Estrada   :  1945   MRN:  [id number]       Assessment / Plan:  Sepsis with acute encephalopathy POA  Due to  COVID-19 PNA  POA  Chest x-ray left upper lobe airspace disease/opacity  CT head showed Interval development of multiple infarcts and atrophy as compared to the 2016 study. No acute process identified by noncontrast CT. UA positive for glucose and ketones otherwise negative  CT chest/ap showed 2 focal patches of infiltrate in the upper lobes bilaterally suspicious for infectious process, however, the left upper lobe opacity could reflect neoplasm and close interval follow-up is recommended   Lactate 2.0, Procalcitonin 0.15, Fibrinogen 491, Ferritin 91, INR 1.0  COVID19 positive on PCR and rapid test  Con't rocephin/azithromycin  Remains stable on RA, no leukocytosis  Will need repeat CT when he recovers from PNA  PT/OT, CM to help with dispo     RAPHAEL with dehydration POA  Holding Bumex, losartan metformin  IVF changed to d5/12NS, rate decrease  Repeat bmp     Diabetes type 2 insulin-dependent POA-controlled  He received 60 units lantus on admission so BG was low throughout the day. He received treatment for hypoglycemia, although he was asymptomatic per documentation  lantus is now discontinued.   Will reevaluate whne he takes more po intake  Currently on D5  Needs reminders during meals  SSI  Metformin on hold due to RAPHAEL    Hypertension  CAD status post CABG  Hx of CVAs  Cont' coreg, asa/plavix and statin  Hold bumex/losartan    Alzheimer's/vascular dementia  Seizure disorder  Cont' to PO keppra            Code Status: Full code  Surrogate Decision Maker: Daughter   DVT Prophylaxis: Subcu Lovenox  GI Prophylaxis: not indicated   Baseline: Patient is a long-term resident at [facility], has baseline vascular/Alzheimer's dementia but is conversant at baseline per nursing home staff     Subjective:     Chief Complaint / Reason for Physician Visit  Pt seen, NAD, awake, pleasantly confused. Discussed with RN events overnight. Review of Systems:  Symptom Y/N Comments  Symptom Y/N Comments   Fever/Chills    Chest Pain     Poor Appetite    Edema     Cough    Abdominal Pain     Sputum    Joint Pain     SOB/WEEKS    Pruritis/Rash     Nausea/vomit    Tolerating PT/OT     Diarrhea    Tolerating Diet     Constipation    Other       Could NOT obtain due to: dementia     Objective:     VITALS:   Last 24hrs VS reviewed since prior progress note. Most recent are:  Patient Vitals for the past 24 hrs:   Temp Pulse Resp BP SpO2   08/07/20 0420 99.9 °F (37.7 °C) 69 18 130/68 95 %   08/06/20 2252 99.8 °F (37.7 °C) -- -- -- --   08/06/20 1928 100.2 °F (37.9 °C) 71 18 131/69 96 %   08/06/20 1703 -- 76 -- 159/74 --   08/06/20 1546 98.3 °F (36.8 °C) 71 16 125/71 97 %   08/06/20 1038 98.8 °F (37.1 °C) 60 20 121/70 96 %       Intake/Output Summary (Last 24 hours) at 8/7/2020 0840  Last data filed at 8/6/2020 1859  Gross per 24 hour   Intake 1160 ml   Output 625 ml   Net 535 ml        PHYSICAL EXAM:  General: Elderly male awake in bed,, NAD   EENT:  EOMI. Anicteric sclerae. MMM  Resp:  CTA bilaterally, no wheezing or rales. No accessory muscle use  CV:  Regular  rhythm,  No edema  GI:  Soft, Non distended, Non tender.  +BS  Neurologic:  Moving all exts. awake, followed some commands  Psych:   Not anxious or agitated  Skin:  No rashes.   No jaundice    Reviewed most current lab test results and cultures  YES  Reviewed most current radiology test results   YES  Review and summation of old records today    NO  Reviewed patient's current orders and MAR    YES  PMH/SH reviewed - no change compared to H&P  ________________________________________________________________________  Care Plan discussed with:    Comments   Patient x    Family      RN x    Care Manager     Consultant                        Multidiciplinary team rounds were held today with , nursing, pharmacist and clinical coordinator. Patient's plan of care was discussed; medications were reviewed and discharge planning was addressed. ________________________________________________________________________  Total NON critical care TIME:  35   Minutes    Total CRITICAL CARE TIME Spent:   Minutes non procedure based      Comments   >50% of visit spent in counseling and coordination of care     ________________________________________________________________________  Apurva Stevens MD     Procedures: see electronic medical records for all procedures/Xrays and details which were not copied into this note but were reviewed prior to creation of Plan. LABS:  I reviewed today's most current labs and imaging studies.   Pertinent labs include:  Recent Labs     08/07/20  0323 08/06/20  0309 08/05/20  0415   WBC 3.8* 5.6 5.4   HGB 13.0 13.1 14.2   HCT 42.1 43.9 46.5   * 114* 164     Recent Labs     08/07/20  0323 08/06/20  1356 08/06/20  0309 08/05/20  0415 08/04/20  1614    142  --   --  139   K 3.7 4.2  --   --  4.8   * 112*  --   --  109*   CO2 21 24  --   --  25   * 92  --   --  143*   BUN 20 24*  --   --  39*   CREA 1.12 1.30  --   --  2.02*   CA 7.9* 8.6  --   --  9.2   MG  --   --   --   --  2.3   ALB  --   --   --   --  3.6   TBILI  --   --   --   --  0.7   ALT  --   --   --   --  10*   INR 1.0  --  1.0 1.0 1.0       Signed: Apurva Stevens MD

## 2020-08-07 NOTE — PROGRESS NOTES
[facility]AN--Long term care [facility])    Called and spoke with lucy at [facility] and she is aware of the covid positive test on 8/4/20. She states they have a covid unit. Weekend CM aware . Brittney Lopez  RN BSN CRM        [phone]

## 2020-08-08 LAB
APTT PPP: 32 SEC (ref 22.1–32)
FIBRINOGEN PPP-MCNC: 543 MG/DL (ref 200–475)
GLUCOSE BLD STRIP.AUTO-MCNC: 148 MG/DL (ref 65–100)
GLUCOSE BLD STRIP.AUTO-MCNC: 156 MG/DL (ref 65–100)
GLUCOSE BLD STRIP.AUTO-MCNC: 171 MG/DL (ref 65–100)
GLUCOSE BLD STRIP.AUTO-MCNC: 184 MG/DL (ref 65–100)
HEALTH STATUS, XMCV2T: ABNORMAL
INR PPP: 1 (ref 0.9–1.1)
PROTHROMBIN TIME: 10.6 SEC (ref 9–11.1)
SARS-COV-2, COV2: DETECTED
SERVICE CMNT-IMP: ABNORMAL
SOURCE, COVRS: ABNORMAL
SPECIMEN SOURCE, FCOV2M: ABNORMAL
SPECIMEN TYPE, XMCV1T: ABNORMAL
THERAPEUTIC RANGE,PTTT: NORMAL SECS (ref 58–77)

## 2020-08-08 PROCEDURE — 85384 FIBRINOGEN ACTIVITY: CPT

## 2020-08-08 PROCEDURE — 36415 COLL VENOUS BLD VENIPUNCTURE: CPT

## 2020-08-08 PROCEDURE — 87635 SARS-COV-2 COVID-19 AMP PRB: CPT

## 2020-08-08 PROCEDURE — 74011000258 HC RX REV CODE- 258: Performed by: INTERNAL MEDICINE

## 2020-08-08 PROCEDURE — 65660000000 HC RM CCU STEPDOWN

## 2020-08-08 PROCEDURE — 74011250637 HC RX REV CODE- 250/637: Performed by: INTERNAL MEDICINE

## 2020-08-08 PROCEDURE — 74011250636 HC RX REV CODE- 250/636: Performed by: INTERNAL MEDICINE

## 2020-08-08 PROCEDURE — 85730 THROMBOPLASTIN TIME PARTIAL: CPT

## 2020-08-08 PROCEDURE — 85610 PROTHROMBIN TIME: CPT

## 2020-08-08 PROCEDURE — 82962 GLUCOSE BLOOD TEST: CPT

## 2020-08-08 RX ADMIN — TOPIRAMATE 100 MG: 100 TABLET, FILM COATED ORAL at 08:36

## 2020-08-08 RX ADMIN — ALLOPURINOL 100 MG: 100 TABLET ORAL at 08:36

## 2020-08-08 RX ADMIN — AZITHROMYCIN 500 MG: 500 INJECTION, POWDER, LYOPHILIZED, FOR SOLUTION INTRAVENOUS at 18:24

## 2020-08-08 RX ADMIN — LACOSAMIDE 100 MG: 100 TABLET, FILM COATED ORAL at 08:36

## 2020-08-08 RX ADMIN — GUAIFENESIN 600 MG: 600 TABLET, EXTENDED RELEASE ORAL at 22:24

## 2020-08-08 RX ADMIN — LEVETIRACETAM 750 MG: 500 TABLET ORAL at 08:36

## 2020-08-08 RX ADMIN — PRAVASTATIN SODIUM 40 MG: 40 TABLET ORAL at 22:24

## 2020-08-08 RX ADMIN — AMLODIPINE BESYLATE 5 MG: 5 TABLET ORAL at 08:36

## 2020-08-08 RX ADMIN — LACOSAMIDE 100 MG: 100 TABLET, FILM COATED ORAL at 22:25

## 2020-08-08 RX ADMIN — ENOXAPARIN SODIUM 40 MG: 40 INJECTION, SOLUTION INTRAVENOUS; SUBCUTANEOUS at 09:00

## 2020-08-08 RX ADMIN — CARVEDILOL 6.25 MG: 6.25 TABLET, FILM COATED ORAL at 16:47

## 2020-08-08 RX ADMIN — CLOPIDOGREL BISULFATE 75 MG: 75 TABLET, FILM COATED ORAL at 08:36

## 2020-08-08 RX ADMIN — Medication 10 ML: at 14:00

## 2020-08-08 RX ADMIN — LEVETIRACETAM 750 MG: 500 TABLET ORAL at 22:24

## 2020-08-08 RX ADMIN — TOPIRAMATE 100 MG: 100 TABLET, FILM COATED ORAL at 18:10

## 2020-08-08 RX ADMIN — Medication 10 ML: at 22:24

## 2020-08-08 RX ADMIN — CEFTRIAXONE 1 G: 1 INJECTION, POWDER, FOR SOLUTION INTRAMUSCULAR; INTRAVENOUS at 08:40

## 2020-08-08 RX ADMIN — GUAIFENESIN 600 MG: 600 TABLET, EXTENDED RELEASE ORAL at 08:36

## 2020-08-08 RX ADMIN — ASPIRIN 81 MG CHEWABLE TABLET 81 MG: 81 TABLET CHEWABLE at 08:36

## 2020-08-08 RX ADMIN — CARVEDILOL 6.25 MG: 6.25 TABLET, FILM COATED ORAL at 08:36

## 2020-08-08 NOTE — PROGRESS NOTES
Bedside and Verbal shift change report given to Michela (oncoming nurse) by Rickey Inman (offgoing nurse). Report included the following information SBAR, Kardex, Intake/Output, MAR, Recent Results and Cardiac Rhythm NSR.     2000: Patient watching television at this time. Denies any needs. No signs of distress noted. External catheter care given. Safety measures in place. Call bell on lap. 2120: Recheck of temperature improved after removing the two blankets draped on patient. 2200: Able to tolerate pills crushed in applesauce with no difficulties. Repositioned to right side. Safety measures in place. Call bell in reach. Patient denies any needs at this time.

## 2020-08-08 NOTE — PROGRESS NOTES
Problem: Falls - Risk of  Goal: *Absence of Falls  Description: Document Tali Rodriguez Fall Risk and appropriate interventions in the flowsheet. Outcome: Progressing Towards Goal  Note: Fall Risk Interventions:  Mobility Interventions: Bed/chair exit alarm    Mentation Interventions: Bed/chair exit alarm    Medication Interventions: Bed/chair exit alarm    Elimination Interventions: Bed/chair exit alarm, Call light in reach    History of Falls Interventions: Bed/chair exit alarm         Problem: Patient Education: Go to Patient Education Activity  Goal: Patient/Family Education  Outcome: Progressing Towards Goal     Problem: Pressure Injury - Risk of  Goal: *Prevention of pressure injury  Description: Document Roderick Scale and appropriate interventions in the flowsheet.   Outcome: Progressing Towards Goal  Note: Pressure Injury Interventions:  Sensory Interventions: Assess changes in LOC, Keep linens dry and wrinkle-free, Maintain/enhance activity level, Minimize linen layers, Monitor skin under medical devices, Pad between skin to skin    Moisture Interventions: Absorbent underpads    Activity Interventions: Increase time out of bed    Mobility Interventions: PT/OT evaluation, HOB 30 degrees or less    Nutrition Interventions: Offer support with meals,snacks and hydration    Friction and Shear Interventions: Apply protective barrier, creams and emollients, Lift sheet, Lift team/patient mobility team, Minimize layers                Problem: Patient Education: Go to Patient Education Activity  Goal: Patient/Family Education  Outcome: Progressing Towards Goal

## 2020-08-08 NOTE — PROGRESS NOTES
Hospitalist Progress Note    NAME: Chiki Collazo   :  1945   MRN:  [id number]       Assessment / Plan:  Sepsis with acute encephalopathy POA  Due to  COVID-19 PNA  POA  Chest x-ray left upper lobe airspace disease/opacity  CT head showed Interval development of multiple infarcts and atrophy as compared to the 2016 study. No acute process identified by noncontrast CT. UA positive for glucose and ketones otherwise negative  CT chest/ap showed 2 focal patches of infiltrate in the upper lobes bilaterally suspicious for infectious process, however, the left upper lobe opacity could reflect neoplasm and close interval follow-up is recommended   Lactate 2.0, Procalcitonin 0.15, Fibrinogen 491, Ferritin 91, INR 1.0  COVID19 positive on PCR and rapid test  Con't rocephin/azithromycin  End date 8/10  Remains stable on RA, no leukocytosis  Will need repeat CT when he recovers from PNA  PT/OT, CM to help with dispo     RAPHAEL with dehydration POA  Holding Bumex, losartan metformin  Off fluids  Repeat bmp     Diabetes type 2 insulin-dependent POA-controlled  He received 60 units lantus yesterday so BG was low throughout the day. He received treatment for hypoglycemia, although he was asymptomatic per documentation  lantus is now discontinued. Will re-start he takes more po intake  OFF d5 gtt  Daughter states he needs constant reminder to eat his meal d/t dementia  SSI  Metformin on hold due to RAPHAEL    Hypertension  CAD status post CABG  Hx of CVAs  Cont' coreg, asa/plavix and statin  Hold bumex/losartan    Alzheimer's/vascular dementia  Seizure disorder  Change to PO keppra     updated daughter Nicole Peña.  updated daughter Nicole Peña.   Questions/concerns answered.     Code Status: Full code  Surrogate Decision Maker: Daughter   DVT Prophylaxis: Subcu Lovenox  GI Prophylaxis: not indicated   Baseline: Patient is a long-term resident at [facility], has baseline vascular/Alzheimer's dementia but is conversant at baseline per nursing home staff     Subjective:     Chief Complaint / Reason for Physician Visit  Pt seen, NAD. Discussed with RN events overnight. Review of Systems:  Symptom Y/N Comments  Symptom Y/N Comments   Fever/Chills    Chest Pain     Poor Appetite    Edema     Cough    Abdominal Pain     Sputum    Joint Pain     SOB/WEEKS    Pruritis/Rash     Nausea/vomit    Tolerating PT/OT     Diarrhea    Tolerating Diet     Constipation    Other       Could NOT obtain due to: dementia     Objective:     VITALS:   Last 24hrs VS reviewed since prior progress note. Most recent are:  Patient Vitals for the past 24 hrs:   Temp Pulse Resp BP SpO2   08/08/20 0231 98.2 °F (36.8 °C) 71 18 136/79 93 %   08/07/20 2205 98.2 °F (36.8 °C) 77 16 118/59 93 %   08/07/20 2120 98.5 °F (36.9 °C) -- -- -- --   08/07/20 2015 100 °F (37.8 °C) 71 18 135/68 95 %   08/07/20 1517 98.8 °F (37.1 °C) 68 20 136/59 95 %   08/07/20 1124 99.6 °F (37.6 °C) 62 20 (!) 104/31 94 %   08/07/20 0928 98.9 °F (37.2 °C) 62 18 147/61 95 %       Intake/Output Summary (Last 24 hours) at 8/8/2020 6630  Last data filed at 8/8/2020 0231  Gross per 24 hour   Intake 500 ml   Output 1775 ml   Net -1275 ml        PHYSICAL EXAM:  General: Elderly male awake in bed,, NAD   EENT:  EOMI. Anicteric sclerae. MMM  Resp:  CTA bilaterally, no wheezing or rales. No accessory muscle use  CV:  Regular  rhythm,  No edema  GI:  Soft, ND, NT.  +BS  Neurologic:  Moving all exts. awake, followed some commands  Psych:   Not anxious or agitated  Skin:  No rashes.   No jaundice    Reviewed most current lab test results and cultures  YES  Reviewed most current radiology test results   YES  Review and summation of old records today    NO  Reviewed patient's current orders and MAR    YES  PMH/SH reviewed - no change compared to H&P  ________________________________________________________________________  Care Plan discussed with:    Comments   Patient x    Family  x    RN x    Care Manager Consultant                        Multidiciplinary team rounds were held today with , nursing, pharmacist and clinical coordinator. Patient's plan of care was discussed; medications were reviewed and discharge planning was addressed. ________________________________________________________________________  Total NON critical care TIME:  35   Minutes    Total CRITICAL CARE TIME Spent:   Minutes non procedure based      Comments   >50% of visit spent in counseling and coordination of care     ________________________________________________________________________  Katiana Alaniz MD     Procedures: see electronic medical records for all procedures/Xrays and details which were not copied into this note but were reviewed prior to creation of Plan. LABS:  I reviewed today's most current labs and imaging studies.   Pertinent labs include:  Recent Labs     08/07/20  0323 08/06/20  0309   WBC 3.8* 5.6   HGB 13.0 13.1   HCT 42.1 43.9   * 114*     Recent Labs     08/08/20  0232 08/07/20  0323 08/06/20  1356 08/06/20  0309   NA  --  140 142  --    K  --  3.7 4.2  --    CL  --  114* 112*  --    CO2  --  21 24  --    GLU  --  103* 92  --    BUN  --  20 24*  --    CREA  --  1.12 1.30  --    CA  --  7.9* 8.6  --    INR 1.0 1.0  --  1.0       Signed: Katiana Alaniz MD

## 2020-08-09 LAB
ANION GAP SERPL CALC-SCNC: 8 MMOL/L (ref 5–15)
APTT PPP: 28.4 SEC (ref 22.1–32)
BUN SERPL-MCNC: 26 MG/DL (ref 6–20)
BUN/CREAT SERPL: 19 (ref 12–20)
CALCIUM SERPL-MCNC: 8.8 MG/DL (ref 8.5–10.1)
CHLORIDE SERPL-SCNC: 114 MMOL/L (ref 97–108)
CO2 SERPL-SCNC: 21 MMOL/L (ref 21–32)
CREAT SERPL-MCNC: 1.39 MG/DL (ref 0.7–1.3)
FIBRINOGEN PPP-MCNC: 606 MG/DL (ref 200–475)
GLUCOSE BLD STRIP.AUTO-MCNC: 139 MG/DL (ref 65–100)
GLUCOSE BLD STRIP.AUTO-MCNC: 146 MG/DL (ref 65–100)
GLUCOSE BLD STRIP.AUTO-MCNC: 170 MG/DL (ref 65–100)
GLUCOSE BLD STRIP.AUTO-MCNC: 191 MG/DL (ref 65–100)
GLUCOSE SERPL-MCNC: 149 MG/DL (ref 65–100)
INR PPP: 1 (ref 0.9–1.1)
POTASSIUM SERPL-SCNC: 4.1 MMOL/L (ref 3.5–5.1)
PROTHROMBIN TIME: 10.2 SEC (ref 9–11.1)
SERVICE CMNT-IMP: ABNORMAL
SODIUM SERPL-SCNC: 143 MMOL/L (ref 136–145)
THERAPEUTIC RANGE,PTTT: NORMAL SECS (ref 58–77)

## 2020-08-09 PROCEDURE — 85730 THROMBOPLASTIN TIME PARTIAL: CPT

## 2020-08-09 PROCEDURE — 85610 PROTHROMBIN TIME: CPT

## 2020-08-09 PROCEDURE — 80048 BASIC METABOLIC PNL TOTAL CA: CPT

## 2020-08-09 PROCEDURE — 74011250636 HC RX REV CODE- 250/636: Performed by: INTERNAL MEDICINE

## 2020-08-09 PROCEDURE — 36415 COLL VENOUS BLD VENIPUNCTURE: CPT

## 2020-08-09 PROCEDURE — 74011250637 HC RX REV CODE- 250/637: Performed by: INTERNAL MEDICINE

## 2020-08-09 PROCEDURE — 74011000258 HC RX REV CODE- 258: Performed by: INTERNAL MEDICINE

## 2020-08-09 PROCEDURE — 85384 FIBRINOGEN ACTIVITY: CPT

## 2020-08-09 PROCEDURE — 65660000000 HC RM CCU STEPDOWN

## 2020-08-09 PROCEDURE — 82962 GLUCOSE BLOOD TEST: CPT

## 2020-08-09 RX ORDER — INSULIN GLARGINE 100 [IU]/ML
5 INJECTION, SOLUTION SUBCUTANEOUS
Status: DISCONTINUED | OUTPATIENT
Start: 2020-08-09 | End: 2020-08-09

## 2020-08-09 RX ADMIN — CARVEDILOL 6.25 MG: 6.25 TABLET, FILM COATED ORAL at 18:06

## 2020-08-09 RX ADMIN — Medication 10 ML: at 13:28

## 2020-08-09 RX ADMIN — LEVETIRACETAM 750 MG: 500 TABLET ORAL at 08:07

## 2020-08-09 RX ADMIN — ALLOPURINOL 100 MG: 100 TABLET ORAL at 08:06

## 2020-08-09 RX ADMIN — GUAIFENESIN 600 MG: 600 TABLET, EXTENDED RELEASE ORAL at 20:46

## 2020-08-09 RX ADMIN — GUAIFENESIN 600 MG: 600 TABLET, EXTENDED RELEASE ORAL at 08:08

## 2020-08-09 RX ADMIN — CLOPIDOGREL BISULFATE 75 MG: 75 TABLET, FILM COATED ORAL at 08:08

## 2020-08-09 RX ADMIN — TOPIRAMATE 100 MG: 100 TABLET, FILM COATED ORAL at 08:07

## 2020-08-09 RX ADMIN — TOPIRAMATE 100 MG: 100 TABLET, FILM COATED ORAL at 18:05

## 2020-08-09 RX ADMIN — Medication 10 ML: at 07:01

## 2020-08-09 RX ADMIN — CEFTRIAXONE 1 G: 1 INJECTION, POWDER, FOR SOLUTION INTRAMUSCULAR; INTRAVENOUS at 08:06

## 2020-08-09 RX ADMIN — ENOXAPARIN SODIUM 40 MG: 40 INJECTION, SOLUTION INTRAVENOUS; SUBCUTANEOUS at 08:05

## 2020-08-09 RX ADMIN — LACOSAMIDE 100 MG: 100 TABLET, FILM COATED ORAL at 20:47

## 2020-08-09 RX ADMIN — LACOSAMIDE 100 MG: 100 TABLET, FILM COATED ORAL at 08:09

## 2020-08-09 RX ADMIN — AMLODIPINE BESYLATE 5 MG: 5 TABLET ORAL at 08:09

## 2020-08-09 RX ADMIN — CARVEDILOL 6.25 MG: 6.25 TABLET, FILM COATED ORAL at 08:07

## 2020-08-09 RX ADMIN — PRAVASTATIN SODIUM 40 MG: 40 TABLET ORAL at 20:46

## 2020-08-09 RX ADMIN — ACETAMINOPHEN 650 MG: 325 TABLET ORAL at 20:43

## 2020-08-09 RX ADMIN — ASPIRIN 81 MG CHEWABLE TABLET 81 MG: 81 TABLET CHEWABLE at 08:07

## 2020-08-09 RX ADMIN — LEVETIRACETAM 750 MG: 500 TABLET ORAL at 20:46

## 2020-08-09 RX ADMIN — ACETAMINOPHEN 650 MG: 325 TABLET ORAL at 08:07

## 2020-08-09 NOTE — PROGRESS NOTES
Bedside and Verbal shift change report given to Bess (oncoming nurse) by Vincent Alonso (offgoing nurse). Report included the following information SBAR, Kardex, Intake/Output, MAR, Recent Results, Cardiac Rhythm SR and Alarm Parameters .

## 2020-08-09 NOTE — PROGRESS NOTES
Bedside and Verbal shift change report given to [facility] (oncoming nurse) by Michael (offgoing nurse). Report included the following information SBAR, Kardex, ED Summary, Intake/Output, MAR and Cardiac Rhythm NSR.

## 2020-08-09 NOTE — PROGRESS NOTES
Hospitalist Progress Note    NAME: Segun Bloom   :  1945   MRN:  [id number]       Assessment / Plan:  Sepsis with acute encephalopathy POA  Due to  COVID-19 PNA  POA  Chest x-ray left upper lobe airspace disease/opacity  CT head showed Interval development of multiple infarcts and atrophy as compared to the 2016 study. No acute process identified by noncontrast CT. UA positive for glucose and ketones otherwise negative  CT chest/ap showed 2 focal patches of infiltrate in the upper lobes bilaterally suspicious for infectious process, however, the left upper lobe opacity could reflect neoplasm and close interval follow-up is recommended   Lactate 2.0, Procalcitonin 0.15, Fibrinogen 491, Ferritin 91, INR 1.0  COVID19 positive on PCR and rapid test  Con't rocephin/azithromycin  End date 8/10  Remains stable on RA, no leukocytosis  Will need repeat CT when he recovers from PNA  Possible discharge in the AM.      RAPHAEL with dehydration POA  Holding Bumex, losartan metformin  Off fluids  Repeat bmp     Diabetes type 2 insulin-dependent POA-controlled  He received 60 units lantus yesterday so BG was low throughout the day. He received treatment for hypoglycemia, although he was asymptomatic per documentation  lantus is now discontinued. Pt is currently OFF D5 gtt. BG maintaining ~ 130s-150s. .  Given his dementia and forgetting to eat even during meals, will hold off on starting long acting insulin as he provens to have several episodes of hypoglycemia during inpt. Will continue with SSI for now. Long actin insulin can be reintroduced in the outpt setting once appetite improves. Metformin on hold due to RAPHAEL    Hypertension  CAD status post CABG  Hx of CVAs  Cont' coreg, asa/plavix and statin  Hold bumex/losartan    Alzheimer's/vascular dementia  Seizure disorder  Change to PO keppra     updated daughter Torrey First.  updated daughter Torrey First.   Questions/concerns answered.     Code Status: Full code  Surrogate Decision Maker: Daughter   DVT Prophylaxis: Subcu Lovenox  GI Prophylaxis: not indicated   Baseline: Patient is a long-term resident at [facility], has baseline vascular/Alzheimer's dementia but is conversant at baseline per nursing home staff     Subjective:     Chief Complaint / Reason for Physician Visit  Pt seen, NAD. Discussed with RN events overnight. Review of Systems:  Symptom Y/N Comments  Symptom Y/N Comments   Fever/Chills    Chest Pain     Poor Appetite    Edema     Cough    Abdominal Pain     Sputum    Joint Pain     SOB/WEEKS    Pruritis/Rash     Nausea/vomit    Tolerating PT/OT     Diarrhea    Tolerating Diet     Constipation    Other       Could NOT obtain due to: dementia     Objective:     VITALS:   Last 24hrs VS reviewed since prior progress note. Most recent are:  Patient Vitals for the past 24 hrs:   Temp Pulse Resp BP SpO2   08/09/20 0459 98.5 °F (36.9 °C) 79 16 137/76 93 %   08/08/20 2300 99 °F (37.2 °C) 73 20 128/66 93 %   08/08/20 2233 99.2 °F (37.3 °C) -- -- -- --   08/08/20 2031 100.3 °F (37.9 °C) 68 18 130/76 97 %   08/08/20 1443 -- 62 20 123/53 92 %   08/08/20 1200 -- (!) 58 -- 119/62 94 %   08/08/20 1147 98.2 °F (36.8 °C) (!) 58 18 110/79 94 %   08/08/20 0912 97.9 °F (36.6 °C) 71 18 142/68 95 %       Intake/Output Summary (Last 24 hours) at 8/9/2020 3808  Last data filed at 8/9/2020 0705  Gross per 24 hour   Intake 100 ml   Output 1000 ml   Net -900 ml        PHYSICAL EXAM:  General: Elderly male awake in bed,, NAD   EENT:  EOMI. Anicteric sclerae. MMM  Resp:  CTA bilaterally, no wheezing or rales. No accessory muscle use  CV:  Regular  rhythm,  No edema  GI:  Soft, ND, NT.  +BS  Neurologic:  Moving all exts. awake, followed some commands  Psych:   Not anxious or agitated  Skin:  No rashes.   No jaundice    Reviewed most current lab test results and cultures  YES  Reviewed most current radiology test results   YES  Review and summation of old records today NO  Reviewed patient's current orders and MAR    YES  PMH/SH reviewed - no change compared to H&P  ________________________________________________________________________  Care Plan discussed with:    Comments   Patient x    Family  x    RN x    Care Manager     Consultant                        Multidiciplinary team rounds were held today with , nursing, pharmacist and clinical coordinator. Patient's plan of care was discussed; medications were reviewed and discharge planning was addressed. ________________________________________________________________________  Total NON critical care TIME:  35   Minutes    Total CRITICAL CARE TIME Spent:   Minutes non procedure based      Comments   >50% of visit spent in counseling and coordination of care     ________________________________________________________________________  Bro Hampton MD     Procedures: see electronic medical records for all procedures/Xrays and details which were not copied into this note but were reviewed prior to creation of Plan. LABS:  I reviewed today's most current labs and imaging studies.   Pertinent labs include:  Recent Labs     08/07/20  0323   WBC 3.8*   HGB 13.0   HCT 42.1   *     Recent Labs     08/09/20  0541 08/08/20  0232 08/07/20  0323 08/06/20  1356     --  140 142   K 4.1  --  3.7 4.2   *  --  114* 112*   CO2 21  --  21 24   *  --  103* 92   BUN 26*  --  20 24*   CREA 1.39*  --  1.12 1.30   CA 8.8  --  7.9* 8.6   INR 1.0 1.0 1.0  --        Signed: Bro Hampton MD

## 2020-08-09 NOTE — PROGRESS NOTES
Problem: Falls - Risk of  Goal: *Absence of Falls  Description: Document Jalil Self Fall Risk and appropriate interventions in the flowsheet. Outcome: Progressing Towards Goal  Note: Fall Risk Interventions:  Mobility Interventions: Communicate number of staff needed for ambulation/transfer    Mentation Interventions: Adequate sleep, hydration, pain control, Bed/chair exit alarm, Toileting rounds, Update white board, Room close to nurse's station, Reorient patient, More frequent rounding, Increase mobility    Medication Interventions: Bed/chair exit alarm, Patient to call before getting OOB, Teach patient to arise slowly    Elimination Interventions: Bed/chair exit alarm, Call light in reach, Patient to call for help with toileting needs, Toilet paper/wipes in reach, Toileting schedule/hourly rounds    History of Falls Interventions: Bed/chair exit alarm, Room close to nurse's station         Problem: Pressure Injury - Risk of  Goal: *Prevention of pressure injury  Description: Document Roderick Scale and appropriate interventions in the flowsheet. Outcome: Progressing Towards Goal  Note: Pressure Injury Interventions:  Sensory Interventions: Avoid rigorous massage over bony prominences, Keep linens dry and wrinkle-free, Minimize linen layers, Turn and reposition approx. every two hours (pillows and wedges if needed)    Moisture Interventions: Absorbent underpads, Check for incontinence Q2 hours and as needed, Internal/External urinary devices, Limit adult briefs, Minimize layers, Offer toileting Q_hr    Activity Interventions: Pressure redistribution bed/mattress(bed type)    Mobility Interventions: Pressure redistribution bed/mattress (bed type), HOB 30 degrees or less, Turn and reposition approx.  every two hours(pillow and wedges)    Nutrition Interventions: Document food/fluid/supplement intake    Friction and Shear Interventions: HOB 30 degrees or less, Minimize layers

## 2020-08-10 LAB
ABO + RH BLD: NORMAL
APTT PPP: 30.8 SEC (ref 22.1–32)
BACTERIA SPEC CULT: NORMAL
BACTERIA SPEC CULT: NORMAL
BASOPHILS # BLD: 0 K/UL (ref 0–0.1)
BASOPHILS NFR BLD: 0 % (ref 0–1)
BLOOD GROUP ANTIBODIES SERPL: NORMAL
D DIMER PPP FEU-MCNC: 0.6 MG/L FEU (ref 0–0.65)
DIFFERENTIAL METHOD BLD: ABNORMAL
EOSINOPHIL # BLD: 0 K/UL (ref 0–0.4)
EOSINOPHIL NFR BLD: 0 % (ref 0–7)
ERYTHROCYTE [DISTWIDTH] IN BLOOD BY AUTOMATED COUNT: 13.9 % (ref 11.5–14.5)
FERRITIN SERPL-MCNC: 553 NG/ML (ref 26–388)
FIBRINOGEN PPP-MCNC: >800 MG/DL (ref 200–475)
GLUCOSE BLD STRIP.AUTO-MCNC: 149 MG/DL (ref 65–100)
GLUCOSE BLD STRIP.AUTO-MCNC: 211 MG/DL (ref 65–100)
GLUCOSE BLD STRIP.AUTO-MCNC: 252 MG/DL (ref 65–100)
GLUCOSE BLD STRIP.AUTO-MCNC: 378 MG/DL (ref 65–100)
GLUCOSE BLD STRIP.AUTO-MCNC: 440 MG/DL (ref 65–100)
HCT VFR BLD AUTO: 41.5 % (ref 36.6–50.3)
HGB BLD-MCNC: 12.8 G/DL (ref 12.1–17)
IMM GRANULOCYTES # BLD AUTO: 0 K/UL (ref 0–0.04)
IMM GRANULOCYTES NFR BLD AUTO: 0 % (ref 0–0.5)
INR PPP: 1 (ref 0.9–1.1)
LDH SERPL L TO P-CCNC: 380 U/L (ref 85–241)
LYMPHOCYTES # BLD: 0.2 K/UL (ref 0.8–3.5)
LYMPHOCYTES NFR BLD: 2 % (ref 12–49)
MCH RBC QN AUTO: 26.8 PG (ref 26–34)
MCHC RBC AUTO-ENTMCNC: 30.8 G/DL (ref 30–36.5)
MCV RBC AUTO: 87 FL (ref 80–99)
MONOCYTES # BLD: 0.5 K/UL (ref 0–1)
MONOCYTES NFR BLD: 5 % (ref 5–13)
NEUTS BAND NFR BLD MANUAL: 4 %
NEUTS SEG # BLD: 10 K/UL (ref 1.8–8)
NEUTS SEG NFR BLD: 89 % (ref 32–75)
NRBC # BLD: 0 K/UL (ref 0–0.01)
NRBC BLD-RTO: 0 PER 100 WBC
PLATELET # BLD AUTO: 222 K/UL (ref 150–400)
PMV BLD AUTO: 11.1 FL (ref 8.9–12.9)
PROCALCITONIN SERPL-MCNC: 0.51 NG/ML
PROTHROMBIN TIME: 10.3 SEC (ref 9–11.1)
RBC # BLD AUTO: 4.77 M/UL (ref 4.1–5.7)
RBC MORPH BLD: ABNORMAL
SERVICE CMNT-IMP: ABNORMAL
SERVICE CMNT-IMP: NORMAL
SERVICE CMNT-IMP: NORMAL
SPECIMEN EXP DATE BLD: NORMAL
THERAPEUTIC RANGE,PTTT: NORMAL SECS (ref 58–77)
WBC # BLD AUTO: 10.7 K/UL (ref 4.1–11.1)

## 2020-08-10 PROCEDURE — 74011250637 HC RX REV CODE- 250/637: Performed by: INTERNAL MEDICINE

## 2020-08-10 PROCEDURE — 77010033678 HC OXYGEN DAILY

## 2020-08-10 PROCEDURE — 82728 ASSAY OF FERRITIN: CPT

## 2020-08-10 PROCEDURE — 94640 AIRWAY INHALATION TREATMENT: CPT

## 2020-08-10 PROCEDURE — 36415 COLL VENOUS BLD VENIPUNCTURE: CPT

## 2020-08-10 PROCEDURE — 87040 BLOOD CULTURE FOR BACTERIA: CPT

## 2020-08-10 PROCEDURE — 74011636637 HC RX REV CODE- 636/637: Performed by: INTERNAL MEDICINE

## 2020-08-10 PROCEDURE — 85730 THROMBOPLASTIN TIME PARTIAL: CPT

## 2020-08-10 PROCEDURE — 74011000258 HC RX REV CODE- 258: Performed by: INTERNAL MEDICINE

## 2020-08-10 PROCEDURE — 86900 BLOOD TYPING SEROLOGIC ABO: CPT

## 2020-08-10 PROCEDURE — 65660000000 HC RM CCU STEPDOWN

## 2020-08-10 PROCEDURE — 74011250636 HC RX REV CODE- 250/636: Performed by: INTERNAL MEDICINE

## 2020-08-10 PROCEDURE — 83615 LACTATE (LD) (LDH) ENZYME: CPT

## 2020-08-10 PROCEDURE — 85025 COMPLETE CBC W/AUTO DIFF WBC: CPT

## 2020-08-10 PROCEDURE — 84145 PROCALCITONIN (PCT): CPT

## 2020-08-10 PROCEDURE — 85379 FIBRIN DEGRADATION QUANT: CPT

## 2020-08-10 PROCEDURE — 85610 PROTHROMBIN TIME: CPT

## 2020-08-10 PROCEDURE — 82962 GLUCOSE BLOOD TEST: CPT

## 2020-08-10 PROCEDURE — 85384 FIBRINOGEN ACTIVITY: CPT

## 2020-08-10 RX ORDER — ENOXAPARIN SODIUM 100 MG/ML
30 INJECTION SUBCUTANEOUS EVERY 12 HOURS
Status: DISCONTINUED | OUTPATIENT
Start: 2020-08-10 | End: 2020-08-13 | Stop reason: HOSPADM

## 2020-08-10 RX ORDER — DEXAMETHASONE 4 MG/1
10 TABLET ORAL DAILY
Status: DISCONTINUED | OUTPATIENT
Start: 2020-08-10 | End: 2020-08-10

## 2020-08-10 RX ORDER — AMLODIPINE BESYLATE 5 MG/1
10 TABLET ORAL DAILY
Status: DISCONTINUED | OUTPATIENT
Start: 2020-08-11 | End: 2020-08-13 | Stop reason: HOSPADM

## 2020-08-10 RX ORDER — DEXAMETHASONE 4 MG/1
6 TABLET ORAL DAILY
Status: DISCONTINUED | OUTPATIENT
Start: 2020-08-10 | End: 2020-08-12

## 2020-08-10 RX ORDER — ALBUTEROL SULFATE 90 UG/1
1 AEROSOL, METERED RESPIRATORY (INHALATION)
Status: DISCONTINUED | OUTPATIENT
Start: 2020-08-10 | End: 2020-08-13 | Stop reason: HOSPADM

## 2020-08-10 RX ADMIN — ACETAMINOPHEN 650 MG: 650 SUPPOSITORY RECTAL at 16:01

## 2020-08-10 RX ADMIN — Medication 10 ML: at 15:38

## 2020-08-10 RX ADMIN — DEXAMETHASONE 10 MG: 4 TABLET ORAL at 16:27

## 2020-08-10 RX ADMIN — LACOSAMIDE 100 MG: 100 TABLET, FILM COATED ORAL at 20:28

## 2020-08-10 RX ADMIN — DEXAMETHASONE 6 MG: 4 TABLET ORAL at 17:59

## 2020-08-10 RX ADMIN — INSULIN LISPRO 2 UNITS: 100 INJECTION, SOLUTION INTRAVENOUS; SUBCUTANEOUS at 12:15

## 2020-08-10 RX ADMIN — CLOPIDOGREL BISULFATE 75 MG: 75 TABLET, FILM COATED ORAL at 09:47

## 2020-08-10 RX ADMIN — CARVEDILOL 6.25 MG: 6.25 TABLET, FILM COATED ORAL at 09:49

## 2020-08-10 RX ADMIN — CARVEDILOL 6.25 MG: 6.25 TABLET, FILM COATED ORAL at 16:28

## 2020-08-10 RX ADMIN — LEVETIRACETAM 750 MG: 500 TABLET ORAL at 09:48

## 2020-08-10 RX ADMIN — TOPIRAMATE 100 MG: 100 TABLET, FILM COATED ORAL at 17:59

## 2020-08-10 RX ADMIN — GUAIFENESIN 600 MG: 600 TABLET, EXTENDED RELEASE ORAL at 09:48

## 2020-08-10 RX ADMIN — INSULIN LISPRO 10 UNITS: 100 INJECTION, SOLUTION INTRAVENOUS; SUBCUTANEOUS at 16:27

## 2020-08-10 RX ADMIN — Medication 20 ML: at 20:28

## 2020-08-10 RX ADMIN — AMLODIPINE BESYLATE 5 MG: 5 TABLET ORAL at 09:47

## 2020-08-10 RX ADMIN — HUMAN INSULIN 8 UNITS: 100 INJECTION, SUSPENSION SUBCUTANEOUS at 18:00

## 2020-08-10 RX ADMIN — ASPIRIN 81 MG CHEWABLE TABLET 81 MG: 81 TABLET CHEWABLE at 09:48

## 2020-08-10 RX ADMIN — ALLOPURINOL 100 MG: 100 TABLET ORAL at 09:48

## 2020-08-10 RX ADMIN — INSULIN LISPRO 2 UNITS: 100 INJECTION, SOLUTION INTRAVENOUS; SUBCUTANEOUS at 22:00

## 2020-08-10 RX ADMIN — PRAVASTATIN SODIUM 40 MG: 40 TABLET ORAL at 20:29

## 2020-08-10 RX ADMIN — PIPERACILLIN AND TAZOBACTAM 3.38 G: 3; .375 INJECTION, POWDER, LYOPHILIZED, FOR SOLUTION INTRAVENOUS at 18:01

## 2020-08-10 RX ADMIN — GUAIFENESIN 600 MG: 600 TABLET, EXTENDED RELEASE ORAL at 20:28

## 2020-08-10 RX ADMIN — Medication 10 ML: at 12:16

## 2020-08-10 RX ADMIN — LEVETIRACETAM 750 MG: 500 TABLET ORAL at 20:28

## 2020-08-10 RX ADMIN — ACETAMINOPHEN 650 MG: 325 TABLET ORAL at 22:08

## 2020-08-10 RX ADMIN — LACOSAMIDE 100 MG: 100 TABLET, FILM COATED ORAL at 09:47

## 2020-08-10 RX ADMIN — TOPIRAMATE 100 MG: 100 TABLET, FILM COATED ORAL at 09:47

## 2020-08-10 RX ADMIN — ENOXAPARIN SODIUM 30 MG: 30 INJECTION SUBCUTANEOUS at 18:00

## 2020-08-10 RX ADMIN — ENOXAPARIN SODIUM 40 MG: 40 INJECTION, SOLUTION INTRAVENOUS; SUBCUTANEOUS at 09:49

## 2020-08-10 RX ADMIN — ALBUTEROL SULFATE 1 PUFF: 90 AEROSOL, METERED RESPIRATORY (INHALATION) at 19:10

## 2020-08-10 NOTE — PROGRESS NOTES
Problem: Falls - Risk of  Goal: *Absence of Falls  Description: Document Avni Jamel Fall Risk and appropriate interventions in the flowsheet. Outcome: Progressing Towards Goal  Note: Fall Risk Interventions:  Mobility Interventions: Bed/chair exit alarm, Patient to call before getting OOB    Mentation Interventions: Adequate sleep, hydration, pain control, Bed/chair exit alarm, Reorient patient, Room close to nurse's station    Medication Interventions: Bed/chair exit alarm, Patient to call before getting OOB    Elimination Interventions: Bed/chair exit alarm, Call light in reach, Patient to call for help with toileting needs    History of Falls Interventions: Bed/chair exit alarm, Room close to nurse's station         Problem: Pressure Injury - Risk of  Goal: *Prevention of pressure injury  Description: Document Roderick Scale and appropriate interventions in the flowsheet.   Outcome: Progressing Towards Goal  Note: Pressure Injury Interventions:  Sensory Interventions: Assess changes in LOC, Keep linens dry and wrinkle-free, Maintain/enhance activity level, Minimize linen layers, Pad between skin to skin    Moisture Interventions: Apply protective barrier, creams and emollients, Absorbent underpads, Internal/External urinary devices    Activity Interventions: Increase time out of bed, Assess need for specialty bed    Mobility Interventions: HOB 30 degrees or less    Nutrition Interventions: Document food/fluid/supplement intake, Offer support with meals,snacks and hydration    Friction and Shear Interventions: Apply protective barrier, creams and emollients, Foam dressings/transparent film/skin sealants, HOB 30 degrees or less, Lift sheet

## 2020-08-10 NOTE — PROGRESS NOTES
Problem: Falls - Risk of  Goal: *Absence of Falls  Description: Document Amrik Abbasi Fall Risk and appropriate interventions in the flowsheet.   Outcome: Progressing Towards Goal  Note: Fall Risk Interventions:  Mobility Interventions: Bed/chair exit alarm, OT consult for ADLs    Mentation Interventions: Adequate sleep, hydration, pain control, Bed/chair exit alarm    Medication Interventions: Bed/chair exit alarm, Evaluate medications/consider consulting pharmacy    Elimination Interventions: Bed/chair exit alarm, Call light in reach    History of Falls Interventions: Bed/chair exit alarm, Room close to nurse's station

## 2020-08-10 NOTE — PROGRESS NOTES
0700: Bedside shift change report given to Christy Medina RN (oncoming nurse) by Calin Verduzco RN (offgoing nurse). Report included the following information SBAR, Kardex, Intake/Output, MAR and Recent Results. 0945:  Cleaned pt up with new gown, new underpad, new condom cath for urine occurrence. Pt is confused, oriented to self only. Appears to be unable to chew or swallow, but managed to get morning meds swallowed with encouragement crushed in applesauce. 1215:  Administered 2u insulin for blood sugar. 0:  Spoke with daughter re: patient possible discharge. Daughter relayed concern re: pt confusion, speech and unclear answers. RN will follow up with MD and PRABHAKAR to confirm discharge. [phone]:  RN received message from  that pt will be picked up for transport back to [facility] at 1500 today. Paged Dr. Aurea Maier re: daughter's concerns re: confusion and upcoming discharge. 1319:  Dr. Aurea Maier on the unit. Dr. Aurea Maier confirmed pt to discharge today at 1500, and that confusion is d/t dementia. Dr. Aurea Maier to speak with pt daughter about discharge. [phone]:  RN confirmed with PRABHAKAR Rincon that transportation for pt return to [facility] has been ordered for 1500 today. 1325: Dr. Aurea Maier speaking with [facility] re: pt discharge. MD Aurea Maier confirmed that she had been speaking with other daughter Ryanne Travis and that both Ryanne Travis and [facility] were to be updating one another. MD Aurea Maier explained confusion normal d/t dementia, and chest CT with infection and small tumor, pt needs repeat CT in 3-4 weeks. 1519:  Confirmed with PRABHAKAR Rincon to call  at [facility] [phone] with report since pt will be returning to their [employer] unit, not to his regular room at present. 1440:  Received call from [place] Kam that [facility] will be here at 1630 to retrieve pt.      [phone]:  RN contacted [facility] for report but no one picked up at nurse's station. RN will call back.       1512:  RN contacted [facility] [phone] to give report.   Gave report Naomi Parry RN.      1525:    Hospital to [facility]                                                                        76 y.o.   male    [address]   Room: 2245/01    [facility] 2 PROGRESSIVE CARE  Unit Phone# :  [phone]      [address]  [facility] PROGRESSIVE CARE  [address] [facility]  [address] [address]  Dept: [phone]  Loc: [phone]                    SITUATION     Admitted:  8/4/2020         Attending Provider:  Ivette Soriano MD       Consultations:  None    PCP:  Sindy Park MD   [phone]    Treatment Team: Attending Provider: Ivette Soriano MD; Utilization Review: Maribeth Noguera RN; Care Manager: Mikayla Hernandez RN; Utilization Review: Yelena Coleman RN; Primary Nurse: Antonio Romero    Admitting Dx:  Acute encephalopathy [G93.40]  Sepsis ([facility] [A41.9]  Left upper lobe pneumonia [J18.9]  Suspected COVID-19 virus infection [Z20.828]  RAPHAEL (acute kidney injury) ([facility] [N17.9]       Principal Problem: <principal problem not specified>    * No surgery found * of      BY: * Surgery not found *             ON: * No surgery found *                  Code Status: Full Code                Advance Directives:   Advance Care Planning 10/4/2016   Patient's Healthcare Decision Maker is: Verbal statement (Legal Next of Kin remains as decision maker)   Primary Decision Maker Name Cliff Edwards   Primary Decision Maker Relationship to Patient Adult child   Confirm Advance Directive Yes, not on file    (Send w/patient)   Not Received       Isolation:  Droplet Plus, Droplet Plus       MDRO: COVID-19    Pain Medications given:  As noted in discharge instructions    Last dose: 8/10/2020 at  [place] needed: no  Type of equipment:    (Not currently on dialysis)  (Not currently on dialysis)  (Not currently on dialysis)     BACKGROUND     Allergies:  No Known Allergies    Past Medical History:   Diagnosis Date    Allergic rhinitis, cause unspecified 4/18/2011    Alzheimer's disease     Arthritis     GOUT    Cancer ([facility] 09/20/2016    PROSTATE    Chronic obstructive pulmonary disease ([facility]     CHRONIC BRONCHITIS    Congestive heart failure, unspecified 4/18/2011    (ON 9/20/16 PT & DTR STATE THEY DON'T REMEMBER THIS DIAGNOSIS)    Coronary Artery Disease 4/18/2011    Diabetes Mellitus Type I 4/18/2011    Erectile Dysfunction 4/18/2011    Hemorrhoids 4/18/2011    Hypertension     Seizures ([facility]     STARTED 2005; \"BLACK-OUT Perla Michaels", DTR SATES    Stroke ([facility]     MULTIPLE MINISTROKES, DTR REPORTS; LEFT-SIDED WEAKNESS    Thromboembolus ([facility]     left leg  (NO PE)    Unspecified asthma(493.90) 4/18/2011       Past Surgical History:   Procedure Laterality Date    CARDIAC SURG PROCEDURE UNLIST  2006    CABG    COLONOSCOPY N/A 10/9/2017    COLONOSCOPY performed by Gordon Orellana MD at [facility] ENDOSCOPY    VASCULAR SURGERY PROCEDURE UNLIST      PERIPHERAL ANGIOGRAM, STENTS LEFT LEG       Medications Prior to Admission   Medication Sig    bumetanide (BUMEX) 1 mg tablet take 1 tablet once daily    levETIRAcetam (KEPPRA) 750 mg tablet Take 750 mg by mouth two (2) times a day. 3 tab, twice daily   Indications: PARTIAL EPILEPSY TREATMENT ADJUNCT    lacosamide (VIMPAT) 200 mg tab tablet Take 200 mg by mouth two (2) times a day. Indications: PARTIAL EPILEPSY TREATMENT ADJUNCT    topiramate (TOPAMAX) 100 mg tablet Take 100 mg by mouth two (2) times a day. Indications: COMPLEX-PARTIAL EPILEPSY    insulin glargine (LANTUS SOLOSTAR) 100 unit/mL (3 mL) inpn 60 Units by SubCUTAneous route daily. Indications: type 2 diabetes mellitus    carvedilol (COREG) 6.25 mg tablet Take 6.25 mg by mouth two (2) times daily (with meals). Indications: PREVENTION OF A. FIB POST CARDIO-THORACIC SURGERY    [DISCONTINUED] amLODIPine (NORVASC) 2.5 mg tablet Take 2.5 mg by mouth daily.  Indications: hypertension    metFORMIN (GLUCOPHAGE) 1,000 mg tablet take 1 tablet by mouth twice a day with food    insulin glargine (LANTUS SOLOSTAR) 100 unit/mL (3 mL) inpn 10 units sc daily    amLODIPine (NORVASC) 5 mg tablet take 1 tablet by mouth once daily    clopidogrel (PLAVIX) 75 mg tab take 1 tablet by mouth once daily    losartan (COZAAR) 100 mg tablet take 1 tablet by mouth once daily    Walker misc Rollator walker    HYDROcodone-acetaminophen (NORCO) 5-325 mg per tablet Take 1 Tab by mouth every four (4) hours as needed for Pain. Max Daily Amount: 6 Tabs.  Insulin Needles, Disposable, (JAZMYN PEN NEEDLE) 32 gauge x 5/32\" ndle Use daily as directed with insulin pen.  aspirin (ASPIRIN) 325 mg tablet Take 325 mg by mouth daily.  pravastatin (PRAVACHOL) 40 mg tablet take 1 tablet once daily    metoprolol tartrate (LOPRESSOR) 50 mg tablet take 1 tablet by mouth twice a day    [DISCONTINUED] lacosamide (VIMPAT) 100 mg tab tablet take 1 tablet by mouth twice a day    [DISCONTINUED] levETIRAcetam 1,000 mg tablet take 1 tablet by mouth twice a day    glucose blood VI test strips (ONETOUCH ULTRA TEST) strip Test 2 times daily    ONETOUCH ULTRA 2 TEST STRIPS ONLY    allopurinol (ZYLOPRIM) 100 mg tablet take 1 tablet by mouth once daily    ACCU-CHEK ALEXIS PLUS TEST STRP strip TEST BLOOD SUGARS 2 TIMES DAILY    Lancets misc Patient test 3 x daily  Dm 250       Hard scripts included in transfer packet no    Vaccinations:    Immunization History   Administered Date(s) Administered    (RETIRED) Pneumococcal Vaccine (Unspecified Type) 11/01/2012    Influenza High Dose Vaccine PF 12/03/2014, 10/30/2015, 10/31/2016    Influenza Vaccine Split 11/01/2012    TB Skin Test (PPD) Intradermal 08/22/2014    Tdap 12/03/2014       Readmission Risks:    Known Risks: Fall risk        The Charlson CoMorbitiy Index tool is an evidenced based tool that has more automatic generated information.  The tool looks at many different items such as the age of the patient, how many times they were admitted in the last calendar year, current length of stay in the hospital and their diagnosis. All of these items are pulled automatically from information documented in the chart from various places and will generate a score that predicts whether a patient is at low (less than 13), medium (13-20) or high (21 or greater) risk of being readmitted. ASSESSMENT                Temp: 97.6 °F (36.4 °C) (08/10/20 1040) Pulse (Heart Rate): 90 (08/10/20 1040)     Resp Rate: 18 (08/10/20 1040)           BP: 122/76 (08/10/20 1040)     O2 Sat (%): 90 % (08/10/20 1040)     Weight: 67.4 kg (148 lb 9.6 oz)    Height: 5' 6\" (167.6 cm) (08/04/20 1607)       If above not within 1 hour of discharge:    BP:_____  P:____  R:____ T:_____ O2 Sat: ___%  O2: ______    Active Orders   Diet    DIET DIABETIC CONSISTENT CARB Regular; 2 GM NA (House Low NA); AHA-LOW-CHOL FAT         Orientation: only aware of  person     Active Behaviors: None                                   Active Lines/Drains:  (Peg Tube / Loyd / CL or S/L?): no    Urinary Status: External catheter     Last BM: Last Bowel Movement Date: (PTA)     Skin Integrity: Intact             Mobility: Slightly limited   Weight Bearing Status: WBAT (Weight Bearing as Tolerated)                Lab Results   Component Value Date/Time    Glucose 149 (H) 08/09/2020 05:41 AM    Hemoglobin A1c 8.8 (H) 01/04/2013 03:49 AM    INR 1.0 08/09/2020 05:41 AM    INR 1.0 08/08/2020 02:32 AM    HGB 13.0 08/07/2020 03:23 AM    HGB 13.1 08/06/2020 03:09 AM        RECOMMENDATION     See After Visit Summary (AVS) for:  · Discharge instructions  · After [address]   · Special equipment needed (entered pre-discharge by Care Management)  · Medication Reconciliation    · Follow up Appointment(s)         Report given/sent by:   Braulio Archer                    Verbal report given to: Raj silva RN  FAXED to:  [id number]         Estimated discharge time: 8/10/2020 at 1530.            1540:  Pt has temp 100.4 oral.  Will treat with Tylenol. 1605:  Gave pt acetaminophen suppository for fever. Blood sugar 440, RN paged MD Everette Ashley. Awaiting response. 1612:  Contacted MD Ojeda re: blood sugar 440, fever 100.4 treated with tylenol suppository, O2 sats now in high 80s. MD ordered 10u Lispro for sugar correction, CXR (RN placed order), and 10mg decadron PO stat (RN placed order) and O2 support. RN informed MD that pt had consumed Ensure supplement at lunchtime. MD informed RN to cancel discharge. RN contacted PRABHAKAR Castro to cancel discharge, which CM will do. 1643:  RN placed pt on 4L O2 nasal cannula; pt O2 sats 99%. Lispro given, RN will recheck blood sugar at 1700. Pt confused but alert, watching tv. Denies pain. Pt set up for dinner. 1830:  Blood cultures, labs obtained and walked to lab.      1900:  Bedside shift change report given to Shin Almazan RN (oncoming nurse) by Liyah Mcneal RN (offgoing nurse). Report included the following information SBAR, Kardex, Intake/Output, MAR, Recent Results and Cardiac Rhythm Paced.

## 2020-08-10 NOTE — PROGRESS NOTES
GEOFFREY  Plan:    D/C - [address]   2nd IM Letter given  F/U appointment with PCP- Dr. Brady Hashimoto will arrange. [facility] transportation scheduled  at 3:00pm.    16:20  CM received a call from Nurse that pt was spiked a fever and blood sugar is elevated. The D/C has been cancelled. CM called [facility], spoke with Osiris with admission with  update on d/c plan. 13:55  CM discussed d/c plan with DaughterTorrey. Daughter had concerns about how the COVID unit is set up at the facility. CM encouraged daughter to call the unit to have her concerns addressed. CM reviewed 2nd IM Letter with daughter. Informing them of the right to appeal the d/c. Pt verbalized and signed a copy. A copy was provided to the pt and  a signed copy for bedside chart. Pt is cleared for d/c from CM standpoint. Transition of Care Plan to SNF/Rehab    SNF/Rehab Transition:  Patient has been accepted to [facility] and meets criteria for admission. Patient will transported by [facility] and expected to leave at 3:00pm.    Communication to Patient/Family:  Met with patient and DaughterTorrey and they are agreeable to the transition plan. Communication to SNF/Rehab:  Bedside RN, Paz Elliott, has been notified to update the transition plan to the facility and call report (phone number [phone]). Discharge information has been updated on the AVS.     Discharge instructions to be fax'd to facility at (Fax # [phone]). Nursing Please include all hard scripts for controlled substances, med rec and dc summary, and AVS in packet.      Reviewed and confirmed with facility, [facility], can manage the patient care needs for the following:     Jhoana Garcia with (X) only those applicable:    Medication:  [x]  Medications will be available at the facility  []  IV Antibiotics   []  Controlled Substance - hard copy to be sent with patient   []  Weekly Labs   Documents:  [] Hard RX  [] MAR  [] Kardex  [x] AVS  []Transfer Summary  [x]Discharge   Equipment:  []  CPAP/BiPAP  []  Wound Vacuum  []  Loyd or Urinary Device  []  PICC/Central Line  []  Nebulizer  []  Ventilator   Treatment:  [x]Isolation (for COVID 19.)  []Surgical Drain Management  []Tracheostomy Care  []Dressing Changes  []Dialysis with transportation and chair time n/a  []PEG Care  []Oxygen  []Daily Weights for Heart Failure   Dietary:  []Any diet limitations  []Tube Feedings   []Total Parenteral Management (TPN)   Eligible for Medicaid Long Term Services and Supports  Yes:  [] Eligible for medical assistance or will become eligible within 180 days and UAI completed. [] Provider/Patient and/or support system has requested screening. [] UAI copy provided to patient or responsible party,  [] UAI unavailable at discharge will send once processed to SNF provider. [] UAI unavailable at discharged mailed to patient  No:   [x] Private pay and is not financially eligible for Medicaid within the next 180 days. [] Reside out-of-state. [] A residents of a state owned/operated facility that is licensed  by [place] [address] [employer] or [facility]  [] Enrollment in [facility] hospice services  [] [address]  [] Patient /Family declines to have screening completed or provide financial information for screening     Financial Resources:  Medicaid    [] Initiated and application pending   [] Full coverage     Advanced Care Plan:  []Surrogate Decision Maker of Care  []POA  []Communicated Code Status \" Full\"   Other         Care Management Interventions  PCP Verified by CM:  Yes  Mode of Transport at Discharge: BLS  Transition of Care Consult (CM Consult): [address]  Discharge Durable Medical Equipment: (Patient has a rollator. )  Current Support Network: [facility] [address]  Confirm Follow Up Transport: Other (see comment)  Discharge [facility]  [facility]  Phone: [phone]

## 2020-08-10 NOTE — PROGRESS NOTES
Bedside shift change report given to Frank Olivier (oncoming nurse) by Luann YODER (offgoing nurse). Report included the following information SBAR, Kardex, Procedure Summary, Intake/Output, MAR, Recent Results and Cardiac Rhythm NSR.     1931: Pt in bed, watching tv. Temp 100.4. Will give tylenol and reduce blankets. No complaints of pain, no SOB, no dizziness. Pt is alert but confused. Will continue to monitor. 2043: Pt given tylenol crushed in apple sauce. Tolerated well.     8119: Pt in bed, watching tv. Temp 99.4. No complaints of pain, no SOB, no dizziness. Will continue to monitor. 0400: Pt temp 98.8. Pt in bed, sleeping.     0700: Report given to dayshift nurse.

## 2020-08-10 NOTE — PROGRESS NOTES
Hospitalist Progress Note    NAME: Zen Estrada   :  1945   MRN:  [id number]       Assessment / Plan:  New fever in the setting gof known Covid19 infection  Possible aspiration   Acute hypoxic respiratory failure, SpO2 88% on RA per RN  Anticipated discharge today however has to be canceled due to new onset of fever  Will repeat work up to include repeat CXR, obtain Bcx, UA  Repeat Covid19 test over the weekend  remains positive  Start decadron given new hypoxia per RN  Albuterol inh prn, mucinex  He completed rocephin/azithromycin today however given new fever, will need to be concern about aspiration. Will start zosyn  Cont' lovenox BID  Repeat CBC/BMP, procalcitonin  SLP to eval   O suppl prn    Sepsis with acute encephalopathy POA  Due to  COVID-19 PNA  POA  Sepsis with acute encephalopathy POA  Due to  COVID-19 PNA  POA   Chest x-ray left upper lobe airspace disease/opacity  CT head showed Interval development of multiple infarcts and atrophy as compared to the 2016 study.  No acute process identified by noncontrast CT. UA positive for glucose and ketones otherwise negative. CT chest/ap showed 2 focal patches of infiltrate in the upper lobes bilaterally suspicious for infectious process, however, the left upper lobe opacity could reflect neoplasm and close interval follow-up is recommended . Lactate 2.0, Procalcitonin 0.15, Fibrinogen 491, Ferritin 91, INR 1.0  COVID19 positive on PCR and rapid test.  Repeat test on  remains positive. Pt completed IV rocephin/azithromycin inpt. Will need repeat CT when he recovers from PNA.   I discussed this with patient's daughter Yonatan Ceja and she is aware that f/u CT chest needs to be done in 3-4 weeks.         RAPHAEL with dehydration POA  Holding Bumex, losartan metformin  Off fluids  Repeat bmp     Diabetes type 2 insulin-dependent POA-controlled  He received 60 units lantus the night of admission so BG was low throughout the first admission day.  He received treatment for hypoglycemia , although he was asymptomatic per documentation. Pt was then started on D5 gtt for >24 hrs. Daughter reports due to his dementia, pt forgets to eat often. He needs constant reminder to eat even during meals. He is at high risk for hypoglycemia with subsequent complications. Lantus has been discontinued since admission. In the setting of infection/new fever, his BG was elevated today ~400. Will add low dose NPH BID and titrate prn. Consider d/c lantus at discharge to reduce risk of hypoglycemia. Hypertension  CAD status post CABG  Hx of CVAs  Cont' coreg, asa/plavix and statin  Hold bumex/losartan  Increase amlodipine for better BP control    Alzheimer's/vascular dementia  Seizure disorder  con't PO keppra     8/5 updated daughter [facility]. 8/8 updated daughter [facility]. Questions/concerns answered. 8/10: updated both daughter [facility] and Rochelle James today     Code Status: Full code  Surrogate Decision Maker: Daughter   DVT Prophylaxis: Subcu Lovenox  GI Prophylaxis: not indicated   Baseline: Patient is a long-term resident at [facility], has baseline vascular/Alzheimer's dementia but is conversant at baseline per nursing home staff     Subjective:     Chief Complaint / Reason for Physician Visit  Pt seen, NAD. Confused but unchanged from previous day. Noted new fever. RN reports hypoxia on RA with SpO2 88% on RA. Poor PO intake today. Discussed with RN events overnight. Review of Systems:  Symptom Y/N Comments  Symptom Y/N Comments   Fever/Chills    Chest Pain     Poor Appetite    Edema     Cough    Abdominal Pain     Sputum    Joint Pain     SOB/WEEKS    Pruritis/Rash     Nausea/vomit    Tolerating PT/OT     Diarrhea    Tolerating Diet     Constipation    Other       Could NOT obtain due to: dementia     Objective:     VITALS:   Last 24hrs VS reviewed since prior progress note.  Most recent are:  Patient Vitals for the past 24 hrs:   Temp Pulse Resp BP SpO2 08/10/20 1538 100.4 °F (38 °C) 95 18 166/68 91 %   08/10/20 1040 97.6 °F (36.4 °C) 90 18 122/76 90 %   08/10/20 0800 99.1 °F (37.3 °C) 87 18 149/69 92 %   08/10/20 0727 -- -- -- -- 92 %   08/10/20 0400 98.8 °F (37.1 °C) 93 18 164/89 94 %   08/09/20 2356 99.4 °F (37.4 °C) 74 18 145/68 93 %   08/09/20 1931 100.4 °F (38 °C) 79 16 152/73 92 %   08/09/20 1700 98.7 °F (37.1 °C) 78 18 164/70 92 %       Intake/Output Summary (Last 24 hours) at 8/10/2020 1628  Last data filed at 8/10/2020 1040  Gross per 24 hour   Intake 75 ml   Output 950 ml   Net -875 ml        PHYSICAL EXAM:  General: Elderly male awake in bed, NAD   EENT:  EOMI. Anicteric sclerae. MMM  Resp:  CTA bilaterally, no wheezing or rales. No accessory muscle use  CV:  Regular  rhythm,  No edema  GI:  Soft, ND, NT.  +BS  Neurologic:  Moving all exts. awake, followed some commands  Psych:   Not anxious or agitated  Skin:  No rashes. No jaundice    Reviewed most current lab test results and cultures  YES  Reviewed most current radiology test results   YES  Review and summation of old records today    NO  Reviewed patient's current orders and MAR    YES  PMH/ reviewed - no change compared to H&P  ________________________________________________________________________  Care Plan discussed with:    Comments   Patient x    Family  x daughters   RN x    Care Manager     Consultant                        Multidiciplinary team rounds were held today with , nursing, pharmacist and clinical coordinator. Patient's plan of care was discussed; medications were reviewed and discharge planning was addressed.      ________________________________________________________________________  Total NON critical care TIME:  45   Minutes    Total CRITICAL CARE TIME Spent:   Minutes non procedure based      Comments   >50% of visit spent in counseling and coordination of care     ________________________________________________________________________  Horace Less, MD Procedures: see electronic medical records for all procedures/Xrays and details which were not copied into this note but were reviewed prior to creation of Plan. LABS:  I reviewed today's most current labs and imaging studies. Pertinent labs include:  No results for input(s): WBC, HGB, HCT, PLT, HGBEXT, HCTEXT, PLTEXT, HGBEXT, HCTEXT, PLTEXT in the last 72 hours.   Recent Labs     08/09/20  0541 08/08/20  0232     --    K 4.1  --    *  --    CO2 21  --    *  --    BUN 26*  --    CREA 1.39*  --    CA 8.8  --    INR 1.0 1.0       Signed: Sanjiv Zheng MD

## 2020-08-11 ENCOUNTER — APPOINTMENT (OUTPATIENT)
Dept: GENERAL RADIOLOGY | Age: 75
DRG: 871 | End: 2020-08-11
Attending: INTERNAL MEDICINE
Payer: MEDICARE

## 2020-08-11 LAB
ANION GAP SERPL CALC-SCNC: 7 MMOL/L (ref 5–15)
APPEARANCE UR: CLEAR
APTT PPP: 31 SEC (ref 22.1–32)
BACTERIA URNS QL MICRO: NEGATIVE /HPF
BASOPHILS # BLD: 0 K/UL (ref 0–0.1)
BASOPHILS NFR BLD: 0 % (ref 0–1)
BILIRUB UR QL: NEGATIVE
BUN SERPL-MCNC: 49 MG/DL (ref 6–20)
BUN/CREAT SERPL: 30 (ref 12–20)
CALCIUM SERPL-MCNC: 9 MG/DL (ref 8.5–10.1)
CHLORIDE SERPL-SCNC: 116 MMOL/L (ref 97–108)
CO2 SERPL-SCNC: 21 MMOL/L (ref 21–32)
COLOR UR: ABNORMAL
CREAT SERPL-MCNC: 1.61 MG/DL (ref 0.7–1.3)
DIFFERENTIAL METHOD BLD: ABNORMAL
EOSINOPHIL # BLD: 0 K/UL (ref 0–0.4)
EOSINOPHIL NFR BLD: 0 % (ref 0–7)
EPITH CASTS URNS QL MICRO: ABNORMAL /LPF
ERYTHROCYTE [DISTWIDTH] IN BLOOD BY AUTOMATED COUNT: 13.7 % (ref 11.5–14.5)
FIBRINOGEN PPP-MCNC: >800 MG/DL (ref 200–475)
GLUCOSE BLD STRIP.AUTO-MCNC: 257 MG/DL (ref 65–100)
GLUCOSE BLD STRIP.AUTO-MCNC: 340 MG/DL (ref 65–100)
GLUCOSE BLD STRIP.AUTO-MCNC: 355 MG/DL (ref 65–100)
GLUCOSE BLD STRIP.AUTO-MCNC: 383 MG/DL (ref 65–100)
GLUCOSE SERPL-MCNC: 340 MG/DL (ref 65–100)
GLUCOSE UR STRIP.AUTO-MCNC: >1000 MG/DL
GRAN CASTS URNS QL MICRO: ABNORMAL /LPF
HCT VFR BLD AUTO: 39.1 % (ref 36.6–50.3)
HGB BLD-MCNC: 12.1 G/DL (ref 12.1–17)
HGB UR QL STRIP: NEGATIVE
HYALINE CASTS URNS QL MICRO: ABNORMAL /LPF (ref 0–5)
IMM GRANULOCYTES # BLD AUTO: 0.3 K/UL (ref 0–0.04)
IMM GRANULOCYTES NFR BLD AUTO: 2 % (ref 0–0.5)
INR PPP: 1 (ref 0.9–1.1)
KETONES UR QL STRIP.AUTO: 15 MG/DL
LEUKOCYTE ESTERASE UR QL STRIP.AUTO: NEGATIVE
LYMPHOCYTES # BLD: 0.6 K/UL (ref 0.8–3.5)
LYMPHOCYTES NFR BLD: 5 % (ref 12–49)
MCH RBC QN AUTO: 26.8 PG (ref 26–34)
MCHC RBC AUTO-ENTMCNC: 30.9 G/DL (ref 30–36.5)
MCV RBC AUTO: 86.5 FL (ref 80–99)
MONOCYTES # BLD: 0.3 K/UL (ref 0–1)
MONOCYTES NFR BLD: 2 % (ref 5–13)
NEUTS SEG # BLD: 11.5 K/UL (ref 1.8–8)
NEUTS SEG NFR BLD: 91 % (ref 32–75)
NITRITE UR QL STRIP.AUTO: NEGATIVE
NRBC # BLD: 0 K/UL (ref 0–0.01)
NRBC BLD-RTO: 0 PER 100 WBC
PH UR STRIP: 5.5 [PH] (ref 5–8)
PLATELET # BLD AUTO: 226 K/UL (ref 150–400)
PMV BLD AUTO: 11 FL (ref 8.9–12.9)
POTASSIUM SERPL-SCNC: 4.4 MMOL/L (ref 3.5–5.1)
PROT UR STRIP-MCNC: 300 MG/DL
PROTHROMBIN TIME: 10.3 SEC (ref 9–11.1)
RBC # BLD AUTO: 4.52 M/UL (ref 4.1–5.7)
RBC #/AREA URNS HPF: ABNORMAL /HPF (ref 0–5)
RBC MORPH BLD: ABNORMAL
SERVICE CMNT-IMP: ABNORMAL
SODIUM SERPL-SCNC: 144 MMOL/L (ref 136–145)
SP GR UR REFRACTOMETRY: 1.02 (ref 1–1.03)
THERAPEUTIC RANGE,PTTT: NORMAL SECS (ref 58–77)
UA: UC IF INDICATED,UAUC: ABNORMAL
UROBILINOGEN UR QL STRIP.AUTO: 0.2 EU/DL (ref 0.2–1)
WBC # BLD AUTO: 12.7 K/UL (ref 4.1–11.1)
WBC URNS QL MICRO: ABNORMAL /HPF (ref 0–4)

## 2020-08-11 PROCEDURE — 74011636637 HC RX REV CODE- 636/637: Performed by: INTERNAL MEDICINE

## 2020-08-11 PROCEDURE — 82962 GLUCOSE BLOOD TEST: CPT

## 2020-08-11 PROCEDURE — 74011250637 HC RX REV CODE- 250/637: Performed by: INTERNAL MEDICINE

## 2020-08-11 PROCEDURE — 77010033678 HC OXYGEN DAILY

## 2020-08-11 PROCEDURE — 85025 COMPLETE CBC W/AUTO DIFF WBC: CPT

## 2020-08-11 PROCEDURE — 74011000258 HC RX REV CODE- 258: Performed by: INTERNAL MEDICINE

## 2020-08-11 PROCEDURE — 85610 PROTHROMBIN TIME: CPT

## 2020-08-11 PROCEDURE — 80048 BASIC METABOLIC PNL TOTAL CA: CPT

## 2020-08-11 PROCEDURE — 81001 URINALYSIS AUTO W/SCOPE: CPT

## 2020-08-11 PROCEDURE — 36415 COLL VENOUS BLD VENIPUNCTURE: CPT

## 2020-08-11 PROCEDURE — 85730 THROMBOPLASTIN TIME PARTIAL: CPT

## 2020-08-11 PROCEDURE — 92610 EVALUATE SWALLOWING FUNCTION: CPT

## 2020-08-11 PROCEDURE — 94640 AIRWAY INHALATION TREATMENT: CPT

## 2020-08-11 PROCEDURE — 65660000000 HC RM CCU STEPDOWN

## 2020-08-11 PROCEDURE — 71045 X-RAY EXAM CHEST 1 VIEW: CPT

## 2020-08-11 PROCEDURE — 74011250636 HC RX REV CODE- 250/636: Performed by: INTERNAL MEDICINE

## 2020-08-11 PROCEDURE — 85384 FIBRINOGEN ACTIVITY: CPT

## 2020-08-11 RX ORDER — INSULIN LISPRO 100 [IU]/ML
10 INJECTION, SOLUTION INTRAVENOUS; SUBCUTANEOUS ONCE
Status: COMPLETED | OUTPATIENT
Start: 2020-08-11 | End: 2020-08-11

## 2020-08-11 RX ADMIN — TOPIRAMATE 100 MG: 100 TABLET, FILM COATED ORAL at 08:51

## 2020-08-11 RX ADMIN — INSULIN LISPRO 4 UNITS: 100 INJECTION, SOLUTION INTRAVENOUS; SUBCUTANEOUS at 17:10

## 2020-08-11 RX ADMIN — INSULIN LISPRO 10 UNITS: 100 INJECTION, SOLUTION INTRAVENOUS; SUBCUTANEOUS at 23:35

## 2020-08-11 RX ADMIN — INSULIN LISPRO 3 UNITS: 100 INJECTION, SOLUTION INTRAVENOUS; SUBCUTANEOUS at 08:51

## 2020-08-11 RX ADMIN — ALBUTEROL SULFATE 1 PUFF: 90 AEROSOL, METERED RESPIRATORY (INHALATION) at 08:30

## 2020-08-11 RX ADMIN — ALBUTEROL SULFATE 1 PUFF: 90 AEROSOL, METERED RESPIRATORY (INHALATION) at 19:12

## 2020-08-11 RX ADMIN — HUMAN INSULIN 15 UNITS: 100 INJECTION, SUSPENSION SUBCUTANEOUS at 17:10

## 2020-08-11 RX ADMIN — LEVETIRACETAM 750 MG: 500 TABLET ORAL at 08:51

## 2020-08-11 RX ADMIN — DEXAMETHASONE 6 MG: 4 TABLET ORAL at 08:51

## 2020-08-11 RX ADMIN — TOPIRAMATE 100 MG: 100 TABLET, FILM COATED ORAL at 17:10

## 2020-08-11 RX ADMIN — Medication 10 ML: at 04:06

## 2020-08-11 RX ADMIN — Medication 10 ML: at 21:25

## 2020-08-11 RX ADMIN — PIPERACILLIN AND TAZOBACTAM 3.38 G: 3; .375 INJECTION, POWDER, LYOPHILIZED, FOR SOLUTION INTRAVENOUS at 08:52

## 2020-08-11 RX ADMIN — LACOSAMIDE 100 MG: 100 TABLET, FILM COATED ORAL at 21:26

## 2020-08-11 RX ADMIN — CLOPIDOGREL BISULFATE 75 MG: 75 TABLET, FILM COATED ORAL at 08:51

## 2020-08-11 RX ADMIN — ALBUTEROL SULFATE 1 PUFF: 90 AEROSOL, METERED RESPIRATORY (INHALATION) at 13:15

## 2020-08-11 RX ADMIN — INSULIN LISPRO 2 UNITS: 100 INJECTION, SOLUTION INTRAVENOUS; SUBCUTANEOUS at 21:25

## 2020-08-11 RX ADMIN — AMLODIPINE BESYLATE 10 MG: 5 TABLET ORAL at 08:51

## 2020-08-11 RX ADMIN — LEVETIRACETAM 750 MG: 500 TABLET ORAL at 21:26

## 2020-08-11 RX ADMIN — GUAIFENESIN 600 MG: 600 TABLET, EXTENDED RELEASE ORAL at 21:25

## 2020-08-11 RX ADMIN — PRAVASTATIN SODIUM 40 MG: 40 TABLET ORAL at 21:26

## 2020-08-11 RX ADMIN — Medication 10 ML: at 14:00

## 2020-08-11 RX ADMIN — GUAIFENESIN 600 MG: 600 TABLET, EXTENDED RELEASE ORAL at 08:51

## 2020-08-11 RX ADMIN — LACOSAMIDE 100 MG: 100 TABLET, FILM COATED ORAL at 08:51

## 2020-08-11 RX ADMIN — ENOXAPARIN SODIUM 30 MG: 30 INJECTION SUBCUTANEOUS at 17:10

## 2020-08-11 RX ADMIN — ALLOPURINOL 100 MG: 100 TABLET ORAL at 08:51

## 2020-08-11 RX ADMIN — CARVEDILOL 6.25 MG: 6.25 TABLET, FILM COATED ORAL at 17:11

## 2020-08-11 RX ADMIN — PIPERACILLIN AND TAZOBACTAM 3.38 G: 3; .375 INJECTION, POWDER, LYOPHILIZED, FOR SOLUTION INTRAVENOUS at 17:38

## 2020-08-11 RX ADMIN — CARVEDILOL 6.25 MG: 6.25 TABLET, FILM COATED ORAL at 08:51

## 2020-08-11 RX ADMIN — ENOXAPARIN SODIUM 30 MG: 30 INJECTION SUBCUTANEOUS at 04:00

## 2020-08-11 RX ADMIN — ALBUTEROL SULFATE 1 PUFF: 90 AEROSOL, METERED RESPIRATORY (INHALATION) at 01:16

## 2020-08-11 RX ADMIN — ASPIRIN 81 MG CHEWABLE TABLET 81 MG: 81 TABLET CHEWABLE at 08:51

## 2020-08-11 RX ADMIN — HUMAN INSULIN 8 UNITS: 100 INJECTION, SUSPENSION SUBCUTANEOUS at 08:50

## 2020-08-11 RX ADMIN — PIPERACILLIN AND TAZOBACTAM 3.38 G: 3; .375 INJECTION, POWDER, LYOPHILIZED, FOR SOLUTION INTRAVENOUS at 00:28

## 2020-08-11 RX ADMIN — INSULIN LISPRO 10 UNITS: 100 INJECTION, SOLUTION INTRAVENOUS; SUBCUTANEOUS at 12:08

## 2020-08-11 NOTE — PROGRESS NOTES
Speech path   Acknowledge consult to evaluate this patient. Since his is covid positive he will be seen at the end of the day.    Joanne Pedersen, SLP

## 2020-08-11 NOTE — PROGRESS NOTES
1125: Notified Dr. Amina Ramey regarding patient's BG of 383. Awaiting for an order. 1142: Received order to administer 10 units of lispro.

## 2020-08-11 NOTE — PROGRESS NOTES
Bedside and Verbal shift change report given to Jarvis Granados (oncoming nurse) by Angela Talbert (offgoing nurse). Report included the following information SBAR, Kardex, Procedure Summary, Intake/Output, MAR and Med Rec Status. 2105: Tylenol given for temperature of 99.9    0000: Patient sleeping, vital signs stable, temperature down. 0400: Patient continues to sleep comfortably, repositioned in bed, bed pad changed.

## 2020-08-11 NOTE — PROGRESS NOTES
Problem: Dysphagia (Adult)  Goal: *Acute Goals and Plan of Care (Insert Text)  Description: 8/11/2020  Speech path goals  1. patient will tolerate purees and thins with no overt s/s of aspiration. 2. patient will tolerate soft solids with no overt s/s of aspiration. Outcome: Not Met   SPEECH LANGUAGE PATHOLOGY BEDSIDE SWALLOW EVALUATION  Patient: Hai Lozano (42 y.o. male)  Date: 8/11/2020  Primary Diagnosis: Acute encephalopathy [G93.40]  Sepsis ([facility] [A41.9]  Left upper lobe pneumonia [J18.9]  Suspected COVID-19 virus infection [Z20.828]  RAPHAEL (acute kidney injury) ([facility] [N17.9]        Precautions:        ASSESSMENT :  Based on the objective data described below, the patient presents with functional swallow of thins and purees. Mildly slow oral phase and two swallows per bolus most likely due to residue. No coughing or s/s of aspiration. There was oral residue from lunch with pieces of ground beef that had to be cleaned out. He winced as if oral care was painful. ?     Patient will benefit from skilled intervention to address the above impairments. Patients rehabilitation potential is considered to be Fair     PLAN :  Recommendations and Planned Interventions:  Recommend purees and thins  Frequency/Duration: Patient will be followed by speech-language pathology 3 times a week to address goals. Discharge Recommendations: To Be Determined     SUBJECTIVE:   Patient was alert, moaning.      OBJECTIVE:     Past Medical History:   Diagnosis Date    Allergic rhinitis, cause unspecified 4/18/2011    Alzheimer's disease     Arthritis     GOUT    Cancer ([facility] 09/20/2016    PROSTATE    Chronic obstructive pulmonary disease ([facility]     CHRONIC BRONCHITIS    Congestive heart failure, unspecified 4/18/2011    (ON 9/20/16 PT & DTR STATE THEY DON'T REMEMBER THIS DIAGNOSIS)    Coronary Artery Disease 4/18/2011    Diabetes Mellitus Type I 4/18/2011    Erectile Dysfunction 4/18/2011    Hemorrhoids 4/18/2011    Hypertension Seizures ([facility]     STARTED 2005; \"BLACK-OUT Shakira Roa", DTR SATES    Stroke ([facility]     MULTIPLE MINISTROKES, DTR REPORTS; LEFT-SIDED WEAKNESS    Thromboembolus ([facility]     left leg  (NO PE)    Unspecified asthma(493.90) 4/18/2011     Past Surgical History:   Procedure Laterality Date    CARDIAC SURG PROCEDURE UNLIST  2006    CABG    COLONOSCOPY N/A 10/9/2017    COLONOSCOPY performed by Marylou Camacho MD at [address] [place] [address], STENTS LEFT LEG     Prior Level of Function/Home Situation:      Diet prior to admission:   Current Diet:     Cognitive and Communication Status:  Neurologic State: Alert, Confused  Orientation Level: Oriented to person  Cognition: Decreased attention/concentration, Decreased command following           Oral Assessment:  Oral Assessment  Labial: No impairment  Dentition: Natural;Limited  Lingual: No impairment  Velum: Unable to visualize  Mandible: No impairment  P.O. Trials:  Patient Position: upright in bed  Vocal quality prior to P.O.: No impairment  Consistency Presented: Thin liquid;Puree  How Presented: Straw;Self-fed/presented;SLP-fed/presented;Spoon     Bolus Acceptance: No impairment  Bolus Formation/Control: No impairment     Propulsion: No impairment  Oral Residue: None  Initiation of Swallow: Delayed (# of seconds)  Laryngeal Elevation: Decreased     Pharyngeal Phase Characteristics: Double swallow; Suspected pharyngeal residue             Oral Phase Severity: Mild  Pharyngeal Phase Severity : Mild    NOMS:   The NOMS functional outcome measure was used to quantify this patient's level of swallowing impairment.   Based on the NOMS, the patient was determined to be at level 4 for swallow function       NOMS Swallowing Levels:  Level 1 (CN): NPO  Level 2 (CM): NPO but takes consistency in therapy  Level 3 (CL): Takes less than 50% of nutrition p.o. and continues with nonoral feedings; and/or safe with mod cues; and/or max diet restriction  Level 4 (CK): Safe swallow but needs mod cues; and/or mod diet restriction; and/or still requires some nonoral feeding/supplements  Level 5 (CJ): Safe swallow with min diet restriction; and/or needs min cues  Level 6 (CI): Independent with p.o.; rare cues; usually self cues; may need to avoid some foods or needs extra time  Level 7 ([address] [place] [address]): Independent for all p.o.  SHAREE. (2003). National Outcomes Measurement System (NOMS): Adult Speech-Language Pathology User's Guide. Pain:  Pain Scale 1: Numeric (0 - 10)  Pain Intensity 1: 0       After treatment:   Patient left in no apparent distress in bed and Call bell within reach    COMMUNICATION/EDUCATION:   The patient's plan of care including recommendations, planned interventions, and recommended diet changes were discussed with: Registered nurse. Patient is unable to participate in goal setting and plan of care.     Thank you for this referral.  BRYAN Fair  Time Calculation: 15 mins

## 2020-08-11 NOTE — PROGRESS NOTES
Hospitalist Progress Note    NAME: Joe Lauren   :  1945   MRN:  [id number]       Assessment / Plan:  New fever noted 8/10  Likely due to NEW RUL PNA-  in the setting of known Covid19 infection POA  Possible aspiration? Acute hypoxic respiratory failure not POA (8/10) SpO2 88% on RA per RN- stable on 2-3L/m oxygen now  Anticipated discharge 8/10 that was canceled due to new onset of fever  repeat CXR revealed NEW RUL PNA  Repeat Blood Cx pending  UA neg  Repeat Covid19 test over the weekend  remains positive    Cont decadron started  given new hypoxia per RN  Albuterol inh prn, mucinex  He completed rocephin/azithromycin  however given new fever, will need to be concern about aspiration. cont zosyn for now (started 8/10)  Cont' lovenox BID  Repeat CBC/BMP, procalcitonin  SLP to eval   O suppl prn    Sepsis with acute encephalopathy POA  Due to  COVID-19 PNA  POA  Sepsis with acute encephalopathy POA  Due to  COVID-19 PNA  POA   Chest x-ray left upper lobe airspace disease/opacity  CT head showed Interval development of multiple infarcts and atrophy as compared to the 2016 study.  No acute process identified by noncontrast CT. UA positive for glucose and ketones otherwise negative. CT chest/ap showed 2 focal patches of infiltrate in the upper lobes bilaterally suspicious for infectious process, however, the left upper lobe opacity could reflect neoplasm and close interval follow-up is recommended . Lactate 2.0, Procalcitonin 0.15, Fibrinogen 491, Ferritin 91, INR 1.0  COVID19 positive on PCR and rapid test.  Repeat test on  remains positive. Pt completed IV rocephin/azithromycin inpt. Will need repeat CT when he recovers from PNA.   I discussed this with patient's daughter Taz Coppola and she is aware that f/u CT chest needs to be done in 3-4 weeks.         RAPHAEL with dehydration POA  Holding Bumex, losartan metformin  Off fluids  Repeat bmp     Diabetes type 2 insulin-dependent POA-controlled  He received 60 units lantus the night of admission so BG was low throughout the first admission day. [facility] received [facility] for hypoglycemia , although he was asymptomatic per documentation. Pt was then started on D5 gtt for >24 hrs. Daughter reports due to his dementia, pt forgets to eat often. He needs constant reminder to eat even during meals. He is at high risk for hypoglycemia with subsequent complications. Lantus has been discontinued since admission. In the setting of infection/new fever, his BG was elevated now  - was started on low dose NPH BID 8/10- will increase to 15 units  Consider d/c lantus at discharge to reduce risk of hypoglycemia. Hypertension  CAD status post CABG  Hx of CVAs  Cont' coreg, asa/plavix and statin  Hold bumex/losartan  Increase amlodipine for better BP control    Alzheimer's/vascular dementia  Seizure disorder  con't PO keppra     Both daughter Patricia Abbott and Kaylin Carroll last updated 8/10     Code Status: Full code  Surrogate Decision Maker: Daughter   DVT Prophylaxis: Subcu Lovenox  GI Prophylaxis: not indicated   Baseline: [facility] has baseline vascular/Alzheimer's dementia but is conversant at baseline per nursing home staff -   [facility] in 24-48 hrs once off oxygen & no more fevers     Subjective:     Chief Complaint / Reason for Physician Visit  Pt seen, NAD. Confused but unchanged from previous day. Noted new fever. RN reports hypoxia on RA with SpO2 88% on RA. Poor PO intake today. Discussed with RN events overnight.      Review of Systems:  Symptom Y/N Comments  Symptom Y/N Comments   Fever/Chills    Chest Pain     Poor Appetite    Edema     Cough    Abdominal Pain     Sputum    Joint Pain     SOB/WEEKS    Pruritis/Rash     Nausea/vomit    Tolerating PT/OT     Diarrhea    Tolerating Diet     Constipation    Other       Could NOT obtain due to: dementia     Objective:     VITALS:   Last 24hrs VS reviewed since prior progress note. Most recent are:  Patient Vitals for the past 24 hrs:   Temp Pulse Resp BP SpO2   08/11/20 1315 -- -- -- -- 93 %   08/11/20 1112 97.6 °F (36.4 °C) 76 16 103/61 94 %   08/11/20 0830 -- -- -- -- 92 %   08/11/20 0800 97.5 °F (36.4 °C) 71 14 135/71 96 %   08/11/20 0700 -- 69 -- 123/65 93 %   08/11/20 0410 97.5 °F (36.4 °C) 71 16 113/55 94 %   08/11/20 0116 -- -- -- -- 95 %   08/11/20 0000 97 °F (36.1 °C) 66 17 110/64 96 %   08/10/20 2027 99.9 °F (37.7 °C) 74 17 122/68 96 %   08/10/20 1912 -- -- -- -- 94 %   08/10/20 1641 99.3 °F (37.4 °C) 96 18 159/74 95 %   08/10/20 1627 -- 97 -- 159/74 --   08/10/20 1538 100.4 °F (38 °C) 95 18 166/68 91 %       Intake/Output Summary (Last 24 hours) at 8/11/2020 1441  Last data filed at 8/11/2020 3915  Gross per 24 hour   Intake 420 ml   Output 700 ml   Net -280 ml        PHYSICAL EXAM:  General: Elderly male awake in bed, NAD   EENT:  EOMI. Anicteric sclerae. MMM  Resp:  CTA bilaterally, no wheezing or rales. No accessory muscle use  CV:  Regular  rhythm,  No edema  GI:  Soft, ND, NT.  +BS  Neurologic:  Moving all exts. awake, followed some commands  Psych:   Not anxious or agitated  Skin:  No rashes. No jaundice    Reviewed most current lab test results and cultures  YES  Reviewed most current radiology test results   YES  Review and summation of old records today    NO  Reviewed patient's current orders and MAR    YES  PMH/SH reviewed - no change compared to H&P  ________________________________________________________________________  Care Plan discussed with:    Comments   Patient x    Family      RN x    Care Manager     Consultant                        Multidiciplinary team rounds were held today with , nursing, pharmacist and clinical coordinator. Patient's plan of care was discussed; medications were reviewed and discharge planning was addressed.      ________________________________________________________________________  Total NON critical care TIME:  36   Minutes    Total CRITICAL CARE TIME Spent:   Minutes non procedure based      Comments   >50% of visit spent in counseling and coordination of care     ________________________________________________________________________  Chance Caballero MD     Procedures: see electronic medical records for all procedures/Xrays and details which were not copied into this note but were reviewed prior to creation of Plan. LABS:  I reviewed today's most current labs and imaging studies.   Pertinent labs include:  Recent Labs     08/11/20  0307 08/10/20  1829   WBC 12.7* 10.7   HGB 12.1 12.8   HCT 39.1 41.5    222     Recent Labs     08/11/20  0307 08/10/20  1829 08/09/20  0541     --  143   K 4.4  --  4.1   *  --  114*   CO2 21  --  21   *  --  149*   BUN 49*  --  26*   CREA 1.61*  --  1.39*   CA 9.0  --  8.8   INR 1.0 1.0 1.0       Signed: Chance Caballero MD

## 2020-08-11 NOTE — PROGRESS NOTES
Verbal shift change report given to Elvis Archer (oncoming nurse) by Gentry Scherer (offgoing nurse). Report included the following information SBAR.

## 2020-08-12 LAB
ANION GAP SERPL CALC-SCNC: 7 MMOL/L (ref 5–15)
APTT PPP: 27.9 SEC (ref 22.1–32)
BUN SERPL-MCNC: 50 MG/DL (ref 6–20)
BUN/CREAT SERPL: 37 (ref 12–20)
CALCIUM SERPL-MCNC: 8.8 MG/DL (ref 8.5–10.1)
CHLORIDE SERPL-SCNC: 122 MMOL/L (ref 97–108)
CO2 SERPL-SCNC: 23 MMOL/L (ref 21–32)
CREAT SERPL-MCNC: 1.34 MG/DL (ref 0.7–1.3)
ERYTHROCYTE [DISTWIDTH] IN BLOOD BY AUTOMATED COUNT: 14.1 % (ref 11.5–14.5)
FIBRINOGEN PPP-MCNC: 688 MG/DL (ref 200–475)
GLUCOSE BLD STRIP.AUTO-MCNC: 145 MG/DL (ref 65–100)
GLUCOSE BLD STRIP.AUTO-MCNC: 180 MG/DL (ref 65–100)
GLUCOSE BLD STRIP.AUTO-MCNC: 210 MG/DL (ref 65–100)
GLUCOSE BLD STRIP.AUTO-MCNC: 302 MG/DL (ref 65–100)
GLUCOSE SERPL-MCNC: 145 MG/DL (ref 65–100)
HCT VFR BLD AUTO: 44.1 % (ref 36.6–50.3)
HGB BLD-MCNC: 13.6 G/DL (ref 12.1–17)
INR PPP: 1 (ref 0.9–1.1)
MCH RBC QN AUTO: 26.9 PG (ref 26–34)
MCHC RBC AUTO-ENTMCNC: 30.8 G/DL (ref 30–36.5)
MCV RBC AUTO: 87.2 FL (ref 80–99)
NRBC # BLD: 0 K/UL (ref 0–0.01)
NRBC BLD-RTO: 0 PER 100 WBC
PLATELET # BLD AUTO: 262 K/UL (ref 150–400)
PMV BLD AUTO: 11.5 FL (ref 8.9–12.9)
POTASSIUM SERPL-SCNC: 4.1 MMOL/L (ref 3.5–5.1)
PROTHROMBIN TIME: 10.3 SEC (ref 9–11.1)
RBC # BLD AUTO: 5.06 M/UL (ref 4.1–5.7)
SERVICE CMNT-IMP: ABNORMAL
SODIUM SERPL-SCNC: 152 MMOL/L (ref 136–145)
THERAPEUTIC RANGE,PTTT: NORMAL SECS (ref 58–77)
WBC # BLD AUTO: 16.6 K/UL (ref 4.1–11.1)

## 2020-08-12 PROCEDURE — 74011250637 HC RX REV CODE- 250/637: Performed by: INTERNAL MEDICINE

## 2020-08-12 PROCEDURE — 65660000000 HC RM CCU STEPDOWN

## 2020-08-12 PROCEDURE — 74011000258 HC RX REV CODE- 258: Performed by: INTERNAL MEDICINE

## 2020-08-12 PROCEDURE — 80048 BASIC METABOLIC PNL TOTAL CA: CPT

## 2020-08-12 PROCEDURE — 36415 COLL VENOUS BLD VENIPUNCTURE: CPT

## 2020-08-12 PROCEDURE — 82962 GLUCOSE BLOOD TEST: CPT

## 2020-08-12 PROCEDURE — 85610 PROTHROMBIN TIME: CPT

## 2020-08-12 PROCEDURE — 85730 THROMBOPLASTIN TIME PARTIAL: CPT

## 2020-08-12 PROCEDURE — 74011636637 HC RX REV CODE- 636/637: Performed by: INTERNAL MEDICINE

## 2020-08-12 PROCEDURE — 85384 FIBRINOGEN ACTIVITY: CPT

## 2020-08-12 PROCEDURE — 74011250636 HC RX REV CODE- 250/636: Performed by: INTERNAL MEDICINE

## 2020-08-12 PROCEDURE — 85027 COMPLETE CBC AUTOMATED: CPT

## 2020-08-12 PROCEDURE — 77010033678 HC OXYGEN DAILY

## 2020-08-12 PROCEDURE — 94640 AIRWAY INHALATION TREATMENT: CPT

## 2020-08-12 RX ORDER — DEXAMETHASONE 0.5 MG/1
2 TABLET ORAL DAILY
Status: DISCONTINUED | OUTPATIENT
Start: 2020-08-13 | End: 2020-08-13

## 2020-08-12 RX ORDER — SODIUM CHLORIDE 450 MG/100ML
75 INJECTION, SOLUTION INTRAVENOUS CONTINUOUS
Status: DISPENSED | OUTPATIENT
Start: 2020-08-12 | End: 2020-08-12

## 2020-08-12 RX ADMIN — PRAVASTATIN SODIUM 40 MG: 40 TABLET ORAL at 23:04

## 2020-08-12 RX ADMIN — LACOSAMIDE 100 MG: 100 TABLET, FILM COATED ORAL at 08:43

## 2020-08-12 RX ADMIN — LEVETIRACETAM 750 MG: 500 TABLET ORAL at 08:43

## 2020-08-12 RX ADMIN — ALBUTEROL SULFATE 1 PUFF: 90 AEROSOL, METERED RESPIRATORY (INHALATION) at 01:22

## 2020-08-12 RX ADMIN — Medication 10 ML: at 05:26

## 2020-08-12 RX ADMIN — CARVEDILOL 6.25 MG: 6.25 TABLET, FILM COATED ORAL at 08:44

## 2020-08-12 RX ADMIN — PIPERACILLIN AND TAZOBACTAM 3.38 G: 3; .375 INJECTION, POWDER, LYOPHILIZED, FOR SOLUTION INTRAVENOUS at 08:45

## 2020-08-12 RX ADMIN — ALLOPURINOL 100 MG: 100 TABLET ORAL at 08:43

## 2020-08-12 RX ADMIN — GUAIFENESIN 600 MG: 600 TABLET, EXTENDED RELEASE ORAL at 08:43

## 2020-08-12 RX ADMIN — PIPERACILLIN AND TAZOBACTAM 3.38 G: 3; .375 INJECTION, POWDER, LYOPHILIZED, FOR SOLUTION INTRAVENOUS at 01:05

## 2020-08-12 RX ADMIN — ALBUTEROL SULFATE 1 PUFF: 90 AEROSOL, METERED RESPIRATORY (INHALATION) at 08:08

## 2020-08-12 RX ADMIN — CLOPIDOGREL BISULFATE 75 MG: 75 TABLET, FILM COATED ORAL at 08:44

## 2020-08-12 RX ADMIN — LACOSAMIDE 100 MG: 100 TABLET, FILM COATED ORAL at 23:05

## 2020-08-12 RX ADMIN — DEXAMETHASONE 6 MG: 4 TABLET ORAL at 08:44

## 2020-08-12 RX ADMIN — SODIUM CHLORIDE 75 ML/HR: 450 INJECTION, SOLUTION INTRAVENOUS at 11:00

## 2020-08-12 RX ADMIN — ALBUTEROL SULFATE 1 PUFF: 90 AEROSOL, METERED RESPIRATORY (INHALATION) at 13:14

## 2020-08-12 RX ADMIN — AMLODIPINE BESYLATE 10 MG: 5 TABLET ORAL at 08:44

## 2020-08-12 RX ADMIN — ENOXAPARIN SODIUM 30 MG: 30 INJECTION SUBCUTANEOUS at 05:26

## 2020-08-12 RX ADMIN — Medication 10 ML: at 13:12

## 2020-08-12 RX ADMIN — HUMAN INSULIN 15 UNITS: 100 INJECTION, SUSPENSION SUBCUTANEOUS at 17:29

## 2020-08-12 RX ADMIN — LEVETIRACETAM 750 MG: 500 TABLET ORAL at 23:05

## 2020-08-12 RX ADMIN — INSULIN LISPRO 4 UNITS: 100 INJECTION, SOLUTION INTRAVENOUS; SUBCUTANEOUS at 17:29

## 2020-08-12 RX ADMIN — GUAIFENESIN 600 MG: 600 TABLET, EXTENDED RELEASE ORAL at 23:05

## 2020-08-12 RX ADMIN — Medication 10 ML: at 23:12

## 2020-08-12 RX ADMIN — TOPIRAMATE 100 MG: 100 TABLET, FILM COATED ORAL at 08:42

## 2020-08-12 RX ADMIN — ASPIRIN 81 MG CHEWABLE TABLET 81 MG: 81 TABLET CHEWABLE at 08:43

## 2020-08-12 RX ADMIN — CARVEDILOL 6.25 MG: 6.25 TABLET, FILM COATED ORAL at 17:30

## 2020-08-12 RX ADMIN — INSULIN LISPRO 1 UNITS: 100 INJECTION, SOLUTION INTRAVENOUS; SUBCUTANEOUS at 23:05

## 2020-08-12 RX ADMIN — TOPIRAMATE 100 MG: 100 TABLET, FILM COATED ORAL at 17:30

## 2020-08-12 RX ADMIN — ENOXAPARIN SODIUM 30 MG: 30 INJECTION SUBCUTANEOUS at 17:30

## 2020-08-12 RX ADMIN — ALBUTEROL SULFATE 1 PUFF: 90 AEROSOL, METERED RESPIRATORY (INHALATION) at 19:03

## 2020-08-12 RX ADMIN — PIPERACILLIN AND TAZOBACTAM 3.38 G: 3; .375 INJECTION, POWDER, LYOPHILIZED, FOR SOLUTION INTRAVENOUS at 17:30

## 2020-08-12 RX ADMIN — HUMAN INSULIN 15 UNITS: 100 INJECTION, SUSPENSION SUBCUTANEOUS at 08:44

## 2020-08-12 NOTE — PROGRESS NOTES
Hospitalist Progress Note    NAME: Jay Clements   :  1945   MRN:  [id number]       Assessment / Plan:  New fever noted 8/10- seems to have resolved in past 48 hrs  Likely due to NEW RUL PNA- likely aspiration?  in the setting of known Covid19 infection POA  Possible aspiration? Acute hypoxic respiratory failure not POA (8/10) SpO2 88% on RA per RN- stable on 2-3L/m oxygen now  Anticipated discharge 8/10 that was canceled due to new onset of fever  repeat CXR revealed NEW RUL PNA  Repeat Blood Cx negative till now  UA neg  Repeat Covid19 test over the weekend  remains positive    Taper decadron now - will help with hyperglycemia  Albuterol inh prn, mucinex  He completed rocephin/azithromycin  however given new fever, will need to be concern about aspiration. cont zosyn for now (started 8/10)- plan to DC on Levaquin + Flagyl in 24-48 hrs if stable on modified diet  Appreciate SLP eval- diet modified  Cont' lovenox BID  Repeat CBC/BMP, procalcitonin  O suppl prn- Nursing to taper oxygen as able in next 24-48 hrs before DC    Sepsis with acute encephalopathy POA  Due to  COVID-19 PNA  POA  Sepsis with acute encephalopathy POA  Due to  COVID-19 PNA  POA   Chest x-ray left upper lobe airspace disease/opacity  CT head showed Interval development of multiple infarcts and atrophy as compared to the 2016 study.  No acute process identified by noncontrast CT. UA positive for glucose and ketones otherwise negative. CT chest/ap showed 2 focal patches of infiltrate in the upper lobes bilaterally suspicious for infectious process, however, the left upper lobe opacity could reflect neoplasm and close interval follow-up is recommended . Lactate 2.0, Procalcitonin 0.15, Fibrinogen 491, Ferritin 91, INR 1.0  COVID19 positive on PCR and rapid test.  Repeat test on  remains positive. Pt completed IV rocephin/azithromycin inpt. Will need repeat CT when he recovers from PNA.   I discussed this with patient's daughter Francois Manuel and she is aware that f/u CT chest needs to be done in 3-4 weeks.         RAPHAEL with dehydration POA  Holding Bumex, losartan metformin  Off fluids  Repeat bmp     Diabetes type 2 insulin-dependent POA-controlled  He received 60 units lantus the night of admission so BG was low throughout the first admission day. [facility] received treatment for hypoglycemia , although he was asymptomatic per documentation. Pt was then started on D5 gtt for >24 hrs. Daughter reports due to his dementia, pt forgets to eat often. He needs constant reminder to eat even during meals. He is at high risk for hypoglycemia with subsequent complications. Lantus has been discontinued since admission. In the setting of infection/new fever, his BG was elevated now  - was started on low dose NPH BID 8/10-cont at 15 units  Consider d/c lantus at discharge to reduce risk of hypoglycemia. Hypertension  CAD status post CABG  Hx of CVAs  Cont' coreg, asa/plavix and statin  Hold bumex/losartan  Increase amlodipine for better BP control    Alzheimer's/vascular dementia  Seizure disorder  con't PO keppra     Both daughter [facility] [place] [facility] last updated 8/10     Code Status: Full code  Surrogate Decision Maker: Daughter   DVT Prophylaxis: Subcu Lovenox  GI Prophylaxis: not indicated   Baseline: Patient is a long-term resident at [facility], has baseline vascular/Alzheimer's dementia but is conversant at baseline per nursing home staff -   [facility] in 24-48 hrs once off oxygen & no more fevers     Subjective:     Chief Complaint / Reason for Physician Visit  Pt seen, NAD. Confused but unchanged from previous day. Noted new fever. RN reports hypoxia on RA with SpO2 88% on RA. Poor PO intake today. Discussed with RN events overnight.      Review of Systems:  Symptom Y/N Comments  Symptom Y/N Comments   Fever/Chills    Chest Pain     Poor Appetite    Edema     Cough    Abdominal Pain     Sputum    Joint Pain     SOB/WEEKS Pruritis/Rash     Nausea/vomit    Tolerating PT/OT     Diarrhea    Tolerating Diet     Constipation    Other       Could NOT obtain due to: dementia     Objective:     VITALS:   Last 24hrs VS reviewed since prior progress note. Most recent are:  Patient Vitals for the past 24 hrs:   Temp Pulse Resp BP SpO2   08/12/20 0808 -- -- -- -- 93 %   08/12/20 0733 96.8 °F (36 °C) 71 20 121/68 92 %   08/12/20 0700 -- 63 -- 127/73 90 %   08/12/20 0600 -- 62 -- 138/70 92 %   08/12/20 0500 -- 62 -- 131/66 93 %   08/12/20 0400 97.6 °F (36.4 °C) 61 18 121/61 91 %   08/12/20 0300 -- 62 -- 145/69 92 %   08/12/20 0200 -- 60 -- 95/83 93 %   08/12/20 0123 -- -- -- -- 95 %   08/12/20 0100 -- 61 -- 124/68 95 %   08/12/20 0000 97.6 °F (36.4 °C) (!) 59 16 (!) 131/109 93 %   08/11/20 2300 -- 63 -- 126/68 94 %   08/11/20 2200 -- 66 -- 125/74 90 %   08/11/20 2100 -- 66 -- 132/68 92 %   08/11/20 2000 98.3 °F (36.8 °C) 63 16 116/70 90 %   08/11/20 1912 -- -- -- -- 92 %   08/11/20 1900 -- 67 -- 126/75 92 %   08/11/20 1700 -- 67 -- 125/77 92 %   08/11/20 1549 97.7 °F (36.5 °C) 68 16 103/58 93 %   08/11/20 1315 -- -- -- -- 93 %   08/11/20 1112 97.6 °F (36.4 °C) 76 16 103/61 94 %       Intake/Output Summary (Last 24 hours) at 8/12/2020 1020  Last data filed at 8/12/2020 0500  Gross per 24 hour   Intake 400 ml   Output 750 ml   Net -350 ml        PHYSICAL EXAM:  General: Elderly male awake in bed, NAD   EENT:  EOMI. Anicteric sclerae. MMM  Resp:  CTA bilaterally, no wheezing or rales. No accessory muscle use  CV:  Regular  rhythm,  No edema  GI:  Soft, ND, NT.  +BS  Neurologic:  Moving all exts. awake, followed some commands  Psych:   Not anxious or agitated  Skin:  No rashes.   No jaundice    Reviewed most current lab test results and cultures  YES  Reviewed most current radiology test results   YES  Review and summation of old records today    NO  Reviewed patient's current orders and MAR    YES  PMH/SH reviewed - no change compared to H&P  ________________________________________________________________________  Care Plan discussed with:    Comments   Patient x    Family      RN x    Care Manager     Consultant                        Multidiciplinary team rounds were held today with , nursing, pharmacist and clinical coordinator. Patient's plan of care was discussed; medications were reviewed and discharge planning was addressed. ________________________________________________________________________  Total NON critical care TIME:  26   Minutes    Total CRITICAL CARE TIME Spent:   Minutes non procedure based      Comments   >50% of visit spent in counseling and coordination of care     ________________________________________________________________________  Jake Deras MD     Procedures: see electronic medical records for all procedures/Xrays and details which were not copied into this note but were reviewed prior to creation of Plan. LABS:  I reviewed today's most current labs and imaging studies.   Pertinent labs include:  Recent Labs     08/12/20  0647 08/11/20  0307 08/10/20  1829   WBC 16.6* 12.7* 10.7   HGB 13.6 12.1 12.8   HCT 44.1 39.1 41.5    226 222     Recent Labs     08/12/20  0647 08/11/20  0307 08/10/20  1829   * 144  --    K 4.1 4.4  --    * 116*  --    CO2 23 21  --    * 340*  --    BUN 50* 49*  --    CREA 1.34* 1.61*  --    CA 8.8 9.0  --    INR 1.0 1.0 1.0       Signed: Jake Deras MD

## 2020-08-12 NOTE — PROGRESS NOTES
Spiritual Care Assessment/Progress Note  [facility]      NAME: Tanya Edwards      MRN: [id number]  AGE: 76 y.o. SEX: male  Latter day Affiliation:    Language: English     8/12/2020     Total Time (in minutes): 5     Spiritual Assessment begun in [facility] PROGRESSIVE CARE through conversation with:         []Patient        [] Family    [] Friend(s)        Reason for Consult: Initial visit     Spiritual beliefs: (Please include comment if needed)     [] Identifies with a [place] tradition:         [] Supported by a [place] community:            [] Claims no spiritual orientation:           [] Seeking spiritual identity:                [] Adheres to an individual form of spirituality:           [x] Not able to assess:                           Identified resources for coping:      [] Prayer                               [] Music                  [] Guided Imagery     [] Family/friends                 [] Pet visits     [] Devotional reading                         [x] Unknown     [] Other:                                              Interventions offered during this visit: (See comments for more details)                Plan of Care:     [] Support spiritual and/or cultural needs    [] Support AMD and/or advance care planning process      [] Support grieving process   [] Coordinate Rites and/or Rituals    [] Coordination with community clergy   [] No spiritual needs identified at this time   [] Detailed Plan of Care below (See Comments)  [] Make referral to Music Therapy  [] Make referral to Pet Therapy     [] Make referral to Addiction services  [] Make referral to [facility]  [] Make referral to Spiritual Care Partner  [] No future visits requested        [] Follow up visits as needed     Comments: Attempted Initial Spiritual Assessment in [facility] 0178, however pt was unable to be assessed at this time. No family present at time of visit.   Page Spiritual Care Services for any spiritual or emotional support needs. Elías Gutierrez MDiv.  Staff   Request  Support/Spiritual Care Services via [facility]

## 2020-08-12 NOTE — PROGRESS NOTES
07:00  Assumed care of pt. Pt being feed breakfast.  Plan of care discussed. Call bell in reach. Will continue to monitor. 11:30  Pt resting in bed. Pt with clean dry pad. Pt pulled up in bed, pillow placed under ankles. Pt denies needs at this time. IVF infusing without difficulty. Plan of care discussed. Call bell in reach. Will continue to monitor.

## 2020-08-12 NOTE — ED NOTES
15:34  Pt resting in bed. Denies needs at this time. Plan of care discussed. Will continue to monitor. 17:44  Pt resting in bed watching TV. Pt pulled up to a position of comfort. Pt given ice water. Plan of care discussed. Call bell in reach. Will continue to monitor.

## 2020-08-12 NOTE — PROGRESS NOTES
Problem: Falls - Risk of  Goal: *Absence of Falls  Description: Document Rebbecca Persons Fall Risk and appropriate interventions in the flowsheet. Outcome: Progressing Towards Goal  Note: Fall Risk Interventions:  Mobility Interventions: Bed/chair exit alarm, Communicate number of staff needed for ambulation/transfer, Patient to call before getting OOB    Mentation Interventions: Adequate sleep, hydration, pain control, Bed/chair exit alarm, More frequent rounding, Reorient patient    Medication Interventions: Bed/chair exit alarm, Evaluate medications/consider consulting pharmacy, Patient to call before getting OOB, Teach patient to arise slowly    Elimination Interventions: Bed/chair exit alarm, Call light in reach, Patient to call for help with toileting needs, Stay With Me (per policy), Toileting schedule/hourly rounds    History of Falls Interventions: Bed/chair exit alarm, Consult care management for discharge planning         Problem: Patient Education: Go to Patient Education Activity  Goal: Patient/Family Education  Outcome: Progressing Towards Goal     Problem: Pressure Injury - Risk of  Goal: *Prevention of pressure injury  Description: Document Roderick Scale and appropriate interventions in the flowsheet. Outcome: Progressing Towards Goal  Note: Pressure Injury Interventions:  Sensory Interventions: Assess changes in LOC, Avoid rigorous massage over bony prominences, Check visual cues for pain, Keep linens dry and wrinkle-free, Float heels, Maintain/enhance activity level, Minimize linen layers, Pressure redistribution bed/mattress (bed type), Turn and reposition approx.  every two hours (pillows and wedges if needed)    Moisture Interventions: Absorbent underpads, Check for incontinence Q2 hours and as needed, Internal/External urinary devices, Maintain skin hydration (lotion/cream)    Activity Interventions: Pressure redistribution bed/mattress(bed type)    Mobility Interventions: HOB 30 degrees or less, Pressure redistribution bed/mattress (bed type), Turn and reposition approx.  every two hours(pillow and wedges), Float heels    Nutrition Interventions: Document food/fluid/supplement intake, Offer support with meals,snacks and hydration, Discuss nutritional consult with provider    Friction and Shear Interventions: HOB 30 degrees or less, Lift sheet, Minimize layers, Apply protective barrier, creams and emollients                Problem: Patient Education: Go to Patient Education Activity  Goal: Patient/Family Education  Outcome: Progressing Towards Goal     Problem: Breathing Pattern - Ineffective  Goal: *Absence of hypoxia  Outcome: Progressing Towards Goal  Goal: *Use of effective breathing techniques  Outcome: Progressing Towards Goal  Goal: *PALLIATIVE CARE:  Alleviation of Dyspnea  Outcome: Progressing Towards Goal     Problem: Patient Education: Go to Patient Education Activity  Goal: Patient/Family Education  Outcome: Progressing Towards Goal     Problem: Patient Education: Go to Patient Education Activity  Goal: Patient/Family Education  Outcome: Progressing Towards Goal

## 2020-08-12 NOTE — PROGRESS NOTES
Bedside shift change report given to Christopher Manuel RN (oncoming nurse) by Prabha Henson RN and Arjun Dhaliwal RN (offgoing nurse). Report included the following information SBAR, Kardex, Intake/Output, MAR, Recent Results and Cardiac Rhythm NSR.    1945: Pt alert, disoriented to place, time, situation. Lungs diminished. Active BS. IVF infusing per orders. Condom cath intact draining clear yellow urine. Call bell within reach, bed alarm active. Aspiration precautions. 2230: Pt drank full up of water after taking meds. 0430: Tried to wean to room air, desaturations to 85%. Placed back on 1L NC. Condom cath leaked, incontinent of large amount of urine. Changed bed linens and gave soap/water bath, changed gown. Placed new condom cath. 0710: Bedside shift change report given to Prabha Henson RN and Arjun Dhaliwal RN (oncoming nurse) by Christopher Manuel RN (offgoing nurse). Report included the following information SBAR, Kardex, Intake/Output, MAR, Recent Results and Cardiac Rhythm NSR BBB.

## 2020-08-13 VITALS
SYSTOLIC BLOOD PRESSURE: 170 MMHG | RESPIRATION RATE: 19 BRPM | HEART RATE: 74 BPM | TEMPERATURE: 97.5 F | OXYGEN SATURATION: 92 % | DIASTOLIC BLOOD PRESSURE: 79 MMHG | WEIGHT: 148.4 LBS | HEIGHT: 66 IN | BODY MASS INDEX: 23.85 KG/M2

## 2020-08-13 LAB
ANION GAP SERPL CALC-SCNC: 5 MMOL/L (ref 5–15)
APTT PPP: <20 SEC (ref 22.1–32)
BUN SERPL-MCNC: 46 MG/DL (ref 6–20)
BUN/CREAT SERPL: 33 (ref 12–20)
CALCIUM SERPL-MCNC: 9.1 MG/DL (ref 8.5–10.1)
CHLORIDE SERPL-SCNC: 119 MMOL/L (ref 97–108)
CO2 SERPL-SCNC: 22 MMOL/L (ref 21–32)
CREAT SERPL-MCNC: 1.38 MG/DL (ref 0.7–1.3)
ERYTHROCYTE [DISTWIDTH] IN BLOOD BY AUTOMATED COUNT: 14.1 % (ref 11.5–14.5)
FIBRINOGEN PPP-MCNC: >800 MG/DL (ref 200–475)
GLUCOSE BLD STRIP.AUTO-MCNC: 113 MG/DL (ref 65–100)
GLUCOSE BLD STRIP.AUTO-MCNC: 187 MG/DL (ref 65–100)
GLUCOSE BLD STRIP.AUTO-MCNC: 91 MG/DL (ref 65–100)
GLUCOSE SERPL-MCNC: 104 MG/DL (ref 65–100)
HCT VFR BLD AUTO: 45 % (ref 36.6–50.3)
HGB BLD-MCNC: 13.5 G/DL (ref 12.1–17)
INR PPP: 0.9 (ref 0.9–1.1)
MCH RBC QN AUTO: 26.4 PG (ref 26–34)
MCHC RBC AUTO-ENTMCNC: 30 G/DL (ref 30–36.5)
MCV RBC AUTO: 87.9 FL (ref 80–99)
NRBC # BLD: 0 K/UL (ref 0–0.01)
NRBC BLD-RTO: 0 PER 100 WBC
PLATELET # BLD AUTO: 300 K/UL (ref 150–400)
PMV BLD AUTO: 11.3 FL (ref 8.9–12.9)
POTASSIUM SERPL-SCNC: 3.9 MMOL/L (ref 3.5–5.1)
PROTHROMBIN TIME: 9.8 SEC (ref 9–11.1)
RBC # BLD AUTO: 5.12 M/UL (ref 4.1–5.7)
SERVICE CMNT-IMP: ABNORMAL
SERVICE CMNT-IMP: ABNORMAL
SERVICE CMNT-IMP: NORMAL
SODIUM SERPL-SCNC: 146 MMOL/L (ref 136–145)
THERAPEUTIC RANGE,PTTT: ABNORMAL SECS (ref 58–77)
WBC # BLD AUTO: 12.4 K/UL (ref 4.1–11.1)

## 2020-08-13 PROCEDURE — 85730 THROMBOPLASTIN TIME PARTIAL: CPT

## 2020-08-13 PROCEDURE — 77010033678 HC OXYGEN DAILY

## 2020-08-13 PROCEDURE — 74011636637 HC RX REV CODE- 636/637: Performed by: INTERNAL MEDICINE

## 2020-08-13 PROCEDURE — 74011000258 HC RX REV CODE- 258: Performed by: INTERNAL MEDICINE

## 2020-08-13 PROCEDURE — 85027 COMPLETE CBC AUTOMATED: CPT

## 2020-08-13 PROCEDURE — 74011250636 HC RX REV CODE- 250/636: Performed by: INTERNAL MEDICINE

## 2020-08-13 PROCEDURE — 80048 BASIC METABOLIC PNL TOTAL CA: CPT

## 2020-08-13 PROCEDURE — 36415 COLL VENOUS BLD VENIPUNCTURE: CPT

## 2020-08-13 PROCEDURE — 85384 FIBRINOGEN ACTIVITY: CPT

## 2020-08-13 PROCEDURE — 85610 PROTHROMBIN TIME: CPT

## 2020-08-13 PROCEDURE — 74011250637 HC RX REV CODE- 250/637: Performed by: INTERNAL MEDICINE

## 2020-08-13 PROCEDURE — 94640 AIRWAY INHALATION TREATMENT: CPT

## 2020-08-13 PROCEDURE — 82962 GLUCOSE BLOOD TEST: CPT

## 2020-08-13 RX ORDER — METRONIDAZOLE 500 MG/1
500 TABLET ORAL 3 TIMES DAILY
Qty: 9 TAB | Refills: 0 | Status: SHIPPED
Start: 2020-08-13 | End: 2020-08-16

## 2020-08-13 RX ORDER — LEVOFLOXACIN 750 MG/1
750 TABLET ORAL DAILY
Qty: 3 TAB | Refills: 0 | Status: SHIPPED
Start: 2020-08-13 | End: 2020-08-16

## 2020-08-13 RX ORDER — GUAIFENESIN 600 MG/1
600 TABLET, EXTENDED RELEASE ORAL EVERY 12 HOURS
Qty: 10 TAB | Refills: 0 | Status: SHIPPED
Start: 2020-08-13 | End: 2020-08-18

## 2020-08-13 RX ADMIN — LEVETIRACETAM 750 MG: 500 TABLET ORAL at 08:20

## 2020-08-13 RX ADMIN — ALLOPURINOL 100 MG: 100 TABLET ORAL at 08:21

## 2020-08-13 RX ADMIN — TOPIRAMATE 100 MG: 100 TABLET, FILM COATED ORAL at 17:25

## 2020-08-13 RX ADMIN — CARVEDILOL 6.25 MG: 6.25 TABLET, FILM COATED ORAL at 17:25

## 2020-08-13 RX ADMIN — ASPIRIN 81 MG CHEWABLE TABLET 81 MG: 81 TABLET CHEWABLE at 08:21

## 2020-08-13 RX ADMIN — Medication 10 ML: at 14:00

## 2020-08-13 RX ADMIN — AMLODIPINE BESYLATE 10 MG: 5 TABLET ORAL at 08:20

## 2020-08-13 RX ADMIN — ENOXAPARIN SODIUM 30 MG: 30 INJECTION SUBCUTANEOUS at 17:25

## 2020-08-13 RX ADMIN — ALBUTEROL SULFATE 1 PUFF: 90 AEROSOL, METERED RESPIRATORY (INHALATION) at 00:58

## 2020-08-13 RX ADMIN — TOPIRAMATE 100 MG: 100 TABLET, FILM COATED ORAL at 08:20

## 2020-08-13 RX ADMIN — DEXAMETHASONE 2 MG: 0.5 TABLET ORAL at 08:20

## 2020-08-13 RX ADMIN — ALBUTEROL SULFATE 1 PUFF: 90 AEROSOL, METERED RESPIRATORY (INHALATION) at 17:12

## 2020-08-13 RX ADMIN — PIPERACILLIN AND TAZOBACTAM 3.38 G: 3; .375 INJECTION, POWDER, LYOPHILIZED, FOR SOLUTION INTRAVENOUS at 00:32

## 2020-08-13 RX ADMIN — HUMAN INSULIN 15 UNITS: 100 INJECTION, SUSPENSION SUBCUTANEOUS at 08:21

## 2020-08-13 RX ADMIN — ALBUTEROL SULFATE 1 PUFF: 90 AEROSOL, METERED RESPIRATORY (INHALATION) at 08:14

## 2020-08-13 RX ADMIN — ENOXAPARIN SODIUM 30 MG: 30 INJECTION SUBCUTANEOUS at 05:52

## 2020-08-13 RX ADMIN — LACOSAMIDE 100 MG: 100 TABLET, FILM COATED ORAL at 08:21

## 2020-08-13 RX ADMIN — GUAIFENESIN 600 MG: 600 TABLET, EXTENDED RELEASE ORAL at 08:20

## 2020-08-13 RX ADMIN — PIPERACILLIN AND TAZOBACTAM 3.38 G: 3; .375 INJECTION, POWDER, LYOPHILIZED, FOR SOLUTION INTRAVENOUS at 08:19

## 2020-08-13 RX ADMIN — Medication 10 ML: at 05:53

## 2020-08-13 RX ADMIN — CARVEDILOL 6.25 MG: 6.25 TABLET, FILM COATED ORAL at 08:21

## 2020-08-13 RX ADMIN — CLOPIDOGREL BISULFATE 75 MG: 75 TABLET, FILM COATED ORAL at 08:21

## 2020-08-13 NOTE — DISCHARGE INSTRUCTIONS
HOSPITALIST DISCHARGE INSTRUCTIONS    NAME: Segun Bloom   :  1945   MRN:  [id number]     Date/Time:  2020 11:28 AM    ADMIT DATE: 2020     DISCHARGE DATE: 2020     DISCHARGE DIAGNOSIS:  New fever noted 8/10- seems to have resolved in past 72 hrs  Likely due to NEW RUL PNA- likely aspiration?- complete Levaquin + Flagyl PO x 3 more days  in the setting of known Covid19 infection POA  Possible aspiration  Acute hypoxic respiratory failure not POA (8/10) SpO2 88% on RA per RN- stable on 1L/m oxygen now  Sepsis with acute encephalopathy POA  Due to  COVID-19 PNA  POA   RAPHAEL with dehydration POA  Diabetes type 2 insulin-dependent POA-controlled  Hypertension  CAD status post CABG  Hx of CVAs  Alzheimer's/vascular dementia  Seizure disorder    Active Problems:    Sepsis ([facility] [address] (2020)      Left upper lobe pneumonia (2020)      RAPHAEL (acute kidney injury) ([facility] [address] (2020)      Acute encephalopathy (2020)      Suspected COVID-19 virus infection (2020)         MEDICATIONS:  As per medication reconciliation  list  · It is important that you take the medication exactly as they are prescribed. · Keep your medication in the bottles provided by the pharmacist and keep a list of the medication names, dosages, and times to be taken in your wallet. · Do not take other medications without consulting your doctor. Pain Management: per above medications    What to do at Home    Recommended diet:  Diabetic Diet pureed & thin as per SLP eval    Recommended activity: Activity as tolerated    If you have questions regarding the hospital related prescriptions or hospital related issues please [facility] [phone].     If you experience any of the following symptoms then please call your primary care physician or return to the emergency room if you cannot get hold of your doctor:  Fever, chills, nausea, vomiting, diarrhea, change in mentation, falling, bleeding, shortness of breath,     Follow Up:  Dr. Jeff Anderson MD  you are to call and set up an appointment to see them in 7-10 days. Information obtained by :  I understand that if any problems occur once I am at home I am to contact my physician. I understand and acknowledge receipt of the instructions indicated above.                                                                                                                                            Physician's or R.N.'s Signature                                                                  Date/Time                                                                                                                                              Patient or Representative Signature                                                          Date/Time

## 2020-08-13 NOTE — PROGRESS NOTES
Comprehensive Nutrition Assessment    Type and Reason for Visit: RD nutrition re-screen/LOS, Initial    Nutrition Recommendations/Plan:  Continue current diet  Continue Glucerna TID    Nutrition Assessment:     Patient medically noted for COVID-19, pneumonia, acute encephalopathy, and RAPHAEL. PMH DM, CHF, HTN, CAD, CABG, CVA, and dementia. Chart reviewed for length of stay. Patient with fluctuating PO intake. Glucerna supplements ordered TID; has been consuming them per flowsheets. Needs reminders at meals to eat. SLP evaluated and recommended pureed foods. Discharge planning noted. Encourage intake of meals/supplements. Patient Vitals for the past 72 hrs:   % Diet Eaten   08/12/20 1821 25 %   08/12/20 0845 100 %   08/11/20 1220 25 %   08/10/20 1040 0 %       Estimated Daily Nutrient Needs:  Energy (kcal):  1752 kcal (CAB3491 x 1. 3AF)  Protein (g):  80g (1.2 g/kg bw)       Fluid (ml/day):  1750 mL    Nutrition Related Findings:       Na 146, BG [phone]  BM 8/11  Medications: norvasc, Plavix, Decadron, Humalog, NPH, Keppra, and pravastatin     Wounds:    None       Current Nutrition Therapies:   DIET NUTRITIONAL SUPPLEMENTS All Meals; Glucerna Shake  DIET DIABETIC CONSISTENT CARB Pureed; 2 GM NA (House Low NA); AHA-LOW-CHOL FAT    Anthropometric Measures:  · Height:  5' 6\" (167.6 cm)  · Current Body Wt:  67.3 kg (148 lb 5.9 oz)   · BMI Category:  Normal weight (BMI 18.5-24. 9)       Nutrition Diagnosis:   · Inadequate protein-energy intake related to (dementia, flutuating PO) as evidenced by (need for ONS, PO 0-100% of meals)    Nutrition Interventions:   Food and/or Nutrient Delivery: Continue oral nutrition supplement, Continue current diet  Nutrition Education and Counseling: No recommendations at this time  Coordination of Nutrition Care: No recommendation at this time    Goals:  PO intake >50% of meals and 75% of ONS next 3-5 days       Nutrition Monitoring and Evaluation:   Behavioral-Environmental Outcomes: Knowledge or skill  Food/Nutrient Intake Outcomes: Food and nutrient intake, Supplement intake  Physical Signs/Symptoms Outcomes: Biochemical data, Weight    Discharge Planning:    Continue current diet, Continue oral nutrition supplement     Electronically signed by Sammi Zapata RD on 8/13/2020 at 9:44 AM    Contact: Ext [phone]

## 2020-08-13 NOTE — PROGRESS NOTES
[facility] bed today- Has discharge orders for today. Junior Wellington at [facility] was notified. Transportation via [facility] and  time is 4PM    2nd IM letter was given on 8/10/20    Patient's daughter Parvez Patel was notified at 1:15PM that patient is returning to [facility] today at [address]; RN Porfirio Cano was also notified of the  time. Transition of Care Plan to SNF/Rehab    SNF/Rehab Transition:  Patient has been accepted to [facility] and meets criteria for admission. Patient will transported by [facility] and expected to leave at 4PM.    Communication to Patient/Family:  Contacted daughter Pam(identified care giver) and  she is agreeable to the transition plan. Communication to SNF/Rehab:  Bedside RN,Gurmeet, has been notified to update the transition plan to the facility and call report (phone number [phone]). Discharge information has been updated on the AVS.     Discharge instructions to be fax'd to facility at (Fax # [phone])   . Nursing Please include all hard scripts for controlled substances, med rec and dc summary, and AVS in packet.      Reviewed and confirmed with facility, [facility], can manage the patient care needs for the following:     Dashawn Montilla with (X) only those applicable:    Medication:  [x]  Medications will be available at the facility  [x]  IV Antibiotics will be switched to PO meds  []  Controlled Substance - hard copy to be sent with patient   []  Weekly Labs   Documents:  [] Hard RX  [x] MAR  [] Kardex  [x] AVS  [x]Transfer Summary  [x]Discharge   Equipment:  []  CPAP/BiPAP  []  Wound Vacuum  [x]  Loyd or Urinary Device Condom catheter  []  PICC/Central Line  []  Nebulizer  []  Ventilator   Treatment:  []Isolation (for MRSA, VRE, etc.)  []Surgical Drain Management  []Tracheostomy Care  []Dressing Changes  []Dialysis with transportation and chair time  []PEG Care  [x]Oxygen 1 liter oxygen  []Daily Weights for Heart Failure Dietary:  [x]Any diet limitations;Diabetic consistent carb pureed; 2GM NA; diet nutritional supplements   []Tube Feedings   []Total Parenteral Management (TPN)   Eligible for Medicaid Long Term Services and Supports  Yes:  [] Eligible for medical assistance or will become eligible within 180 days and UAI completed. [] Provider/Patient and/or support system has requested screening. [] UAI copy provided to patient or responsible party,  [] UAI unavailable at discharge will send once processed to SNF provider. [] UAI unavailable at discharged mailed to patient  No:   Patient has C/ [address] Complete care of [place]  [] Private pay and is not financially eligible for Medicaid within the next 180 days. [] Reside out-of-state. [] A residents of a state owned/operated facility that is licensed  by [facility] or [facility]  [] Enrollment in [facility] hospice services  []  [address]  [] Patient /Family declines to have screening completed or provide financial information for screening     Financial Resources:  Medicaid    [] Initiated and application pending   [x] Full coverage     Advanced Care Plan:  []Surrogate Decision Maker of Care  []POA  []Communicated Code Status FULL CODE   Other    COVID Positive       [facility] form and paperwork left with patient's chart.     Yen Doe

## 2020-08-13 NOTE — PROGRESS NOTES
07:00  Assumed care of pt. Pt repositioned in bad. Plan of care discussed. Call bell in reach. Will continue to monitor. 10:16  Pt's daughter called, updated on status. Transferred call into room to talk with pt.     11:37  Pt provided with water. Denies needs at this time. Call bell in reach. Will continue to monitor. 11:48 Left message for case management about discharge [facility] today. 13:38  Pt notified of placement back at [facility]. Pt denies needs at this time. Call bell in reach. Will continue to monitor. 16:43  Report called to Neri Strickland LPN at [facility]. 17:00  [facility] delayed transport until 19:30. [facility] called states they will accept pt. 17:55  Pt refusing dinner tray will send with transport back to [facility]. Pt drank his supplement. Pt denies needs at this time. Call bell in reach. Will continue to monitor. 19:22  Report given to [facility], [facility] at bedside to transport pt to [facility].

## 2020-08-13 NOTE — DISCHARGE SUMMARY
Hospitalist Discharge Summary     Patient ID:  Alex Villalta  [id number]  41 y.o.  1945 8/4/2020    PCP on record: Arun Crespo MD    Admit date: 8/4/2020  Discharge date and time: 8/13/2020    DISCHARGE DIAGNOSIS:    New fever noted 8/10- seems to have resolved in past 72 hrs  Likely due to NEW RUL PNA- likely aspiration?- complete Levaquin + Flagyl PO x 3 more days  in the setting of known Covid19 infection POA  Possible aspiration  Acute hypoxic respiratory failure not POA (8/10) SpO2 88% on RA per RN- stable on 1L/m oxygen now  Sepsis with acute encephalopathy POA  Due to  COVID-19 PNA  POA   RAPHAEL with dehydration POA  Diabetes type 2 insulin-dependent POA-controlled  Hypertension  CAD status post CABG  Hx of CVAs  Alzheimer's/vascular dementia  Seizure disorder      CONSULTATIONS:  None    Excerpted HPI from H&P of Link Sacks, MD:  East.So y.o.  male who presents with above complaints from nursing home/long-term care facility sent via EMS. Patient has been found to have altered mental status with decreased mental status-less conversant than usual as per the nursing. Patient was found to have fever of 100.9, with mild dehydration/RAPHAEL with ketosis on UA, was found to have left upper lobe opacity on the chest x-ray suspicious for pneumonia. Patient was otherwise confused at baseline when seen by me-jessika historian.        We were asked to admit for work up and evaluation of the above problems\"    ______________________________________________________________________  DISCHARGE SUMMARY/HOSPITAL COURSE:  for full details see H&P, daily progress notes, labs, consult notes. New fever noted 8/10- seems to have resolved in past 48 hrs  Likely due to NEW RUL PNA- likely aspiration?  in the setting of known Covid19 infection POA  Possible aspiration?   Acute hypoxic respiratory failure not POA (8/10) SpO2 88% on RA per RN- stable on 2-3L/m oxygen now  Anticipated discharge 8/10 that was canceled due to new onset of fever  repeat CXR revealed NEW RUL PNA  Repeat Blood Cx negative till now  UA neg  Repeat Covid19 test over the weekend 8/8 remains positive     Taper decadron now - will help with hyperglycemia  Albuterol inh prn, mucinex  He completed rocephin/azithromycin 8/20 however given new fever, will need to be concern about aspiration. cont zosyn for now (started 8/10)- plan to DC on Levaquin + Flagyl in 24-48 hrs if stable on modified diet  Appreciate SLP eval- diet modified  Cont' lovenox BID  Repeat CBC/BMP, procalcitonin  O suppl prn- Nursing to taper oxygen as able in next 24-48 hrs before DC     Sepsis with acute encephalopathy POA  Due to  COVID-19 PNA  POA  Sepsis with acute encephalopathy POA  Due to  COVID-19 PNA  POA   Chest x-ray left upper lobe airspace disease/opacity  CT head showed Interval development of multiple infarcts and atrophy as compared to the 2016 study.  No acute process identified by noncontrast CT. UA positive for glucose and ketones otherwise negative. CT chest/ap showed 2 focal patches of infiltrate in the upper lobes bilaterally suspicious for infectious process, however, the left upper lobe opacity could reflect neoplasm and close interval follow-up is recommended .  Lactate 2.0, Procalcitonin 0.15, Fibrinogen 491, Ferritin 91, INR 1.0  COVID19 positive on PCR and rapid test.  Repeat test on 8/8 remains positive.  Pt completed IV rocephin/azithromycin inpt.    Will need repeat CT when he recovers from PNA.  I discussed this with patient's daughter Francisco Carter and she is aware that f/u CT chest needs to be done in 3-4 weeks.         RAPHAEL with dehydration POA  Holding Bumex, losartan metformin  Off fluids  Repeat bmp     Diabetes type 2 insulin-dependent POA-controlled  He received 60 units lantus the night of admission so BG was low throughout the first admission day. Ayad Day received treatment for hypoglycemia , although he was asymptomatic per documentation.  Pt was then started on D5 gtt for >24 hrs. Leonard Soulier reports due to his dementia, pt forgets to eat often. [facility] needs constant reminder to eat even during meals.  He is at high risk for hypoglycemia with subsequent complications.  Lantus has been discontinued since admission. In the setting of infection/new fever, his BG was elevated now  - was started on low dose NPH BID 8/10-cont at 15 units  Consider d/c lantus at discharge to reduce risk of hypoglycemia.       Hypertension  CAD status post CABG  Hx of CVAs  Cont' coreg, asa/plavix and statin  Hold bumex/losartan  Increase amlodipine for better BP control     Alzheimer's/vascular dementia  Seizure disorder  con't PO keppra      Both daughter hospitals and Jessi Darby last updated 8/10     Code Status: Full code  Surrogate Decision Maker: Daughter         _______________________________________________________________________  Patient seen and examined by me on discharge day. Pertinent Findings:  Gen:    Not in distress  Chest: Clear lungs  CVS:   Regular rhythm. No edema  Abd:  Soft, not distended, not tender  Neuro:  Alert, oriented x 1, dementia +  _______________________________________________________________________  DISCHARGE MEDICATIONS:   Current Discharge Medication List      START taking these medications    Details   guaiFENesin ER (MUCINEX) 600 mg ER tablet Take 1 Tab by mouth every twelve (12) hours for 5 days. Qty: 10 Tab, Refills: 0      levoFLOXacin (Levaquin) 750 mg tablet Take 1 Tab by mouth daily for 3 days. Qty: 3 Tab, Refills: 0      metroNIDAZOLE (FlagyL) 500 mg tablet Take 1 Tab by mouth three (3) times daily for 3 days. Qty: 9 Tab, Refills: 0         CONTINUE these medications which have NOT CHANGED    Details   bumetanide (BUMEX) 1 mg tablet take 1 tablet once daily  Qty: 30 Tab, Refills: 12    Associated Diagnoses: Essential hypertension      levETIRAcetam (KEPPRA) 750 mg tablet Take 750 mg by mouth two (2) times a day.  3 tab, twice daily   Indications: PARTIAL EPILEPSY TREATMENT ADJUNCT      lacosamide (VIMPAT) 200 mg tab tablet Take 200 mg by mouth two (2) times a day. Indications: PARTIAL EPILEPSY TREATMENT ADJUNCT      topiramate (TOPAMAX) 100 mg tablet Take 100 mg by mouth two (2) times a day. Indications: COMPLEX-PARTIAL EPILEPSY      carvedilol (COREG) 6.25 mg tablet Take 6.25 mg by mouth two (2) times daily (with meals). Indications: PREVENTION OF A. FIB POST CARDIO-THORACIC SURGERY      amLODIPine (NORVASC) 5 mg tablet take 1 tablet by mouth once daily  Qty: 90 Tab, Refills: 3      clopidogrel (PLAVIX) 75 mg tab take 1 tablet by mouth once daily  Qty: 30 Tab, Refills: 12      losartan (COZAAR) 100 mg tablet take 1 tablet by mouth once daily  Qty: 90 Tab, Refills: 1      Walker misc Rollator walker  Qty: 1 Each, Refills: 0      HYDROcodone-acetaminophen (NORCO) 5-325 mg per tablet Take 1 Tab by mouth every four (4) hours as needed for Pain. Max Daily Amount: 6 Tabs. Qty: 20 Tab, Refills: 0      Insulin Needles, Disposable, (JAZMYN PEN NEEDLE) 32 gauge x 5/32\" ndle Use daily as directed with insulin pen. Qty: 100 Pen Needle, Refills: 11      aspirin (ASPIRIN) 325 mg tablet Take 325 mg by mouth daily. pravastatin (PRAVACHOL) 40 mg tablet take 1 tablet once daily  Qty: 30 Tab, Refills: 9      metoprolol tartrate (LOPRESSOR) 50 mg tablet take 1 tablet by mouth twice a day  Qty: 60 Tab, Refills: 4      !! glucose blood VI test strips (ONETOUCH ULTRA TEST) strip Test 2 times daily    ONETOUCH ULTRA 2 TEST STRIPS ONLY  Qty: 100 Strip, Refills: 11      allopurinol (ZYLOPRIM) 100 mg tablet take 1 tablet by mouth once daily  Qty: 30 Tab, Refills: 12      !! ACCU-CHEK ALEXIS PLUS TEST STRP strip TEST BLOOD SUGARS 2 TIMES DAILY  Qty: 100 Strip, Refills: PRN      Lancets misc Patient test 3 x daily  Dm 250  Qty: 1 Package, Refills: 11       !! - Potential duplicate medications found. Please discuss with provider.       STOP taking these medications       insulin glargine (LANTUS SOLOSTAR) 100 unit/mL (3 mL) inpn Comments:   Reason for Stopping:         metFORMIN (GLUCOPHAGE) 1,000 mg tablet Comments:   Reason for Stopping:         insulin glargine (LANTUS SOLOSTAR) 100 unit/mL (3 mL) inpn Comments:   Reason for Stopping:                 Patient Follow Up Instructions: Activity: Activity as tolerated  Diet: diabetic  purees and thins diet    Follow-up with PCP in 2 weeks.     Follow-up Information     Follow up With Specialties Details [address]  [phone]    Gene Toure MD Internal Medicine In 1 week  [address]  [phone]         facility will schedule appointment.         ________________________________________________________________    Risk of deterioration: Moderate    Condition at Discharge:  Stable  __________________________________________________________________    Disposition  SNF/LTC    ____________________________________________________________________    Code Status: Full Code  ___________________________________________________________________      Total time in minutes spent coordinating this discharge (includes going over instructions, follow-up, prescriptions, and preparing report for sign off to her PCP) :  36 minutes    Signed:  Ike Bhatia MD

## 2020-08-14 ENCOUNTER — EXTERNAL NURSING HOME DOCUMENTATION (OUTPATIENT)
Dept: INTERNAL MEDICINE CLINIC | Age: 75
End: 2020-08-14
Payer: MEDICARE

## 2020-08-14 DIAGNOSIS — Z79.4 TYPE 2 DIABETES MELLITUS WITH HYPERGLYCEMIA, WITH LONG-TERM CURRENT USE OF INSULIN (HCC): ICD-10-CM

## 2020-08-14 DIAGNOSIS — G93.40 ACUTE ENCEPHALOPATHY: ICD-10-CM

## 2020-08-14 DIAGNOSIS — Z86.73 HISTORY OF CVA (CEREBROVASCULAR ACCIDENT): ICD-10-CM

## 2020-08-14 DIAGNOSIS — U07.1 COVID-19: Primary | ICD-10-CM

## 2020-08-14 DIAGNOSIS — F03.90 DEMENTIA WITHOUT BEHAVIORAL DISTURBANCE, UNSPECIFIED DEMENTIA TYPE: ICD-10-CM

## 2020-08-14 DIAGNOSIS — E11.65 TYPE 2 DIABETES MELLITUS WITH HYPERGLYCEMIA, WITH LONG-TERM CURRENT USE OF INSULIN (HCC): ICD-10-CM

## 2020-08-14 DIAGNOSIS — J18.9 PNEUMONIA OF LEFT UPPER LOBE DUE TO INFECTIOUS ORGANISM: ICD-10-CM

## 2020-08-14 PROCEDURE — 99306 1ST NF CARE HIGH MDM 50: CPT | Performed by: INTERNAL MEDICINE

## 2020-08-14 PROCEDURE — 1101F PT FALLS ASSESS-DOCD LE1/YR: CPT | Performed by: INTERNAL MEDICINE

## 2020-08-14 PROCEDURE — G8432 DEP SCR NOT DOC, RNG: HCPCS | Performed by: INTERNAL MEDICINE

## 2020-08-14 PROCEDURE — G8536 NO DOC ELDER MAL SCRN: HCPCS | Performed by: INTERNAL MEDICINE

## 2020-08-15 LAB
BACTERIA SPEC CULT: NORMAL
SERVICE CMNT-IMP: NORMAL

## 2020-08-19 ENCOUNTER — EXTERNAL NURSING HOME DOCUMENTATION (OUTPATIENT)
Dept: INTERNAL MEDICINE CLINIC | Age: 75
End: 2020-08-19
Payer: MEDICARE

## 2020-08-19 VITALS — RESPIRATION RATE: 21 BRPM

## 2020-08-19 DIAGNOSIS — I10 ESSENTIAL HYPERTENSION: ICD-10-CM

## 2020-08-19 DIAGNOSIS — E11.65 TYPE 2 DIABETES MELLITUS WITH HYPERGLYCEMIA, WITH LONG-TERM CURRENT USE OF INSULIN (HCC): ICD-10-CM

## 2020-08-19 DIAGNOSIS — U07.1 COVID-19: Primary | ICD-10-CM

## 2020-08-19 DIAGNOSIS — Z79.4 TYPE 2 DIABETES MELLITUS WITH HYPERGLYCEMIA, WITH LONG-TERM CURRENT USE OF INSULIN (HCC): ICD-10-CM

## 2020-08-19 DIAGNOSIS — J18.9 PNEUMONIA OF LEFT UPPER LOBE DUE TO INFECTIOUS ORGANISM: ICD-10-CM

## 2020-08-19 PROCEDURE — 99309 SBSQ NF CARE MODERATE MDM 30: CPT | Performed by: INTERNAL MEDICINE

## 2020-08-19 NOTE — PROGRESS NOTES
THIS IS NOT A COMPLETED MEDICAL RECORD ON THIS PATIENT. THIS IS A PATIENT OF [facility]   PLEASE CONTACT THE FACILITY BELOW FOR MORE INFORMATION ON THIS PATIENT   THE COMPLETE RECORD RESIDES WITH THIS LONG TERM CARE FACILITY    HISTORY OF PRESENT ILLNESS  David Saldaña is a 76 y.o. male. This patient is currently under the care of Dr. Wing Aguirre at [facility].  The patient's past medical history includes seizure disorder, dementia, diabetes, hyperlipidemia, hypertension, CAD, and CVA. Patient was recently readmitted back to [facility]. In the ER with AMS. CT of head was done, showed old infarct. CT chest was done, showed focal patches of infiltrate in the left upper lobe bilaterally, suspicious for infection. He was admitted to the hospital, received oxygen, completed Rocephin and Azithromycin courses. Repeat chest x-ray was done because of his spiked fever, but he was found to have possible aspiration pneumonia. He was treated again with Zosyn followed by PO Levaquin and Flagyl. Saw patient today with low O2 and fatigue. Repeat Chest xray showed multi focal airspace, a change from previous. HPI    Review of Systems   Unable to perform ROS: Medical condition       Physical Exam  Vitals signs and nursing note reviewed. Constitutional:       Appearance: He is ill-appearing. He is not toxic-appearing. HENT:      Head: Normocephalic and atraumatic. Neck:      Musculoskeletal: Neck supple. Cardiovascular:      Rate and Rhythm: Normal rate and regular rhythm. Pulmonary:      Effort: Pulmonary effort is normal.      Breath sounds: Examination of the right-lower field reveals decreased breath sounds. Examination of the left-lower field reveals decreased breath sounds. Decreased breath sounds present. Abdominal:      General: Bowel sounds are normal.      Palpations: Abdomen is soft. Musculoskeletal:      Right lower leg: No edema. Left lower leg: No edema.    Skin:     General: Skin is warm. Neurological:      Mental Status: Mental status is at baseline. Psychiatric:         Mood and Affect: Mood normal.         ASSESSMENT and PLAN  Diagnoses and all orders for this visit:    1. COVID-19    2. Pneumonia of left upper lobe due to infectious organism    3. Type 2 diabetes mellitus with hyperglycemia, with long-term current use of insulin ([facility] [address]    4. Essential hypertension        PLAN:  1. Continue O2  2. Will extend Levaquin 500 mg for 5 more days   3. Prednisone 5 mg, 2 times a day for 7 days   4.  Supportive care     lab results and schedule of future lab studies reviewed with patient  reviewed medications and side effects in detail

## 2020-08-27 ENCOUNTER — HOSPITAL ENCOUNTER (INPATIENT)
Age: 75
LOS: 7 days | Discharge: NURSING, REHAB OR LONG-TERM CARE | DRG: 177 | End: 2020-09-04
Attending: EMERGENCY MEDICINE | Admitting: HOSPITALIST
Payer: MEDICARE

## 2020-08-27 ENCOUNTER — APPOINTMENT (OUTPATIENT)
Dept: CT IMAGING | Age: 75
DRG: 177 | End: 2020-08-27
Attending: EMERGENCY MEDICINE
Payer: MEDICARE

## 2020-08-27 ENCOUNTER — APPOINTMENT (OUTPATIENT)
Dept: GENERAL RADIOLOGY | Age: 75
DRG: 177 | End: 2020-08-27
Attending: EMERGENCY MEDICINE
Payer: MEDICARE

## 2020-08-27 DIAGNOSIS — K92.0 HEMATEMESIS, PRESENCE OF NAUSEA NOT SPECIFIED: Primary | ICD-10-CM

## 2020-08-27 PROBLEM — J12.82 PNEUMONIA DUE TO COVID-19 VIRUS: Status: ACTIVE | Noted: 2020-08-27

## 2020-08-27 PROBLEM — U07.1 PNEUMONIA DUE TO COVID-19 VIRUS: Status: ACTIVE | Noted: 2020-08-27

## 2020-08-27 LAB
ABO + RH BLD: NORMAL
ALBUMIN SERPL-MCNC: 2.2 G/DL (ref 3.5–5)
ALBUMIN/GLOB SERPL: 0.4 {RATIO} (ref 1.1–2.2)
ALP SERPL-CCNC: 109 U/L (ref 45–117)
ALT SERPL-CCNC: 16 U/L (ref 12–78)
ANION GAP SERPL CALC-SCNC: 7 MMOL/L (ref 5–15)
APPEARANCE UR: CLEAR
ARTERIAL PATENCY WRIST A: YES
AST SERPL-CCNC: 17 U/L (ref 15–37)
BASE DEFICIT BLD-SCNC: 1 MMOL/L
BASOPHILS # BLD: 0.1 K/UL (ref 0–0.1)
BASOPHILS NFR BLD: 1 % (ref 0–1)
BDY SITE: ABNORMAL
BILIRUB SERPL-MCNC: 0.3 MG/DL (ref 0.2–1)
BILIRUB UR QL: NEGATIVE
BLOOD GROUP ANTIBODIES SERPL: NORMAL
BUN SERPL-MCNC: 25 MG/DL (ref 6–20)
BUN/CREAT SERPL: 16 (ref 12–20)
CA-I BLD-SCNC: 1.28 MMOL/L (ref 1.12–1.32)
CALCIUM SERPL-MCNC: 8.9 MG/DL (ref 8.5–10.1)
CHLORIDE SERPL-SCNC: 100 MMOL/L (ref 97–108)
CO2 SERPL-SCNC: 28 MMOL/L (ref 21–32)
COLOR UR: ABNORMAL
COMMENT, HOLDF: NORMAL
CREAT SERPL-MCNC: 1.54 MG/DL (ref 0.7–1.3)
DIFFERENTIAL METHOD BLD: ABNORMAL
EOSINOPHIL # BLD: 0.1 K/UL (ref 0–0.4)
EOSINOPHIL NFR BLD: 2 % (ref 0–7)
ERYTHROCYTE [DISTWIDTH] IN BLOOD BY AUTOMATED COUNT: 15.7 % (ref 11.5–14.5)
EST. AVERAGE GLUCOSE BLD GHB EST-MCNC: 194 MG/DL
GAS FLOW.O2 O2 DELIVERY SYS: ABNORMAL L/MIN
GAS FLOW.O2 SETTING OXYMISER: 2 L/M
GLOBULIN SER CALC-MCNC: 5.2 G/DL (ref 2–4)
GLUCOSE SERPL-MCNC: 448 MG/DL (ref 65–100)
GLUCOSE UR STRIP.AUTO-MCNC: 500 MG/DL
HBA1C MFR BLD: 8.4 % (ref 4–5.6)
HCO3 BLD-SCNC: 23.9 MMOL/L (ref 22–26)
HCT VFR BLD AUTO: 43.2 % (ref 36.6–50.3)
HGB BLD-MCNC: 13.4 G/DL (ref 12.1–17)
HGB UR QL STRIP: NEGATIVE
IMM GRANULOCYTES # BLD AUTO: 0.1 K/UL (ref 0–0.04)
IMM GRANULOCYTES NFR BLD AUTO: 1 % (ref 0–0.5)
INR PPP: 1.1 (ref 0.9–1.1)
KETONES UR QL STRIP.AUTO: NEGATIVE MG/DL
LACTATE SERPL-SCNC: 1.4 MMOL/L (ref 0.4–2)
LEUKOCYTE ESTERASE UR QL STRIP.AUTO: NEGATIVE
LIPASE SERPL-CCNC: 89 U/L (ref 73–393)
LYMPHOCYTES # BLD: 1.2 K/UL (ref 0.8–3.5)
LYMPHOCYTES NFR BLD: 17 % (ref 12–49)
MAGNESIUM SERPL-MCNC: 1.9 MG/DL (ref 1.6–2.4)
MCH RBC QN AUTO: 27.1 PG (ref 26–34)
MCHC RBC AUTO-ENTMCNC: 31 G/DL (ref 30–36.5)
MCV RBC AUTO: 87.3 FL (ref 80–99)
MONOCYTES # BLD: 0.4 K/UL (ref 0–1)
MONOCYTES NFR BLD: 6 % (ref 5–13)
NEUTS SEG # BLD: 5.5 K/UL (ref 1.8–8)
NEUTS SEG NFR BLD: 73 % (ref 32–75)
NITRITE UR QL STRIP.AUTO: NEGATIVE
NRBC # BLD: 0 K/UL (ref 0–0.01)
NRBC BLD-RTO: 0 PER 100 WBC
O2/TOTAL GAS SETTING VFR VENT: 0.28 %
PCO2 BLD: 38.7 MMHG (ref 35–45)
PH BLD: 7.4 [PH] (ref 7.35–7.45)
PH UR STRIP: 5 [PH] (ref 5–8)
PHOSPHATE SERPL-MCNC: 2.5 MG/DL (ref 2.6–4.7)
PLATELET # BLD AUTO: 131 K/UL (ref 150–400)
PMV BLD AUTO: 10.6 FL (ref 8.9–12.9)
PO2 BLD: 69 MMHG (ref 80–100)
POTASSIUM SERPL-SCNC: 3.8 MMOL/L (ref 3.5–5.1)
PROT SERPL-MCNC: 7.4 G/DL (ref 6.4–8.2)
PROT UR STRIP-MCNC: NEGATIVE MG/DL
PROTHROMBIN TIME: 10.9 SEC (ref 9–11.1)
RBC # BLD AUTO: 4.95 M/UL (ref 4.1–5.7)
SAMPLES BEING HELD,HOLD: NORMAL
SAO2 % BLD: 94 % (ref 92–97)
SODIUM SERPL-SCNC: 135 MMOL/L (ref 136–145)
SP GR UR REFRACTOMETRY: 1.03 (ref 1–1.03)
SPECIMEN EXP DATE BLD: NORMAL
SPECIMEN TYPE: ABNORMAL
TOTAL RESP. RATE, ITRR: 25
TROPONIN I SERPL-MCNC: <0.05 NG/ML
UROBILINOGEN UR QL STRIP.AUTO: 0.2 EU/DL (ref 0.2–1)
WBC # BLD AUTO: 7.4 K/UL (ref 4.1–11.1)

## 2020-08-27 PROCEDURE — 80053 COMPREHEN METABOLIC PANEL: CPT

## 2020-08-27 PROCEDURE — 36600 WITHDRAWAL OF ARTERIAL BLOOD: CPT

## 2020-08-27 PROCEDURE — 71275 CT ANGIOGRAPHY CHEST: CPT

## 2020-08-27 PROCEDURE — 74011250636 HC RX REV CODE- 250/636: Performed by: EMERGENCY MEDICINE

## 2020-08-27 PROCEDURE — 36415 COLL VENOUS BLD VENIPUNCTURE: CPT

## 2020-08-27 PROCEDURE — 74011000258 HC RX REV CODE- 258: Performed by: RADIOLOGY

## 2020-08-27 PROCEDURE — 86900 BLOOD TYPING SEROLOGIC ABO: CPT

## 2020-08-27 PROCEDURE — 96374 THER/PROPH/DIAG INJ IV PUSH: CPT

## 2020-08-27 PROCEDURE — 85610 PROTHROMBIN TIME: CPT

## 2020-08-27 PROCEDURE — 99285 EMERGENCY DEPT VISIT HI MDM: CPT

## 2020-08-27 PROCEDURE — 82803 BLOOD GASES ANY COMBINATION: CPT

## 2020-08-27 PROCEDURE — 84100 ASSAY OF PHOSPHORUS: CPT

## 2020-08-27 PROCEDURE — 74018 RADEX ABDOMEN 1 VIEW: CPT

## 2020-08-27 PROCEDURE — 83605 ASSAY OF LACTIC ACID: CPT

## 2020-08-27 PROCEDURE — 83036 HEMOGLOBIN GLYCOSYLATED A1C: CPT

## 2020-08-27 PROCEDURE — 99218 HC RM OBSERVATION: CPT

## 2020-08-27 PROCEDURE — 74011000250 HC RX REV CODE- 250: Performed by: EMERGENCY MEDICINE

## 2020-08-27 PROCEDURE — 81003 URINALYSIS AUTO W/O SCOPE: CPT

## 2020-08-27 PROCEDURE — 74011000636 HC RX REV CODE- 636: Performed by: RADIOLOGY

## 2020-08-27 PROCEDURE — 93005 ELECTROCARDIOGRAM TRACING: CPT

## 2020-08-27 PROCEDURE — 85025 COMPLETE CBC W/AUTO DIFF WBC: CPT

## 2020-08-27 PROCEDURE — 84484 ASSAY OF TROPONIN QUANT: CPT

## 2020-08-27 PROCEDURE — C9113 INJ PANTOPRAZOLE SODIUM, VIA: HCPCS | Performed by: EMERGENCY MEDICINE

## 2020-08-27 PROCEDURE — 83735 ASSAY OF MAGNESIUM: CPT

## 2020-08-27 PROCEDURE — 83690 ASSAY OF LIPASE: CPT

## 2020-08-27 RX ORDER — MAGNESIUM SULFATE 100 %
4 CRYSTALS MISCELLANEOUS AS NEEDED
Status: DISCONTINUED | OUTPATIENT
Start: 2020-08-27 | End: 2020-09-04 | Stop reason: HOSPADM

## 2020-08-27 RX ORDER — ONDANSETRON 2 MG/ML
4 INJECTION INTRAMUSCULAR; INTRAVENOUS
Status: DISCONTINUED | OUTPATIENT
Start: 2020-08-27 | End: 2020-09-04 | Stop reason: HOSPADM

## 2020-08-27 RX ORDER — SODIUM CHLORIDE 0.9 % (FLUSH) 0.9 %
10 SYRINGE (ML) INJECTION
Status: COMPLETED | OUTPATIENT
Start: 2020-08-27 | End: 2020-08-27

## 2020-08-27 RX ORDER — LACOSAMIDE 50 MG/1
200 TABLET ORAL 2 TIMES DAILY
Status: DISCONTINUED | OUTPATIENT
Start: 2020-08-28 | End: 2020-09-04 | Stop reason: HOSPADM

## 2020-08-27 RX ORDER — INSULIN GLARGINE 100 [IU]/ML
20 INJECTION, SOLUTION SUBCUTANEOUS
Status: DISCONTINUED | OUTPATIENT
Start: 2020-08-27 | End: 2020-08-28

## 2020-08-27 RX ORDER — PRAVASTATIN SODIUM 40 MG/1
40 TABLET ORAL
Status: DISCONTINUED | OUTPATIENT
Start: 2020-08-27 | End: 2020-09-04 | Stop reason: HOSPADM

## 2020-08-27 RX ORDER — DEXTROSE MONOHYDRATE 100 MG/ML
0-250 INJECTION, SOLUTION INTRAVENOUS AS NEEDED
Status: DISCONTINUED | OUTPATIENT
Start: 2020-08-27 | End: 2020-09-04 | Stop reason: HOSPADM

## 2020-08-27 RX ORDER — INSULIN LISPRO 100 [IU]/ML
INJECTION, SOLUTION INTRAVENOUS; SUBCUTANEOUS
Status: DISCONTINUED | OUTPATIENT
Start: 2020-08-27 | End: 2020-09-04 | Stop reason: HOSPADM

## 2020-08-27 RX ORDER — PROMETHAZINE HYDROCHLORIDE 25 MG/1
12.5 TABLET ORAL
Status: DISCONTINUED | OUTPATIENT
Start: 2020-08-27 | End: 2020-09-04 | Stop reason: HOSPADM

## 2020-08-27 RX ORDER — ALBUTEROL SULFATE 90 UG/1
2 AEROSOL, METERED RESPIRATORY (INHALATION)
Status: DISCONTINUED | OUTPATIENT
Start: 2020-08-27 | End: 2020-09-04 | Stop reason: HOSPADM

## 2020-08-27 RX ORDER — ACETAMINOPHEN 325 MG/1
650 TABLET ORAL
Status: DISCONTINUED | OUTPATIENT
Start: 2020-08-27 | End: 2020-09-04 | Stop reason: HOSPADM

## 2020-08-27 RX ORDER — ACETAMINOPHEN 650 MG/1
650 SUPPOSITORY RECTAL
Status: DISCONTINUED | OUTPATIENT
Start: 2020-08-27 | End: 2020-09-04 | Stop reason: HOSPADM

## 2020-08-27 RX ORDER — DEXAMETHASONE 4 MG/1
6 TABLET ORAL
Status: DISCONTINUED | OUTPATIENT
Start: 2020-08-27 | End: 2020-09-04 | Stop reason: HOSPADM

## 2020-08-27 RX ORDER — POLYETHYLENE GLYCOL 3350 17 G/17G
17 POWDER, FOR SOLUTION ORAL DAILY PRN
Status: DISCONTINUED | OUTPATIENT
Start: 2020-08-27 | End: 2020-09-04 | Stop reason: HOSPADM

## 2020-08-27 RX ORDER — TOPIRAMATE 100 MG/1
100 TABLET, FILM COATED ORAL 2 TIMES DAILY
Status: DISCONTINUED | OUTPATIENT
Start: 2020-08-28 | End: 2020-09-04 | Stop reason: HOSPADM

## 2020-08-27 RX ORDER — GUAIFENESIN 600 MG/1
1200 TABLET, EXTENDED RELEASE ORAL EVERY 12 HOURS
Status: DISCONTINUED | OUTPATIENT
Start: 2020-08-27 | End: 2020-09-04 | Stop reason: HOSPADM

## 2020-08-27 RX ORDER — SODIUM CHLORIDE 0.9 % (FLUSH) 0.9 %
5-40 SYRINGE (ML) INJECTION AS NEEDED
Status: DISCONTINUED | OUTPATIENT
Start: 2020-08-27 | End: 2020-09-04 | Stop reason: HOSPADM

## 2020-08-27 RX ORDER — SODIUM CHLORIDE 0.9 % (FLUSH) 0.9 %
5-40 SYRINGE (ML) INJECTION EVERY 8 HOURS
Status: DISCONTINUED | OUTPATIENT
Start: 2020-08-27 | End: 2020-09-04 | Stop reason: HOSPADM

## 2020-08-27 RX ADMIN — IOPAMIDOL 70 ML: 755 INJECTION, SOLUTION INTRAVENOUS at 16:11

## 2020-08-27 RX ADMIN — SODIUM CHLORIDE 80 MG: 9 INJECTION INTRAMUSCULAR; INTRAVENOUS; SUBCUTANEOUS at 18:23

## 2020-08-27 RX ADMIN — SODIUM CHLORIDE 100 ML: 900 INJECTION, SOLUTION INTRAVENOUS at 16:12

## 2020-08-27 RX ADMIN — Medication 10 ML: at 16:12

## 2020-08-27 NOTE — ED TRIAGE NOTES
Triage Note: Patient is coming in from [facility] with vomiting blood x 1 today. Per EMS, temperatures was 96.1. Patient has had a positive COVID 2 weeks ago.

## 2020-08-27 NOTE — ED PROVIDER NOTES
This is a 55-year-old male with a history of prostate cancer, COPD, CHF, CAD, diabetes and seizures that presents with a chief complaint of abdominal pain. The patient informs me that he was throwing up blood this morning. There is some question as to whether he was coughing up the blood according to EMS. He was recently diagnosed with coronavirus, approximately 2 weeks ago during an admission for pneumonia. He denies any chest pain or shortness of breath.            Past Medical History:   Diagnosis Date    Allergic rhinitis, cause unspecified 4/18/2011    Alzheimer's disease     Arthritis     GOUT    Cancer ([facility] [address] 09/20/2016    PROSTATE    Chronic obstructive pulmonary disease (HCC)     CHRONIC BRONCHITIS    Congestive heart failure, unspecified 4/18/2011    (ON 9/20/16 PT & DTR STATE THEY DON'T REMEMBER THIS DIAGNOSIS)    Coronary Artery Disease 4/18/2011    Diabetes Mellitus Type I 4/18/2011    Erectile Dysfunction 4/18/2011    Hemorrhoids 4/18/2011    Hypertension     Seizures ([address]     STARTED 2005; \"BLACK-OUT Nickie Hosteller SEIZURES\", DTR SATES    Stroke ([facility] [address]     MULTIPLE MINISTROKES, DTR REPORTS; LEFT-SIDED WEAKNESS    Thromboembolus ([address]     left leg  (NO PE)    Unspecified asthma(493.90) 4/18/2011       Past Surgical History:   Procedure Laterality Date    CARDIAC SURG PROCEDURE UNLIST  2006    CABG    COLONOSCOPY N/A 10/9/2017    COLONOSCOPY performed by Krista Johnson MD at [facility]    VASCULAR SURGERY PROCEDURE UNLIST      PERIPHERAL ANGIOGRAM, STENTS LEFT LEG         Family History:   Problem Relation Age of Onset    Diabetes Mother     Hypertension Mother     Diabetes Father     Anesth Problems Neg Hx        Social History     Socioeconomic History    Marital status:      Spouse name: Not on file    Number of children: Not on file    Years of education: Not on file    Highest education level: Not on file   Occupational History    Not on file   Social Needs  Financial resource strain: Not on file   [employer] insecurity     Worry: Not on file     Inability: Not on file   "[employer] needs     Medical: Not on file     Non-medical: Not on file   Tobacco Use    Smoking status: Former Smoker     Packs/day: 2.00     Years: 30.00     Pack years: 60.00    Smokeless tobacco: Former User     Quit date: 9/20/1990   Substance and Sexual Activity    Alcohol use: No     Comment: IN PAST, MODERATE ALCOHOL. WEEKENDS ONLY    Drug use: No    Sexual activity: Yes     Partners: Female     Birth control/protection: None   Lifestyle    Physical activity     Days per week: Not on file     Minutes per session: Not on file    Stress: Not on file   Relationships    Social connections     Talks on phone: Not on file     Gets together: Not on file     Attends Restoration service: Not on file     Active member of club or organization: Not on file     Attends meetings of clubs or organizations: Not on file     Relationship status: Not on file    Intimate partner violence     Fear of current or ex partner: Not on file     Emotionally abused: Not on file     Physically abused: Not on file     Forced sexual activity: Not on file   Other Topics Concern    Not on file   Social History Narrative    Not on file         ALLERGIES: Patient has no known allergies. Review of Systems   Constitutional: Positive for fatigue. HENT: Negative for rhinorrhea. Respiratory: Positive for cough. Cardiovascular: Negative for chest pain. Gastrointestinal: Positive for abdominal pain. Genitourinary: Negative for dysuria. Musculoskeletal: Negative for back pain. Skin: Negative for wound. Neurological: Negative for headaches. Psychiatric/Behavioral: Negative for confusion. There were no vitals filed for this visit. Physical Exam  Vitals signs and nursing note reviewed. Constitutional:       General: He is not in acute distress. Appearance: Normal appearance.  He is not ill-appearing, toxic-appearing or diaphoretic. HENT:      Head: Normocephalic. Eyes:      Extraocular Movements: Extraocular movements intact. Neck:      Musculoskeletal: Normal range of motion. Cardiovascular:      Rate and Rhythm: Normal rate. Pulses: Normal pulses. Heart sounds: Normal heart sounds. Pulmonary:      Effort: Pulmonary effort is normal. No respiratory distress. Breath sounds: Rales present. Abdominal:      General: Abdomen is flat. Bowel sounds are normal. There is no distension. Tenderness: There is no abdominal tenderness. There is no guarding. Musculoskeletal: Normal range of motion. Skin:     General: Skin is warm and dry. Capillary Refill: Capillary refill takes less than 2 seconds. Neurological:      Mental Status: He is alert and oriented to person, place, and time. Psychiatric:         Mood and Affect: Mood normal.          MDM  Number of Diagnoses or Management Options  Hematemesis, presence of nausea not specified:   Diagnosis management comments: Patient seen evaluated by myself in the emergency department. He presents with hematemesis and. It is not clear if this was hematemesis or hemoptysis however. According to nursing staff this may have been coughing up blood. Laboratory studies are unremarkable. CT scan of the chest was obtained to evaluate for possible pulmonary embolism. CT of the chest shows changes consistent with coronavirus. Given the patient's age, I do feel that he has high risk and warrants admission for evaluation by GI should his episode have been true hemoptysis. I did give him a dose of Protonix here in the emergency department. EKG shows a normal sinus rhythm at a rate of 77, normal intervals, left axis deviation, no evidence of active ischemia. Chapito Rojas Serve for Admission  5:03 PM    ED Room Number: ER06/06  Patient Name and age:   Ana Allred 76 y.o.  male  Working Diagnosis: Hematemesis, presence of nausea not specified  (primary encounter diagnosis)    COVID-19 Suspicion:  yes    Code Status:  Full Code  Readmission: yes  Isolation Requirements:  yes  Recommended Level of Care:  telemetry  Department:[facility] Adult ED - 21   Other: 79-year-old male previously diagnosed with coronavirus presents with hematemesis. His story is not clear whether this was hematemesis or hemoptysis. CT of the chest was performed and shows worsening groundglass opacities and consolidation bilaterally.             Procedures

## 2020-08-28 PROBLEM — N17.9 ACUTE KIDNEY INJURY SUPERIMPOSED ON CHRONIC KIDNEY DISEASE (HCC): Status: ACTIVE | Noted: 2020-08-28

## 2020-08-28 PROBLEM — N18.9 ACUTE KIDNEY INJURY SUPERIMPOSED ON CHRONIC KIDNEY DISEASE (HCC): Status: ACTIVE | Noted: 2020-08-28

## 2020-08-28 PROBLEM — D69.6 THROMBOCYTOPENIA (HCC): Status: ACTIVE | Noted: 2020-08-28

## 2020-08-28 PROBLEM — J18.9 CAP (COMMUNITY ACQUIRED PNEUMONIA): Status: ACTIVE | Noted: 2020-08-28

## 2020-08-28 PROBLEM — E87.1 HYPONATREMIA: Status: ACTIVE | Noted: 2020-08-28

## 2020-08-28 LAB
ALBUMIN SERPL-MCNC: 2 G/DL (ref 3.5–5)
ALBUMIN/GLOB SERPL: 0.4 {RATIO} (ref 1.1–2.2)
ALP SERPL-CCNC: 94 U/L (ref 45–117)
ALT SERPL-CCNC: 17 U/L (ref 12–78)
ANION GAP SERPL CALC-SCNC: 8 MMOL/L (ref 5–15)
APTT PPP: 25.9 SEC (ref 22.1–32)
AST SERPL-CCNC: 15 U/L (ref 15–37)
ATRIAL RATE: 77 BPM
B PERT DNA SPEC QL NAA+PROBE: NOT DETECTED
BASOPHILS # BLD: 0.1 K/UL (ref 0–0.1)
BASOPHILS NFR BLD: 1 % (ref 0–1)
BILIRUB SERPL-MCNC: 0.4 MG/DL (ref 0.2–1)
BNP SERPL-MCNC: 131 PG/ML
BORDETELLA PARAPERTUSSIS PCR, BORPAR: NOT DETECTED
BUN SERPL-MCNC: 25 MG/DL (ref 6–20)
BUN/CREAT SERPL: 19 (ref 12–20)
C PNEUM DNA SPEC QL NAA+PROBE: NOT DETECTED
CALCIUM SERPL-MCNC: 9 MG/DL (ref 8.5–10.1)
CALCULATED P AXIS, ECG09: 61 DEGREES
CALCULATED R AXIS, ECG10: -77 DEGREES
CALCULATED T AXIS, ECG11: 58 DEGREES
CHLORIDE SERPL-SCNC: 101 MMOL/L (ref 97–108)
CO2 SERPL-SCNC: 24 MMOL/L (ref 21–32)
CREAT SERPL-MCNC: 1.35 MG/DL (ref 0.7–1.3)
D DIMER PPP FEU-MCNC: >35.2 MG/L FEU (ref 0–0.65)
DIAGNOSIS, 93000: NORMAL
DIFFERENTIAL METHOD BLD: ABNORMAL
EOSINOPHIL # BLD: 0.1 K/UL (ref 0–0.4)
EOSINOPHIL NFR BLD: 2 % (ref 0–7)
ERYTHROCYTE [DISTWIDTH] IN BLOOD BY AUTOMATED COUNT: 15.7 % (ref 11.5–14.5)
FERRITIN SERPL-MCNC: 270 NG/ML (ref 26–388)
FIBRINOGEN PPP-MCNC: 259 MG/DL (ref 200–475)
FLUAV H1 2009 PAND RNA SPEC QL NAA+PROBE: NOT DETECTED
FLUAV H1 RNA SPEC QL NAA+PROBE: NOT DETECTED
FLUAV H3 RNA SPEC QL NAA+PROBE: NOT DETECTED
FLUAV SUBTYP SPEC NAA+PROBE: NOT DETECTED
FLUBV RNA SPEC QL NAA+PROBE: NOT DETECTED
GLOBULIN SER CALC-MCNC: 4.7 G/DL (ref 2–4)
GLUCOSE BLD STRIP.AUTO-MCNC: 152 MG/DL (ref 65–100)
GLUCOSE BLD STRIP.AUTO-MCNC: 183 MG/DL (ref 65–100)
GLUCOSE BLD STRIP.AUTO-MCNC: 419 MG/DL (ref 65–100)
GLUCOSE BLD STRIP.AUTO-MCNC: 445 MG/DL (ref 65–100)
GLUCOSE SERPL-MCNC: 427 MG/DL (ref 65–100)
HADV DNA SPEC QL NAA+PROBE: NOT DETECTED
HCOV 229E RNA SPEC QL NAA+PROBE: NOT DETECTED
HCOV HKU1 RNA SPEC QL NAA+PROBE: NOT DETECTED
HCOV NL63 RNA SPEC QL NAA+PROBE: NOT DETECTED
HCOV OC43 RNA SPEC QL NAA+PROBE: NOT DETECTED
HCT VFR BLD AUTO: 41.6 % (ref 36.6–50.3)
HEALTH STATUS, XMCV2T: ABNORMAL
HGB BLD-MCNC: 12.5 G/DL (ref 12.1–17)
HMPV RNA SPEC QL NAA+PROBE: NOT DETECTED
HPIV1 RNA SPEC QL NAA+PROBE: NOT DETECTED
HPIV2 RNA SPEC QL NAA+PROBE: NOT DETECTED
HPIV3 RNA SPEC QL NAA+PROBE: NOT DETECTED
HPIV4 RNA SPEC QL NAA+PROBE: NOT DETECTED
IMM GRANULOCYTES # BLD AUTO: 0 K/UL (ref 0–0.04)
IMM GRANULOCYTES NFR BLD AUTO: 1 % (ref 0–0.5)
LDH SERPL L TO P-CCNC: 299 U/L (ref 85–241)
LYMPHOCYTES # BLD: 1.4 K/UL (ref 0.8–3.5)
LYMPHOCYTES NFR BLD: 27 % (ref 12–49)
M PNEUMO DNA SPEC QL NAA+PROBE: NOT DETECTED
MAGNESIUM SERPL-MCNC: 1.9 MG/DL (ref 1.6–2.4)
MCH RBC QN AUTO: 26.5 PG (ref 26–34)
MCHC RBC AUTO-ENTMCNC: 30 G/DL (ref 30–36.5)
MCV RBC AUTO: 88.1 FL (ref 80–99)
MONOCYTES # BLD: 0.4 K/UL (ref 0–1)
MONOCYTES NFR BLD: 8 % (ref 5–13)
NEUTS SEG # BLD: 3.2 K/UL (ref 1.8–8)
NEUTS SEG NFR BLD: 61 % (ref 32–75)
NRBC # BLD: 0 K/UL (ref 0–0.01)
NRBC BLD-RTO: 0 PER 100 WBC
P-R INTERVAL, ECG05: 120 MS
PLATELET # BLD AUTO: 141 K/UL (ref 150–400)
PMV BLD AUTO: 12.9 FL (ref 8.9–12.9)
POTASSIUM SERPL-SCNC: 4.3 MMOL/L (ref 3.5–5.1)
PROCALCITONIN SERPL-MCNC: 0.17 NG/ML
PROT SERPL-MCNC: 6.7 G/DL (ref 6.4–8.2)
Q-T INTERVAL, ECG07: 424 MS
QRS DURATION, ECG06: 136 MS
QTC CALCULATION (BEZET), ECG08: 479 MS
RBC # BLD AUTO: 4.72 M/UL (ref 4.1–5.7)
RSV RNA SPEC QL NAA+PROBE: NOT DETECTED
RV+EV RNA SPEC QL NAA+PROBE: NOT DETECTED
SARS-COV-2, COV2: DETECTED
SERVICE CMNT-IMP: ABNORMAL
SODIUM SERPL-SCNC: 133 MMOL/L (ref 136–145)
SOURCE, COVRS: ABNORMAL
SPECIMEN SOURCE, FCOV2M: ABNORMAL
SPECIMEN TYPE, XMCV1T: ABNORMAL
THERAPEUTIC RANGE,PTTT: NORMAL SECS (ref 58–77)
TROPONIN I SERPL-MCNC: <0.05 NG/ML
VENTRICULAR RATE, ECG03: 77 BPM
WBC # BLD AUTO: 5.2 K/UL (ref 4.1–11.1)

## 2020-08-28 PROCEDURE — 77010033678 HC OXYGEN DAILY

## 2020-08-28 PROCEDURE — 74011000258 HC RX REV CODE- 258: Performed by: INTERNAL MEDICINE

## 2020-08-28 PROCEDURE — 74011636637 HC RX REV CODE- 636/637: Performed by: HOSPITALIST

## 2020-08-28 PROCEDURE — 0100U RESPIRATORY PANEL,PCR,NASOPHARYNGEAL: CPT

## 2020-08-28 PROCEDURE — 85379 FIBRIN DEGRADATION QUANT: CPT

## 2020-08-28 PROCEDURE — 74011250636 HC RX REV CODE- 250/636: Performed by: INTERNAL MEDICINE

## 2020-08-28 PROCEDURE — 85384 FIBRINOGEN ACTIVITY: CPT

## 2020-08-28 PROCEDURE — 83615 LACTATE (LD) (LDH) ENZYME: CPT

## 2020-08-28 PROCEDURE — 82962 GLUCOSE BLOOD TEST: CPT

## 2020-08-28 PROCEDURE — 74011250637 HC RX REV CODE- 250/637: Performed by: HOSPITALIST

## 2020-08-28 PROCEDURE — 65660000001 HC RM ICU INTERMED STEPDOWN

## 2020-08-28 PROCEDURE — 82728 ASSAY OF FERRITIN: CPT

## 2020-08-28 PROCEDURE — 83880 ASSAY OF NATRIURETIC PEPTIDE: CPT

## 2020-08-28 PROCEDURE — 74011000258 HC RX REV CODE- 258: Performed by: NURSE PRACTITIONER

## 2020-08-28 PROCEDURE — 74011250637 HC RX REV CODE- 250/637: Performed by: NURSE PRACTITIONER

## 2020-08-28 PROCEDURE — 80053 COMPREHEN METABOLIC PANEL: CPT

## 2020-08-28 PROCEDURE — 85025 COMPLETE CBC W/AUTO DIFF WBC: CPT

## 2020-08-28 PROCEDURE — 99218 HC RM OBSERVATION: CPT

## 2020-08-28 PROCEDURE — 83735 ASSAY OF MAGNESIUM: CPT

## 2020-08-28 PROCEDURE — 84145 PROCALCITONIN (PCT): CPT

## 2020-08-28 PROCEDURE — 96375 TX/PRO/DX INJ NEW DRUG ADDON: CPT

## 2020-08-28 PROCEDURE — 74011250636 HC RX REV CODE- 250/636: Performed by: NURSE PRACTITIONER

## 2020-08-28 PROCEDURE — 85730 THROMBOPLASTIN TIME PARTIAL: CPT

## 2020-08-28 PROCEDURE — 87635 SARS-COV-2 COVID-19 AMP PRB: CPT

## 2020-08-28 PROCEDURE — 84484 ASSAY OF TROPONIN QUANT: CPT

## 2020-08-28 PROCEDURE — 74011250636 HC RX REV CODE- 250/636: Performed by: HOSPITALIST

## 2020-08-28 PROCEDURE — 36415 COLL VENOUS BLD VENIPUNCTURE: CPT

## 2020-08-28 RX ORDER — ASCORBIC ACID 500 MG
500 TABLET ORAL 2 TIMES DAILY
Status: DISCONTINUED | OUTPATIENT
Start: 2020-08-28 | End: 2020-09-04 | Stop reason: HOSPADM

## 2020-08-28 RX ORDER — INSULIN LISPRO 100 [IU]/ML
12 INJECTION, SOLUTION INTRAVENOUS; SUBCUTANEOUS ONCE
Status: COMPLETED | OUTPATIENT
Start: 2020-08-28 | End: 2020-08-28

## 2020-08-28 RX ORDER — ENOXAPARIN SODIUM 100 MG/ML
30 INJECTION SUBCUTANEOUS EVERY 12 HOURS
Status: DISCONTINUED | OUTPATIENT
Start: 2020-08-28 | End: 2020-09-04 | Stop reason: HOSPADM

## 2020-08-28 RX ORDER — HYDROCODONE BITARTRATE AND ACETAMINOPHEN 5; 325 MG/1; MG/1
1 TABLET ORAL
Status: DISCONTINUED | OUTPATIENT
Start: 2020-08-28 | End: 2020-09-04 | Stop reason: HOSPADM

## 2020-08-28 RX ORDER — SODIUM CHLORIDE 9 MG/ML
50 INJECTION, SOLUTION INTRAVENOUS CONTINUOUS
Status: DISCONTINUED | OUTPATIENT
Start: 2020-08-28 | End: 2020-08-31

## 2020-08-28 RX ORDER — INSULIN GLARGINE 100 [IU]/ML
24 INJECTION, SOLUTION SUBCUTANEOUS
Status: DISCONTINUED | OUTPATIENT
Start: 2020-08-28 | End: 2020-08-28

## 2020-08-28 RX ORDER — LEVOFLOXACIN 5 MG/ML
750 INJECTION, SOLUTION INTRAVENOUS
Status: DISCONTINUED | OUTPATIENT
Start: 2020-08-28 | End: 2020-09-04 | Stop reason: HOSPADM

## 2020-08-28 RX ORDER — ZINC SULFATE 50(220)MG
1 CAPSULE ORAL DAILY
Status: DISCONTINUED | OUTPATIENT
Start: 2020-08-28 | End: 2020-09-04 | Stop reason: HOSPADM

## 2020-08-28 RX ORDER — INSULIN GLARGINE 100 [IU]/ML
28 INJECTION, SOLUTION SUBCUTANEOUS
Status: DISCONTINUED | OUTPATIENT
Start: 2020-08-28 | End: 2020-08-31

## 2020-08-28 RX ORDER — INSULIN LISPRO 100 [IU]/ML
5 INJECTION, SOLUTION INTRAVENOUS; SUBCUTANEOUS
Status: DISCONTINUED | OUTPATIENT
Start: 2020-08-28 | End: 2020-08-31

## 2020-08-28 RX ADMIN — PRAVASTATIN SODIUM 40 MG: 40 TABLET ORAL at 22:08

## 2020-08-28 RX ADMIN — GUAIFENESIN 1200 MG: 600 TABLET, EXTENDED RELEASE ORAL at 20:49

## 2020-08-28 RX ADMIN — LACOSAMIDE 200 MG: 50 TABLET, FILM COATED ORAL at 10:17

## 2020-08-28 RX ADMIN — INSULIN LISPRO 12 UNITS: 100 INJECTION, SOLUTION INTRAVENOUS; SUBCUTANEOUS at 13:27

## 2020-08-28 RX ADMIN — OXYCODONE HYDROCHLORIDE AND ACETAMINOPHEN 500 MG: 500 TABLET ORAL at 20:12

## 2020-08-28 RX ADMIN — Medication 10 ML: at 22:10

## 2020-08-28 RX ADMIN — SODIUM CHLORIDE 50 ML/HR: 900 INJECTION, SOLUTION INTRAVENOUS at 04:08

## 2020-08-28 RX ADMIN — GUAIFENESIN 1200 MG: 600 TABLET, EXTENDED RELEASE ORAL at 01:17

## 2020-08-28 RX ADMIN — LEVOFLOXACIN 750 MG: 5 INJECTION, SOLUTION INTRAVENOUS at 20:50

## 2020-08-28 RX ADMIN — LEVETIRACETAM 750 MG: 500 TABLET ORAL at 20:12

## 2020-08-28 RX ADMIN — ENOXAPARIN SODIUM 30 MG: 30 INJECTION SUBCUTANEOUS at 16:51

## 2020-08-28 RX ADMIN — Medication 10 ML: at 16:51

## 2020-08-28 RX ADMIN — Medication 10 ML: at 10:35

## 2020-08-28 RX ADMIN — TOPIRAMATE 100 MG: 100 TABLET, FILM COATED ORAL at 20:12

## 2020-08-28 RX ADMIN — DEXAMETHASONE 6 MG: 4 TABLET ORAL at 10:17

## 2020-08-28 RX ADMIN — PRAVASTATIN SODIUM 40 MG: 40 TABLET ORAL at 01:17

## 2020-08-28 RX ADMIN — GUAIFENESIN 1200 MG: 600 TABLET, EXTENDED RELEASE ORAL at 10:17

## 2020-08-28 RX ADMIN — OXYCODONE HYDROCHLORIDE AND ACETAMINOPHEN 500 MG: 500 TABLET ORAL at 10:17

## 2020-08-28 RX ADMIN — INSULIN GLARGINE 28 UNITS: 100 INJECTION, SOLUTION SUBCUTANEOUS at 22:08

## 2020-08-28 RX ADMIN — TOPIRAMATE 100 MG: 100 TABLET, FILM COATED ORAL at 10:18

## 2020-08-28 RX ADMIN — DEXAMETHASONE 6 MG: 4 TABLET ORAL at 01:18

## 2020-08-28 RX ADMIN — ZINC SULFATE 220 MG (50 MG) CAPSULE 1 CAPSULE: CAPSULE at 10:18

## 2020-08-28 RX ADMIN — INSULIN LISPRO 12 UNITS: 100 INJECTION, SOLUTION INTRAVENOUS; SUBCUTANEOUS at 10:18

## 2020-08-28 RX ADMIN — LACOSAMIDE 200 MG: 50 TABLET, FILM COATED ORAL at 20:12

## 2020-08-28 RX ADMIN — AZITHROMYCIN MONOHYDRATE 500 MG: 500 INJECTION, POWDER, LYOPHILIZED, FOR SOLUTION INTRAVENOUS at 05:28

## 2020-08-28 RX ADMIN — CEFTRIAXONE 1 G: 1 INJECTION, POWDER, FOR SOLUTION INTRAMUSCULAR; INTRAVENOUS at 04:08

## 2020-08-28 RX ADMIN — CEFEPIME 2 G: 2 INJECTION, POWDER, FOR SOLUTION INTRAVENOUS at 20:49

## 2020-08-28 RX ADMIN — LEVETIRACETAM 750 MG: 500 TABLET ORAL at 10:18

## 2020-08-28 NOTE — PROGRESS NOTES
Readmission Assessment  Number of days since last admission?: 8-30 days  Previous disposition: Long Term Care(Resides in LTC at [facility])  Who is being interviewed?: Careg[place] [facility] [place]  Did you visit your Primary Care Physician after you left the hospital, before you returned this time?: (In LTC seen by facility MD)  Who advised the patient to return to the hospital?: Other (Comment)(staff at facility)  Does the patient report anything that got in the way of taking their medications?: Reagan for Readmission:  Admitting from [facility] with vomiting coughing up blood. Of note he was recently admitted with positive COVID PNA. RUR Score/Risk Level:     Observation    PCP: First and Last name:  Chayito Hodges   Name of Practice:    Are you a current patient: Yes/No: YES  Approximate date of last visit: Seen at facility  Can you participate in a virtual visit with your PCP:     Is a Care Conference indicated:   NO    Did you attend your follow up appointment (s): If not, why not:  Seen by MD at facility       Resources/supports as identified by patient/family:          Top Challenges facing patient (as identified by patient/family and CM): Finances/Medication cost? [employer] system or lack thereof? Living arrangements? LTC at facility          Self-care/ADLs/Cognition? Total assist with ADLs  Oriented to self  Current Advanced Directive/Advance Care Plan:  Does not have and can not complete secondary to dementia. Marilu Johnson and Angelina Cardona are legal NOK. Plan for utilizing home health:   NA return to [facility] to LTC. Transition of Care Plan:    Based on readmission, the patient's previous Plan of Care   has been evaluated and/or modified. The current Transition of Care Plan is: Once medically stable will likely discharge back to [facility].   Care Management Interventions  PCP Verified by CM: Yes(8/2020)  Mode of Transport at Discharge: BLS  Transition of Care Consult (CM Consult): Long Term Care(Resides in LTC at [facility])  The Patient and/or Patient Representative was Provided with a Choice of Provider and Agrees with the Discharge Plan?: (daughter Proctor Payer)  [address] Provided?: No  Observation notice provided in writing to daughter Proctor Payer as well as verbal explanation of the policy. Patients who are in outpatient status also receive the Observation notice.    Nadege Sandoval RN CRM  Ext [phone]

## 2020-08-28 NOTE — PROGRESS NOTES
Problem: Diabetes Self-Management  Goal: *Disease process and treatment process  Description: Define diabetes and identify own type of diabetes; list 3 options for treating diabetes. Outcome: Progressing Towards Goal  Goal: *Incorporating nutritional management into lifestyle  Description: Describe effect of type, amount and timing of food on blood glucose; list 3 methods for planning meals. Outcome: Progressing Towards Goal  Goal: *Incorporating physical activity into lifestyle  Description: State effect of exercise on blood glucose levels. Outcome: Progressing Towards Goal  Goal: *Developing strategies to promote health/change behavior  Description: Define the ABC's of diabetes; identify appropriate screenings, schedule and personal plan for screenings. Outcome: Progressing Towards Goal  Goal: *Using medications safely  Description: State effect of diabetes medications on diabetes; name diabetes medication taking, action and side effects. Outcome: Progressing Towards Goal  Goal: *Monitoring blood glucose, interpreting and using results  Description: Identify recommended blood glucose targets  and personal targets. Outcome: Not Progressing Towards Goal  Goal: *Prevention, detection, treatment of acute complications  Description: List symptoms of hyper- and hypoglycemia; describe how to treat low blood sugar and actions for lowering  high blood glucose level. Outcome: Progressing Towards Goal  Goal: *Prevention, detection and treatment of chronic complications  Description: Define the natural course of diabetes and describe the relationship of blood glucose levels to long term complications of diabetes.   Outcome: Progressing Towards Goal  Goal: *Developing strategies to address psychosocial issues  Description: Describe feelings about living with diabetes; identify support needed and support network  Outcome: Not Progressing Towards Goal  Goal: *Insulin pump training  Outcome: Not Met  Goal: *Sick day guidelines  Outcome: Progressing Towards Goal     Problem: Falls - Risk of  Goal: *Absence of Falls  Description: Document Belkys Glass Fall Risk and appropriate interventions in the flowsheet.   Outcome: Progressing Towards Goal  Note: Fall Risk Interventions:  Mobility Interventions: Communicate number of staff needed for ambulation/transfer, Patient to call before getting OOB, PT Consult for mobility concerns    Mentation Interventions: Bed/chair exit alarm, Evaluate medications/consider consulting pharmacy, Gait belt with transfers/ambulation, Reorient patient, More frequent rounding, Room close to nurse's station    Medication Interventions: Evaluate medications/consider consulting pharmacy, Patient to call before getting OOB, Teach patient to arise slowly    Elimination Interventions: Call light in reach, Patient to call for help with toileting needs, Toilet paper/wipes in reach, Toileting schedule/hourly rounds

## 2020-08-28 NOTE — PROGRESS NOTES
Problem: Falls - Risk of  Goal: *Absence of Falls  Description: Document Kayode Mead Fall Risk and appropriate interventions in the flowsheet.   8/28/2020 0905 by Jonathan Mir  Outcome: Progressing Towards Goal  Note: Fall Risk Interventions:  Mobility Interventions: Communicate number of staff needed for ambulation/transfer, Patient to call before getting OOB, PT Consult for mobility concerns    Mentation Interventions: Bed/chair exit alarm, Evaluate medications/consider consulting pharmacy, Gait belt with transfers/ambulation, Reorient patient, More frequent rounding, Room close to nurse's station    Medication Interventions: Evaluate medications/consider consulting pharmacy, Patient to call before getting OOB, Teach patient to arise slowly    Elimination Interventions: Call light in reach, Patient to call for help with toileting needs, Toilet paper/wipes in reach, Toileting schedule/hourly rounds           8/28/2020 0859 by Jonathan Mir  Outcome: Progressing Towards Goal  Note: Fall Risk Interventions:  Mobility Interventions: Communicate number of staff needed for ambulation/transfer, Patient to call before getting OOB, PT Consult for mobility concerns    Mentation Interventions: Bed/chair exit alarm, Evaluate medications/consider consulting pharmacy, Gait belt with transfers/ambulation, Reorient patient, More frequent rounding, Room close to nurse's station    Medication Interventions: Evaluate medications/consider consulting pharmacy, Patient to call before getting OOB, Teach patient to arise slowly    Elimination Interventions: Call light in reach, Patient to call for help with toileting needs, Toilet paper/wipes in reach, Toileting schedule/hourly rounds              Problem: Breathing Pattern - Ineffective  Goal: *Absence of hypoxia  Outcome: Progressing Towards Goal  Goal: *Use of effective breathing techniques  Outcome: Progressing Towards Goal  Goal: *PALLIATIVE CARE:  Alleviation of Dyspnea  Outcome: Progressing Towards Goal

## 2020-08-28 NOTE — CONSULTS
ID Consult Note  NAME:  Renetta Gallardo   :   1945   MRN:   [id number]   Date/Time:  2020 7:18 PM  Subjective:   REASON FOR CONSULT: COVID-19      Bella Herman is a 76 y.o. with a history of Alzheimer's, prostate cancer, COPD and chronic kidney disease. He also has a diagnosis of coronary artery disease. The patient is not a good historian. He cannot tell me why he came to the hospital.  He also cannot tell me his medical problems. He apparently was admitted in early August at another [facility] facility for [address] [place] [address]. While he was there he was treated with Zosyn, azithromycin and Rocephin. He did not get remdesivir, convalescent plasma or Actemra. He got dexamethasone only from the  to the . He was apparently discharged to [facility]. He was then admitted on  for coughing up blood. He was brought here to the hospital where he had a CT of the chest done which showed progression of multifocal bilateral groundglass and patchy consolidations consistent with worsening of COVID-19 pneumonia. He is currently on ceftriaxone and azithromycin. Dexamethasone has been started and we are being asked to see him in consult. He tells me he has shortness of breath. He tells me that he is coughing. He tells me that he is not in any pain.       Past Medical History:   Diagnosis Date    Allergic rhinitis, cause unspecified 2011    [facility]'s disease ([facility]     Arthritis     GOUT    Cancer ([facility] [facility] 2016    PROSTATE    Chronic obstructive pulmonary disease (HCC)     CHRONIC BRONCHITIS    CKD (chronic kidney disease), stage II     Congestive heart failure, unspecified 2011    (ON 16 PT & DTR STATE THEY DON'T REMEMBER THIS DIAGNOSIS)    Coronary Artery Disease 2011    Diabetes Mellitus Type I 2011    Erectile Dysfunction 2011    Hemorrhoids 2011    Hypertension     Pneumonia due to COVID-19 virus 2020    Seizures ([facility]     STARTED ; \"BLACK-OUT SEIZURES,ABSENCE SEIZURES\", DTR SATES    Stroke ([facility] [address]     MULTIPLE MINISTROKES, DTR REPORTS; LEFT-SIDED WEAKNESS    Thromboembolus ([address]     left leg  (NO PE)    Unspecified asthma(493.90) 4/18/2011      Past Surgical History:   Procedure Laterality Date    CARDIAC SURG PROCEDURE UNLIST  2006    CABG    COLONOSCOPY N/A 10/9/2017    COLONOSCOPY performed by Umang Alvarado MD at [facility], STENTS LEFT LEG     Social History     Tobacco Use    Smoking status: Former Smoker     Packs/day: 2.00     Years: 30.00     Pack years: 60.00    Smokeless tobacco: Former User     Quit date: 9/20/1990   Substance Use Topics    Alcohol use: No     Comment: IN PAST, MODERATE ALCOHOL. WEEKENDS ONLY      Family History   Problem Relation Age of Onset    Diabetes Mother     Hypertension Mother    Ernesto Glover Diabetes Father    Ernesto Glover Anesth Problems Neg Hx       No Known Allergies   Home Medications:  Prior to Admission Medications   Prescriptions Last Dose Informant Patient Reported? Taking? ACCU-CHEK ALEXIS PLUS TEST STRP strip   No No   Sig: TEST BLOOD SUGARS 2 TIMES DAILY   HYDROcodone-acetaminophen (NORCO) 5-325 mg per tablet   No No   Sig: Take 1 Tab by mouth every four (4) hours as needed for Pain. Max Daily Amount: 6 Tabs. Insulin Needles, Disposable, (JAZMYN PEN NEEDLE) 32 gauge x 5/32\" ndle   No No   Sig: Use daily as directed with insulin pen. Lancets misc   No No   Sig: Patient test 3 x daily  Dm 250   Walker misc   No No   Sig: Rollator walker   allopurinol (ZYLOPRIM) 100 mg tablet   No No   Sig: take 1 tablet by mouth once daily   amLODIPine (NORVASC) 5 mg tablet   No No   Sig: take 1 tablet by mouth once daily   aspirin (ASPIRIN) 325 mg tablet   Yes No   Sig: Take 325 mg by mouth daily.    bumetanide (BUMEX) 1 mg tablet   No No   Sig: take 1 tablet once daily   carvedilol (COREG) 6.25 mg tablet   Yes No   Sig: Take 6.25 mg by mouth two (2) times daily (with meals). Indications: PREVENTION OF A. FIB POST CARDIO-THORACIC SURGERY   clopidogrel (PLAVIX) 75 mg tab   No No   Sig: take 1 tablet by mouth once daily   glucose blood VI test strips (ONETOUCH ULTRA TEST) strip   No No   Sig: Test 2 times daily    ONETOUCH ULTRA 2 TEST STRIPS ONLY   lacosamide (VIMPAT) 200 mg tab tablet  Transfer Papers Yes No   Sig: Take 200 mg by mouth two (2) times a day. Indications: PARTIAL EPILEPSY TREATMENT ADJUNCT   levETIRAcetam (KEPPRA) 750 mg tablet  Transfer Papers Yes No   Sig: Take 750 mg by mouth two (2) times a day. 3 tab, twice daily   Indications: PARTIAL EPILEPSY TREATMENT ADJUNCT   losartan (COZAAR) 100 mg tablet   No No   Sig: take 1 tablet by mouth once daily   metoprolol tartrate (LOPRESSOR) 50 mg tablet   No No   Sig: take 1 tablet by mouth twice a day   pravastatin (PRAVACHOL) 40 mg tablet   No No   Sig: take 1 tablet once daily   topiramate (TOPAMAX) 100 mg tablet  Transfer Papers Yes No   Sig: Take 100 mg by mouth two (2) times a day.  Indications: COMPLEX-PARTIAL EPILEPSY      Facility-Administered Medications: None     Hospital medications:  Current Facility-Administered Medications   Medication Dose Route Frequency    0.9% sodium chloride infusion  50 mL/hr IntraVENous CONTINUOUS    ascorbic acid (vitamin C) (VITAMIN C) tablet 500 mg  500 mg Oral BID    zinc sulfate (ZINCATE) 220 (50) mg capsule 1 Cap  1 Cap Oral DAILY    HYDROcodone-acetaminophen (NORCO) 5-325 mg per tablet 1 Tab  1 Tab Oral Q6H PRN    insulin glargine (LANTUS) injection 28 Units  28 Units SubCUTAneous QHS    insulin lispro (HUMALOG) injection 5 Units  5 Units SubCUTAneous TIDAC    enoxaparin (LOVENOX) injection 30 mg  30 mg SubCUTAneous Q12H    sodium chloride (NS) flush 5-40 mL  5-40 mL IntraVENous Q8H    sodium chloride (NS) flush 5-40 mL  5-40 mL IntraVENous PRN    acetaminophen (TYLENOL) tablet 650 mg  650 mg Oral Q6H PRN    Or    acetaminophen (TYLENOL) suppository 650 mg 650 mg Rectal Q6H PRN    polyethylene glycol (MIRALAX) packet 17 g  17 g Oral DAILY PRN    promethazine (PHENERGAN) tablet 12.5 mg  12.5 mg Oral Q6H PRN    Or    ondansetron (ZOFRAN) injection 4 mg  4 mg IntraVENous Q6H PRN    dexAMETHasone (DECADRON) tablet 6 mg  6 mg Oral DAILY WITH BREAKFAST    lacosamide (VIMPAT) tablet 200 mg  200 mg Oral BID    levETIRAcetam (KEPPRA) tablet 750 mg  750 mg Oral BID    pravastatin (PRAVACHOL) tablet 40 mg  40 mg Oral QHS    topiramate (TOPAMAX) tablet 100 mg  100 mg Oral BID    glucose chewable tablet 16 g  4 Tab Oral PRN    glucagon (GLUCAGEN) injection 1 mg  1 mg IntraMUSCular PRN    dextrose 10% infusion 0-250 mL  0-250 mL IntraVENous PRN    insulin lispro (HUMALOG) injection   SubCUTAneous AC&HS    albuterol (PROVENTIL HFA, VENTOLIN HFA, PROAIR HFA) inhaler 2 Puff  2 Puff Inhalation Q4H PRN    guaiFENesin ER (MUCINEX) tablet 1,200 mg  1,200 mg Oral Q12H    cefTRIAXone (ROCEPHIN) 1 g in 0.9% sodium chloride (MBP/ADV) 50 mL  1 g IntraVENous Q24H    azithromycin (ZITHROMAX) 500 mg in 0.9% sodium chloride (MBP/ADV) 250 mL  500 mg IntraVENous Q24H     REVIEW OF SYSTEMS:        Const:    negative weight loss  Eyes:   negative eye pain  ENT:   negative sore throat  Resp:   negative hemoptysis  Cards:  negative for chest pain  :  negative for dysuria  GI:   Negative for abdominal pain  Skin:   negative for rash   Heme:   negative for easy bruising and gum/nose bleeding  MS:  negative for myalgias, arthralgias, back pain   Neurolo:  negative for headaches          Objective:   VITALS:    Visit Vitals  BP 98/62 (BP 1 Location: Right arm, BP Patient Position: At rest)   Pulse 76   Temp 97.9 °F (36.6 °C)   Resp 13   Ht 5' 8\" (1.727 m)   Wt 60.5 kg (133 lb 6.1 oz)   SpO2 99%   BMI 20.28 kg/m²     Temp (24hrs), Av.9 °F (36.6 °C), Min:97.7 °F (36.5 °C), Max:98.2 °F (36.8 °C)    PHYSICAL EXAM:   General:    Alert, cooperative, no distress, appears stated age. Head:   Normocephalic, without obvious abnormality, atraumatic. Eyes:   Conjunctivae clear, anicteric sclerae. Nose:  Nares normal.   Throat:    Lips and tongue normal.  No Thrush  Neck:  Supple, symmetrical    no carotid bruit and no JVD. :    No CVA tenderness, no bolaños catheter  Lungs:   Clear to auscultation bilaterally. No Wheezing or Rhonchi. No rales. Heart:   Regular rate and rhythm,  no murmur, rub or gallop. Abdomen:   Soft, non-tender,not distended. Bowel sounds normal.   Extremities: Knees, ankles, wrists, elbows are not warm and not tender. No pedal edema  Skin:     No rashes or lesions. Not Jaundiced  Lymph: Cervical normal  Neurologic: Full use of extraocular muscles, no facial asymmetry, tongue midline he has spontaneous movements of his extremities. LAB DATA REVIEWED:    Recent Results (from the past 48 hour(s))   CBC WITH AUTOMATED DIFF    Collection Time: 08/27/20  2:09 PM   Result Value Ref Range    WBC 7.4 4.1 - 11.1 K/uL    RBC 4.95 4.10 - 5.70 M/uL    HGB 13.4 12.1 - 17.0 g/dL    HCT 43.2 36.6 - 50.3 %    MCV 87.3 80.0 - 99.0 FL    MCH 27.1 26.0 - 34.0 PG    MCHC 31.0 30.0 - 36.5 g/dL    RDW 15.7 (H) 11.5 - 14.5 %    PLATELET 660 (L) 604 - 400 K/uL    MPV 10.6 8.9 - 12.9 FL    NRBC 0.0 0  WBC    ABSOLUTE NRBC 0.00 0.00 - 0.01 K/uL    NEUTROPHILS 73 32 - 75 %    LYMPHOCYTES 17 12 - 49 %    MONOCYTES 6 5 - 13 %    EOSINOPHILS 2 0 - 7 %    BASOPHILS 1 0 - 1 %    IMMATURE GRANULOCYTES 1 (H) 0.0 - 0.5 %    ABS. NEUTROPHILS 5.5 1.8 - 8.0 K/UL    ABS. LYMPHOCYTES 1.2 0.8 - 3.5 K/UL    ABS. MONOCYTES 0.4 0.0 - 1.0 K/UL    ABS. EOSINOPHILS 0.1 0.0 - 0.4 K/UL    ABS. BASOPHILS 0.1 0.0 - 0.1 K/UL    ABS. IMM.  GRANS. 0.1 (H) 0.00 - 0.04 K/UL    DF AUTOMATED     METABOLIC PANEL, COMPREHENSIVE    Collection Time: 08/27/20  2:09 PM   Result Value Ref Range    Sodium 135 (L) 136 - 145 mmol/L    Potassium 3.8 3.5 - 5.1 mmol/L    Chloride 100 97 - 108 mmol/L    CO2 28 21 - 32 mmol/L Anion gap 7 5 - 15 mmol/L    Glucose 448 (H) 65 - 100 mg/dL    BUN 25 (H) 6 - 20 MG/DL    Creatinine 1.54 (H) 0.70 - 1.30 MG/DL    BUN/Creatinine ratio 16 12 - 20      GFR est AA 54 (L) >60 ml/min/1.73m2    GFR est non-AA 44 (L) >60 ml/min/1.73m2    Calcium 8.9 8.5 - 10.1 MG/DL    Bilirubin, total 0.3 0.2 - 1.0 MG/DL    ALT (SGPT) 16 12 - 78 U/L    AST (SGOT) 17 15 - 37 U/L    Alk. phosphatase 109 45 - 117 U/L    Protein, total 7.4 6.4 - 8.2 g/dL    Albumin 2.2 (L) 3.5 - 5.0 g/dL    Globulin 5.2 (H) 2.0 - 4.0 g/dL    A-G Ratio 0.4 (L) 1.1 - 2.2     TROPONIN I    Collection Time: 08/27/20  2:09 PM   Result Value Ref Range    Troponin-I, Qt. <0.05 <0.05 ng/mL   LIPASE    Collection Time: 08/27/20  2:09 PM   Result Value Ref Range    Lipase 89 73 - 393 U/L   LACTIC ACID    Collection Time: 08/27/20  2:09 PM   Result Value Ref Range    Lactic acid 1.4 0.4 - 2.0 MMOL/L   MAGNESIUM    Collection Time: 08/27/20  2:09 PM   Result Value Ref Range    Magnesium 1.9 1.6 - 2.4 mg/dL   HEMOGLOBIN A1C WITH EAG    Collection Time: 08/27/20  2:09 PM   Result Value Ref Range    Hemoglobin A1c 8.4 (H) 4.0 - 5.6 %    Est. average glucose 194 mg/dL   PHOSPHORUS    Collection Time: 08/27/20  2:15 PM   Result Value Ref Range    Phosphorus 2.5 (L) 2.6 - 4.7 MG/DL   PROTHROMBIN TIME + INR    Collection Time: 08/27/20  2:15 PM   Result Value Ref Range    INR 1.1 0.9 - 1.1      Prothrombin time 10.9 9.0 - 11.1 sec   TYPE & SCREEN    Collection Time: 08/27/20  2:16 PM   Result Value Ref Range    Crossmatch Expiration 08/30/2020     ABO/Rh(D) O POSITIVE     Antibody screen NEG    SAMPLES BEING HELD    Collection Time: 08/27/20  2:16 PM   Result Value Ref Range    SAMPLES BEING HELD 1RED,1BLU,1BC(LAV),1BC(LUCILLE)     COMMENT        Add-on orders for these samples will be processed based on acceptable specimen integrity and analyte stability, which may vary by analyte.    EKG, 12 LEAD, INITIAL    Collection Time: 08/27/20  6:20 PM   Result Value Ref Range    Ventricular Rate 77 BPM    Atrial Rate 77 BPM    P-R Interval 120 ms    QRS Duration 136 ms    Q-T Interval 424 ms    QTC Calculation (Bezet) 479 ms    Calculated P Axis 61 degrees    Calculated R Axis -77 degrees    Calculated T Axis 58 degrees    Diagnosis       Normal sinus rhythm  Left axis deviation  Right bundle branch block  When compared with ECG of 04-AUG-2020 16:50,  No significant change  Confirmed by Marylou Browne M.D., [place] ([id number]) on 8/28/2020 3:39:51 PM     URINALYSIS W/ RFLX MICROSCOPIC    Collection Time: 08/27/20  6:26 PM   Result Value Ref Range    Color YELLOW/STRAW      Appearance CLEAR CLEAR      Specific gravity 1.027 1.003 - 1.030      pH (UA) 5.0 5.0 - 8.0      Protein Negative NEG mg/dL    Glucose 500 (A) NEG mg/dL    Ketone Negative NEG mg/dL    Bilirubin Negative NEG      Blood Negative NEG      Urobilinogen 0.2 0.2 - 1.0 EU/dL    Nitrites Negative NEG      Leukocyte Esterase Negative NEG     POC EG7    Collection Time: 08/27/20  9:02 PM   Result Value Ref Range    Calcium, ionized (POC) 1.28 1.12 - 1.32 mmol/L    FIO2 (POC) 0.28 %    pH (POC) 7.40 7.35 - 7.45      pCO2 (POC) 38.7 35.0 - 45.0 MMHG    pO2 (POC) 69 (L) 80 - 100 MMHG    HCO3 (POC) 23.9 22 - 26 MMOL/L    Base deficit (POC) 1 mmol/L    sO2 (POC) 94 92 - 97 %    Site RIGHT RADIAL      Device: NASAL CANNULA      Flow rate (POC) 2.0 L/M    Allens test (POC) YES      Specimen type (POC) ARTERIAL      Total resp.  rate 25     LD    Collection Time: 08/28/20  3:29 AM   Result Value Ref Range     (H) 85 - 241 U/L   FERRITIN    Collection Time: 08/28/20  3:29 AM   Result Value Ref Range    Ferritin 270 26 - 388 NG/ML   PROCALCITONIN    Collection Time: 08/28/20  3:29 AM   Result Value Ref Range    Procalcitonin 0.17 ng/mL   TROPONIN I    Collection Time: 08/28/20  3:29 AM   Result Value Ref Range    Troponin-I, Qt. <0.05 <8.53 ng/mL   METABOLIC PANEL, COMPREHENSIVE    Collection Time: 08/28/20  3:39 AM   Result Value Ref Range Sodium 133 (L) 136 - 145 mmol/L    Potassium 4.3 3.5 - 5.1 mmol/L    Chloride 101 97 - 108 mmol/L    CO2 24 21 - 32 mmol/L    Anion gap 8 5 - 15 mmol/L    Glucose 427 (H) 65 - 100 mg/dL    BUN 25 (H) 6 - 20 MG/DL    Creatinine 1.35 (H) 0.70 - 1.30 MG/DL    BUN/Creatinine ratio 19 12 - 20      GFR est AA >60 >60 ml/min/1.73m2    GFR est non-AA 52 (L) >60 ml/min/1.73m2    Calcium 9.0 8.5 - 10.1 MG/DL    Bilirubin, total 0.4 0.2 - 1.0 MG/DL    ALT (SGPT) 17 12 - 78 U/L    AST (SGOT) 15 15 - 37 U/L    Alk. phosphatase 94 45 - 117 U/L    Protein, total 6.7 6.4 - 8.2 g/dL    Albumin 2.0 (L) 3.5 - 5.0 g/dL    Globulin 4.7 (H) 2.0 - 4.0 g/dL    A-G Ratio 0.4 (L) 1.1 - 2.2     MAGNESIUM    Collection Time: 08/28/20  3:39 AM   Result Value Ref Range    Magnesium 1.9 1.6 - 2.4 mg/dL   CBC WITH AUTOMATED DIFF    Collection Time: 08/28/20  3:39 AM   Result Value Ref Range    WBC 5.2 4.1 - 11.1 K/uL    RBC 4.72 4.10 - 5.70 M/uL    HGB 12.5 12.1 - 17.0 g/dL    HCT 41.6 36.6 - 50.3 %    MCV 88.1 80.0 - 99.0 FL    MCH 26.5 26.0 - 34.0 PG    MCHC 30.0 30.0 - 36.5 g/dL    RDW 15.7 (H) 11.5 - 14.5 %    PLATELET 396 (L) 440 - 400 K/uL    MPV 12.9 8.9 - 12.9 FL    NRBC 0.0 0  WBC    ABSOLUTE NRBC 0.00 0.00 - 0.01 K/uL    NEUTROPHILS 61 32 - 75 %    LYMPHOCYTES 27 12 - 49 %    MONOCYTES 8 5 - 13 %    EOSINOPHILS 2 0 - 7 %    BASOPHILS 1 0 - 1 %    IMMATURE GRANULOCYTES 1 (H) 0.0 - 0.5 %    ABS. NEUTROPHILS 3.2 1.8 - 8.0 K/UL    ABS. LYMPHOCYTES 1.4 0.8 - 3.5 K/UL    ABS. MONOCYTES 0.4 0.0 - 1.0 K/UL    ABS. EOSINOPHILS 0.1 0.0 - 0.4 K/UL    ABS. BASOPHILS 0.1 0.0 - 0.1 K/UL    ABS. IMM.  GRANS. 0.0 0.00 - 0.04 K/UL    DF AUTOMATED     NT-PRO BNP    Collection Time: 08/28/20  3:39 AM   Result Value Ref Range    NT pro- <450 PG/ML   PTT    Collection Time: 08/28/20  5:33 AM   Result Value Ref Range    aPTT 25.9 22.1 - 32.0 sec    aPTT, therapeutic range     58.0 - 77.0 SECS   FIBRINOGEN    Collection Time: 08/28/20  5:33 AM Result Value Ref Range    Fibrinogen 259 200 - 475 mg/dL   D DIMER    Collection Time: 08/28/20  5:33 AM   Result Value Ref Range    D-dimer >35.20 (H) 0.00 - 0.65 mg/L FEU   SARS-COV-2    Collection Time: 08/28/20  5:33 AM   Result Value Ref Range    Specimen source Nasopharyngeal      SARS-CoV-2 Detected (A) NOTD      Specimen source Nasopharyngeal      Specimen type NP Swab      Health status Symptomatic Testing     RESPIRATORY PANEL,PCR,NASOPHARYNGEAL    Collection Time: 08/28/20  5:42 AM    Specimen: Nasopharyngeal   Result Value Ref Range    Adenovirus Not detected NOTD      Coronavirus 229E Not detected NOTD      Coronavirus HKU1 Not detected NOTD      Coronavirus CVNL63 Not detected NOTD      Coronavirus OC43 Not detected NOTD      Metapneumovirus Not detected NOTD      Rhinovirus and Enterovirus Not detected NOTD      Influenza A Not detected NOTD      Influenza A, subtype H1 Not detected NOTD      Influenza A, subtype H3 Not detected NOTD      INFLUENZA A H1N1 PCR Not detected NOTD      Influenza B Not detected NOTD      Parainfluenza 1 Not detected NOTD      Parainfluenza 2 Not detected NOTD      Parainfluenza 3 Not detected NOTD      Parainfluenza virus 4 Not detected NOTD      RSV by PCR Not detected NOTD      B. parapertussis, PCR Not detected NOTD      Bordetella pertussis - PCR Not detected NOTD      Chlamydophila pneumoniae DNA, QL, PCR Not detected NOTD      Mycoplasma pneumoniae DNA, QL, PCR Not detected NOTD     GLUCOSE, POC    Collection Time: 08/28/20  8:56 AM   Result Value Ref Range    Glucose (POC) 419 (H) 65 - 100 mg/dL    Performed by Douangphoumy Spaphay P    GLUCOSE, POC    Collection Time: 08/28/20 11:14 AM   Result Value Ref Range    Glucose (POC) 445 (H) 65 - 100 mg/dL    Performed by Douangphoumy Spaphay P    GLUCOSE, POC    Collection Time: 08/28/20  5:10 PM   Result Value Ref Range    Glucose (POC) 183 (H) 65 - 100 mg/dL    Performed by Sima Maria (CON)      Antibiotic history of previous admission    Zosyn 8/4, 8/10 to 8/13  Azithromycin 8/4 to 8/9  Rocephin 8/5 to 8/9  Dexamethasone 8/10 to 8/13    IMPRESSION    Bilateral infiltrates possibly from COVID versus other  Renal insufficiency  Coronary artery disease  Peripheral vascular disease  COPD  History of prostate cancer  History of thromboembolic disease  History  of seizures      PLAN    It has been 24 days already since his first positive test.  I do not think that giving  remdesivir would be of any benefit now. I agree with giving him dexamethasone. We should probably try getting convalescent plasma for him. I would also work him up for other possible causes of bilateral infiltrates. I will send Legionella, mycoplasma and chlamydia serology. I would check him for HIV. Probably asking pulmonology to see him would be of benefit. Team available over the weekend if needed.                   ___________________________________________________  ID: Adrian Paredes MD

## 2020-08-28 NOTE — PROGRESS NOTES
Problem: Falls - Risk of  Goal: *Absence of Falls  Description: Document Irene Cuellar Fall Risk and appropriate interventions in the flowsheet. Outcome: Progressing Towards Goal  Note: Fall Risk Interventions:  Mobility Interventions: Assess mobility with egress test, Communicate number of staff needed for ambulation/transfer, Patient to call before getting OOB, PT Consult for mobility concerns, Strengthening exercises (ROM-active/passive), Utilize gait belt for transfers/ambulation, Utilize walker, cane, or other assistive device, PT Consult for assist device competence, OT consult for ADLs, Bed/chair exit alarm    Mentation Interventions: Adequate sleep, hydration, pain control, Bed/chair exit alarm, Door open when patient unattended, Increase mobility, More frequent rounding, Room close to nurse's station, Self-releasing belt, Toileting rounds, Update white board, Reorient patient    Medication Interventions: Assess postural VS orthostatic hypotension, Bed/chair exit alarm, Evaluate medications/consider consulting pharmacy, Teach patient to arise slowly, Utilize gait belt for transfers/ambulation, Patient to call before getting OOB    Elimination Interventions: Bed/chair exit alarm, Call light in reach, Elevated toilet seat, Stay With Me (per policy), Toilet paper/wipes in reach, Toileting schedule/hourly rounds, Patient to call for help with toileting needs            Bedside shift change report given to [facility] (oncoming nurse) by Kinjal Singh RN (offgoing nurse). Report included the following information SBAR, Kardex, ED Summary, Intake/Output, MAR, Recent Results, Med Rec Status, Cardiac Rhythm NSR, Alarm Parameters  and Quality Measures.

## 2020-08-28 NOTE — PROGRESS NOTES
Verbal shift change report given to Lori Keenan RN (oncoming nurse) by Shaggy Rivas RN (offgoing nurse). Report included the following information SBAR, Kardex, Procedure Summary, Intake/Output, Accordion and Cardiac Rhythm NSR.

## 2020-08-28 NOTE — ACP (ADVANCE CARE PLANNING)
Advance Care Planning     Advance Care Planning Activator (Inpatient)  Conversation Note      Date of ACP Conversation: 08/28/20     Conversation Conducted with:    Healthcare Decision Maker: Next of Kin by law (only applies in absence of above) (name) Weston Whitney daughter    ACP Activator: Melissa Dodd RN    *When Decision Maker makes decisions on behalf of the incapacitated patient: Decision Maker is asked to consider and make decisions based on patient values, known preferences, or best interests. Health Care Decision Maker:    Current Designated Health Care Decision Maker:   Primary Decision Maker: Larisa Castillo - Daughter - [phone]    Primary Decision Maker: Paramjit Noguera - Daughter - [phone]  (If there is a [address] named in the \"Healthcare Decision Makers\" box in the ACP activity, but it is not visible above, be sure to open that field and then select the health care decision maker relationship (ie \"primary\") in the blank space to the right of the name.) Validate  this information as still accurate & up-to-date; edit [id number] 8 field as needed.)    Note: Assess and validate information in current ACP documents, as indicated. If no Decision Maker listed above or available through scanned documents, then:    If no Authorized Decision Maker has previously been identified, then patient chooses [address]:  \"Who would you like to name as your primary health care decision-maker? \"    Name: Weston Whitney   Relationship: child Phone number: [phone]  Tito Temi this person be reached easily? \" YES  \"Who would you like to name as your back-up decision maker? \"   Name: Bony Davies  Relationship: child Phone number: [phone]  [facility] this person be reached easily? \" YES    Note: If the relationship of these Decision-Makers to the patient does NOT follow your state's Next of Kin hierarchy, recommend that patient complete ACP document that meets state-specific requirements to allow them to act on the patient's behalf when appropriate. Care Preferences    Ventilation: \"If you were in your present state of health and suddenly became very ill and were unable to breathe on your own, what would your preference be about the use of a ventilator (breathing machine) if it were available to you? \"      If patient would desire the use of a ventilator (breathing machine), answer \"yes\", if not \"no\":yes    \"If your health worsens and it becomes clear that your chance of recovery is unlikely, what would your preference be about the use of a ventilator (breathing machine) if it were available to you? \"     Would the patient desire the use of a ventilator (breathing machine)? YES      Resuscitation  \"CPR works best to restart the heart when there is a sudden event, like a heart attack, in someone who is otherwise healthy. Unfortunately, CPR does not typically restart the heart for people who have serious health conditions or who are very sick. \"    \"In the event your heart stopped as a result of an underlying serious health condition, would you want attempts to be made to restart your heart (answer \"yes\" for attempt to resuscitate) or would you prefer a natural death (answer \"no\" for do not attempt to resuscitate)? \" yes    [x] Yes  [] No   Educated Patient / Annelizabeth Peals regarding differences between Advance Directives and portable DNR orders. Length of ACP Conversation in minutes:  5 reviewed with daughter Alejo Cai by phone.     Conversation Outcomes:  [x] ACP discussion completed  [] Existing advance directive reviewed with patient; no changes to patient's previously recorded wishes     [] New Advance Directive completed   [] Portable Do Not Resuscitate prepared for Provider review and signature  [] POLST/POST/MOLST/MOST prepared for Provider review and signature      Follow-up plan:    [] Schedule follow-up conversation to continue planning  [] Referred individual to Provider for additional questions/concerns   [] Advised patient/agent/surrogate to review completed ACP document and update if needed with changes in condition, patient preferences or care setting     [] This note routed to one or more involved healthcare providers

## 2020-08-28 NOTE — H&P
[address] [facility] Adult  Hospitalist Group  History and Physical    Primary Care Provider: Kamari Cary MD  Date of Service:  8/28/2020    Subjective: Bin Joy is a 76 y.o. male who presented via EMS from [facility] w/ CC vomiting/coughing up blood x1. EMS reported rectal temperature of 96.1. He was admitted approximately 2 weeks ago for PNA r/t COVID and was COVID positive. Workup in ED included largely unremarkable CBC except for low platelets of 111, UA negative for UTI, chemistry w/ , glucose 448, creat 1.54, phos 2.5, negative troponin and A1c 8.4. Glucose of 448 not treated in ED. KUB of abdomen negative. CTA chest showed no PE, substantial interval progression of multifocal bilateral groundglass and patchy consolidations with \"crazy paving\" appearance, consistent with substantial worsening of COVID 19 PNA; unchanged consolidation vs mass lesion of CAMERON measuring [id number] transverse. Patient seen in ED in NAD with his oxygen in his hand. Replaced oxygen and mask and continued H&P. Patient is alert, oriented only to self with hx alzheimer's dementia. Pt denies HA, dizziness, visual changes, CP, abd pain, n/v/d or dysuria. He endorses continued cough and SOB. He does not recall coughing/vomiting up blood. I called his daughter and updated her on POC. All questions answered. Discussed with Dr. Berto Kern. Will send to intermediate care d/t likely need for [facility]. If maintains core temp greater than 97.4, can consider downgrade to tele. Review of Systems:    A comprehensive review of systems was negative except for that written in the History of Present Illness.      Past Medical History:   Diagnosis Date    Allergic rhinitis, cause unspecified 4/18/2011    Alzheimer's disease ([facility] [address]     Arthritis     GOUT    Cancer ([facility] [address] 09/20/2016    PROSTATE    Chronic obstructive pulmonary disease (HCC)     CHRONIC BRONCHITIS    CKD (chronic kidney disease), stage II     Congestive heart failure, unspecified 4/18/2011    (ON 9/20/16 PT & DTR STATE THEY DON'T REMEMBER THIS DIAGNOSIS)    Coronary Artery Disease 4/18/2011    Diabetes Mellitus Type I 4/18/2011    Erectile Dysfunction 4/18/2011    Hemorrhoids 4/18/2011    Hypertension     Pneumonia due to COVID-19 virus 8/27/2020    Seizures ([facility] [address]     STARTED 2005; \"BLACK-OUT Katherne Bullocks", DTR SATES    Stroke ([facility] [address]     MULTIPLE MINISTROKES, DTR REPORTS; LEFT-SIDED WEAKNESS    Thromboembolus ([facility] [address]     left leg  (NO PE)    Unspecified asthma(493.90) 4/18/2011      Past Surgical History:   Procedure Laterality Date    CARDIAC SURG PROCEDURE UNLIST  2006    CABG    COLONOSCOPY N/A 10/9/2017    COLONOSCOPY performed by Cassidy Muro MD at [facility], STENTS LEFT LEG     Prior to Admission medications    Medication Sig Start Date End Date Taking? Authorizing Provider   bumetanide (BUMEX) 1 mg tablet take 1 tablet once daily 1/4/18   Antonio Foss MD   levETIRAcetam (KEPPRA) 750 mg tablet Take 750 mg by mouth two (2) times a day. 3 tab, twice daily   Indications: PARTIAL EPILEPSY TREATMENT ADJUNCT 12/13/17   Everitt Rinne, MD   lacosamide (VIMPAT) 200 mg tab tablet Take 200 mg by mouth two (2) times a day. Indications: PARTIAL EPILEPSY TREATMENT ADJUNCT 12/13/17   Everitt Rinne, MD   topiramate (TOPAMAX) 100 mg tablet Take 100 mg by mouth two (2) times a day. Indications: COMPLEX-PARTIAL EPILEPSY 12/13/17   Everitt Rinne, MD   carvedilol (COREG) 6.25 mg tablet Take 6.25 mg by mouth two (2) times daily (with meals). Indications: PREVENTION OF A.  FIB POST CARDIO-THORACIC SURGERY    Everitt Rinne, MD   amLODIPine (NORVASC) 5 mg tablet take 1 tablet by mouth once daily 11/6/17   Antonio Foss MD   clopidogrel (PLAVIX) 75 mg tab take 1 tablet by mouth once daily 9/5/17   Antonio Foss MD   losartan (COZAAR) 100 mg tablet take 1 tablet by mouth once daily 8/28/17   MD [facility] Rollator walker 8/24/17   Elian Dean MD   HYDROcodone-acetaminophen [facility]) 5-325 mg per tablet Take 1 Tab by mouth every four (4) hours as needed for Pain. Max Daily Amount: 6 Tabs. 7/25/17   Justyn Shirts, DO   Insulin Needles, Disposable, (JAZMYN PEN NEEDLE) 32 gauge x 5/32\" ndle Use daily as directed with insulin pen. 7/17/17   Elian Dean MD   aspirin (ASPIRIN) 325 mg tablet Take 325 mg by mouth daily. Other, Phys, MD   pravastatin (PRAVACHOL) 40 mg tablet take 1 tablet once daily 6/8/17   Elian Dean MD   metoprolol tartrate (LOPRESSOR) 50 mg tablet take 1 tablet by mouth twice a day 6/8/17   Elian Dean MD   glucose blood VI test strips (ONETOUCH ULTRA TEST) strip Test 2 times daily    ONETOUCH ULTRA 2 TEST STRIPS ONLY 10/7/16   Elian Dean MD   allopurinol (ZYLOPRIM) 100 mg tablet take 1 tablet by mouth once daily 9/15/16   Elian Dean MD   ACCU-CHEK ALEXIS PLUS TEST STRP strip TEST BLOOD SUGARS 2 TIMES DAILY 9/29/15   Elian Dean MD   Lancets misc Patient test 3 x daily  Dm 250 7/8/15   Elian Dean MD     No Known Allergies   Family History   Problem Relation Age of Onset    Diabetes Mother     Hypertension Mother    [facility] Diabetes Father     Anesth Problems Neg Hx         SOCIAL HISTORY:  Patient resides [facility]  Patient ambulates with walker  Smoking history: Denies  Alcohol history: Denies    Objective:       Physical Exam:   General:  Alert, cooperative, no distress, appears stated age   Head:  Normocephalic, without obvious abnormality, atraumatic. Eyes:  Conjunctivae/corneas clear. Pupils equal, round, reactive to light. Lungs:   Scattered rhonchi, diminished CAMERON   Heart:  Regular rate and rhythm, S1, S2 normal, no murmur, click, rub, or gallop. Abdomen:   Soft, non-tender/non-distended.  Bowel sounds normal   Extremities: Extremities normal, atraumatic, no cyanosis or edema. Pulses: 1+ and symmetric all extremities. Skin: Skin color, texture, turgor normal. No rashes or lesions. Neurologic: CNII-XII grossly intact. Normal strength. Alert, oriented to self. Hx alzheimers       ECG:  NA    Data Review: All diagnostic labs and studies have been reviewed.       Assessment:     Principal Problem:    Hematemesis (8/27/2020)    Active Problems:    Chronic ischemic heart disease (4/18/2011)      Diabetes mellitus type 2 in obese ([address] (7/29/2011)      Seizure ([address] (12/30/2012)      Overview: Hx of seizure as well      Diastolic CHF, chronic ([address] (3/22/2013)      Essential hypertension, malignant (3/22/2013)      Pneumonia due to COVID-19 virus (8/27/2020)      Chronic obstructive pulmonary disease (HCC) ()      Overview: CHRONIC BRONCHITIS      Alzheimer's disease (HCC) ()      Acute kidney injury superimposed on chronic kidney disease ([address] (8/28/2020)      Hyponatremia (8/28/2020)      Thrombocytopenia ([address] (8/28/2020)        Plan:     Hematemesis  -x1 at NH; suspect might have been in sputum from excessive coughing  -stool occult ordered in ED  -monitor HH  -sputum culture  -Monitor VS, O2    PNA d/t COVID 19  -positive 2 weeks ago  -substantial worsening seen on CTA today  -COVID workup initiated  -start decadron  -Gentle IVF overnight  -Monitor VS, O2    CAMERON mass: consolidation vs lesion (measuring 29 x 17mm transverse)  -No change since previous imaging  -Start ceftriaxone and azithro for now; add mucinex  -sputum culture  -consult pulmonary to further eval (hx CA)  -further mgt as above    RAPHAEL on CKDII: POA creat/gfr 1.54/54 (baseline creat/gfr 1.1-1.3/>60)  -Gentle IVF hydration overnight  -avoid nephrotoxins  -recheck labs in AM; monitor kidney function    Hyponatremia  -suspect d/t poor PO intake recently  -gentle IVF hydration overnight  -recheck labs in AM    Thrombocytopenia  -Plts 131 (baseline normal)  -Unclear reason, no PE on CTA (has hx of DVT not currently on [address])  -hold plavix and AC for now d/t concern for hemoptysis/hematemesis  -no edema to BLE, doubt current DVT  -monitor labs for now    Hx HTN  -now low/normal BP  -give albumin x3  -hold home meds    Hx COPD  -Not on home meds  -PRN inhaler  -Monitor VS, O2    Hx CHF/Ischemic Heart Disease  -bumex, coreg, norvasc, cozaar, lopressor on home med list; hold as BP low and med/rec not yet done  -hold plavix/asa for now d/t ?hemoptysis/hematemesis  -Echo in 2013 w/o evidence of HF  -check pro-BNP, echo    Hx Seizures  -Continue home vimpat, keppra  -seizure precations    DMII: A1c 8.4  -AC/HS accuchecks with SSI  -Lantus 20u qhs  -trend VS; likely to need to increase lantus while on decadron  -CC/AHA diet    Hx Alzheimer's Dementia   -calm, cooperative  -supportive care    DVTppx: SCDs  Gippx: Protonix  Code Status: Full code - discussed with daughter via telephone-very clear family wants full code  Diet: Cardiac/carb consistent  Activity: OOB to chair TID and PRN  Discharge: TBD      Signed By: America Alcazar NP     August 28, 2020

## 2020-08-28 NOTE — PROGRESS NOTES
[address] [facility] Adult  Hospitalist Group                                                                                          Hospitalist Progress Note  Tim Weaver MD  Answering service: [phone] from in house phone        Date of Service:  2020  NAME:  Ray Andrade  :  1945  MRN:  [id number]      Admission Summary:     Ray Andrade is a 76 y.o. male who presented via EMS from [facility] with a complaint of  vomiting/coughing up blood x1. EMS reported rectal temperature of 96.1. He was admitted approximately 2 weeks ago for PNA related to COVID and was COVID positive. Workup in ED included largely unremarkable CBC except for low platelets of 874, UA negative for UTI, chemistry w/ , glucose 448, creat 1.54, phos 2.5, negative troponin and A1c 8.4. Glucose of 448 not treated in ED. KUB of abdomen negative. CTA chest showed no PE, substantial interval progression of multifocal bilateral groundglass and patchy consolidations with \"crazy paving\" appearance, consistent with substantial worsening of COVID 19 PNA; unchanged consolidation vs mass lesion of CAMERON measuring 98o72db transverse. Patient is alert, oriented only to self with hx alzheimer's dementia. Pt denies HA, dizziness, visual changes, CP, abd pain, n/v/d or dysuria. He endorses continued cough and SOB. He does not recall coughing/vomiting up blood. I called his daughter and updated her on POC. All questions answered. Discussed with Dr. Jermain Beltre.     Will send to intermediate care d/t likely need for [facility].  If maintains core temp greater than 97.4, can consider downgrade to tele.       Interval history / Subjective:   Poor historian, denied pain     Assessment & Plan:     Hematemesis  -x1 at NH; suspect might have been in sputum from excessive coughing  -stool occult pending  -Hgb 12.6 was 13.4, monitor H/H  -culture sputum pending  -no hemoptysis      PNA due to COVID 19  -continue iv azithromycin, ceftriaxone, decadron, oxygen support   -positive 2 weeks ago  -Chest CTA no evidence for pulmonary embolism, substantial interval progression of multifocal bilateral groundglass and patchy consolidations with \"crazy paving\" appearance. Findings consistent with substantial worsening in Covid 19 pneumonia. Unchanged consolidation versus mass lesion of left upper lobe measuring 29 x 17 mm transverse.   -COVID 8/28 result pending  -SpO2 % on 2 l/m  -ID is consulted     CAMERON mass: consolidation vs lesion (measuring 29 x 17mm transverse)  -No change since previous imaging  -on ceftriaxone and azithro for now; add mucinex  -sputum culture pending  -consult pulmonary to further eval (hx CA)  -further mgt as above     RAPHAEL on CKD Stage II  -creatinine improving,   -Gentle IVF hydration overnight  -avoid nephrotoxins  -monitor renal function      Hyponatremia  -suspect d/t poor PO intake recently  -gentle IVF hydration overnight  -repeat bmp in am     Thrombocytopenia  -Plts 131 (baseline normal), improving   -Unclear reason, no PE on CTA (has hx of DVT not currently on [facility])  -hold plavix and AC for now d/t concern for hemoptysis/hematemesis  -no edema to BLE, doubt current DVT  -monitor labs for now     Hx HTN  -now low/normal BP  -give albumin x3  -hold home meds     Hx COPD  -Not on home meds  -PRN inhaler  -Monitor VS, O2     Hx CHF/Ischemic Heart Disease  -bumex, coreg, cozaar, lopressor on home med list; hold as BP low and med/rec not yet done  -hold plavix/asa for now d/t ?hemoptysis/hematemesis  -Echo in 2013 w/o evidence of HF  -pro-BNP normal     Hx Seizures  -Continue home vimpat, keppra  -seizure precations     T2DM  -A1c 8.4,   -adjust lantus to 28 units, add lispro 5 units and continue sliding scale, monitor finger stick glucose      Hx Alzheimer's Dementia   -calm, cooperative  -Continue supportive care      Code status: Full Code   DVT prophylaxis:SCD    Care Plan discussed with: Patient/Family, Nurse and   Anticipated Disposition: From [facility]  Anticipated Discharge: Greater than 48 hours     I called and updated his daughter, Nathaly Ordonez at      [id number] Problems  Date Reviewed: 8/27/2020          Codes Class Noted POA    Acute kidney injury superimposed on chronic kidney disease ([facility].) ICD-10-CM: N17.9, N18.9  ICD-9-CM: 866.00, 585.9  8/28/2020 Yes        Hyponatremia ICD-10-CM: E87.1  ICD-9-CM: 276.1  8/28/2020 Yes        Thrombocytopenia ([facility] ICD-10-CM: D69.6  ICD-9-CM: 287.5  8/28/2020 Yes        Pneumonia due to COVID-19 virus ICD-10-CM: U07.1, J12.89  ICD-9-CM: 480.8  8/27/2020 Yes        * (Principal) Hematemesis ICD-10-CM: K92.0  ICD-9-CM: 578.0  8/27/2020 Yes        Chronic obstructive pulmonary disease ([facility].) ICD-10-CM: J44.9  ICD-9-CM: 852  Unknown Yes    Overview Signed 8/27/2020  8:14 PM by Damari Matamoros NP     CHRONIC BRONCHITIS             Alzheimer's disease ([facility] ICD-10-CM: G30.9, F02.80  ICD-9-CM: 331.0  Unknown Yes        Diastolic CHF, chronic (HCC) (Chronic) ICD-10-CM: I50.32  ICD-9-CM: 428.32, 428.0  3/22/2013 Yes        Essential hypertension, malignant ICD-10-CM: I10  ICD-9-CM: 401.0  3/22/2013 Yes        Seizure ([facility] ICD-10-CM: R56.9  ICD-9-CM: 780.39  12/30/2012 Yes    Overview Signed 12/30/2012  2:02 PM by Germania Aragon MD     Hx of seizure as well             Diabetes mellitus type 2 in [facility]) ICD-10-CM: E11.69, E66.9  ICD-9-CM: 250.00, 278.00  7/29/2011 Yes        Chronic ischemic heart disease ICD-10-CM: I25.9  ICD-9-CM: 414.9  4/18/2011 Yes                Vital Signs:    Last 24hrs VS reviewed since prior progress note.  Most recent are:  Visit Vitals  /52 (BP 1 Location: Right arm, BP Patient Position: Post activity)   Pulse 99   Temp 98.2 °F (36.8 °C)   Resp 16   Ht 5' 8\" (1.727 m)   Wt 60.5 kg (133 lb 6.1 oz)   SpO2 99%   BMI 20.28 kg/m²         Intake/Output Summary (Last 24 hours) at 8/28/2020 1344  Last data filed at 8/28/2020 0413  Gross per 24 hour   Intake --   Output 200 ml   Net -200 ml        Physical Examination:             Constitutional:  No acute distress, cooperative, pleasant    ENT:  Oral mucosa moist, oropharynx benign. Resp:  Decrease bronchial breath sound bilaterally. No wheezing/rhonchi/rales. No accessory muscle use   CV:  Regular rhythm, normal rate, no murmurs, gallops, rubs    GI:  Soft, non distended, non tender. normoactive bowel sounds, no hepatosplenomegaly     Musculoskeletal:  No edema     Neurologic:  Conscious and alert, disoriented, follows simple command, motor UE 4-5/5, LE 2/5     Skin:  Good turgor, no rashes or ulcers       Data Review:    Review and/or order of clinical lab test  Review and/or order of tests in the radiology section of CPT  Review and/or order of tests in the medicine section of CPT      Labs:     Recent Labs     08/28/20  0339 08/27/20  1409   WBC 5.2 7.4   HGB 12.5 13.4   HCT 41.6 43.2   * 131*     Recent Labs     08/28/20  0339 08/27/20  1415 08/27/20  1409   *  --  135*   K 4.3  --  3.8     --  100   CO2 24  --  28   BUN 25*  --  25*   CREA 1.35*  --  1.54*   *  --  448*   CA 9.0  --  8.9   MG 1.9  --  1.9   PHOS  --  2.5*  --      Recent Labs     08/28/20  0339 08/27/20  1409   ALT 17 16   AP 94 109   TBILI 0.4 0.3   TP 6.7 7.4   ALB 2.0* 2.2*   GLOB 4.7* 5.2*   LPSE  --  89     Recent Labs     08/28/20  0533 08/27/20  1415   INR  --  1.1   PTP  --  10.9   APTT 25.9  --       Recent Labs     08/28/20  0329   FERR 270      Lab Results   Component Value Date/Time    Folate 8.5 10/30/2009 03:30 PM      No results for input(s): PH, PCO2, PO2 in the last 72 hours.   Recent Labs     08/28/20  0329 08/27/20  1409   TROIQ <0.05 <0.05     Lab Results   Component Value Date/Time    Cholesterol, total 168 07/29/2011 08:27 AM    HDL Cholesterol 35 (L) 07/29/2011 08:27 AM    LDL, calculated 98 07/29/2011 08:27 AM    Triglyceride 176 (H) 07/29/2011 08:27 AM    CHOL/HDL Ratio 3.3 08/03/2010 10:55 PM     Lab Results   Component Value Date/Time    Glucose (POC) 445 (H) 08/28/2020 11:14 AM    Glucose (POC) 419 (H) 08/28/2020 08:56 AM    Glucose (POC) 187 (H) 08/13/2020 05:07 PM    Glucose (POC) 113 (H) 08/13/2020 11:15 AM    Glucose (POC) 91 08/13/2020 07:43 AM     Lab Results   Component Value Date/Time    Color YELLOW/STRAW 08/27/2020 06:26 PM    Appearance CLEAR 08/27/2020 06:26 PM    Specific gravity 1.027 08/27/2020 06:26 PM    Specific gravity 1.020 08/11/2020 06:17 AM    pH (UA) 5.0 08/27/2020 06:26 PM    Protein Negative 08/27/2020 06:26 PM    Glucose 500 (A) 08/27/2020 06:26 PM    Ketone Negative 08/27/2020 06:26 PM    Bilirubin Negative 08/27/2020 06:26 PM    Urobilinogen 0.2 08/27/2020 06:26 PM    Nitrites Negative 08/27/2020 06:26 PM    Leukocyte Esterase Negative 08/27/2020 06:26 PM    Epithelial cells FEW 08/11/2020 06:17 AM    Bacteria Negative 08/11/2020 06:17 AM    WBC 0-4 08/11/2020 06:17 AM    RBC 0-5 08/11/2020 06:17 AM         Medications Reviewed:     Current Facility-Administered Medications   Medication Dose Route Frequency    0.9% sodium chloride infusion  50 mL/hr IntraVENous CONTINUOUS    ascorbic acid (vitamin C) (VITAMIN C) tablet 500 mg  500 mg Oral BID    zinc sulfate (ZINCATE) 220 (50) mg capsule 1 Cap  1 Cap Oral DAILY    insulin glargine (LANTUS) injection 24 Units  24 Units SubCUTAneous QHS    HYDROcodone-acetaminophen (NORCO) 5-325 mg per tablet 1 Tab  1 Tab Oral Q6H PRN    sodium chloride (NS) flush 5-40 mL  5-40 mL IntraVENous Q8H    sodium chloride (NS) flush 5-40 mL  5-40 mL IntraVENous PRN    acetaminophen (TYLENOL) tablet 650 mg  650 mg Oral Q6H PRN    Or    acetaminophen (TYLENOL) suppository 650 mg  650 mg Rectal Q6H PRN    polyethylene glycol (MIRALAX) packet 17 g  17 g Oral DAILY PRN    promethazine (PHENERGAN) tablet 12.5 mg  12.5 mg Oral Q6H PRN    Or    ondansetron (ZOFRAN) injection 4 mg  4 mg IntraVENous Q6H PRN    dexAMETHasone (DECADRON) tablet 6 mg  6 mg Oral DAILY WITH BREAKFAST    lacosamide (VIMPAT) tablet 200 mg  200 mg Oral BID    levETIRAcetam (KEPPRA) tablet 750 mg  750 mg Oral BID    pravastatin (PRAVACHOL) tablet 40 mg  40 mg Oral QHS    topiramate (TOPAMAX) tablet 100 mg  100 mg Oral BID    glucose chewable tablet 16 g  4 Tab Oral PRN    glucagon (GLUCAGEN) injection 1 mg  1 mg IntraMUSCular PRN    dextrose 10% infusion 0-250 mL  0-250 mL IntraVENous PRN    insulin lispro (HUMALOG) injection   SubCUTAneous AC&HS    albuterol (PROVENTIL HFA, VENTOLIN HFA, PROAIR HFA) inhaler 2 Puff  2 Puff Inhalation Q4H PRN    guaiFENesin ER (MUCINEX) tablet 1,200 mg  1,200 mg Oral Q12H    cefTRIAXone (ROCEPHIN) 1 g in 0.9% sodium chloride (MBP/ADV) 50 mL  1 g IntraVENous Q24H    azithromycin (ZITHROMAX) 500 mg in 0.9% sodium chloride (MBP/ADV) 250 mL  500 mg IntraVENous Q24H     ______________________________________________________________________  EXPECTED LENGTH OF STAY: - - -  ACTUAL LENGTH OF STAY:          0                 Robin Gan MD

## 2020-08-29 LAB
ALBUMIN SERPL-MCNC: 1.9 G/DL (ref 3.5–5)
ALBUMIN/GLOB SERPL: 0.4 {RATIO} (ref 1.1–2.2)
ALP SERPL-CCNC: 74 U/L (ref 45–117)
ALT SERPL-CCNC: 13 U/L (ref 12–78)
ANION GAP SERPL CALC-SCNC: 6 MMOL/L (ref 5–15)
AST SERPL-CCNC: 14 U/L (ref 15–37)
BILIRUB SERPL-MCNC: 0.3 MG/DL (ref 0.2–1)
BUN SERPL-MCNC: 27 MG/DL (ref 6–20)
BUN/CREAT SERPL: 21 (ref 12–20)
CALCIUM SERPL-MCNC: 8.7 MG/DL (ref 8.5–10.1)
CHLORIDE SERPL-SCNC: 108 MMOL/L (ref 97–108)
CO2 SERPL-SCNC: 25 MMOL/L (ref 21–32)
CREAT SERPL-MCNC: 1.26 MG/DL (ref 0.7–1.3)
D DIMER PPP FEU-MCNC: 34.36 MG/L FEU (ref 0–0.65)
GLOBULIN SER CALC-MCNC: 4.4 G/DL (ref 2–4)
GLUCOSE BLD STRIP.AUTO-MCNC: 156 MG/DL (ref 65–100)
GLUCOSE BLD STRIP.AUTO-MCNC: 203 MG/DL (ref 65–100)
GLUCOSE BLD STRIP.AUTO-MCNC: 244 MG/DL (ref 65–100)
GLUCOSE BLD STRIP.AUTO-MCNC: 93 MG/DL (ref 65–100)
GLUCOSE SERPL-MCNC: 165 MG/DL (ref 65–100)
HIV 1+2 AB+HIV1 P24 AG SERPL QL IA: NONREACTIVE
HIV12 RESULT COMMENT, HHIVC: NORMAL
POTASSIUM SERPL-SCNC: 3.7 MMOL/L (ref 3.5–5.1)
PROT SERPL-MCNC: 6.3 G/DL (ref 6.4–8.2)
SERVICE CMNT-IMP: ABNORMAL
SERVICE CMNT-IMP: NORMAL
SODIUM SERPL-SCNC: 139 MMOL/L (ref 136–145)

## 2020-08-29 PROCEDURE — 74011250637 HC RX REV CODE- 250/637: Performed by: HOSPITALIST

## 2020-08-29 PROCEDURE — 74011636637 HC RX REV CODE- 636/637: Performed by: HOSPITALIST

## 2020-08-29 PROCEDURE — 65660000001 HC RM ICU INTERMED STEPDOWN

## 2020-08-29 PROCEDURE — 86713 LEGIONELLA ANTIBODY: CPT

## 2020-08-29 PROCEDURE — 74011636637 HC RX REV CODE- 636/637: Performed by: NURSE PRACTITIONER

## 2020-08-29 PROCEDURE — 82962 GLUCOSE BLOOD TEST: CPT

## 2020-08-29 PROCEDURE — XW13325 TRANSFUSION OF CONVALESCENT PLASMA (NONAUTOLOGOUS) INTO PERIPHERAL VEIN, PERCUTANEOUS APPROACH, NEW TECHNOLOGY GROUP 5: ICD-10-PCS | Performed by: HOSPITALIST

## 2020-08-29 PROCEDURE — 74011250636 HC RX REV CODE- 250/636: Performed by: INTERNAL MEDICINE

## 2020-08-29 PROCEDURE — 74011250637 HC RX REV CODE- 250/637: Performed by: NURSE PRACTITIONER

## 2020-08-29 PROCEDURE — 86738 MYCOPLASMA ANTIBODY: CPT

## 2020-08-29 PROCEDURE — 77010033678 HC OXYGEN DAILY

## 2020-08-29 PROCEDURE — 74011000258 HC RX REV CODE- 258: Performed by: INTERNAL MEDICINE

## 2020-08-29 PROCEDURE — 36415 COLL VENOUS BLD VENIPUNCTURE: CPT

## 2020-08-29 PROCEDURE — 94760 N-INVAS EAR/PLS OXIMETRY 1: CPT

## 2020-08-29 PROCEDURE — 85379 FIBRIN DEGRADATION QUANT: CPT

## 2020-08-29 PROCEDURE — 74011250636 HC RX REV CODE- 250/636: Performed by: HOSPITALIST

## 2020-08-29 PROCEDURE — 80053 COMPREHEN METABOLIC PANEL: CPT

## 2020-08-29 PROCEDURE — 74011250636 HC RX REV CODE- 250/636: Performed by: NURSE PRACTITIONER

## 2020-08-29 PROCEDURE — 86632 CHLAMYDIA IGM ANTIBODY: CPT

## 2020-08-29 PROCEDURE — 87389 HIV-1 AG W/HIV-1&-2 AB AG IA: CPT

## 2020-08-29 RX ORDER — SODIUM CHLORIDE 9 MG/ML
250 INJECTION, SOLUTION INTRAVENOUS AS NEEDED
Status: DISCONTINUED | OUTPATIENT
Start: 2020-08-29 | End: 2020-09-04 | Stop reason: HOSPADM

## 2020-08-29 RX ADMIN — INSULIN LISPRO 3 UNITS: 100 INJECTION, SOLUTION INTRAVENOUS; SUBCUTANEOUS at 13:01

## 2020-08-29 RX ADMIN — Medication 10 ML: at 05:07

## 2020-08-29 RX ADMIN — INSULIN LISPRO 5 UNITS: 100 INJECTION, SOLUTION INTRAVENOUS; SUBCUTANEOUS at 18:36

## 2020-08-29 RX ADMIN — INSULIN LISPRO 3 UNITS: 100 INJECTION, SOLUTION INTRAVENOUS; SUBCUTANEOUS at 18:37

## 2020-08-29 RX ADMIN — TOPIRAMATE 100 MG: 100 TABLET, FILM COATED ORAL at 18:36

## 2020-08-29 RX ADMIN — OXYCODONE HYDROCHLORIDE AND ACETAMINOPHEN 500 MG: 500 TABLET ORAL at 18:36

## 2020-08-29 RX ADMIN — LACOSAMIDE 200 MG: 50 TABLET, FILM COATED ORAL at 18:36

## 2020-08-29 RX ADMIN — Medication 10 ML: at 18:40

## 2020-08-29 RX ADMIN — ZINC SULFATE 220 MG (50 MG) CAPSULE 1 CAPSULE: CAPSULE at 09:41

## 2020-08-29 RX ADMIN — LACOSAMIDE 200 MG: 50 TABLET, FILM COATED ORAL at 10:17

## 2020-08-29 RX ADMIN — Medication 10 ML: at 21:56

## 2020-08-29 RX ADMIN — OXYCODONE HYDROCHLORIDE AND ACETAMINOPHEN 500 MG: 500 TABLET ORAL at 09:41

## 2020-08-29 RX ADMIN — SODIUM CHLORIDE 50 ML/HR: 900 INJECTION, SOLUTION INTRAVENOUS at 09:35

## 2020-08-29 RX ADMIN — INSULIN GLARGINE 28 UNITS: 100 INJECTION, SOLUTION SUBCUTANEOUS at 21:54

## 2020-08-29 RX ADMIN — TOPIRAMATE 100 MG: 100 TABLET, FILM COATED ORAL at 09:41

## 2020-08-29 RX ADMIN — CEFEPIME 2 G: 2 INJECTION, POWDER, FOR SOLUTION INTRAVENOUS at 20:38

## 2020-08-29 RX ADMIN — LEVETIRACETAM 750 MG: 500 TABLET ORAL at 09:42

## 2020-08-29 RX ADMIN — ENOXAPARIN SODIUM 30 MG: 30 INJECTION SUBCUTANEOUS at 05:07

## 2020-08-29 RX ADMIN — DEXAMETHASONE 6 MG: 4 TABLET ORAL at 09:41

## 2020-08-29 RX ADMIN — INSULIN LISPRO 5 UNITS: 100 INJECTION, SOLUTION INTRAVENOUS; SUBCUTANEOUS at 13:01

## 2020-08-29 RX ADMIN — ENOXAPARIN SODIUM 30 MG: 30 INJECTION SUBCUTANEOUS at 18:36

## 2020-08-29 RX ADMIN — CEFEPIME 2 G: 2 INJECTION, POWDER, FOR SOLUTION INTRAVENOUS at 09:41

## 2020-08-29 RX ADMIN — PRAVASTATIN SODIUM 40 MG: 40 TABLET ORAL at 21:55

## 2020-08-29 RX ADMIN — GUAIFENESIN 1200 MG: 600 TABLET, EXTENDED RELEASE ORAL at 09:41

## 2020-08-29 RX ADMIN — LEVETIRACETAM 750 MG: 500 TABLET ORAL at 18:36

## 2020-08-29 RX ADMIN — GUAIFENESIN 1200 MG: 600 TABLET, EXTENDED RELEASE ORAL at 20:38

## 2020-08-29 NOTE — PROGRESS NOTES
[address] [facility] Adult  Hospitalist Group                                                                                          Hospitalist Progress Note  Stanley Samayoa MD  Answering service: [phone] from in house phone        Date of Service:  2020  NAME:  Ana Allred  :  1945  MRN:  [id number]      Admission Summary:     Ana Allred is a 76 y.o. male who presented via EMS from [facility] with a complaint of  vomiting/coughing up blood x1. EMS reported rectal temperature of 96.1. He was admitted approximately 2 weeks ago for PNA related to COVID and was COVID positive. Workup in ED included largely unremarkable CBC except for low platelets of 899, UA negative for UTI, chemistry w/ , glucose 448, creat 1.54, phos 2.5, negative troponin and A1c 8.4. Glucose of 448 not treated in ED. KUB of abdomen negative. CTA chest showed no PE, substantial interval progression of multifocal bilateral groundglass and patchy consolidations with \"crazy paving\" appearance, consistent with substantial worsening of COVID 19 PNA; unchanged consolidation vs mass lesion of CAMERON measuring 84g35ar transverse. Patient is alert, oriented only to self with hx alzheimer's dementia. Pt denies HA, dizziness, visual changes, CP, abd pain, n/v/d or dysuria. He endorses continued cough and SOB. He does not recall coughing/vomiting up blood. I called his daughter and updated her on POC. All questions answered. Discussed with Dr. Ania Pickens.     Will send to intermediate care d/t likely need for [facility].  If maintains core temp greater than 97.4, can consider downgrade to tele.       Interval history / Subjective:     Poor historian, denied pain     Assessment & Plan:     Hematemesis  -x1 at NH; suspect might have been in sputum from excessive coughing  -stool occult pending  -Hgb 12.5, it was 13.4, monitor H/H  -hemoptysis improved     PNA due to COVID 19  -continue iv azithromycin, ceftriaxone, decadron, oxygen support   -positive 2 weeks ago  -Chest CTA no evidence for pulmonary embolism, substantial interval progression of multifocal bilateral groundglass and patchy consolidations with \"crazy paving\" appearance. Findings consistent with substantial worsening in Covid 19 pneumonia. Unchanged consolidation versus mass lesion of left upper lobe measuring 29 x 17 mm transverse.   -COVID 8/28 positive  -SpO2 % on 2 l/m  -respiratory panel negative   -HIV 1/2 non reactive  -Chlamydia panel, legionel ag, mycoplasma ag are pending  -ferritin was normal  -elevated d dimer  -ID is on board     CAMERON mass: consolidation vs lesion (measuring 29 x 17mm transverse)  -No change since previous imaging  -on ceftriaxone and azithro for now; add mucinex  -sputum culture pending  -consult pulmonary to further eval (hx CA)  -further mgt as above     RAPHAEL on CKD Stage II  -creatinine improving,   -Gentle IVF hydration overnight  -avoid nephrotoxins  -monitor renal function      Hyponatremia  -suspect d/t poor PO intake recently  -gentle IVF hydration overnight  -resolved now     Thrombocytopenia  -Plts 131 (baseline normal), improving   -Unclear reason, no PE on CTA (has hx of DVT not currently on [address])  -hold plavix and AC for now d/t concern for hemoptysis/hematemesis  -no edema to BLE, doubt current DVT  -monitor labs for now     Hx HTN  -now low/normal BP  -give albumin x3  -hold home meds     Hx COPD  -stable,   -PRN inhaler  -Monitor VS, O2     Hx CHF/Ischemic Heart Disease  -bumex, coreg, cozaar, lopressor on home med list; hold as BP low and med/rec not yet done  -hold plavix/asa for now d/t ?hemoptysis/hematemesis  -Echo in 2013 w/o evidence of HF  -pro-BNP normal     Hx Seizures  -Continue home vimpat, keppra  -seizure precations     T2DM  -A1c 8.4,   -on lantus to 28 units, add lispro 5 units and continue sliding scale, monitor finger stick glucose      Hx Alzheimer's Dementia   -calm, cooperative  -Continue supportive care      Code status: Full Code   DVT prophylaxis:SCD    Care Plan discussed with: Patient/Family, Nurse and   Anticipated Disposition: From [facility]  Anticipated Discharge: Greater than 48 hours     I called and updated his daughter, [facility] at , answered questions     Hospital Problems  Date Reviewed: 8/27/2020          Codes Class Noted POA    Acute kidney injury superimposed on chronic kidney disease ([facility] ICD-10-CM: N17.9, N18.9  ICD-9-CM: 866.00, 585.9  8/28/2020 Yes        Hyponatremia ICD-10-CM: E87.1  ICD-9-CM: 276.1  8/28/2020 Yes        Thrombocytopenia ([facility] ICD-10-CM: D69.6  ICD-9-CM: 287.5  8/28/2020 Yes        CAP (community acquired pneumonia) ICD-10-CM: J18.9  ICD-9-CM: 918  8/28/2020 Unknown        Pneumonia due to COVID-19 virus ICD-10-CM: U07.1, J12.89  ICD-9-CM: 480.8  8/27/2020 Yes        * (Principal) Hematemesis ICD-10-CM: K92.0  ICD-9-CM: 578.0  8/27/2020 Yes        Chronic obstructive pulmonary disease ([facility] ICD-10-CM: J44.9  ICD-9-CM: 480  Unknown Yes    Overview Signed 8/27/2020  8:14 PM by Concha Avalos NP     CHRONIC BRONCHITIS             Alzheimer's disease ([facility].) ICD-10-CM: G30.9, F02.80  ICD-9-CM: 331.0  Unknown Yes        Diastolic CHF, chronic (HCC) (Chronic) ICD-10-CM: I50.32  ICD-9-CM: 428.32, 428.0  3/22/2013 Yes        Essential hypertension, malignant ICD-10-CM: I10  ICD-9-CM: 401.0  3/22/2013 Yes        Seizure ([facility] ICD-10-CM: R56.9  ICD-9-CM: 780.39  12/30/2012 Yes    Overview Signed 12/30/2012  2:02 PM by Sahara Kennedy MD     Hx of seizure as well             Diabetes mellitus type 2 in obese [facility]) ICD-10-CM: E11.69, E66.9  ICD-9-CM: 250.00, 278.00  7/29/2011 Yes        Chronic ischemic heart disease ICD-10-CM: I25.9  ICD-9-CM: 414.9  4/18/2011 Yes                Vital Signs:    Last 24hrs VS reviewed since prior progress note.  Most recent are:  Visit Vitals  /67 (BP 1 Location: Right arm, BP Patient Position: Post activity)   Pulse 73   Temp 97.6 °F (36.4 °C)   Resp 18   Ht 5' 8\" (1.727 m)   Wt 60.5 kg (133 lb 6.1 oz)   SpO2 97%   BMI 20.28 kg/m²         Intake/Output Summary (Last 24 hours) at 8/29/2020 1520  Last data filed at 8/29/2020 1518  Gross per 24 hour   Intake 1794.16 ml   Output 225 ml   Net 1569.16 ml        Physical Examination:             Constitutional:  No acute distress, cooperative, pleasant    ENT:  Oral mucosa moist, oropharynx benign. Resp:  Decrease bronchial breath sound bilaterally. No wheezing/rhonchi/rales. No accessory muscle use   CV:  Regular rhythm, normal rate, no murmurs, gallops, rubs    GI:  Soft, non distended, non tender.  normoactive bowel sounds, no hepatosplenomegaly     Musculoskeletal:  No edema     Neurologic:  Conscious and alert, disoriented, follows simple command, motor UE 4-5/5, LE 2/5     Skin:  Good turgor, no rashes or ulcers       Data Review:    Review and/or order of clinical lab test  Review and/or order of tests in the radiology section of CPT  Review and/or order of tests in the medicine section of CPT      Labs:     Recent Labs     08/28/20 0339 08/27/20  1409   WBC 5.2 7.4   HGB 12.5 13.4   HCT 41.6 43.2   * 131*     Recent Labs     08/29/20 0217 08/28/20  0339 08/27/20  1415 08/27/20  1409    133*  --  135*   K 3.7 4.3  --  3.8    101  --  100   CO2 25 24  --  28   BUN 27* 25*  --  25*   CREA 1.26 1.35*  --  1.54*   * 427*  --  448*   CA 8.7 9.0  --  8.9   MG  --  1.9  --  1.9   PHOS  --   --  2.5*  --      Recent Labs     08/29/20 0217 08/28/20  0339 08/27/20  1409   ALT 13 17 16   AP 74 94 109   TBILI 0.3 0.4 0.3   TP 6.3* 6.7 7.4   ALB 1.9* 2.0* 2.2*   GLOB 4.4* 4.7* 5.2*   LPSE  --   --  89     Recent Labs     08/28/20  0533 08/27/20  1415   INR  --  1.1   PTP  --  10.9   APTT 25.9  --       Recent Labs     08/28/20  0329   FERR 270      Lab Results   Component Value Date/Time    Folate 8.5 10/30/2009 03:30 PM      No results for input(s): PH, PCO2, PO2 in the last 72 hours.   Recent Labs     08/28/20  0329 08/27/20  1409   TROIQ <0.05 <0.05     Lab Results   Component Value Date/Time    Cholesterol, total 168 07/29/2011 08:27 AM    HDL Cholesterol 35 (L) 07/29/2011 08:27 AM    LDL, calculated 98 07/29/2011 08:27 AM    Triglyceride 176 (H) 07/29/2011 08:27 AM    CHOL/HDL Ratio 3.3 08/03/2010 10:55 PM     Lab Results   Component Value Date/Time    Glucose (POC) 203 (H) 08/29/2020 11:51 AM    Glucose (POC) 93 08/29/2020 08:46 AM    Glucose (POC) 152 (H) 08/28/2020 09:18 PM    Glucose (POC) 183 (H) 08/28/2020 05:10 PM    Glucose (POC) 445 (H) 08/28/2020 11:14 AM     Lab Results   Component Value Date/Time    Color YELLOW/STRAW 08/27/2020 06:26 PM    Appearance CLEAR 08/27/2020 06:26 PM    Specific gravity 1.027 08/27/2020 06:26 PM    Specific gravity 1.020 08/11/2020 06:17 AM    pH (UA) 5.0 08/27/2020 06:26 PM    Protein Negative 08/27/2020 06:26 PM    Glucose 500 (A) 08/27/2020 06:26 PM    Ketone Negative 08/27/2020 06:26 PM    Bilirubin Negative 08/27/2020 06:26 PM    Urobilinogen 0.2 08/27/2020 06:26 PM    Nitrites Negative 08/27/2020 06:26 PM    Leukocyte Esterase Negative 08/27/2020 06:26 PM    Epithelial cells FEW 08/11/2020 06:17 AM    Bacteria Negative 08/11/2020 06:17 AM    WBC 0-4 08/11/2020 06:17 AM    RBC 0-5 08/11/2020 06:17 AM         Medications Reviewed:     Current Facility-Administered Medications   Medication Dose Route Frequency    0.9% sodium chloride infusion  50 mL/hr IntraVENous CONTINUOUS    ascorbic acid (vitamin C) (VITAMIN C) tablet 500 mg  500 mg Oral BID    zinc sulfate (ZINCATE) 220 (50) mg capsule 1 Cap  1 Cap Oral DAILY    HYDROcodone-acetaminophen (NORCO) 5-325 mg per tablet 1 Tab  1 Tab Oral Q6H PRN    insulin glargine (LANTUS) injection 28 Units  28 Units SubCUTAneous QHS    insulin lispro (HUMALOG) injection 5 Units  5 Units SubCUTAneous TIDAC    enoxaparin (LOVENOX) injection 30 mg  30 mg SubCUTAneous Q12H    cefepime (MAXIPIME) 2 g in 0.9% sodium chloride (MBP/ADV) 100 mL  2 g IntraVENous Q12H    levoFLOXacin (LEVAQUIN) 750 mg in D5W IVPB  750 mg IntraVENous Q48H    sodium chloride (NS) flush 5-40 mL  5-40 mL IntraVENous Q8H    sodium chloride (NS) flush 5-40 mL  5-40 mL IntraVENous PRN    acetaminophen (TYLENOL) tablet 650 mg  650 mg Oral Q6H PRN    Or    acetaminophen (TYLENOL) suppository 650 mg  650 mg Rectal Q6H PRN    polyethylene glycol (MIRALAX) packet 17 g  17 g Oral DAILY PRN    promethazine (PHENERGAN) tablet 12.5 mg  12.5 mg Oral Q6H PRN    Or    ondansetron (ZOFRAN) injection 4 mg  4 mg IntraVENous Q6H PRN    dexAMETHasone (DECADRON) tablet 6 mg  6 mg Oral DAILY WITH BREAKFAST    lacosamide (VIMPAT) tablet 200 mg  200 mg Oral BID    levETIRAcetam (KEPPRA) tablet 750 mg  750 mg Oral BID    pravastatin (PRAVACHOL) tablet 40 mg  40 mg Oral QHS    topiramate (TOPAMAX) tablet 100 mg  100 mg Oral BID    glucose chewable tablet 16 g  4 Tab Oral PRN    glucagon (GLUCAGEN) injection 1 mg  1 mg IntraMUSCular PRN    dextrose 10% infusion 0-250 mL  0-250 mL IntraVENous PRN    insulin lispro (HUMALOG) injection   SubCUTAneous AC&HS    albuterol (PROVENTIL HFA, VENTOLIN HFA, PROAIR HFA) inhaler 2 Puff  2 Puff Inhalation Q4H PRN    guaiFENesin ER (MUCINEX) tablet 1,200 mg  1,200 mg Oral Q12H     ______________________________________________________________________  EXPECTED LENGTH OF STAY: - - -  ACTUAL LENGTH OF STAY:          1                 Son Wiley MD

## 2020-08-29 NOTE — PROGRESS NOTES
Verbal shift change report given to RN (oncoming nurse) by Lynette Solo (offgoing nurse). Report included the following information SBAR, Kardex, Intake/Output, Recent Results, and Cardiac Rhythm NSR . Problem: Diabetes Self-Management  Goal: *Disease process and treatment process  Description: Define diabetes and identify own type of diabetes; list 3 options for treating diabetes. Outcome: Progressing Towards Goal  Goal: *Using medications safely  Description: State effect of diabetes medications on diabetes; name diabetes medication taking, action and side effects. Outcome: Progressing Towards Goal  Goal: *Monitoring blood glucose, interpreting and using results  Description: Identify recommended blood glucose targets  and personal targets. Outcome: Progressing Towards Goal  Goal: *Prevention, detection, treatment of acute complications  Description: List symptoms of hyper- and hypoglycemia; describe how to treat low blood sugar and actions for lowering  high blood glucose level. Outcome: Progressing Towards Goal     Problem: Falls - Risk of  Goal: *Absence of Falls  Description: Document Christine Pineda Fall Risk and appropriate interventions in the flowsheet. Outcome: Progressing Towards Goal  Note: Fall Risk Interventions:  Mobility Interventions: Assess mobility with egress test, Communicate number of staff needed for ambulation/transfer    Mentation Interventions: Bed/chair exit alarm, Reorient patient, Room close to nurse's station    Medication Interventions: Patient to call before getting OOB, Teach patient to arise slowly    Elimination Interventions: Call light in reach, Patient to call for help with toileting needs              Problem: Breathing Pattern - Ineffective  Goal: *Absence of hypoxia  Outcome: Progressing Towards Goal     Problem: Pressure Injury - Risk of  Goal: *Prevention of pressure injury  Description: Document Roderick Scale and appropriate interventions in the flowsheet.   Outcome: Progressing Towards Goal  Note: Pressure Injury Interventions:  Sensory Interventions: Keep linens dry and wrinkle-free, Minimize linen layers, Assess need for specialty bed    Moisture Interventions: Absorbent underpads, Minimize layers    Activity Interventions: Assess need for specialty bed    Mobility Interventions: HOB 30 degrees or less, Turn and reposition approx.  every two hours(pillow and wedges)    Nutrition Interventions: Offer support with meals,snacks and hydration    Friction and Shear Interventions: Apply protective barrier, creams and emollients, HOB 30 degrees or less, Minimize layers

## 2020-08-29 NOTE — PROGRESS NOTES
Problem: Falls - Risk of  Goal: *Absence of Falls  Description: Document Rebbecca Persons Fall Risk and appropriate interventions in the flowsheet. Outcome: Progressing Towards Goal  Note: Fall Risk Interventions:  Mobility Interventions: Assess mobility with egress test, Bed/chair exit alarm, Communicate number of staff needed for ambulation/transfer, OT consult for ADLs, Patient to call before getting OOB, PT Consult for mobility concerns, Strengthening exercises (ROM-active/passive), Utilize walker, cane, or other assistive device, Utilize gait belt for transfers/ambulation, PT Consult for assist device competence    Mentation Interventions: Adequate sleep, hydration, pain control, Bed/chair exit alarm, Evaluate medications/consider consulting pharmacy, Door open when patient unattended, Reorient patient, Room close to nurse's station, Self-releasing belt, Toileting rounds, Update white board, Increase mobility, More frequent rounding    Medication Interventions: Assess postural VS orthostatic hypotension, Evaluate medications/consider consulting pharmacy, Bed/chair exit alarm, Patient to call before getting OOB, Teach patient to arise slowly, Utilize gait belt for transfers/ambulation    Elimination Interventions: Bed/chair exit alarm, Call light in reach, Elevated toilet seat, Patient to call for help with toileting needs, Toilet paper/wipes in reach, Toileting schedule/hourly rounds, Stay With Me (per policy), Urinal in reach              Problem: Pressure Injury - Risk of  Goal: *Prevention of pressure injury  Description: Document Roderick Scale and appropriate interventions in the flowsheet. Outcome: Progressing Towards Goal  Note: Pressure Injury Interventions:  Sensory Interventions: Assess changes in LOC, Maintain/enhance activity level, Keep linens dry and wrinkle-free, Float heels, Discuss PT/OT consult with provider, Monitor skin under medical devices, Pad between skin to skin, Turn and reposition approx. every two hours (pillows and wedges if needed)    Moisture Interventions: Absorbent underpads, Limit adult briefs, Maintain skin hydration (lotion/cream), Minimize layers, Moisture barrier, Offer toileting Q_hr    Activity Interventions: Assess need for specialty bed, Increase time out of bed, PT/OT evaluation, Pressure redistribution bed/mattress(bed type)    Mobility Interventions: Assess need for specialty bed, Float heels, HOB 30 degrees or less, Pressure redistribution bed/mattress (bed type), PT/OT evaluation, Turn and reposition approx. every two hours(pillow and wedges)    Nutrition Interventions: Document food/fluid/supplement intake, Discuss nutritional consult with provider, Offer support with meals,snacks and hydration    Friction and Shear Interventions: Apply protective barrier, creams and emollients, Lift sheet, Minimize layers, Sit at 90-degree angle, Transfer aides:transfer board/Jo-Ann lift/ceiling lift           Bedside shift change report given to Cathy Will RN (oncoming nurse) by Jessi Rose (offgoing nurse). Report included the following information SBAR, Kardex, ED Summary, Intake/Output, MAR, Recent Results, Med Rec Status, Cardiac Rhythm NSR, Alarm Parameters  and Quality Measures.

## 2020-08-30 LAB
ALBUMIN SERPL-MCNC: 1.9 G/DL (ref 3.5–5)
ALBUMIN/GLOB SERPL: 0.4 {RATIO} (ref 1.1–2.2)
ALP SERPL-CCNC: 78 U/L (ref 45–117)
ALT SERPL-CCNC: 14 U/L (ref 12–78)
ANION GAP SERPL CALC-SCNC: 5 MMOL/L (ref 5–15)
AST SERPL-CCNC: 18 U/L (ref 15–37)
BILIRUB SERPL-MCNC: 0.4 MG/DL (ref 0.2–1)
BUN SERPL-MCNC: 19 MG/DL (ref 6–20)
BUN/CREAT SERPL: 16 (ref 12–20)
CALCIUM SERPL-MCNC: 8.4 MG/DL (ref 8.5–10.1)
CHLORIDE SERPL-SCNC: 113 MMOL/L (ref 97–108)
CO2 SERPL-SCNC: 25 MMOL/L (ref 21–32)
CREAT SERPL-MCNC: 1.21 MG/DL (ref 0.7–1.3)
D DIMER PPP FEU-MCNC: 13.56 MG/L FEU (ref 0–0.65)
ERYTHROCYTE [DISTWIDTH] IN BLOOD BY AUTOMATED COUNT: 15.5 % (ref 11.5–14.5)
GLOBULIN SER CALC-MCNC: 4.4 G/DL (ref 2–4)
GLUCOSE BLD STRIP.AUTO-MCNC: 160 MG/DL (ref 65–100)
GLUCOSE BLD STRIP.AUTO-MCNC: 166 MG/DL (ref 65–100)
GLUCOSE BLD STRIP.AUTO-MCNC: 191 MG/DL (ref 65–100)
GLUCOSE BLD STRIP.AUTO-MCNC: 202 MG/DL (ref 65–100)
GLUCOSE BLD STRIP.AUTO-MCNC: 265 MG/DL (ref 65–100)
GLUCOSE SERPL-MCNC: 96 MG/DL (ref 65–100)
HCT VFR BLD AUTO: 36.4 % (ref 36.6–50.3)
HGB BLD-MCNC: 11 G/DL (ref 12.1–17)
MCH RBC QN AUTO: 26.5 PG (ref 26–34)
MCHC RBC AUTO-ENTMCNC: 30.2 G/DL (ref 30–36.5)
MCV RBC AUTO: 87.7 FL (ref 80–99)
NRBC # BLD: 0 K/UL (ref 0–0.01)
NRBC BLD-RTO: 0 PER 100 WBC
PLATELET # BLD AUTO: 132 K/UL (ref 150–400)
PMV BLD AUTO: 11.2 FL (ref 8.9–12.9)
POTASSIUM SERPL-SCNC: 4 MMOL/L (ref 3.5–5.1)
PROT SERPL-MCNC: 6.3 G/DL (ref 6.4–8.2)
RBC # BLD AUTO: 4.15 M/UL (ref 4.1–5.7)
SERVICE CMNT-IMP: ABNORMAL
SODIUM SERPL-SCNC: 143 MMOL/L (ref 136–145)
WBC # BLD AUTO: 5.2 K/UL (ref 4.1–11.1)

## 2020-08-30 PROCEDURE — 74011250637 HC RX REV CODE- 250/637: Performed by: HOSPITALIST

## 2020-08-30 PROCEDURE — 74011636637 HC RX REV CODE- 636/637: Performed by: NURSE PRACTITIONER

## 2020-08-30 PROCEDURE — 74011250636 HC RX REV CODE- 250/636: Performed by: HOSPITALIST

## 2020-08-30 PROCEDURE — 74011250636 HC RX REV CODE- 250/636: Performed by: INTERNAL MEDICINE

## 2020-08-30 PROCEDURE — 85379 FIBRIN DEGRADATION QUANT: CPT

## 2020-08-30 PROCEDURE — 36430 TRANSFUSION BLD/BLD COMPNT: CPT

## 2020-08-30 PROCEDURE — 85027 COMPLETE CBC AUTOMATED: CPT

## 2020-08-30 PROCEDURE — 80053 COMPREHEN METABOLIC PANEL: CPT

## 2020-08-30 PROCEDURE — 74011000258 HC RX REV CODE- 258: Performed by: INTERNAL MEDICINE

## 2020-08-30 PROCEDURE — 74011250637 HC RX REV CODE- 250/637: Performed by: NURSE PRACTITIONER

## 2020-08-30 PROCEDURE — 82962 GLUCOSE BLOOD TEST: CPT

## 2020-08-30 PROCEDURE — 74011636637 HC RX REV CODE- 636/637: Performed by: HOSPITALIST

## 2020-08-30 PROCEDURE — 74011250636 HC RX REV CODE- 250/636: Performed by: NURSE PRACTITIONER

## 2020-08-30 PROCEDURE — 36415 COLL VENOUS BLD VENIPUNCTURE: CPT

## 2020-08-30 PROCEDURE — 65660000001 HC RM ICU INTERMED STEPDOWN

## 2020-08-30 RX ADMIN — CEFEPIME 2 G: 2 INJECTION, POWDER, FOR SOLUTION INTRAVENOUS at 09:54

## 2020-08-30 RX ADMIN — LEVETIRACETAM 750 MG: 500 TABLET ORAL at 18:49

## 2020-08-30 RX ADMIN — TOPIRAMATE 100 MG: 100 TABLET, FILM COATED ORAL at 18:49

## 2020-08-30 RX ADMIN — LEVOFLOXACIN 750 MG: 5 INJECTION, SOLUTION INTRAVENOUS at 21:09

## 2020-08-30 RX ADMIN — OXYCODONE HYDROCHLORIDE AND ACETAMINOPHEN 500 MG: 500 TABLET ORAL at 09:52

## 2020-08-30 RX ADMIN — INSULIN LISPRO 2 UNITS: 100 INJECTION, SOLUTION INTRAVENOUS; SUBCUTANEOUS at 07:30

## 2020-08-30 RX ADMIN — LACOSAMIDE 200 MG: 50 TABLET, FILM COATED ORAL at 09:51

## 2020-08-30 RX ADMIN — INSULIN LISPRO 2 UNITS: 100 INJECTION, SOLUTION INTRAVENOUS; SUBCUTANEOUS at 16:30

## 2020-08-30 RX ADMIN — INSULIN GLARGINE 28 UNITS: 100 INJECTION, SOLUTION SUBCUTANEOUS at 22:26

## 2020-08-30 RX ADMIN — Medication 10 ML: at 05:37

## 2020-08-30 RX ADMIN — INSULIN LISPRO 5 UNITS: 100 INJECTION, SOLUTION INTRAVENOUS; SUBCUTANEOUS at 13:04

## 2020-08-30 RX ADMIN — INSULIN LISPRO 2 UNITS: 100 INJECTION, SOLUTION INTRAVENOUS; SUBCUTANEOUS at 22:27

## 2020-08-30 RX ADMIN — PRAVASTATIN SODIUM 40 MG: 40 TABLET ORAL at 22:26

## 2020-08-30 RX ADMIN — INSULIN LISPRO 5 UNITS: 100 INJECTION, SOLUTION INTRAVENOUS; SUBCUTANEOUS at 18:49

## 2020-08-30 RX ADMIN — INSULIN LISPRO 5 UNITS: 100 INJECTION, SOLUTION INTRAVENOUS; SUBCUTANEOUS at 09:52

## 2020-08-30 RX ADMIN — LACOSAMIDE 200 MG: 50 TABLET, FILM COATED ORAL at 18:49

## 2020-08-30 RX ADMIN — Medication 10 ML: at 14:08

## 2020-08-30 RX ADMIN — CEFEPIME 2 G: 2 INJECTION, POWDER, FOR SOLUTION INTRAVENOUS at 19:02

## 2020-08-30 RX ADMIN — SODIUM CHLORIDE 50 ML/HR: 900 INJECTION, SOLUTION INTRAVENOUS at 04:09

## 2020-08-30 RX ADMIN — LEVETIRACETAM 750 MG: 500 TABLET ORAL at 09:51

## 2020-08-30 RX ADMIN — INSULIN LISPRO 2 UNITS: 100 INJECTION, SOLUTION INTRAVENOUS; SUBCUTANEOUS at 13:05

## 2020-08-30 RX ADMIN — ENOXAPARIN SODIUM 30 MG: 30 INJECTION SUBCUTANEOUS at 16:42

## 2020-08-30 RX ADMIN — GUAIFENESIN 1200 MG: 600 TABLET, EXTENDED RELEASE ORAL at 09:52

## 2020-08-30 RX ADMIN — ZINC SULFATE 220 MG (50 MG) CAPSULE 1 CAPSULE: CAPSULE at 09:52

## 2020-08-30 RX ADMIN — OXYCODONE HYDROCHLORIDE AND ACETAMINOPHEN 500 MG: 500 TABLET ORAL at 18:49

## 2020-08-30 RX ADMIN — DEXAMETHASONE 6 MG: 4 TABLET ORAL at 09:52

## 2020-08-30 RX ADMIN — GUAIFENESIN 1200 MG: 600 TABLET, EXTENDED RELEASE ORAL at 21:09

## 2020-08-30 RX ADMIN — ENOXAPARIN SODIUM 30 MG: 30 INJECTION SUBCUTANEOUS at 04:09

## 2020-08-30 RX ADMIN — Medication 10 ML: at 22:29

## 2020-08-30 RX ADMIN — TOPIRAMATE 100 MG: 100 TABLET, FILM COATED ORAL at 14:07

## 2020-08-30 NOTE — PROGRESS NOTES
[facility]  Hospitalist Group                                                                                          Hospitalist Progress Note  Boy Lee MD  Answering service: [phone] from in house phone        Date of Service:  2020  NAME:  Marina Farnsworth  :  1945  MRN:  [id number]      Admission Summary:     Marina Farnsworth is a 76 y.o. male who presented via EMS from [facility] with a complaint of  vomiting/coughing up blood x1. EMS reported rectal temperature of 96.1. He was admitted approximately 2 weeks ago for PNA related to COVID and was COVID positive. Workup in ED included largely unremarkable CBC except for low platelets of 702, UA negative for UTI, chemistry w/ , glucose 448, creat 1.54, phos 2.5, negative troponin and A1c 8.4. Glucose of 448 not treated in ED. KUB of abdomen negative. CTA chest showed no PE, substantial interval progression of multifocal bilateral groundglass and patchy consolidations with \"crazy paving\" appearance, consistent with substantial worsening of COVID 19 PNA; unchanged consolidation vs mass lesion of CAMERON measuring [id number] transverse. Patient is alert, oriented only to self with hx alzheimer's dementia. Pt denies HA, dizziness, visual changes, CP, abd pain, n/v/d or dysuria. He endorses continued cough and SOB. He does not recall coughing/vomiting up blood. I called his daughter and updated her on POC. All questions answered. Discussed with Dr. Roe Langley.     Will send to intermediate care d/t likely need for [facility].  If maintains core temp greater than 97.4, can consider downgrade to tele.       Interval history / Subjective:     Poor historian, denied pain     Assessment & Plan:     PNA due to COVID 19  -continue iv azithromycin, ceftriaxone, decadron, oxygen support   -positive 2 weeks ago, repeated COVID 19 on  positive  -s/p convalescent plasma on   -Chest CTA no evidence for pulmonary embolism, substantial interval progression of multifocal bilateral groundglass and patchy consolidations with \"crazy paving\" appearance. Findings consistent with substantial worsening in Covid 19 pneumonia.  Unchanged consolidation versus mass lesion of left upper lobe measuring 29 x 17 mm transverse.  -SpO2 95-97 % on RA  -respiratory panel negative   -HIV 1/2 non reactive  -Chlamydia panel, legionel ag, mycoplasma ag are pending  -ferritin was normal  -elevated d dimer improving  -ID is on board    Hematemesis  -x1 at NH; suspect might have been in sputum from excessive coughing  -stool occult pending  -Hgb 11.0, it was 13.4, monitor H/H  -hemoptysis improved     CAMERON mass: consolidation vs lesion (measuring 29 x 17mm transverse)  -No change since previous imaging  -on ceftriaxone and azithro for now; add mucinex  -sputum culture pending  -consult pulmonary to further eval (hx CA)  -further mgt as above     RAPHAEL on CKD Stage II  -creatinine improving,   -Gentle IVF hydration overnight  -avoid nephrotoxins  -monitor renal function      Hyponatremia  -suspect d/t poor PO intake recently  -gentle IVF hydration overnight  -resolved now     Thrombocytopenia  -Plts 131 (baseline normal), improving   -Unclear reason, no PE on CTA (has hx of DVT not currently on [facility])  -hold plavix and AC for now d/t concern for hemoptysis/hematemesis  -no edema to BLE, doubt current DVT  -monitor labs for now     Hx HTN  -now low/normal BP  -give albumin x3  -hold home meds     Hx COPD  -stable,   -PRN inhaler  -Monitor VS, O2     Hx CHF/Ischemic Heart Disease  -bumex, coreg, cozaar, lopressor on home med list; hold as BP low and med/rec not yet done  -hold plavix/asa for now d/t ?hemoptysis/hematemesis  -Echo in 2013 w/o evidence of HF  -pro-BNP normal     Hx Seizures  -Continue home vimpat, keppra  -seizure precations     T2DM  -A1c 8.4,   -on lantus to 28 units, add lispro 5 units and continue sliding scale, monitor finger stick glucose      Hx Alzheimer's Dementia   -calm, cooperative  -Continue supportive care      Code status: Full Code   DVT prophylaxis:SCD    Care Plan discussed with: Patient/Family, Nurse and   [facility] Disposition: From [facility]  Anticipated Discharge: Greater than 48 hours     I called and updated his daughter, Alvina Suggs at , answered questions on 8/30     Hospital Problems  Date Reviewed: 8/27/2020          Codes Class Noted POA    Acute kidney injury superimposed on chronic kidney disease (RUST 75.) ICD-10-CM: N17.9, N18.9  ICD-9-CM: 866.00, 585.9  8/28/2020 Yes        Hyponatremia ICD-10-CM: E87.1  ICD-9-CM: 276.1  8/28/2020 Yes        Thrombocytopenia (RUST 75.) ICD-10-CM: D69.6  ICD-9-CM: 287.5  8/28/2020 Yes        CAP (community acquired pneumonia) ICD-10-CM: J18.9  ICD-9-CM: 980  8/28/2020 Unknown        Pneumonia due to COVID-19 virus ICD-10-CM: U07.1, J12.89  ICD-9-CM: 480.8  8/27/2020 Yes        * (Principal) Hematemesis ICD-10-CM: K92.0  ICD-9-CM: 578.0  8/27/2020 Yes        Chronic obstructive pulmonary disease (RUST 75.) ICD-10-CM: J44.9  ICD-9-CM: 060  Unknown Yes    Overview Signed 8/27/2020  8:14 PM by Matt Mccoy NP     CHRONIC BRONCHITIS             Alzheimer's disease (RUST 75.) ICD-10-CM: G30.9, F02.80  ICD-9-CM: 331.0  Unknown Yes        Diastolic CHF, chronic (HCC) (Chronic) ICD-10-CM: I50.32  ICD-9-CM: 428.32, 428.0  3/22/2013 Yes        Essential hypertension, malignant ICD-10-CM: I10  ICD-9-CM: 401.0  3/22/2013 Yes        Seizure (RUST 75.) ICD-10-CM: R56.9  ICD-9-CM: 780.39  12/30/2012 Yes    Overview Signed 12/30/2012  2:02 PM by Marcell Jeffers MD     Hx of seizure as well             Diabetes mellitus type 2 in obese [facility]) ICD-10-CM: E11.69, E66.9  ICD-9-CM: 250.00, 278.00  7/29/2011 Yes        Chronic ischemic heart disease ICD-10-CM: I25.9  ICD-9-CM: 414.9  4/18/2011 Yes                Vital Signs:    Last 24hrs VS reviewed since prior progress note.  Most recent are:  Visit Vitals  /65 (BP 1 Location: Right arm, BP Patient Position: At rest)   Pulse 66   Temp 97.7 °F (36.5 °C)   Resp 13   Ht 5' 8\" (1.727 m)   Wt 61.8 kg (136 lb 3.9 oz)   SpO2 96%   BMI 20.72 kg/m²         Intake/Output Summary (Last 24 hours) at 8/30/2020 1001  Last data filed at 8/30/2020 0715  Gross per 24 hour   Intake 400 ml   Output 1400 ml   Net -1000 ml        Physical Examination:             Constitutional:  No acute distress, cooperative, pleasant    ENT:  Oral mucosa moist, oropharynx benign. Resp:  Decrease bronchial breath sound bilaterally. No wheezing/rhonchi/rales. No accessory muscle use   CV:  Regular rhythm, normal rate, no murmurs, gallops, rubs    GI:  Soft, non distended, non tender.  normoactive bowel sounds, no hepatosplenomegaly     Musculoskeletal:  No edema     Neurologic:  Conscious and alert, disoriented, follows simple command, motor UE 4-5/5, LE 2/5     Skin:  Good turgor, no rashes or ulcers       Data Review:    Review and/or order of clinical lab test  Review and/or order of tests in the radiology section of CPT  Review and/or order of tests in the medicine section of CPT      Labs:     Recent Labs     08/30/20 0413 08/28/20 0339   WBC 5.2 5.2   HGB 11.0* 12.5   HCT 36.4* 41.6   * 141*     Recent Labs     08/30/20 0413 08/29/20 0217 08/28/20  0339 08/27/20  1415 08/27/20  1409    139 133*  --  135*   K 4.0 3.7 4.3  --  3.8   * 108 101  --  100   CO2 25 25 24  --  28   BUN 19 27* 25*  --  25*   CREA 1.21 1.26 1.35*  --  1.54*   GLU 96 165* 427*  --  448*   CA 8.4* 8.7 9.0  --  8.9   MG  --   --  1.9  --  1.9   PHOS  --   --   --  2.5*  --      Recent Labs     08/30/20 0413 08/29/20 0217 08/28/20  0339 08/27/20  1409   ALT 14 13 17 16   AP 78 74 94 109   TBILI 0.4 0.3 0.4 0.3   TP 6.3* 6.3* 6.7 7.4   ALB 1.9* 1.9* 2.0* 2.2*   GLOB 4.4* 4.4* 4.7* 5.2*   LPSE  --   --   --  89     Recent Labs     08/28/20  0533 08/27/20  1415   INR  --  1.1 PTP  --  10.9   APTT 25.9  --       Recent Labs     08/28/20  0329   FERR 270      Lab Results   Component Value Date/Time    Folate 8.5 10/30/2009 03:30 PM      No results for input(s): PH, PCO2, PO2 in the last 72 hours.   Recent Labs     08/28/20  0329 08/27/20  1409   TROIQ <0.05 <0.05     Lab Results   Component Value Date/Time    Cholesterol, total 168 07/29/2011 08:27 AM    HDL Cholesterol 35 (L) 07/29/2011 08:27 AM    LDL, calculated 98 07/29/2011 08:27 AM    Triglyceride 176 (H) 07/29/2011 08:27 AM    CHOL/HDL Ratio 3.3 08/03/2010 10:55 PM     Lab Results   Component Value Date/Time    Glucose (POC) 160 (H) 08/30/2020 08:45 AM    Glucose (POC) 156 (H) 08/29/2020 09:38 PM    Glucose (POC) 244 (H) 08/29/2020 04:53 PM    Glucose (POC) 203 (H) 08/29/2020 11:51 AM    Glucose (POC) 93 08/29/2020 08:46 AM     Lab Results   Component Value Date/Time    Color YELLOW/STRAW 08/27/2020 06:26 PM    Appearance CLEAR 08/27/2020 06:26 PM    Specific gravity 1.027 08/27/2020 06:26 PM    Specific gravity 1.020 08/11/2020 06:17 AM    pH (UA) 5.0 08/27/2020 06:26 PM    Protein Negative 08/27/2020 06:26 PM    Glucose 500 (A) 08/27/2020 06:26 PM    Ketone Negative 08/27/2020 06:26 PM    Bilirubin Negative 08/27/2020 06:26 PM    Urobilinogen 0.2 08/27/2020 06:26 PM    Nitrites Negative 08/27/2020 06:26 PM    Leukocyte Esterase Negative 08/27/2020 06:26 PM    Epithelial cells FEW 08/11/2020 06:17 AM    Bacteria Negative 08/11/2020 06:17 AM    WBC 0-4 08/11/2020 06:17 AM    RBC 0-5 08/11/2020 06:17 AM         Medications Reviewed:     Current Facility-Administered Medications   Medication Dose Route Frequency    0.9% sodium chloride infusion 250 mL  250 mL IntraVENous PRN    0.9% sodium chloride infusion  50 mL/hr IntraVENous CONTINUOUS    ascorbic acid (vitamin C) (VITAMIN C) tablet 500 mg  500 mg Oral BID    zinc sulfate (ZINCATE) 220 (50) mg capsule 1 Cap  1 Cap Oral DAILY    HYDROcodone-acetaminophen (NORCO) 5-325 mg per tablet 1 Tab  1 Tab Oral Q6H PRN    insulin glargine (LANTUS) injection 28 Units  28 Units SubCUTAneous QHS    insulin lispro (HUMALOG) injection 5 Units  5 Units SubCUTAneous TIDAC    enoxaparin (LOVENOX) injection 30 mg  30 mg SubCUTAneous Q12H    cefepime (MAXIPIME) 2 g in 0.9% sodium chloride (MBP/ADV) 100 mL  2 g IntraVENous Q12H    levoFLOXacin (LEVAQUIN) 750 mg in D5W IVPB  750 mg IntraVENous Q48H    sodium chloride (NS) flush 5-40 mL  5-40 mL IntraVENous Q8H    sodium chloride (NS) flush 5-40 mL  5-40 mL IntraVENous PRN    acetaminophen (TYLENOL) tablet 650 mg  650 mg Oral Q6H PRN    Or    acetaminophen (TYLENOL) suppository 650 mg  650 mg Rectal Q6H PRN    polyethylene glycol (MIRALAX) packet 17 g  17 g Oral DAILY PRN    promethazine (PHENERGAN) tablet 12.5 mg  12.5 mg Oral Q6H PRN    Or    ondansetron (ZOFRAN) injection 4 mg  4 mg IntraVENous Q6H PRN    dexAMETHasone (DECADRON) tablet 6 mg  6 mg Oral DAILY WITH BREAKFAST    lacosamide (VIMPAT) tablet 200 mg  200 mg Oral BID    levETIRAcetam (KEPPRA) tablet 750 mg  750 mg Oral BID    pravastatin (PRAVACHOL) tablet 40 mg  40 mg Oral QHS    topiramate (TOPAMAX) tablet 100 mg  100 mg Oral BID    glucose chewable tablet 16 g  4 Tab Oral PRN    glucagon (GLUCAGEN) injection 1 mg  1 mg IntraMUSCular PRN    dextrose 10% infusion 0-250 mL  0-250 mL IntraVENous PRN    insulin lispro (HUMALOG) injection   SubCUTAneous AC&HS    albuterol (PROVENTIL HFA, VENTOLIN HFA, PROAIR HFA) inhaler 2 Puff  2 Puff Inhalation Q4H PRN    guaiFENesin ER (MUCINEX) tablet 1,200 mg  1,200 mg Oral Q12H     ______________________________________________________________________  EXPECTED LENGTH OF STAY: - - -  ACTUAL LENGTH OF STAY:          2                 Liana Meredith MD

## 2020-08-30 NOTE — PROGRESS NOTES
Verbal shift change report given to David Cameron RN (oncoming nurse) by Jailyn Mehta (offgoing nurse). Report included the following information SBAR, Kardex, MAR, Recent Results, and Cardiac Rhythm NSR . Convalescent Plasma given- no adverse reactions noted. Problem: Diabetes Self-Management  Goal: *Disease process and treatment process  Description: Define diabetes and identify own type of diabetes; list 3 options for treating diabetes. Outcome: Progressing Towards Goal     Problem: Falls - Risk of  Goal: *Absence of Falls  Description: Document Maddie Kitchen Fall Risk and appropriate interventions in the flowsheet.   Outcome: Progressing Towards Goal  Note: Fall Risk Interventions:  Mobility Interventions: Patient to call before getting OOB    Mentation Interventions: Reorient patient, Room close to nurse's station    Medication Interventions: Patient to call before getting OOB, Teach patient to arise slowly    Elimination Interventions: Bed/chair exit alarm, Call light in reach, Patient to call for help with toileting needs

## 2020-08-31 LAB
BLD PROD TYP BPU: NORMAL
BPU ID: NORMAL
D DIMER PPP FEU-MCNC: 8.47 MG/L FEU (ref 0–0.65)
GLUCOSE BLD STRIP.AUTO-MCNC: 185 MG/DL (ref 65–100)
GLUCOSE BLD STRIP.AUTO-MCNC: 227 MG/DL (ref 65–100)
GLUCOSE BLD STRIP.AUTO-MCNC: 247 MG/DL (ref 65–100)
GLUCOSE BLD STRIP.AUTO-MCNC: 64 MG/DL (ref 65–100)
GLUCOSE BLD STRIP.AUTO-MCNC: 67 MG/DL (ref 65–100)
GLUCOSE BLD STRIP.AUTO-MCNC: 76 MG/DL (ref 65–100)
SERVICE CMNT-IMP: ABNORMAL
SERVICE CMNT-IMP: NORMAL
SERVICE CMNT-IMP: NORMAL
STATUS OF UNIT,%ST: NORMAL
UNIT DIVISION, %UDIV: 0

## 2020-08-31 PROCEDURE — 74011250636 HC RX REV CODE- 250/636: Performed by: INTERNAL MEDICINE

## 2020-08-31 PROCEDURE — 74011250637 HC RX REV CODE- 250/637: Performed by: HOSPITALIST

## 2020-08-31 PROCEDURE — 65270000029 HC RM PRIVATE

## 2020-08-31 PROCEDURE — 74011250637 HC RX REV CODE- 250/637: Performed by: NURSE PRACTITIONER

## 2020-08-31 PROCEDURE — 74011636637 HC RX REV CODE- 636/637: Performed by: NURSE PRACTITIONER

## 2020-08-31 PROCEDURE — 74011636637 HC RX REV CODE- 636/637: Performed by: HOSPITALIST

## 2020-08-31 PROCEDURE — 36415 COLL VENOUS BLD VENIPUNCTURE: CPT

## 2020-08-31 PROCEDURE — 74011250636 HC RX REV CODE- 250/636: Performed by: NURSE PRACTITIONER

## 2020-08-31 PROCEDURE — 82962 GLUCOSE BLOOD TEST: CPT

## 2020-08-31 PROCEDURE — 74011250636 HC RX REV CODE- 250/636: Performed by: HOSPITALIST

## 2020-08-31 PROCEDURE — 74011000258 HC RX REV CODE- 258: Performed by: INTERNAL MEDICINE

## 2020-08-31 PROCEDURE — 85379 FIBRIN DEGRADATION QUANT: CPT

## 2020-08-31 RX ORDER — CARVEDILOL 6.25 MG/1
6.25 TABLET ORAL 2 TIMES DAILY WITH MEALS
Status: DISCONTINUED | OUTPATIENT
Start: 2020-08-31 | End: 2020-09-04 | Stop reason: HOSPADM

## 2020-08-31 RX ORDER — DEXTROSE, SODIUM CHLORIDE, AND POTASSIUM CHLORIDE 5; .9; .15 G/100ML; G/100ML; G/100ML
25 INJECTION INTRAVENOUS CONTINUOUS
Status: DISCONTINUED | OUTPATIENT
Start: 2020-08-31 | End: 2020-08-31

## 2020-08-31 RX ORDER — ALLOPURINOL 100 MG/1
100 TABLET ORAL DAILY
Status: DISCONTINUED | OUTPATIENT
Start: 2020-08-31 | End: 2020-09-04 | Stop reason: HOSPADM

## 2020-08-31 RX ORDER — GUAIFENESIN 100 MG/5ML
81 LIQUID (ML) ORAL DAILY
Status: DISCONTINUED | OUTPATIENT
Start: 2020-08-31 | End: 2020-09-04 | Stop reason: HOSPADM

## 2020-08-31 RX ORDER — INSULIN GLARGINE 100 [IU]/ML
20 INJECTION, SOLUTION SUBCUTANEOUS
Status: DISCONTINUED | OUTPATIENT
Start: 2020-08-31 | End: 2020-09-01

## 2020-08-31 RX ORDER — SODIUM CHLORIDE 9 MG/ML
50 INJECTION, SOLUTION INTRAVENOUS CONTINUOUS
Status: DISCONTINUED | OUTPATIENT
Start: 2020-08-31 | End: 2020-09-01

## 2020-08-31 RX ADMIN — OXYCODONE HYDROCHLORIDE AND ACETAMINOPHEN 500 MG: 500 TABLET ORAL at 08:48

## 2020-08-31 RX ADMIN — Medication 10 ML: at 15:08

## 2020-08-31 RX ADMIN — TOPIRAMATE 100 MG: 100 TABLET, FILM COATED ORAL at 08:48

## 2020-08-31 RX ADMIN — CEFEPIME 2 G: 2 INJECTION, POWDER, FOR SOLUTION INTRAVENOUS at 08:48

## 2020-08-31 RX ADMIN — LACOSAMIDE 200 MG: 50 TABLET, FILM COATED ORAL at 18:48

## 2020-08-31 RX ADMIN — LEVETIRACETAM 750 MG: 500 TABLET ORAL at 20:39

## 2020-08-31 RX ADMIN — ASPIRIN 81 MG CHEWABLE TABLET 81 MG: 81 TABLET CHEWABLE at 11:53

## 2020-08-31 RX ADMIN — ENOXAPARIN SODIUM 30 MG: 30 INJECTION SUBCUTANEOUS at 03:24

## 2020-08-31 RX ADMIN — INSULIN GLARGINE 20 UNITS: 100 INJECTION, SOLUTION SUBCUTANEOUS at 22:13

## 2020-08-31 RX ADMIN — DEXAMETHASONE 6 MG: 4 TABLET ORAL at 08:48

## 2020-08-31 RX ADMIN — SODIUM CHLORIDE 50 ML/HR: 900 INJECTION, SOLUTION INTRAVENOUS at 11:54

## 2020-08-31 RX ADMIN — TOPIRAMATE 100 MG: 100 TABLET, FILM COATED ORAL at 18:49

## 2020-08-31 RX ADMIN — ENOXAPARIN SODIUM 30 MG: 30 INJECTION SUBCUTANEOUS at 20:39

## 2020-08-31 RX ADMIN — SODIUM CHLORIDE 50 ML/HR: 900 INJECTION, SOLUTION INTRAVENOUS at 20:51

## 2020-08-31 RX ADMIN — GUAIFENESIN 1200 MG: 600 TABLET, EXTENDED RELEASE ORAL at 08:48

## 2020-08-31 RX ADMIN — Medication 10 ML: at 22:14

## 2020-08-31 RX ADMIN — LACOSAMIDE 200 MG: 50 TABLET, FILM COATED ORAL at 08:48

## 2020-08-31 RX ADMIN — OXYCODONE HYDROCHLORIDE AND ACETAMINOPHEN 500 MG: 500 TABLET ORAL at 18:49

## 2020-08-31 RX ADMIN — PRAVASTATIN SODIUM 40 MG: 40 TABLET ORAL at 22:13

## 2020-08-31 RX ADMIN — CARVEDILOL 6.25 MG: 6.25 TABLET, FILM COATED ORAL at 18:49

## 2020-08-31 RX ADMIN — LEVETIRACETAM 750 MG: 500 TABLET ORAL at 08:49

## 2020-08-31 RX ADMIN — Medication 10 ML: at 05:37

## 2020-08-31 RX ADMIN — CEFEPIME 2 G: 2 INJECTION, POWDER, FOR SOLUTION INTRAVENOUS at 20:39

## 2020-08-31 RX ADMIN — ALLOPURINOL 100 MG: 100 TABLET ORAL at 11:52

## 2020-08-31 RX ADMIN — GUAIFENESIN 1200 MG: 600 TABLET, EXTENDED RELEASE ORAL at 22:13

## 2020-08-31 RX ADMIN — ZINC SULFATE 220 MG (50 MG) CAPSULE 1 CAPSULE: CAPSULE at 08:48

## 2020-08-31 RX ADMIN — INSULIN LISPRO 3 UNITS: 100 INJECTION, SOLUTION INTRAVENOUS; SUBCUTANEOUS at 11:52

## 2020-08-31 RX ADMIN — INSULIN LISPRO 3 UNITS: 100 INJECTION, SOLUTION INTRAVENOUS; SUBCUTANEOUS at 20:38

## 2020-08-31 NOTE — PROGRESS NOTES
Received phone call from Shira Anguloe (daughter) stating that patient's daughter, Jm, passed away today. She does not want us to tell patient. Please contact Bobby Zavala ([phone]) from this point on with any updates or concerns.

## 2020-08-31 NOTE — PROGRESS NOTES
Verbal shift change report given to Renetta Steele RN (oncoming nurse) by Cher Molina (offgoing nurse). Report included the following information SBAR, Kardex, Intake/Output, Recent Results, and Cardiac Rhythm NSR . Problem: Diabetes Self-Management  Goal: *Disease process and treatment process  Description: Define diabetes and identify own type of diabetes; list 3 options for treating diabetes. Outcome: Not Progressing Towards Goal  Goal: *Monitoring blood glucose, interpreting and using results  Description: Identify recommended blood glucose targets  and personal targets. Outcome: Progressing Towards Goal     Problem: Falls - Risk of  Goal: *Absence of Falls  Description: Document Cumbola Roam Fall Risk and appropriate interventions in the flowsheet. Outcome: Not Progressing Towards Goal  Note: Fall Risk Interventions:  Mobility Interventions: Communicate number of staff needed for ambulation/transfer    Mentation Interventions: Reorient patient, Room close to nurse's station    Medication Interventions: Bed/chair exit alarm    Elimination Interventions: Bed/chair exit alarm              Problem: Pressure Injury - Risk of  Goal: *Prevention of pressure injury  Description: Document Roderick Scale and appropriate interventions in the flowsheet.   Outcome: Progressing Towards Goal  Note: Pressure Injury Interventions:  Sensory Interventions: Assess changes in LOC, Assess need for specialty bed, Avoid rigorous massage over bony prominences, Discuss PT/OT consult with provider, Keep linens dry and wrinkle-free, Maintain/enhance activity level, Minimize linen layers, Monitor skin under medical devices, Pad between skin to skin, Pressure redistribution bed/mattress (bed type)    Moisture Interventions: Absorbent underpads    Activity Interventions: Pressure redistribution bed/mattress(bed type)    Mobility Interventions: HOB 30 degrees or less    Nutrition Interventions: Offer support with meals,snacks and hydration    Friction and Shear Interventions: Apply protective barrier, creams and emollients, HOB 30 degrees or less, Minimize layers

## 2020-08-31 NOTE — CDMP QUERY
Pt admitted with Covid 19 [place]. Pt noted to have . If possible, please document in the progress notes and discharge summary if you are evaluating and/or treating any of the following: [[Acute respiratory failure with hypoxia[de-identified] This patient is in acute respiratory failure with hypoxia.]] The medical record reflects the following: 
   Risk Factors:  76 AAM with Covid-19 Pneumonia Clinical Indicators:  
 
8/27 ABG on 2L O2 via [place] Patient is NOT documented to have a home O2 need. Elizabet Jeffers NP H&P \"He endorses continued cough and SOB\" \"Hematemesis x1 at [facility] [place] operated by [facility]; suspect might have been in sputum from excessive coughing\" [facility] 8/28 Progress Note \"He endorses continued cough and SOB\" \"Hematemesis x1 at [facility] [place], operated by [facility]; suspect might have been in sputum from excessive coughing\" [facility] 8/29 Progress Note \"He endorses continued cough and SOB\" \"Hematemesis 
-x1 at NH; suspect might have been in sputum from excessive coughing 
-stool occult pending 
-Hgb 12.5, it was 13.4, monitor H/H 
-hemoptysis improved\" Treatment:

## 2020-08-31 NOTE — PROGRESS NOTES
ID Progress Note  2020    Subjective:     Afebrile. He does not have any dyspnea. He is on room air. Objective:     Vitals:   Visit Vitals  BP (!) 174/92 (BP 1 Location: Left arm, BP Patient Position: Head of bed elevated (Comment degrees); Other (comment))   Pulse 78   Temp 98.9 °F (37.2 °C)   Resp 18   Ht 5' 8\" (1.727 m)   Wt 61.5 kg (135 lb 9.3 oz)   SpO2 96%   BMI 20.62 kg/m²        Tmax:  Temp (24hrs), Av.8 °F (36.6 °C), Min:97.2 °F (36.2 °C), Max:98.9 °F (37.2 °C)      Exam:    Not in distress  Pink conjunctivae, anicteric sclerae  Abdomen: soft, nontender  Knees are not warm and not tender    Labs:   Lab Results   Component Value Date/Time    WBC 5.2 2020 04:13 AM    Hemoglobin (POC) 15.6 2017 03:57 PM    HGB 11.0 (L) 2020 04:13 AM    Hematocrit (POC) 46 2017 03:57 PM    HCT 36.4 (L) 2020 04:13 AM    PLATELET 423 (L)  04:13 AM    MCV 87.7 2020 04:13 AM     Lab Results   Component Value Date/Time    Sodium 143 2020 04:13 AM    Potassium 4.0 2020 04:13 AM    Chloride 113 (H) 2020 04:13 AM    CO2 25 2020 04:13 AM    Anion gap 5 2020 04:13 AM    Glucose 96 2020 04:13 AM    BUN 19 2020 04:13 AM    Creatinine 1.21 2020 04:13 AM    BUN/Creatinine ratio 16 2020 04:13 AM    GFR est AA >60 2020 04:13 AM    GFR est non-AA 58 (L) 2020 04:13 AM    Calcium 8.4 (L) 2020 04:13 AM    Bilirubin, total 0.4 2020 04:13 AM    Alk.  phosphatase 78 2020 04:13 AM    Protein, total 6.3 (L) 2020 04:13 AM    Albumin 1.9 (L) 2020 04:13 AM    Globulin 4.4 (H) 2020 04:13 AM    A-G Ratio 0.4 (L) 2020 04:13 AM    ALT (SGPT) 14 2020 04:13 AM       Dexamethasone  - present  Convalescent plasma       Assessment:     Bilateral infiltrates possibly from COVID versus other  Renal insufficiency  Coronary artery disease  Peripheral vascular disease  COPD  History of prostate cancer  History of thromboembolic disease  History  of seizures    Recommendations:     Continue cefepime and Levaquin for now  Complete 10 days of dexamethasone  He is not a candidate for him to severe as his positive test was on August 4.       Kemal Yoder MD

## 2020-08-31 NOTE — PROGRESS NOTES
Transition Plan of Care  RUR 30%-High  COVID positive prior to admission resides at [facility] in [address] [place] [address]  Will require BLS on discharge  Once medically stable when discharge back to the facility  Of note patient's daughter Adelina Rodas  on . Patient does not know this and daughter Aurther Head is the only contact.     Robert Jeffers RN CRM  Ext [phone]

## 2020-08-31 NOTE — PROGRESS NOTES
[facility]  Hospitalist Group                                                                                          Hospitalist Progress Note  Ramses Landa MD  Answering service: [phone] from in house phone        Date of Service:  2020  NAME:  Bin Joy  :  1945  MRN:  [id number]      Admission Summary:     Bin Joy is a 76 y.o. male who presented via EMS from [facility] with a complaint of  vomiting/coughing up blood x1. EMS reported rectal temperature of 96.1. He was admitted approximately 2 weeks ago for PNA related to COVID and was COVID positive. Workup in ED included largely unremarkable CBC except for low platelets of 352, UA negative for UTI, chemistry w/ , glucose 448, creat 1.54, phos 2.5, negative troponin and A1c 8.4. Glucose of 448 not treated in ED. KUB of abdomen negative. CTA chest showed no PE, substantial interval progression of multifocal bilateral groundglass and patchy consolidations with \"crazy paving\" appearance, consistent with substantial worsening of COVID 19 PNA; unchanged consolidation vs mass lesion of CAMERON measuring [id number] transverse. Patient is alert, oriented only to self with hx alzheimer's dementia. Pt denies HA, dizziness, visual changes, CP, abd pain, n/v/d or dysuria. He endorses continued cough and SOB. He does not recall coughing/vomiting up blood. I called his daughter and updated her on POC. All questions answered. Discussed with Dr. Berto Kern.     Will send to intermediate care d/t likely need for [facility].  If maintains core temp greater than 97.4, can consider downgrade to tele.       Interval history / Subjective:     Poor historian, denied pain, sitting at bedside commode      Assessment & Plan:     PNA due to COVID 19  -on iv levaquin and cefepime, decadron, oxygen support   -positive 2 weeks ago, repeated COVID 19 on  positive  -s/p convalescent plasma on   -Chest CTA no evidence for pulmonary embolism, substantial interval progression of multifocal bilateral groundglass and patchy consolidations with \"crazy paving\" appearance. Findings consistent with substantial worsening in Covid 19 pneumonia.  Unchanged consolidation versus mass lesion of left upper lobe measuring 29 x 17 mm transverse.  -SpO2 95-99 % on RA  -respiratory panel negative   -HIV 1/2 non reactive  -Chlamydia panel, legionel ag, mycoplasma ag are pending  -ferritin was normal  -elevated d dimer improving  -ID is on board    Hematemesis  -x1 at NH; suspect might have been in sputum from excessive coughing  -stool occult pending  -Hgb 11.0, it was 13.4, plavix is held, monitor H/H  -hemoptysis improved,      CAMERON mass: consolidation vs lesion (measuring 29 x 17mm transverse)  -No change since previous imaging  -on ceftriaxone and azithro for now; add mucinex  -sputum culture pending  -consult pulmonary to further eval (hx CA)  -further mgt as above     RAPHAEL on CKD Stage II  -creatinine improving,   -Gentle IVF hydration overnight  -avoid nephrotoxins  -monitor renal function      Hyponatremia  -suspect d/t poor PO intake recently  -gentle IVF hydration overnight  -resolved now     Thrombocytopenia  -Plts 132 (baseline normal), improving   -Unclear reason, no PE on CTA (has hx of DVT not currently on [address])  -hold plavix and AC for now d/t concern for hemoptysis/hematemesis  -no edema to BLE, doubt current DVT  -monitor labs for now     Hx HTN  -now low/normal BP  -give albumin x3  -hold home meds     Hx COPD  -stable,   -PRN inhaler  -Monitor VS, O2     Hx CHF/Ischemic Heart Disease  -add coreg  -bumex, cozaar on hold because of   -hold plavix/asa for now d/t ?hemoptysis/hematemesis  -Echo in 2013 w/o evidence of HF  -pro-BNP normal     Hx Seizures  -Continue home vimpat, keppra  -seizure precations     T2DM  -A1c 8.4,   -had low blood sugar, ,cut back on lantus 20 units, disontinue lispro 5 units and continue sliding scale, monitor finger stick glucose      Hx Alzheimer's Dementia   -conscious and alert, calm, cooperative  -Continue supportive care      Code status: Full Code   DVT prophylaxis:SCD    Care Plan discussed with: Patient/Family, Nurse and   Anticipated Disposition: From [facility]  Anticipated Discharge: Greater than 48 hours     I called and tried to update his daughter Jacob Vallecillo [phone] and was a voice mail, will try later    I called and updated his daughter, [facility] at , answered questions on 8/30  Unfortunately I heard on 8/31 that [facility] has passed away on 8/30     Hospital Problems  Date Reviewed: 8/27/2020          Codes Class Noted POA    Acute kidney injury superimposed on chronic kidney disease ([facility] ICD-10-CM: N17.9, N18.9  ICD-9-CM: 866.00, 585.9  8/28/2020 Yes        Hyponatremia ICD-10-CM: E87.1  ICD-9-CM: 276.1  8/28/2020 Yes        Thrombocytopenia ([facility] ICD-10-CM: D69.6  ICD-9-CM: 287.5  8/28/2020 Yes        CAP (community acquired pneumonia) ICD-10-CM: J18.9  ICD-9-CM: 139  8/28/2020 Unknown        Pneumonia due to COVID-19 virus ICD-10-CM: U07.1, J12.89  ICD-9-CM: 480.8  8/27/2020 Yes        * (Principal) Hematemesis ICD-10-CM: K92.0  ICD-9-CM: 578.0  8/27/2020 Yes        Chronic obstructive pulmonary disease ([facility] ICD-10-CM: J44.9  ICD-9-CM: 164  Unknown Yes    Overview Signed 8/27/2020  8:14 PM by Jeromy Landry NP     CHRONIC BRONCHITIS             Alzheimer's disease ([facility] ICD-10-CM: G30.9, F02.80  ICD-9-CM: 331.0  Unknown Yes        Diastolic CHF, chronic ([facility] (Chronic) ICD-10-CM: I50.32  ICD-9-CM: 428.32, 428.0  3/22/2013 Yes        Essential hypertension, malignant ICD-10-CM: I10  ICD-9-CM: 401.0  3/22/2013 Yes        Seizure ([facility] ICD-10-CM: R56.9  ICD-9-CM: 780.39  12/30/2012 Yes    Overview Signed 12/30/2012  2:02 PM by Sophia Antunez MD     Hx of seizure as well             Diabetes mellitus type 2 in obese ([facility] ICD-10-CM: E11.69, E66.9  ICD-9-CM: 250.00, 278.00  7/29/2011 Yes        Chronic ischemic heart disease ICD-10-CM: I25.9  ICD-9-CM: 414.9  4/18/2011 Yes                Vital Signs:    Last 24hrs VS reviewed since prior progress note. Most recent are:  Visit Vitals  /81 (BP 1 Location: Right arm, BP Patient Position: At rest)   Pulse 69   Temp 97.7 °F (36.5 °C)   Resp 12   Ht 5' 8\" (1.727 m)   Wt 61.5 kg (135 lb 9.3 oz)   SpO2 99%   BMI 20.62 kg/m²         Intake/Output Summary (Last 24 hours) at 8/31/2020 1011  Last data filed at 8/30/2020 2246  Gross per 24 hour   Intake --   Output 200 ml   Net -200 ml        Physical Examination:             Constitutional:  No acute distress, cooperative, pleasant    ENT:  Oral mucosa moist, oropharynx benign. Resp:  Decrease bronchial breath sound bilaterally. No wheezing/rhonchi/rales. No accessory muscle use   CV:  Regular rhythm, normal rate, no murmurs, gallops, rubs    GI:  Soft, non distended, non tender. normoactive bowel sounds, no hepatosplenomegaly     Musculoskeletal:  No edema     Neurologic:  Conscious and alert, disoriented, follows simple command, motor UE 4-5/5, LE 2/5     Skin:  Good turgor, no rashes or ulcers       Data Review:    Review and/or order of clinical lab test  Review and/or order of tests in the radiology section of CPT  Review and/or order of tests in the medicine section of CPT      Labs:     Recent Labs     08/30/20 0413   WBC 5.2   HGB 11.0*   HCT 36.4*   *     Recent Labs     08/30/20 0413 08/29/20 0217    139   K 4.0 3.7   * 108   CO2 25 25   BUN 19 27*   CREA 1.21 1.26   GLU 96 165*   CA 8.4* 8.7     Recent Labs     08/30/20 0413 08/29/20 0217   ALT 14 13   AP 78 74   TBILI 0.4 0.3   TP 6.3* 6.3*   ALB 1.9* 1.9*   GLOB 4.4* 4.4*     No results for input(s): INR, PTP, APTT, INREXT, INREXT in the last 72 hours. No results for input(s): FE, TIBC, PSAT, FERR in the last 72 hours.    Lab Results   Component Value Date/Time    Folate 8.5 10/30/2009 03:30 PM      No results for input(s): PH, PCO2, PO2 in the last 72 hours. No results for input(s): CPK, CKNDX, TROIQ in the last 72 hours.     No lab exists for component: CPKMB  Lab Results   Component Value Date/Time    Cholesterol, total 168 07/29/2011 08:27 AM    HDL Cholesterol 35 (L) 07/29/2011 08:27 AM    LDL, calculated 98 07/29/2011 08:27 AM    Triglyceride 176 (H) 07/29/2011 08:27 AM    CHOL/HDL Ratio 3.3 08/03/2010 10:55 PM     Lab Results   Component Value Date/Time    Glucose (POC) 76 08/31/2020 09:15 AM    Glucose (POC) 64 (L) 08/31/2020 09:01 AM    Glucose (POC) 67 08/31/2020 08:40 AM    Glucose (POC) 265 (H) 08/30/2020 09:31 PM    Glucose (POC) 202 (H) 08/30/2020 09:07 PM     Lab Results   Component Value Date/Time    Color YELLOW/STRAW 08/27/2020 06:26 PM    Appearance CLEAR 08/27/2020 06:26 PM    Specific gravity 1.027 08/27/2020 06:26 PM    Specific gravity 1.020 08/11/2020 06:17 AM    pH (UA) 5.0 08/27/2020 06:26 PM    Protein Negative 08/27/2020 06:26 PM    Glucose 500 (A) 08/27/2020 06:26 PM    Ketone Negative 08/27/2020 06:26 PM    Bilirubin Negative 08/27/2020 06:26 PM    Urobilinogen 0.2 08/27/2020 06:26 PM    Nitrites Negative 08/27/2020 06:26 PM    Leukocyte Esterase Negative 08/27/2020 06:26 PM    Epithelial cells FEW 08/11/2020 06:17 AM    Bacteria Negative 08/11/2020 06:17 AM    WBC 0-4 08/11/2020 06:17 AM    RBC 0-5 08/11/2020 06:17 AM         Medications Reviewed:     Current Facility-Administered Medications   Medication Dose Route Frequency    dextrose 5% - 0.9% NaCl with KCl 20 mEq/L infusion  25 mL/hr IntraVENous CONTINUOUS    insulin glargine (LANTUS) injection 20 Units  20 Units SubCUTAneous QHS    0.9% sodium chloride infusion 250 mL  250 mL IntraVENous PRN    ascorbic acid (vitamin C) (VITAMIN C) tablet 500 mg  500 mg Oral BID    zinc sulfate (ZINCATE) 220 (50) mg capsule 1 Cap  1 Cap Oral DAILY    HYDROcodone-acetaminophen (NORCO) 5-325 mg per tablet 1 Tab  1 Tab Oral Q6H PRN    enoxaparin (LOVENOX) injection 30 mg  30 mg SubCUTAneous Q12H    cefepime (MAXIPIME) 2 g in 0.9% sodium chloride (MBP/ADV) 100 mL  2 g IntraVENous Q12H    levoFLOXacin (LEVAQUIN) 750 mg in D5W IVPB  750 mg IntraVENous Q48H    sodium chloride (NS) flush 5-40 mL  5-40 mL IntraVENous Q8H    sodium chloride (NS) flush 5-40 mL  5-40 mL IntraVENous PRN    acetaminophen (TYLENOL) tablet 650 mg  650 mg Oral Q6H PRN    Or    acetaminophen (TYLENOL) suppository 650 mg  650 mg Rectal Q6H PRN    polyethylene glycol (MIRALAX) packet 17 g  17 g Oral DAILY PRN    promethazine (PHENERGAN) tablet 12.5 mg  12.5 mg Oral Q6H PRN    Or    ondansetron (ZOFRAN) injection 4 mg  4 mg IntraVENous Q6H PRN    dexAMETHasone (DECADRON) tablet 6 mg  6 mg Oral DAILY WITH BREAKFAST    lacosamide (VIMPAT) tablet 200 mg  200 mg Oral BID    levETIRAcetam (KEPPRA) tablet 750 mg  750 mg Oral BID    pravastatin (PRAVACHOL) tablet 40 mg  40 mg Oral QHS    topiramate (TOPAMAX) tablet 100 mg  100 mg Oral BID    glucose chewable tablet 16 g  4 Tab Oral PRN    glucagon (GLUCAGEN) injection 1 mg  1 mg IntraMUSCular PRN    dextrose 10% infusion 0-250 mL  0-250 mL IntraVENous PRN    insulin lispro (HUMALOG) injection   SubCUTAneous AC&HS    albuterol (PROVENTIL HFA, VENTOLIN HFA, PROAIR HFA) inhaler 2 Puff  2 Puff Inhalation Q4H PRN    guaiFENesin ER (MUCINEX) tablet 1,200 mg  1,200 mg Oral Q12H     ______________________________________________________________________  EXPECTED LENGTH OF STAY: - - -  ACTUAL LENGTH OF STAY:          3                 Robin Gonzalez MD

## 2020-08-31 NOTE — WOUND CARE
Wound Care Note:     New consult placed by nurse request for sacral wound. Patient on Droplet Plus Precautions for positive COVID-19, and was not assessed by this nurse. Chart shows:  Admitted for hematemesis with a history of allergic rhinitis, alzheimer's, arthritis, prostate cancer, COPD, CKD, CHF, CAD, DM, hemorrhoids, HTN, PNA, seizures, CVA with left sided weakness, thromboembolus and anemia     WBC = 5.2 on 8/30/20  Admitted from [facility]    1. POA nurse stated upper buttocks with three small superficial wounds and no need to see patient. Possibly friction; hydrocolloid to be applied. Spoke with Dr. Andrew Alvarez, wound care orders obtained. Recommendations:    Upper buttock- Please maintain Exuderm Hydrocolloid up to one week or change as needed with soiling or rolled edges. Please use adhesive remover wipes when changing. Skin Care & Pressure Prevention:  Minimize layers of linen/pads under patient to optimize support surface. Turn/reposition approximately every 2 hours and offload heels.   Manage incontinence / promote continence   Nourishing Skin Cream to dry skin, minimize use of briefs when able    Discussed above plan with patient & Mary Henson RN    Transition of Care: Plan to follow as needed while admitted to hospital.    SOHAM Beck, RN, [facility], [facility].  office [phone]  pager [phone] or call  to page

## 2020-08-31 NOTE — PROGRESS NOTES
Problem: Diabetes Self-Management  Goal: *Disease process and treatment process  Description: Define diabetes and identify own type of diabetes; list 3 options for treating diabetes. Outcome: Progressing Towards Goal  Note: Patient receiving 5 units of Humalog ACHS as well as having ACHS blood sugars done with a sliding scale Humalog prescribed. Problem: Falls - Risk of  Goal: *Absence of Falls  Description: Document Marlene Villaseñor Fall Risk and appropriate interventions in the flowsheet. Outcome: Progressing Towards Goal  Note: Fall Risk Interventions:  Mobility Interventions: Assess mobility with egress test, Bed/chair exit alarm, OT consult for ADLs, Patient to call before getting OOB    Mentation Interventions: Adequate sleep, hydration, pain control, Bed/chair exit alarm, Evaluate medications/consider consulting pharmacy, Toileting rounds, Update white board    Medication Interventions: Bed/chair exit alarm, Evaluate medications/consider consulting pharmacy, Patient to call before getting OOB, Teach patient to arise slowly    Elimination Interventions: Bed/chair exit alarm, Call light in reach, Toileting schedule/hourly rounds, Urinal in reach              Problem: Breathing Pattern - Ineffective  Goal: *Absence of hypoxia  Outcome: Progressing Towards Goal  Note: Patient on room air. Pt maintained O2 sats in 90s . Patient had no signs of respiratory distress. Problem: Pressure Injury - Risk of  Goal: *Prevention of pressure injury  Description: Document Roderick Scale and appropriate interventions in the flowsheet.   Outcome: Progressing Towards Goal  Note: Pressure Injury Interventions:  Sensory Interventions: Assess changes in LOC, Assess need for specialty bed, Avoid rigorous massage over bony prominences, Discuss PT/OT consult with provider, Keep linens dry and wrinkle-free, Maintain/enhance activity level, Minimize linen layers, Monitor skin under medical devices, Pad between skin to skin, Pressure redistribution bed/mattress (bed type)    Moisture Interventions: Absorbent underpads, Apply protective barrier, creams and emollients, Assess need for specialty bed, Check for incontinence Q2 hours and as needed, Limit adult briefs, Minimize layers    Activity Interventions: Assess need for specialty bed, PT/OT evaluation, Pressure redistribution bed/mattress(bed type)    Mobility Interventions: Assess need for specialty bed, HOB 30 degrees or less, PT/OT evaluation, Pressure redistribution bed/mattress (bed type)    Nutrition Interventions: Document food/fluid/supplement intake, Offer support with meals,snacks and hydration    Friction and Shear Interventions: Apply protective barrier, creams and emollients, HOB 30 degrees or less, Minimize layers           Verbal shift change report given to Mariama Wilson RN (oncoming nurse) by Chantel Abreu RN (offgoing nurse). Report included the following information SBAR, Kardex, ED Summary, Intake/Output, MAR, Accordion, and Cardiac Rhythm NSR . Problem: Body Temperature -  Risk of, Imbalanced  Goal: Ability to maintain a body temperature within defined limits  Outcome: Progressing Towards Goal  Note: Patient has PRN Tylenol ordered for temperature control. Maintained normal VS and temps throughout this shift. Problem: Isolation Precautions - Risk of Spread of Infection  Goal: Prevent transmission of infectious organism to others  Outcome: Progressing Towards Goal  Note: Patient on droplet plus and airborne precautions. Gown, gloves, respirator, and gloves worn when in room. Problem: Pneumonia: Day 3  Goal: Diagnostic Test/Procedures  Outcome: Progressing Towards Goal  Goal: Medications  Outcome: Progressing Towards Goal  Note: Patient receiving Cefepime via IV.

## 2020-09-01 LAB
D DIMER PPP FEU-MCNC: 6.17 MG/L FEU (ref 0–0.65)
GLUCOSE BLD STRIP.AUTO-MCNC: 218 MG/DL (ref 65–100)
GLUCOSE BLD STRIP.AUTO-MCNC: 218 MG/DL (ref 65–100)
GLUCOSE BLD STRIP.AUTO-MCNC: 220 MG/DL (ref 65–100)
GLUCOSE BLD STRIP.AUTO-MCNC: 291 MG/DL (ref 65–100)
GLUCOSE BLD STRIP.AUTO-MCNC: 44 MG/DL (ref 65–100)
GLUCOSE BLD STRIP.AUTO-MCNC: 48 MG/DL (ref 65–100)
GLUCOSE BLD STRIP.AUTO-MCNC: 69 MG/DL (ref 65–100)
M PNEUMO IGG SER IA-ACNC: 345 U/ML (ref 0–99)
M PNEUMO IGM SER IA-ACNC: <770 U/ML (ref 0–769)
SERVICE CMNT-IMP: ABNORMAL
SERVICE CMNT-IMP: NORMAL

## 2020-09-01 PROCEDURE — 74011250636 HC RX REV CODE- 250/636: Performed by: HOSPITALIST

## 2020-09-01 PROCEDURE — 74011250637 HC RX REV CODE- 250/637: Performed by: HOSPITALIST

## 2020-09-01 PROCEDURE — 85379 FIBRIN DEGRADATION QUANT: CPT

## 2020-09-01 PROCEDURE — 74011250636 HC RX REV CODE- 250/636: Performed by: INTERNAL MEDICINE

## 2020-09-01 PROCEDURE — 74011250636 HC RX REV CODE- 250/636: Performed by: NURSE PRACTITIONER

## 2020-09-01 PROCEDURE — 74011636637 HC RX REV CODE- 636/637: Performed by: NURSE PRACTITIONER

## 2020-09-01 PROCEDURE — 82962 GLUCOSE BLOOD TEST: CPT

## 2020-09-01 PROCEDURE — 74011250637 HC RX REV CODE- 250/637: Performed by: NURSE PRACTITIONER

## 2020-09-01 PROCEDURE — 74011636637 HC RX REV CODE- 636/637: Performed by: HOSPITALIST

## 2020-09-01 PROCEDURE — 65270000029 HC RM PRIVATE

## 2020-09-01 PROCEDURE — 36415 COLL VENOUS BLD VENIPUNCTURE: CPT

## 2020-09-01 PROCEDURE — 74011000258 HC RX REV CODE- 258: Performed by: INTERNAL MEDICINE

## 2020-09-01 RX ORDER — INSULIN GLARGINE 100 [IU]/ML
10 INJECTION, SOLUTION SUBCUTANEOUS
Status: DISCONTINUED | OUTPATIENT
Start: 2020-09-01 | End: 2020-09-04 | Stop reason: HOSPADM

## 2020-09-01 RX ADMIN — INSULIN LISPRO 5 UNITS: 100 INJECTION, SOLUTION INTRAVENOUS; SUBCUTANEOUS at 13:09

## 2020-09-01 RX ADMIN — LEVOFLOXACIN 750 MG: 5 INJECTION, SOLUTION INTRAVENOUS at 22:56

## 2020-09-01 RX ADMIN — INSULIN GLARGINE 10 UNITS: 100 INJECTION, SOLUTION SUBCUTANEOUS at 21:46

## 2020-09-01 RX ADMIN — PRAVASTATIN SODIUM 40 MG: 40 TABLET ORAL at 21:44

## 2020-09-01 RX ADMIN — CARVEDILOL 6.25 MG: 6.25 TABLET, FILM COATED ORAL at 07:20

## 2020-09-01 RX ADMIN — ASPIRIN 81 MG CHEWABLE TABLET 81 MG: 81 TABLET CHEWABLE at 09:58

## 2020-09-01 RX ADMIN — Medication 10 ML: at 18:15

## 2020-09-01 RX ADMIN — LEVETIRACETAM 750 MG: 500 TABLET ORAL at 20:29

## 2020-09-01 RX ADMIN — ENOXAPARIN SODIUM 30 MG: 30 INJECTION SUBCUTANEOUS at 21:45

## 2020-09-01 RX ADMIN — CEFEPIME 2 G: 2 INJECTION, POWDER, FOR SOLUTION INTRAVENOUS at 07:21

## 2020-09-01 RX ADMIN — LACOSAMIDE 200 MG: 50 TABLET, FILM COATED ORAL at 18:12

## 2020-09-01 RX ADMIN — CARVEDILOL 6.25 MG: 6.25 TABLET, FILM COATED ORAL at 18:12

## 2020-09-01 RX ADMIN — Medication 10 ML: at 21:47

## 2020-09-01 RX ADMIN — ALLOPURINOL 100 MG: 100 TABLET ORAL at 09:58

## 2020-09-01 RX ADMIN — TOPIRAMATE 100 MG: 100 TABLET, FILM COATED ORAL at 18:22

## 2020-09-01 RX ADMIN — ZINC SULFATE 220 MG (50 MG) CAPSULE 1 CAPSULE: CAPSULE at 09:58

## 2020-09-01 RX ADMIN — DEXAMETHASONE 6 MG: 4 TABLET ORAL at 07:20

## 2020-09-01 RX ADMIN — OXYCODONE HYDROCHLORIDE AND ACETAMINOPHEN 500 MG: 500 TABLET ORAL at 13:09

## 2020-09-01 RX ADMIN — GUAIFENESIN 1200 MG: 600 TABLET, EXTENDED RELEASE ORAL at 21:44

## 2020-09-01 RX ADMIN — LEVETIRACETAM 750 MG: 500 TABLET ORAL at 09:58

## 2020-09-01 RX ADMIN — LACOSAMIDE 200 MG: 50 TABLET, FILM COATED ORAL at 09:58

## 2020-09-01 RX ADMIN — Medication 10 ML: at 07:20

## 2020-09-01 RX ADMIN — ENOXAPARIN SODIUM 30 MG: 30 INJECTION SUBCUTANEOUS at 07:19

## 2020-09-01 RX ADMIN — OXYCODONE HYDROCHLORIDE AND ACETAMINOPHEN 500 MG: 500 TABLET ORAL at 18:12

## 2020-09-01 RX ADMIN — CEFEPIME 2 G: 2 INJECTION, POWDER, FOR SOLUTION INTRAVENOUS at 21:44

## 2020-09-01 RX ADMIN — INSULIN LISPRO 3 UNITS: 100 INJECTION, SOLUTION INTRAVENOUS; SUBCUTANEOUS at 18:11

## 2020-09-01 RX ADMIN — TOPIRAMATE 100 MG: 100 TABLET, FILM COATED ORAL at 09:58

## 2020-09-01 RX ADMIN — INSULIN LISPRO 2 UNITS: 100 INJECTION, SOLUTION INTRAVENOUS; SUBCUTANEOUS at 21:44

## 2020-09-01 NOTE — ROUTINE PROCESS
Bedside shift change report given to [id number] (oncoming nurse) by Dulce Greenberg (offgoing nurse). Report included the following information SBAR, Kardex, MAR and Recent Results.

## 2020-09-01 NOTE — PROGRESS NOTES
HYPOGLYCEMIC EPISODE DOCUMENTATION    Patient with hypoglycemic episode(s) at 702am on 9/1/20(date). BG value(s) pre-treatment 48,retested 40    Was patient symptomatic? [] yes, [x] no  Patient was treated with the following rescue medications/treatments: [] D50                [x] Glucose tablets                [] Glucagon                [x] 4oz juice x2              [] 6oz reg soda                [] 8oz low fat milk  BG value post-treatment: 69,(758am) retreated with 4oz of OJ and a banana   (829am)    Once BG treated and value greater than 80mg/dl, pt was provided with the following:   [] snack  [x] meal    Name of MD notified:  The following orders were received: Bedtime Lantus was decreased to 10units (he received 20units Lantus for 2213 pm 8/31/20).

## 2020-09-01 NOTE — PROGRESS NOTES
[address] [facility] Adult  Hospitalist Group                                                                                          Hospitalist Progress Note  Roland Menjivar MD  Answering service: [phone] from in house phone        Date of Service:  2020  NAME:  Anne Jerez  :  1945  MRN:  [id number]      Admission Summary:     Anne Jerez is a 76 y.o. male who presented via EMS from [facility] with a complaint of  vomiting/coughing up blood x1. EMS reported rectal temperature of 96.1. He was admitted approximately 2 weeks ago for PNA related to COVID and was COVID positive. Workup in ED included largely unremarkable CBC except for low platelets of 286, UA negative for UTI, chemistry w/ , glucose 448, creat 1.54, phos 2.5, negative troponin and A1c 8.4. Glucose of 448 not treated in ED. KUB of abdomen negative. CTA chest showed no PE, substantial interval progression of multifocal bilateral groundglass and patchy consolidations with \"crazy paving\" appearance, consistent with substantial worsening of COVID 19 PNA; unchanged consolidation vs mass lesion of CAMERON measuring 39m36eq transverse. Patient is alert, oriented only to self with hx alzheimer's dementia. Pt denies HA, dizziness, visual changes, CP, abd pain, n/v/d or dysuria. He endorses continued cough and SOB. He does not recall coughing/vomiting up blood. I called his daughter and updated her on POC. All questions answered. Discussed with Dr. Leni Carmona.     Will send to intermediate care d/t likely need for [facility].  If maintains core temp greater than 97.4, can consider downgrade to tele.       Interval history / Subjective:     Poor historian, denied pain, on RA      Assessment & Plan:     PNA due to COVID 19  -on iv levaquin and cefepime, decadron, oxygen support   -positive 2 weeks ago, repeated COVID 19 on  positive  -s/p convalescent plasma on   -Chest CTA no evidence for pulmonary embolism, substantial interval progression of multifocal bilateral groundglass and patchy consolidations with \"crazy paving\" appearance. Findings consistent with substantial worsening in Covid 19 pneumonia.  Unchanged consolidation versus mass lesion of left upper lobe measuring 29 x 17 mm transverse.  -SpO2 94-96 % on RA  -respiratory panel negative   -HIV 1/2 non reactive  -Chlamydia panel, legionel ag, mycoplasma ag are pending  -ferritin was normal  -elevated d dimer improving  -ID is on board    Hematemesis  -x1 at NH; suspect might have been in sputum from excessive coughing  -stool occult pending  -Hgb 11.0, it was 13.4, plavix is held, monitor H/H  -hemoptysis improved,      CAMERON mass: consolidation vs lesion (measuring 29 x 17mm transverse)  -No change since previous imaging  -on cefepime and levaquin, add mucinex  -sputum culture pending  -consult pulmonary to further eval (hx CA)  -further mgt as above     RAPHAEL on CKD Stage II  -creatinine improving,   -Gentle IVF hydration overnight  -avoid nephrotoxins  -monitor renal function      Hyponatremia  -suspect d/t poor PO intake recently  -discontinue IVF  -resolved now     Thrombocytopenia  -Plts 132 (baseline normal), improving   -Unclear reason, no PE on CTA (has hx of DVT not currently on [address])  -hold plavix and AC for now d/t concern for hemoptysis/hematemesis  -no edema to BLE, doubt current DVT  -monitor labs for now     Hx HTN  -now low/normal BP  -give albumin x3  -hold home meds     Hx COPD  -stable,   -PRN inhaler  -Monitor VS, O2     Hx CHF/Ischemic Heart Disease  -continue coreg  -bumex, cozaar on hold because of   -hold plavix/asa for now d/t ?hemoptysis/hematemesis  -Echo in 2013 w/o evidence of HF  -pro-BNP normal     Hx Seizures  -Continue home vimpat, keppra  -seizure precations     T2DM  -A1c 8.4,   -had low blood sugar, ,cut back on lantus 20 units, disontinue lispro 5 units and continue sliding scale, monitor finger stick glucose      Hx Alzheimer's Dementia   -conscious and alert, calm, cooperative  -Continue supportive care      Code status: Full Code   DVT prophylaxis:SCD    Care Plan discussed with: Patient/Family, Nurse and   Anticipated Disposition: From [facility]  Anticipated Discharge: Greater than 48 hours     I called and tried to update his daughter Christyther Head [phone] and answered her questions 9/1    Daughter dead accidentally 8/30     Hospital Problems  Date Reviewed: 8/27/2020          Codes Class Noted POA    Acute kidney injury superimposed on chronic kidney disease ([facility] ICD-10-CM: N17.9, N18.9  ICD-9-CM: 866.00, 585.9  8/28/2020 Yes        Hyponatremia ICD-10-CM: E87.1  ICD-9-CM: 276.1  8/28/2020 Yes        Thrombocytopenia ([facility] ICD-10-CM: D69.6  ICD-9-CM: 287.5  8/28/2020 Yes        CAP (community acquired pneumonia) ICD-10-CM: J18.9  ICD-9-CM: 658  8/28/2020 Unknown        Pneumonia due to COVID-19 virus ICD-10-CM: U07.1, J12.89  ICD-9-CM: 480.8  8/27/2020 Yes        * (Principal) Hematemesis ICD-10-CM: K92.0  ICD-9-CM: 578.0  8/27/2020 Yes        Chronic obstructive pulmonary disease ([facility] ICD-10-CM: J44.9  ICD-9-CM: 164  Unknown Yes    Overview Signed 8/27/2020  8:14 PM by Matt Mccoy NP     CHRONIC BRONCHITIS             Alzheimer's disease ([facility] ICD-10-CM: G30.9, F02.80  ICD-9-CM: 331.0  Unknown Yes        Diastolic CHF, chronic (HCC) (Chronic) ICD-10-CM: I50.32  ICD-9-CM: 428.32, 428.0  3/22/2013 Yes        Essential hypertension, malignant ICD-10-CM: I10  ICD-9-CM: 401.0  3/22/2013 Yes        Seizure ([facility] ICD-10-CM: R56.9  ICD-9-CM: 780.39  12/30/2012 Yes    Overview Signed 12/30/2012  2:02 PM by Marcell Jeffers MD     Hx of seizure as well             Diabetes mellitus type 2 in obese [facility]) ICD-10-CM: E11.69, E66.9  ICD-9-CM: 250.00, 278.00  7/29/2011 Yes        Chronic ischemic heart disease ICD-10-CM: I25.9  ICD-9-CM: 414.9  4/18/2011 Yes                Vital Signs:    Last 24hrs VS reviewed since prior progress note. Most recent are:  Visit Vitals  /76 (BP 1 Location: Right arm, BP Patient Position: At rest)   Pulse 86   Temp 97.6 °F (36.4 °C)   Resp 20   Ht 5' 8\" (1.727 m)   Wt 64.1 kg (141 lb 4 oz)   SpO2 94%   BMI 21.48 kg/m²         Intake/Output Summary (Last 24 hours) at 9/1/2020 1617  Last data filed at 8/31/2020 1846  Gross per 24 hour   Intake --   Output 600 ml   Net -600 ml        Physical Examination:             Constitutional:  No acute distress, cooperative, pleasant    ENT:  Oral mucosa moist, oropharynx benign. Resp:  Decrease bronchial breath sound bilaterally. No wheezing/rhonchi/rales. No accessory muscle use   CV:  Regular rhythm, normal rate, no murmurs, gallops, rubs    GI:  Soft, non distended, non tender. normoactive bowel sounds, no hepatosplenomegaly     Musculoskeletal:  No edema     Neurologic:  Conscious and alert, disoriented, follows simple command, motor UE 4-5/5, LE 2/5     Skin:  Good turgor, no rashes or ulcers       Data Review:    Review and/or order of clinical lab test  Review and/or order of tests in the radiology section of CPT  Review and/or order of tests in the medicine section of CPT      Labs:     Recent Labs     08/30/20 0413   WBC 5.2   HGB 11.0*   HCT 36.4*   *     Recent Labs     08/30/20 0413      K 4.0   *   CO2 25   BUN 19   CREA 1.21   GLU 96   CA 8.4*     Recent Labs     08/30/20 0413   ALT 14   AP 78   TBILI 0.4   TP 6.3*   ALB 1.9*   GLOB 4.4*     No results for input(s): INR, PTP, APTT, INREXT, INREXT in the last 72 hours. No results for input(s): FE, TIBC, PSAT, FERR in the last 72 hours. Lab Results   Component Value Date/Time    Folate 8.5 10/30/2009 03:30 PM      No results for input(s): PH, PCO2, PO2 in the last 72 hours. No results for input(s): CPK, CKNDX, TROIQ in the last 72 hours.     No lab exists for component: CPKMB  Lab Results   Component Value Date/Time    Cholesterol, total 168 07/29/2011 08:27 AM    HDL Cholesterol 35 (L) 07/29/2011 08:27 AM    LDL, calculated 98 07/29/2011 08:27 AM    Triglyceride 176 (H) 07/29/2011 08:27 AM    CHOL/HDL Ratio 3.3 08/03/2010 10:55 PM     Lab Results   Component Value Date/Time    Glucose (POC) 291 (H) 09/01/2020 11:18 AM    Glucose (POC) 218 (H) 09/01/2020 08:29 AM    Glucose (POC) 69 09/01/2020 07:58 AM    Glucose (POC) 44 (LL) 09/01/2020 07:05 AM    Glucose (POC) 48 (LL) 09/01/2020 07:02 AM     Lab Results   Component Value Date/Time    Color YELLOW/STRAW 08/27/2020 06:26 PM    Appearance CLEAR 08/27/2020 06:26 PM    Specific gravity 1.027 08/27/2020 06:26 PM    Specific gravity 1.020 08/11/2020 06:17 AM    pH (UA) 5.0 08/27/2020 06:26 PM    Protein Negative 08/27/2020 06:26 PM    Glucose 500 (A) 08/27/2020 06:26 PM    Ketone Negative 08/27/2020 06:26 PM    Bilirubin Negative 08/27/2020 06:26 PM    Urobilinogen 0.2 08/27/2020 06:26 PM    Nitrites Negative 08/27/2020 06:26 PM    Leukocyte Esterase Negative 08/27/2020 06:26 PM    Epithelial cells FEW 08/11/2020 06:17 AM    Bacteria Negative 08/11/2020 06:17 AM    WBC 0-4 08/11/2020 06:17 AM    RBC 0-5 08/11/2020 06:17 AM         Medications Reviewed:     Current Facility-Administered Medications   Medication Dose Route Frequency    insulin glargine (LANTUS) injection 10 Units  10 Units SubCUTAneous QHS    carvediloL (COREG) tablet 6.25 mg  6.25 mg Oral BID WITH MEALS    aspirin chewable tablet 81 mg  81 mg Oral DAILY    allopurinoL (ZYLOPRIM) tablet 100 mg  100 mg Oral DAILY    0.9% sodium chloride infusion  50 mL/hr IntraVENous CONTINUOUS    0.9% sodium chloride infusion 250 mL  250 mL IntraVENous PRN    ascorbic acid (vitamin C) (VITAMIN C) tablet 500 mg  500 mg Oral BID    zinc sulfate (ZINCATE) 220 (50) mg capsule 1 Cap  1 Cap Oral DAILY    HYDROcodone-acetaminophen (NORCO) 5-325 mg per tablet 1 Tab  1 Tab Oral Q6H PRN    enoxaparin (LOVENOX) injection 30 mg  30 mg SubCUTAneous Q12H    cefepime (MAXIPIME) 2 g in 0.9% sodium chloride (MBP/ADV) 100 mL  2 g IntraVENous Q12H    levoFLOXacin (LEVAQUIN) 750 mg in D5W IVPB  750 mg IntraVENous Q48H    sodium chloride (NS) flush 5-40 mL  5-40 mL IntraVENous Q8H    sodium chloride (NS) flush 5-40 mL  5-40 mL IntraVENous PRN    acetaminophen (TYLENOL) tablet 650 mg  650 mg Oral Q6H PRN    Or    acetaminophen (TYLENOL) suppository 650 mg  650 mg Rectal Q6H PRN    polyethylene glycol (MIRALAX) packet 17 g  17 g Oral DAILY PRN    promethazine (PHENERGAN) tablet 12.5 mg  12.5 mg Oral Q6H PRN    Or    ondansetron (ZOFRAN) injection 4 mg  4 mg IntraVENous Q6H PRN    dexAMETHasone (DECADRON) tablet 6 mg  6 mg Oral DAILY WITH BREAKFAST    lacosamide (VIMPAT) tablet 200 mg  200 mg Oral BID    levETIRAcetam (KEPPRA) tablet 750 mg  750 mg Oral BID    pravastatin (PRAVACHOL) tablet 40 mg  40 mg Oral QHS    topiramate (TOPAMAX) tablet 100 mg  100 mg Oral BID    glucose chewable tablet 16 g  4 Tab Oral PRN    glucagon (GLUCAGEN) injection 1 mg  1 mg IntraMUSCular PRN    dextrose 10% infusion 0-250 mL  0-250 mL IntraVENous PRN    insulin lispro (HUMALOG) injection   SubCUTAneous AC&HS    albuterol (PROVENTIL HFA, VENTOLIN HFA, PROAIR HFA) inhaler 2 Puff  2 Puff Inhalation Q4H PRN    guaiFENesin ER (MUCINEX) tablet 1,200 mg  1,200 mg Oral Q12H     ______________________________________________________________________  EXPECTED LENGTH OF STAY: - - -  ACTUAL LENGTH OF STAY:          4                 Robin Wilkinson MD

## 2020-09-01 NOTE — PROGRESS NOTES
Problem: Diabetes Self-Management  Goal: *Disease process and treatment process  Description: Define diabetes and identify own type of diabetes; list 3 options for treating diabetes. Outcome: Progressing Towards Goal  Goal: *Incorporating nutritional management into lifestyle  Description: Describe effect of type, amount and timing of food on blood glucose; list 3 methods for planning meals. Outcome: Progressing Towards Goal  Goal: *Incorporating physical activity into lifestyle  Description: State effect of exercise on blood glucose levels. Outcome: Progressing Towards Goal  Goal: *Developing strategies to promote health/change behavior  Description: Define the ABC's of diabetes; identify appropriate screenings, schedule and personal plan for screenings. Outcome: Progressing Towards Goal  Goal: *Using medications safely  Description: State effect of diabetes medications on diabetes; name diabetes medication taking, action and side effects. Outcome: Progressing Towards Goal  Goal: *Monitoring blood glucose, interpreting and using results  Description: Identify recommended blood glucose targets  and personal targets. Outcome: Progressing Towards Goal  Goal: *Prevention, detection, treatment of acute complications  Description: List symptoms of hyper- and hypoglycemia; describe how to treat low blood sugar and actions for lowering  high blood glucose level. Outcome: Progressing Towards Goal  Goal: *Prevention, detection and treatment of chronic complications  Description: Define the natural course of diabetes and describe the relationship of blood glucose levels to long term complications of diabetes.   Outcome: Progressing Towards Goal  Goal: *Developing strategies to address psychosocial issues  Description: Describe feelings about living with diabetes; identify support needed and support network  Outcome: Progressing Towards Goal  Goal: *Insulin pump training  Outcome: Progressing Towards Goal  Goal: *Sick day guidelines  Outcome: Progressing Towards Goal  Goal: *Patient Specific Goal (EDIT GOAL, INSERT TEXT)  Outcome: Progressing Towards Goal     Problem: Patient Education: Go to Patient Education Activity  Goal: Patient/Family Education  Outcome: Progressing Towards Goal     Problem: Falls - Risk of  Goal: *Absence of Falls  Description: Document Fanny Lauren Fall Risk and appropriate interventions in the flowsheet. Outcome: Progressing Towards Goal  Note: Fall Risk Interventions:  Mobility Interventions: Bed/chair exit alarm    Mentation Interventions: Adequate sleep, hydration, pain control    Medication Interventions: Evaluate medications/consider consulting pharmacy    Elimination Interventions: Toileting schedule/hourly rounds, Patient to call for help with toileting needs, Bed/chair exit alarm              Problem: Patient Education: Go to Patient Education Activity  Goal: Patient/Family Education  Outcome: Progressing Towards Goal     Problem: Breathing Pattern - Ineffective  Goal: *Absence of hypoxia  Outcome: Progressing Towards Goal  Goal: *Use of effective breathing techniques  Outcome: Progressing Towards Goal  Goal: *PALLIATIVE CARE:  Alleviation of Dyspnea  Outcome: Progressing Towards Goal     Problem: Patient Education: Go to Patient Education Activity  Goal: Patient/Family Education  Outcome: Progressing Towards Goal     Problem: Pressure Injury - Risk of  Goal: *Prevention of pressure injury  Description: Document Roderick Scale and appropriate interventions in the flowsheet.   Outcome: Progressing Towards Goal  Note: Pressure Injury Interventions:  Sensory Interventions: Assess changes in LOC, Assess need for specialty bed, Avoid rigorous massage over bony prominences, Discuss PT/OT consult with provider, Keep linens dry and wrinkle-free, Maintain/enhance activity level, Minimize linen layers, Monitor skin under medical devices, Pad between skin to skin, Pressure redistribution bed/mattress (bed type)    Moisture Interventions: Absorbent underpads, Apply protective barrier, creams and emollients, Assess need for specialty bed, Check for incontinence Q2 hours and as needed, Limit adult briefs, Maintain skin hydration (lotion/cream), Minimize layers, Moisture barrier    Activity Interventions: Pressure redistribution bed/mattress(bed type)    Mobility Interventions: Assess need for specialty bed    Nutrition Interventions: Document food/fluid/supplement intake    Friction and Shear Interventions: Apply protective barrier, creams and emollients                Problem: Patient Education: Go to Patient Education Activity  Goal: Patient/Family Education  Outcome: Progressing Towards Goal     Problem: Airway Clearance - Ineffective  Goal: Achieve or maintain patent airway  Outcome: Progressing Towards Goal     Problem: Gas Exchange - Impaired  Goal: Absence of hypoxia  Outcome: Progressing Towards Goal  Goal: Promote optimal lung function  Outcome: Progressing Towards Goal     Problem: Breathing Pattern - Ineffective  Goal: Ability to achieve and maintain a regular respiratory rate  Outcome: Progressing Towards Goal     Problem:  Body Temperature -  Risk of, Imbalanced  Goal: Ability to maintain a body temperature within defined limits  Outcome: Progressing Towards Goal  Goal: Will regain or maintain usual level of consciousness  Outcome: Progressing Towards Goal  Goal: Complications related to the disease process, condition or treatment will be avoided or minimized  Outcome: Progressing Towards Goal     Problem: Isolation Precautions - Risk of Spread of Infection  Goal: Prevent transmission of infectious organism to others  Outcome: Progressing Towards Goal     Problem: Nutrition Deficits  Goal: Optimize nutrtional status  Outcome: Progressing Towards Goal     Problem: Risk for Fluid Volume Deficit  Goal: Maintain normal heart rhythm  Outcome: Progressing Towards Goal  Goal: Maintain absence of muscle cramping  Outcome: Progressing Towards Goal  Goal: Maintain normal serum potassium, sodium, calcium, phosphorus, and pH  Outcome: Progressing Towards Goal     Problem: Loneliness or Risk for Loneliness  Goal: Demonstrate positive use of time alone when socialization is not possible  Outcome: Progressing Towards Goal     Problem: Fatigue  Goal: Verbalize increase energy and improved vitality  Outcome: Progressing Towards Goal     Problem: Patient Education: Go to Patient Education Activity  Goal: Patient/Family Education  Outcome: Progressing Towards Goal     Problem: Patient Education: Go to Patient Education Activity  Goal: Patient/Family Education  Outcome: Progressing Towards Goal     Problem: Pneumonia: Day 1  Goal: Off Pathway (Use only if patient is Off Pathway)  Outcome: Progressing Towards Goal  Goal: Activity/Safety  Outcome: Progressing Towards Goal  Goal: Consults, if ordered  Outcome: Progressing Towards Goal  Goal: Diagnostic Test/Procedures  Outcome: Progressing Towards Goal  Goal: Nutrition/Diet  Outcome: Progressing Towards Goal  Goal: Medications  Outcome: Progressing Towards Goal  Goal: Respiratory  Outcome: Progressing Towards Goal  Goal: Treatments/Interventions/Procedures  Outcome: Progressing Towards Goal  Goal: Psychosocial  Outcome: Progressing Towards Goal  Goal: *Oxygen saturation within defined limits  Outcome: Progressing Towards Goal  Goal: *Influenza vaccine administered (October-March)  Outcome: Progressing Towards Goal  Goal: *Pneumoccocal vaccine administered  Outcome: Progressing Towards Goal  Goal: *Hemodynamically stable  Outcome: Progressing Towards Goal  Goal: *Demonstrates progressive activity  Outcome: Progressing Towards Goal  Goal: *Tolerating diet  Outcome: Progressing Towards Goal     Problem: Pneumonia: Day 2  Goal: Off Pathway (Use only if patient is Off Pathway)  Outcome: Progressing Towards Goal  Goal: Activity/Safety  Outcome: Progressing Towards Goal  Goal: Consults, if ordered  Outcome: Progressing Towards Goal  Goal: Diagnostic Test/Procedures  Outcome: Progressing Towards Goal  Goal: Nutrition/Diet  Outcome: Progressing Towards Goal  Goal: Discharge Planning  Outcome: Progressing Towards Goal  Goal: Medications  Outcome: Progressing Towards Goal  Goal: Respiratory  Outcome: Progressing Towards Goal  Goal: Treatments/Interventions/Procedures  Outcome: Progressing Towards Goal  Goal: Psychosocial  Outcome: Progressing Towards Goal  Goal: *Oxygen saturation within defined limits  Outcome: Progressing Towards Goal  Goal: *Hemodynamically stable  Outcome: Progressing Towards Goal  Goal: *Demonstrates progressive activity  Outcome: Progressing Towards Goal  Goal: *Tolerating diet  Outcome: Progressing Towards Goal  Goal: *Optimal pain control at patient's stated goal  Outcome: Progressing Towards Goal     Problem: Pneumonia: Day 3  Goal: Off Pathway (Use only if patient is Off Pathway)  Outcome: Progressing Towards Goal  Goal: Activity/Safety  Outcome: Progressing Towards Goal  Goal: Consults, if ordered  Outcome: Progressing Towards Goal  Goal: Diagnostic Test/Procedures  Outcome: Progressing Towards Goal  Goal: Nutrition/Diet  Outcome: Progressing Towards Goal  Goal: Discharge Planning  Outcome: Progressing Towards Goal  Goal: Medications  Outcome: Progressing Towards Goal  Goal: Respiratory  Outcome: Progressing Towards Goal  Goal: Treatments/Interventions/Procedures  Outcome: Progressing Towards Goal  Goal: Psychosocial  Outcome: Progressing Towards Goal  Goal: *Oxygen saturation within defined limits  Outcome: Progressing Towards Goal  Goal: *Hemodynamically stable  Outcome: Progressing Towards Goal  Goal: *Demonstrates progressive activity  Outcome: Progressing Towards Goal  Goal: *Tolerating diet  Outcome: Progressing Towards Goal  Goal: *Describes available resources and support systems  Outcome: Progressing Towards Goal  Goal: *Optimal pain control at patient's stated goal  Outcome: Progressing Towards Goal     Problem: Pneumonia: Day 4  Goal: Off Pathway (Use only if patient is Off Pathway)  Outcome: Progressing Towards Goal  Goal: Activity/Safety  Outcome: Progressing Towards Goal  Goal: Nutrition/Diet  Outcome: Progressing Towards Goal  Goal: Discharge Planning  Outcome: Progressing Towards Goal  Goal: Medications  Outcome: Progressing Towards Goal  Goal: Respiratory  Outcome: Progressing Towards Goal  Goal: Treatments/Interventions/Procedures  Outcome: Progressing Towards Goal  Goal: Psychosocial  Outcome: Progressing Towards Goal     Problem: Pneumonia: Discharge Outcomes  Goal: *Demonstrates progressive activity  Outcome: Progressing Towards Goal  Goal: *Describes follow-up/return visits to physicians  Outcome: Progressing Towards Goal  Goal: *Tolerating diet  Outcome: Progressing Towards Goal  Goal: *Verbalizes name, dosage, time, side effects, and number of days to continue medications  Outcome: Progressing Towards Goal  Goal: *Influenza immunization  Outcome: Progressing Towards Goal  Goal: *Pneumococcal immunization  Outcome: Progressing Towards Goal  Goal: *Respiratory status at baseline  Outcome: Progressing Towards Goal  Goal: *Vital signs within defined limits  Outcome: Progressing Towards Goal  Goal: *Describes available resources and support systems  Outcome: Progressing Towards Goal  Goal: *Optimal pain control at patient's stated goal  Outcome: Progressing Towards Goal

## 2020-09-01 NOTE — PROGRESS NOTES
Bedside shift change report given to Matty Burroughs RN (oncoming nurse) by Osiris Bonilla RN (offgoing nurse). Report included the following information SBAR, MAR and Recent Results.

## 2020-09-01 NOTE — PROGRESS NOTES
HYPOGLYCEMIC EPISODE DOCUMENTATION    Patient with hypoglycemic episode(s) at 0840 (time) on 08/31/2020(date). BG value(s) pre-treatment 79    Was patient symptomatic? [x] yes, [] no  Patient was treated with the following rescue medications/treatments: [] D50                [] Glucose tablets                [] Glucagon                [x] 8oz juice                [] 6oz reg soda                [] 8oz low fat milk  BG value post-treatment: 64  Once BG treated and value greater than 80mg/dl, pt was provided with the following:  [] snack  [x] meal  Name of MD notified:Yas  The following orders were received: Lantus and meal time coverage D/C        HYPOGLYCEMIC EPISODE DOCUMENTATION    Patient with hypoglycemic episode(s) at 0901 (time) on 08/31/2020(date). BG value(s) pre-treatment 59    Was patient symptomatic?  [x] yes, [] no  Patient was treated with the following rescue medications/treatments: [] D50                [] Glucose tablets                [] Glucagon                [x] 8oz juice                [] 6oz reg soda                [] 8oz low fat milk  BG value post-treatment: 76  Once BG treated and value greater than 80mg/dl, pt was provided with the following:  [] snack  [x] meal  Name of MD notified: Sapphire Lehman  The following orders were received: Lantus and meal time coverage D/C

## 2020-09-02 LAB
D DIMER PPP FEU-MCNC: 5.76 MG/L FEU (ref 0–0.65)
GLUCOSE BLD STRIP.AUTO-MCNC: 185 MG/DL (ref 65–100)
GLUCOSE BLD STRIP.AUTO-MCNC: 221 MG/DL (ref 65–100)
GLUCOSE BLD STRIP.AUTO-MCNC: 250 MG/DL (ref 65–100)
GLUCOSE BLD STRIP.AUTO-MCNC: 87 MG/DL (ref 65–100)
SERVICE CMNT-IMP: ABNORMAL
SERVICE CMNT-IMP: NORMAL

## 2020-09-02 PROCEDURE — 74011250636 HC RX REV CODE- 250/636: Performed by: NURSE PRACTITIONER

## 2020-09-02 PROCEDURE — 74011636637 HC RX REV CODE- 636/637: Performed by: NURSE PRACTITIONER

## 2020-09-02 PROCEDURE — 94760 N-INVAS EAR/PLS OXIMETRY 1: CPT

## 2020-09-02 PROCEDURE — 74011636637 HC RX REV CODE- 636/637: Performed by: HOSPITALIST

## 2020-09-02 PROCEDURE — 82962 GLUCOSE BLOOD TEST: CPT

## 2020-09-02 PROCEDURE — 36415 COLL VENOUS BLD VENIPUNCTURE: CPT

## 2020-09-02 PROCEDURE — 85379 FIBRIN DEGRADATION QUANT: CPT

## 2020-09-02 PROCEDURE — 74011250636 HC RX REV CODE- 250/636: Performed by: INTERNAL MEDICINE

## 2020-09-02 PROCEDURE — 74011250637 HC RX REV CODE- 250/637: Performed by: HOSPITALIST

## 2020-09-02 PROCEDURE — 74011250637 HC RX REV CODE- 250/637: Performed by: NURSE PRACTITIONER

## 2020-09-02 PROCEDURE — 65270000029 HC RM PRIVATE

## 2020-09-02 PROCEDURE — 74011000258 HC RX REV CODE- 258: Performed by: INTERNAL MEDICINE

## 2020-09-02 PROCEDURE — 36600 WITHDRAWAL OF ARTERIAL BLOOD: CPT

## 2020-09-02 PROCEDURE — 74011250636 HC RX REV CODE- 250/636: Performed by: HOSPITALIST

## 2020-09-02 RX ADMIN — Medication 10 ML: at 21:56

## 2020-09-02 RX ADMIN — DEXAMETHASONE 6 MG: 4 TABLET ORAL at 06:49

## 2020-09-02 RX ADMIN — CARVEDILOL 6.25 MG: 6.25 TABLET, FILM COATED ORAL at 17:31

## 2020-09-02 RX ADMIN — INSULIN LISPRO 3 UNITS: 100 INJECTION, SOLUTION INTRAVENOUS; SUBCUTANEOUS at 17:30

## 2020-09-02 RX ADMIN — LACOSAMIDE 200 MG: 50 TABLET, FILM COATED ORAL at 10:54

## 2020-09-02 RX ADMIN — ENOXAPARIN SODIUM 30 MG: 30 INJECTION SUBCUTANEOUS at 19:33

## 2020-09-02 RX ADMIN — LEVETIRACETAM 750 MG: 500 TABLET ORAL at 17:34

## 2020-09-02 RX ADMIN — GUAIFENESIN 1200 MG: 600 TABLET, EXTENDED RELEASE ORAL at 08:48

## 2020-09-02 RX ADMIN — TOPIRAMATE 100 MG: 100 TABLET, FILM COATED ORAL at 17:31

## 2020-09-02 RX ADMIN — ASPIRIN 81 MG CHEWABLE TABLET 81 MG: 81 TABLET CHEWABLE at 08:47

## 2020-09-02 RX ADMIN — INSULIN LISPRO 5 UNITS: 100 INJECTION, SOLUTION INTRAVENOUS; SUBCUTANEOUS at 11:48

## 2020-09-02 RX ADMIN — CARVEDILOL 6.25 MG: 6.25 TABLET, FILM COATED ORAL at 06:49

## 2020-09-02 RX ADMIN — TOPIRAMATE 100 MG: 100 TABLET, FILM COATED ORAL at 08:48

## 2020-09-02 RX ADMIN — LEVETIRACETAM 750 MG: 500 TABLET ORAL at 08:47

## 2020-09-02 RX ADMIN — OXYCODONE HYDROCHLORIDE AND ACETAMINOPHEN 500 MG: 500 TABLET ORAL at 08:47

## 2020-09-02 RX ADMIN — PRAVASTATIN SODIUM 40 MG: 40 TABLET ORAL at 21:55

## 2020-09-02 RX ADMIN — INSULIN GLARGINE 10 UNITS: 100 INJECTION, SOLUTION SUBCUTANEOUS at 21:54

## 2020-09-02 RX ADMIN — Medication 10 ML: at 06:50

## 2020-09-02 RX ADMIN — OXYCODONE HYDROCHLORIDE AND ACETAMINOPHEN 500 MG: 500 TABLET ORAL at 17:31

## 2020-09-02 RX ADMIN — ENOXAPARIN SODIUM 30 MG: 30 INJECTION SUBCUTANEOUS at 06:48

## 2020-09-02 RX ADMIN — LACOSAMIDE 200 MG: 50 TABLET, FILM COATED ORAL at 17:31

## 2020-09-02 RX ADMIN — CEFEPIME 2 G: 2 INJECTION, POWDER, FOR SOLUTION INTRAVENOUS at 19:33

## 2020-09-02 RX ADMIN — ZINC SULFATE 220 MG (50 MG) CAPSULE 1 CAPSULE: CAPSULE at 08:47

## 2020-09-02 RX ADMIN — GUAIFENESIN 1200 MG: 600 TABLET, EXTENDED RELEASE ORAL at 21:55

## 2020-09-02 RX ADMIN — Medication 10 ML: at 13:59

## 2020-09-02 RX ADMIN — CEFEPIME 2 G: 2 INJECTION, POWDER, FOR SOLUTION INTRAVENOUS at 06:47

## 2020-09-02 RX ADMIN — ALLOPURINOL 100 MG: 100 TABLET ORAL at 08:48

## 2020-09-02 NOTE — PROGRESS NOTES
Problem: Falls - Risk of  Goal: *Absence of Falls  Description: Document Maddie Kitchen Fall Risk and appropriate interventions in the flowsheet.   Outcome: Progressing Towards Goal  Note: Fall Risk Interventions:  Mobility Interventions: Bed/chair exit alarm, Strengthening exercises (ROM-active/passive), Patient to call before getting OOB    Mentation Interventions: Bed/chair exit alarm, Adequate sleep, hydration, pain control, Evaluate medications/consider consulting pharmacy    Medication Interventions: Bed/chair exit alarm, Evaluate medications/consider consulting pharmacy, Patient to call before getting OOB    Elimination Interventions: Elevated toilet seat, Call light in reach, Patient to call for help with toileting needs              Problem: Airway Clearance - Ineffective  Goal: Achieve or maintain patent airway  Outcome: Progressing Towards Goal

## 2020-09-02 NOTE — ROUTINE PROCESS
Bedside shift change report given to amaury borja rn (oncoming nurse) by peace rn (offgoing nurse). Report included the following information SBAR and Kardex.

## 2020-09-02 NOTE — PROGRESS NOTES
Bedside shift change report given to [facility] (oncoming nurse) by Marisela Jeffers (offgoing nurse). Report included the following information SBAR, Kardex, Intake/Output and MAR.

## 2020-09-02 NOTE — PROGRESS NOTES
[address] [facility]                                                                                          Hospitalist Progress Note  Talita Guillen MD  Answering service: [phone] from in house phone        Date of Service:  2020  NAME:  Savannah Muro  :  1945  MRN:  [id number]      Admission Summary:     Savannah Muro is a 76 y.o. male who presented via EMS from [facility] with a complaint of  vomiting/coughing up blood x1. EMS reported rectal temperature of 96.1. He was admitted approximately 2 weeks ago for PNA related to COVID and was COVID positive. Workup in ED included largely unremarkable CBC except for low platelets of 089, UA negative for UTI, chemistry w/ , glucose 448, creat 1.54, phos 2.5, negative troponin and A1c 8.4. Glucose of 448 not treated in ED. KUB of abdomen negative. CTA chest showed no PE, substantial interval progression of multifocal bilateral groundglass and patchy consolidations with \"crazy paving\" appearance, consistent with substantial worsening of COVID 19 PNA; unchanged consolidation vs mass lesion of ACMERON measuring [id number] transverse. Patient is alert, oriented only to self with hx alzheimer's dementia. Pt denies HA, dizziness, visual changes, CP, abd pain, n/v/d or dysuria. He endorses continued cough and SOB. He does not recall coughing/vomiting up blood. I called his daughter and updated her on POC. All questions answered. Discussed with Dr. José Antonio Baker.     Will send to intermediate care d/t likely need for [facility].  If maintains core temp greater than 97.4, can consider downgrade to tele.       Interval history / Subjective:     Poor historian, denied pain, on RA      Assessment & Plan:     PNA due to COVID 19  -on iv levaquin and cefepime, decadron,   -off oxygen support, SpO2 95-97% on RA  -positive 2 weeks ago, repeated COVID 19 on  positive  -s/p convalescent plasma on   -Chest CTA no evidence for pulmonary embolism, substantial interval progression of multifocal bilateral groundglass and patchy consolidations with \"crazy paving\" appearance. Findings consistent with substantial worsening in Covid 19 pneumonia.  Unchanged consolidation versus mass lesion of left upper lobe measuring 29 x 17 mm transverse.  -SpO2 94-96 % on RA  -respiratory panel negative   -HIV 1/2 non reactive  -Chlamydia panel, legionel ag, mycoplasma ag are pending  -ferritin was normal  -elevated d dimer improving  -ID is on board    Hematemesis  -x1 at NH; suspect might have been in sputum from excessive coughing  -stool occult pending  -Hgb 11.0, it was 13.4, plavix is held, monitor H/H  -hemoptysis improved,      CAMERON mass: consolidation vs lesion (measuring 29 x 17mm transverse)  -No change since previous imaging  -on cefepime and levaquin, add mucinex  -sputum culture pending  -consult pulmonary to further eval (hx CA)  -further mgt as above     RAPHAEL on CKD Stage II  -creatinine improving,   -Off IVF   -avoid nephrotoxins  -monitor renal function      Hyponatremia  -suspect d/t poor PO intake recently  -discontinued IVF  -resolved now     Thrombocytopenia  -Plts 132 (baseline normal), improving   -Unclear reason, no PE on CTA (has hx of DVT not currently on [address])  -hold plavix and AC for now d/t concern for hemoptysis/hematemesis  -no edema to BLE, doubt current DVT  -monitor labs for now     Hx HTN  -now low/normal BP  -give albumin x3  -hold home meds     Hx COPD  -stable,   -PRN inhaler  -Monitor VS, O2     Hx CHF/Ischemic Heart Disease  -continue coreg  -bumex, cozaar on hold because of   -hold plavix/asa for now d/t ?hemoptysis/hematemesis  -Echo in 2013 w/o evidence of HF  -pro-BNP normal     Hx Seizures  -Continue home vimpat, keppra  -seizure precations     T2DM  -A1c 8.4,   -finger stick glucose is fluctuating, adjused lantus 10 units, disontinue lispro 5 units and continue sliding scale, monitor finger stick glucose      Hx Alzheimer's Dementia   -conscious and alert, calm, cooperative  -Continue supportive care      Code status: Full Code   DVT prophylaxis:SCD    Care Plan discussed with: Patient/Family, Nurse and   Anticipated Disposition: From [facility]  Anticipated Discharge: Greater than 48 hours     I called and tried to update his daughter Alona Purvis [phone] and answered her questions 9/1, tried to contact her and was a voice mail on 9/2    Daughter dead accidentally 8/30     Hospital Problems  Date Reviewed: 8/27/2020          Codes Class Noted POA    Acute kidney injury superimposed on chronic kidney disease ([facility] ICD-10-CM: N17.9, N18.9  ICD-9-CM: 866.00, 585.9  8/28/2020 Yes        Hyponatremia ICD-10-CM: E87.1  ICD-9-CM: 276.1  8/28/2020 Yes        Thrombocytopenia ([facility] ICD-10-CM: D69.6  ICD-9-CM: 287.5  8/28/2020 Yes        CAP (community acquired pneumonia) ICD-10-CM: J18.9  ICD-9-CM: 640  8/28/2020 Unknown        Pneumonia due to COVID-19 virus ICD-10-CM: U07.1, J12.89  ICD-9-CM: 480.8  8/27/2020 Yes        * (Principal) Hematemesis ICD-10-CM: K92.0  ICD-9-CM: 578.0  8/27/2020 Yes        Chronic obstructive pulmonary disease ([facility] ICD-10-CM: J44.9  ICD-9-CM: 899  Unknown Yes    Overview Signed 8/27/2020  8:14 PM by Diana Salazar NP     CHRONIC BRONCHITIS             Alzheimer's disease ([facility] ICD-10-CM: G30.9, F02.80  ICD-9-CM: 331.0  Unknown Yes        Diastolic CHF, chronic ([facility] (Chronic) ICD-10-CM: I50.32  ICD-9-CM: 428.32, 428.0  3/22/2013 Yes        Essential hypertension, malignant ICD-10-CM: I10  ICD-9-CM: 401.0  3/22/2013 Yes        Seizure ([facility] ICD-10-CM: R56.9  ICD-9-CM: 780.39  12/30/2012 Yes    Overview Signed 12/30/2012  2:02 PM by Ruth Pope MD     Hx of seizure as well             Diabetes mellitus type 2 in [facility]) ICD-10-CM: E11.69, E66.9  ICD-9-CM: 250.00, 278.00  7/29/2011 Yes        Chronic ischemic heart disease ICD-10-CM: I25.9  ICD-9-CM: 414.9 4/18/2011 Yes                Vital Signs:    Last 24hrs VS reviewed since prior progress note. Most recent are:  Visit Vitals  /82 (BP 1 Location: Right arm, BP Patient Position: At rest)   Pulse 66   Temp 97.8 °F (36.6 °C)   Resp 20   Ht 5' 8\" (1.727 m)   Wt 63.9 kg (140 lb 14 oz)   SpO2 95%   BMI 21.42 kg/m²         Intake/Output Summary (Last 24 hours) at 9/2/2020 1234  Last data filed at 9/2/2020 0210  Gross per 24 hour   Intake 480 ml   Output 400 ml   Net 80 ml        Physical Examination:             Constitutional:  No acute distress, cooperative, pleasant    ENT:  Oral mucosa moist, oropharynx benign. Resp:  Decrease bronchial breath sound bilaterally. No wheezing/rhonchi/rales. No accessory muscle use   CV:  Regular rhythm, normal rate, no murmurs, gallops, rubs    GI:  Soft, non distended, non tender. normoactive bowel sounds, no hepatosplenomegaly     Musculoskeletal:  No edema     Neurologic:  Conscious and alert, disoriented, follows simple command, motor UE 4-5/5, LE 2/5     Skin:  Good turgor, no rashes or ulcers       Data Review:    Review and/or order of clinical lab test  Review and/or order of tests in the radiology section of CPT  Review and/or order of tests in the medicine section of CPT      Labs:     No results for input(s): WBC, HGB, HCT, PLT, HGBEXT, HCTEXT, PLTEXT, HGBEXT, HCTEXT, PLTEXT in the last 72 hours. No results for input(s): NA, K, CL, CO2, BUN, CREA, GLU, CA, MG, PHOS, URICA in the last 72 hours. No results for input(s): ALT, AP, TBIL, TBILI, TP, ALB, GLOB, GGT, AML, LPSE in the last 72 hours. No lab exists for component: SGOT, GPT, AMYP, HLPSE  No results for input(s): INR, PTP, APTT, INREXT, INREXT in the last 72 hours. No results for input(s): FE, TIBC, PSAT, FERR in the last 72 hours. Lab Results   Component Value Date/Time    Folate 8.5 10/30/2009 03:30 PM      No results for input(s): PH, PCO2, PO2 in the last 72 hours.   No results for input(s): CPK, CKNDX, TROIQ in the last 72 hours.     No lab exists for component: CPKMB  Lab Results   Component Value Date/Time    Cholesterol, total 168 07/29/2011 08:27 AM    HDL Cholesterol 35 (L) 07/29/2011 08:27 AM    LDL, calculated 98 07/29/2011 08:27 AM    Triglyceride 176 (H) 07/29/2011 08:27 AM    CHOL/HDL Ratio 3.3 08/03/2010 10:55 PM     Lab Results   Component Value Date/Time    Glucose (POC) 250 (H) 09/02/2020 11:34 AM    Glucose (POC) 87 09/02/2020 05:55 AM    Glucose (POC) 218 (H) 09/01/2020 09:31 PM    Glucose (POC) 220 (H) 09/01/2020 04:44 PM    Glucose (POC) 291 (H) 09/01/2020 11:18 AM     Lab Results   Component Value Date/Time    Color YELLOW/STRAW 08/27/2020 06:26 PM    Appearance CLEAR 08/27/2020 06:26 PM    Specific gravity 1.027 08/27/2020 06:26 PM    Specific gravity 1.020 08/11/2020 06:17 AM    pH (UA) 5.0 08/27/2020 06:26 PM    Protein Negative 08/27/2020 06:26 PM    Glucose 500 (A) 08/27/2020 06:26 PM    Ketone Negative 08/27/2020 06:26 PM    Bilirubin Negative 08/27/2020 06:26 PM    Urobilinogen 0.2 08/27/2020 06:26 PM    Nitrites Negative 08/27/2020 06:26 PM    Leukocyte Esterase Negative 08/27/2020 06:26 PM    Epithelial cells FEW 08/11/2020 06:17 AM    Bacteria Negative 08/11/2020 06:17 AM    WBC 0-4 08/11/2020 06:17 AM    RBC 0-5 08/11/2020 06:17 AM         Medications Reviewed:     Current Facility-Administered Medications   Medication Dose Route Frequency    insulin glargine (LANTUS) injection 10 Units  10 Units SubCUTAneous QHS    carvediloL (COREG) tablet 6.25 mg  6.25 mg Oral BID WITH MEALS    aspirin chewable tablet 81 mg  81 mg Oral DAILY    allopurinoL (ZYLOPRIM) tablet 100 mg  100 mg Oral DAILY    0.9% sodium chloride infusion 250 mL  250 mL IntraVENous PRN    ascorbic acid (vitamin C) (VITAMIN C) tablet 500 mg  500 mg Oral BID    zinc sulfate (ZINCATE) 220 (50) mg capsule 1 Cap  1 Cap Oral DAILY    HYDROcodone-acetaminophen (NORCO) 5-325 mg per tablet 1 Tab  1 Tab Oral Q6H PRN  enoxaparin (LOVENOX) injection 30 mg  30 mg SubCUTAneous Q12H    cefepime (MAXIPIME) 2 g in 0.9% sodium chloride (MBP/ADV) 100 mL  2 g IntraVENous Q12H    levoFLOXacin (LEVAQUIN) 750 mg in D5W IVPB  750 mg IntraVENous Q48H    sodium chloride (NS) flush 5-40 mL  5-40 mL IntraVENous Q8H    sodium chloride (NS) flush 5-40 mL  5-40 mL IntraVENous PRN    acetaminophen (TYLENOL) tablet 650 mg  650 mg Oral Q6H PRN    Or    acetaminophen (TYLENOL) suppository 650 mg  650 mg Rectal Q6H PRN    polyethylene glycol (MIRALAX) packet 17 g  17 g Oral DAILY PRN    promethazine (PHENERGAN) tablet 12.5 mg  12.5 mg Oral Q6H PRN    Or    ondansetron (ZOFRAN) injection 4 mg  4 mg IntraVENous Q6H PRN    dexAMETHasone (DECADRON) tablet 6 mg  6 mg Oral DAILY WITH BREAKFAST    lacosamide (VIMPAT) tablet 200 mg  200 mg Oral BID    levETIRAcetam (KEPPRA) tablet 750 mg  750 mg Oral BID    pravastatin (PRAVACHOL) tablet 40 mg  40 mg Oral QHS    topiramate (TOPAMAX) tablet 100 mg  100 mg Oral BID    glucose chewable tablet 16 g  4 Tab Oral PRN    glucagon (GLUCAGEN) injection 1 mg  1 mg IntraMUSCular PRN    dextrose 10% infusion 0-250 mL  0-250 mL IntraVENous PRN    insulin lispro (HUMALOG) injection   SubCUTAneous AC&HS    albuterol (PROVENTIL HFA, VENTOLIN HFA, PROAIR HFA) inhaler 2 Puff  2 Puff Inhalation Q4H PRN    guaiFENesin ER (MUCINEX) tablet 1,200 mg  1,200 mg Oral Q12H     ______________________________________________________________________  EXPECTED LENGTH OF STAY: 5d 12h  ACTUAL LENGTH OF STAY:          5                 Shirley Sheikh MD

## 2020-09-03 LAB
ALBUMIN SERPL-MCNC: 2.1 G/DL (ref 3.5–5)
ALBUMIN/GLOB SERPL: 0.4 {RATIO} (ref 1.1–2.2)
ALP SERPL-CCNC: 78 U/L (ref 45–117)
ALT SERPL-CCNC: 36 U/L (ref 12–78)
ANION GAP SERPL CALC-SCNC: 6 MMOL/L (ref 5–15)
AST SERPL-CCNC: 29 U/L (ref 15–37)
BILIRUB SERPL-MCNC: 0.3 MG/DL (ref 0.2–1)
BUN SERPL-MCNC: 13 MG/DL (ref 6–20)
BUN/CREAT SERPL: 9 (ref 12–20)
CALCIUM SERPL-MCNC: 9.3 MG/DL (ref 8.5–10.1)
CHLORIDE SERPL-SCNC: 112 MMOL/L (ref 97–108)
CO2 SERPL-SCNC: 21 MMOL/L (ref 21–32)
CREAT SERPL-MCNC: 1.37 MG/DL (ref 0.7–1.3)
D DIMER PPP FEU-MCNC: 4.87 MG/L FEU (ref 0–0.65)
ERYTHROCYTE [DISTWIDTH] IN BLOOD BY AUTOMATED COUNT: 15.7 % (ref 11.5–14.5)
GLOBULIN SER CALC-MCNC: 4.9 G/DL (ref 2–4)
GLUCOSE BLD STRIP.AUTO-MCNC: 133 MG/DL (ref 65–100)
GLUCOSE BLD STRIP.AUTO-MCNC: 191 MG/DL (ref 65–100)
GLUCOSE BLD STRIP.AUTO-MCNC: 212 MG/DL (ref 65–100)
GLUCOSE BLD STRIP.AUTO-MCNC: 224 MG/DL (ref 65–100)
GLUCOSE SERPL-MCNC: 143 MG/DL (ref 65–100)
HCT VFR BLD AUTO: 39 % (ref 36.6–50.3)
HGB BLD-MCNC: 12.1 G/DL (ref 12.1–17)
L PNEUMO IGG TITR SER IF: ABNORMAL {TITER}
MCH RBC QN AUTO: 26.9 PG (ref 26–34)
MCHC RBC AUTO-ENTMCNC: 31 G/DL (ref 30–36.5)
MCV RBC AUTO: 86.9 FL (ref 80–99)
NRBC # BLD: 0 K/UL (ref 0–0.01)
NRBC BLD-RTO: 0 PER 100 WBC
PLATELET # BLD AUTO: 199 K/UL (ref 150–400)
PMV BLD AUTO: 11.2 FL (ref 8.9–12.9)
POTASSIUM SERPL-SCNC: 4 MMOL/L (ref 3.5–5.1)
PROT SERPL-MCNC: 7 G/DL (ref 6.4–8.2)
RBC # BLD AUTO: 4.49 M/UL (ref 4.1–5.7)
SERVICE CMNT-IMP: ABNORMAL
SODIUM SERPL-SCNC: 139 MMOL/L (ref 136–145)
WBC # BLD AUTO: 6 K/UL (ref 4.1–11.1)

## 2020-09-03 PROCEDURE — 80053 COMPREHEN METABOLIC PANEL: CPT

## 2020-09-03 PROCEDURE — 74011250636 HC RX REV CODE- 250/636: Performed by: INTERNAL MEDICINE

## 2020-09-03 PROCEDURE — 74011250636 HC RX REV CODE- 250/636: Performed by: HOSPITALIST

## 2020-09-03 PROCEDURE — 74011250637 HC RX REV CODE- 250/637: Performed by: NURSE PRACTITIONER

## 2020-09-03 PROCEDURE — 74011000258 HC RX REV CODE- 258: Performed by: INTERNAL MEDICINE

## 2020-09-03 PROCEDURE — 65270000029 HC RM PRIVATE

## 2020-09-03 PROCEDURE — 74011250637 HC RX REV CODE- 250/637: Performed by: HOSPITALIST

## 2020-09-03 PROCEDURE — 36415 COLL VENOUS BLD VENIPUNCTURE: CPT

## 2020-09-03 PROCEDURE — 74011636637 HC RX REV CODE- 636/637: Performed by: NURSE PRACTITIONER

## 2020-09-03 PROCEDURE — 85027 COMPLETE CBC AUTOMATED: CPT

## 2020-09-03 PROCEDURE — 74011636637 HC RX REV CODE- 636/637: Performed by: HOSPITALIST

## 2020-09-03 PROCEDURE — 82962 GLUCOSE BLOOD TEST: CPT

## 2020-09-03 PROCEDURE — 85379 FIBRIN DEGRADATION QUANT: CPT

## 2020-09-03 PROCEDURE — 74011250636 HC RX REV CODE- 250/636: Performed by: NURSE PRACTITIONER

## 2020-09-03 RX ADMIN — ASPIRIN 81 MG CHEWABLE TABLET 81 MG: 81 TABLET CHEWABLE at 09:16

## 2020-09-03 RX ADMIN — CEFEPIME 2 G: 2 INJECTION, POWDER, FOR SOLUTION INTRAVENOUS at 07:18

## 2020-09-03 RX ADMIN — LACOSAMIDE 200 MG: 50 TABLET, FILM COATED ORAL at 09:16

## 2020-09-03 RX ADMIN — HYDROCODONE BITARTRATE AND ACETAMINOPHEN 1 TABLET: 5; 325 TABLET ORAL at 18:51

## 2020-09-03 RX ADMIN — OXYCODONE HYDROCHLORIDE AND ACETAMINOPHEN 500 MG: 500 TABLET ORAL at 18:50

## 2020-09-03 RX ADMIN — ENOXAPARIN SODIUM 30 MG: 30 INJECTION SUBCUTANEOUS at 07:07

## 2020-09-03 RX ADMIN — ENOXAPARIN SODIUM 30 MG: 30 INJECTION SUBCUTANEOUS at 20:40

## 2020-09-03 RX ADMIN — INSULIN LISPRO 2 UNITS: 100 INJECTION, SOLUTION INTRAVENOUS; SUBCUTANEOUS at 22:08

## 2020-09-03 RX ADMIN — LACOSAMIDE 200 MG: 50 TABLET, FILM COATED ORAL at 18:50

## 2020-09-03 RX ADMIN — OXYCODONE HYDROCHLORIDE AND ACETAMINOPHEN 500 MG: 500 TABLET ORAL at 09:17

## 2020-09-03 RX ADMIN — ALLOPURINOL 100 MG: 100 TABLET ORAL at 09:17

## 2020-09-03 RX ADMIN — INSULIN GLARGINE 10 UNITS: 100 INJECTION, SOLUTION SUBCUTANEOUS at 22:08

## 2020-09-03 RX ADMIN — LEVOFLOXACIN 750 MG: 5 INJECTION, SOLUTION INTRAVENOUS at 22:09

## 2020-09-03 RX ADMIN — GUAIFENESIN 1200 MG: 600 TABLET, EXTENDED RELEASE ORAL at 09:17

## 2020-09-03 RX ADMIN — GUAIFENESIN 1200 MG: 600 TABLET, EXTENDED RELEASE ORAL at 20:40

## 2020-09-03 RX ADMIN — TOPIRAMATE 100 MG: 100 TABLET, FILM COATED ORAL at 09:17

## 2020-09-03 RX ADMIN — INSULIN LISPRO 3 UNITS: 100 INJECTION, SOLUTION INTRAVENOUS; SUBCUTANEOUS at 18:50

## 2020-09-03 RX ADMIN — DEXAMETHASONE 6 MG: 4 TABLET ORAL at 07:08

## 2020-09-03 RX ADMIN — ZINC SULFATE 220 MG (50 MG) CAPSULE 1 CAPSULE: CAPSULE at 09:16

## 2020-09-03 RX ADMIN — CARVEDILOL 6.25 MG: 6.25 TABLET, FILM COATED ORAL at 07:08

## 2020-09-03 RX ADMIN — INSULIN LISPRO 2 UNITS: 100 INJECTION, SOLUTION INTRAVENOUS; SUBCUTANEOUS at 11:44

## 2020-09-03 RX ADMIN — LEVETIRACETAM 750 MG: 500 TABLET ORAL at 09:17

## 2020-09-03 RX ADMIN — CARVEDILOL 6.25 MG: 6.25 TABLET, FILM COATED ORAL at 18:50

## 2020-09-03 RX ADMIN — TOPIRAMATE 100 MG: 100 TABLET, FILM COATED ORAL at 18:50

## 2020-09-03 RX ADMIN — PRAVASTATIN SODIUM 40 MG: 40 TABLET ORAL at 22:09

## 2020-09-03 RX ADMIN — POLYETHYLENE GLYCOL 3350 17 G: 17 POWDER, FOR SOLUTION ORAL at 18:51

## 2020-09-03 RX ADMIN — LEVETIRACETAM 750 MG: 500 TABLET ORAL at 18:50

## 2020-09-03 RX ADMIN — Medication 10 ML: at 07:08

## 2020-09-03 RX ADMIN — CEFEPIME 2 G: 2 INJECTION, POWDER, FOR SOLUTION INTRAVENOUS at 20:40

## 2020-09-03 NOTE — PROGRESS NOTES
Comprehensive Nutrition Assessment    Type and Reason for Visit: Initial, RD nutrition re-screen/LOS    Nutrition Recommendations/Plan:    1. Assist/encourage PO intake with all meals   - use Glucerna to give meds   - please continue to document PO intake in I/O flowsheet    Nutrition Assessment:    Pt admitted for [facility] from [facility]. PMHx: gout, prostate CA, dementia, CAD, CKD2, HTN, stroke. COVID +. Hypoglycemia on 9/1 with lantus reduced from 20 to 10 units per day. Now BG trending in the 200's. Possible d/c today? Facility records show pt on mechanical soft diet prior to admit with Glucerna TID. Diet adjusted to baseline with Glucerna (660kcal, 30g protein). Fair appetite noted. Likely partially d/t underlying dementia. Wt loss of 7% x 1.5 months noted which is severe. Unable to complete a physical assessment d/t isolation status. Spoke with RN to used Glucerna to give meds as able.      Malnutrition Assessment:  Malnutrition Status:  Severe malnutrition    Context:  Acute illness     Findings of the 6 clinical characteristics of malnutrition:   Energy Intake:  7 - 50% or less of est energy requirements for 5 or more days  Weight Loss:  7.00 - Greater than 7.5% over 3 months     Body Fat Loss:  Unable to assess,     Muscle Mass Loss:  Unable to assess,    Fluid Accumulation:  Unable to assess,     Strength:  Not performed     Nutritionally Significant Medications: allopurinol, vit C, coreg, cefepime, decadron, lantus (10 units), SSI, keppa, levaquin, zinc sulfate;    Estimated Daily Nutrient Needs:  Energy (kcal):  1410-1404(MSJ x1.3-1.4)  Protein (g):  72-84g (1.2-1.4g/kg)       Fluid (ml/day):  1800 - 1ml/kcal    Nutrition Related Findings:  No edema, BM 8/31  Wounds:  None       Current Nutrition Therapies:   Diet: consistent CHO, 2000kcal, cardiac  Supplements: none  Meal intake:   Patient Vitals for the past 168 hrs:   % Diet Eaten   09/01/20 1811 50 %   09/01/20 1325 50 %   09/01/20 0956 50 %       Anthropometric Measures:  · Height:  5' 8\" (172.7 cm)  · Current Body Wt:  60.2 kg (132 lb 11.5 oz)   · Admission Body Wt:  132 lb 11.5 oz(zeroed bedscale)    · Usual Body Wt:  148 lb     · Ideal Body Wt:   :  86.2 %   Wt Readings :   09/03/20 62.2 kg (137 lb 1.6 oz)   08/13/20 67.3 kg (148 lb 6.4 oz)   11/20/17 75 kg (165 lb 4.8 oz)     Nutrition Diagnosis:   · Inadequate oral intake related to cognitive or neurological impairment(poor appetite) as evidenced by (<50% meals consumed, baseline dementia)    · Severe malnutrition related to inadequate protein-energy intake as evidenced by weight loss greater than or equal to 5% in 1 month, intake 26-50%    Nutrition Interventions:   Food and/or Nutrient Delivery: Modify current diet, Start oral nutrition supplement  Nutrition Education and Counseling: No recommendations at this time  Coordination of Nutrition Care: Continued inpatient monitoring    Goals:  Consumption of at least 75% meals and 1-2 supplements/day in 5-7 days; wt maintenance       Nutrition Monitoring and Evaluation:   Behavioral-Environmental Outcomes:    Food/Nutrient Intake Outcomes: Food and nutrient intake, Supplement intake  Physical Signs/Symptoms Outcomes: Weight, Biochemical data(BG)    Discharge Planning:    Continue current diet, Continue oral nutrition supplement     Jessica Owusu, RD  [address] , Pager #[phone] or via [employer]

## 2020-09-03 NOTE — PROGRESS NOTES
Patient resting in bed, on room air, no complaints of pain, moderate appetite. Problem: Diabetes Self-Management  Goal: *Disease process and treatment process  Description: Define diabetes and identify own type of diabetes; list 3 options for treating diabetes. Outcome: Progressing Towards Goal  Goal: *Incorporating nutritional management into lifestyle  Description: Describe effect of type, amount and timing of food on blood glucose; list 3 methods for planning meals. Outcome: Progressing Towards Goal  Goal: *Incorporating physical activity into lifestyle  Description: State effect of exercise on blood glucose levels. Outcome: Progressing Towards Goal  Goal: *Developing strategies to promote health/change behavior  Description: Define the ABC's of diabetes; identify appropriate screenings, schedule and personal plan for screenings. Outcome: Progressing Towards Goal  Goal: *Using medications safely  Description: State effect of diabetes medications on diabetes; name diabetes medication taking, action and side effects. Outcome: Progressing Towards Goal  Goal: *Monitoring blood glucose, interpreting and using results  Description: Identify recommended blood glucose targets  and personal targets. Outcome: Progressing Towards Goal  Goal: *Prevention, detection, treatment of acute complications  Description: List symptoms of hyper- and hypoglycemia; describe how to treat low blood sugar and actions for lowering  high blood glucose level. Outcome: Progressing Towards Goal  Goal: *Prevention, detection and treatment of chronic complications  Description: Define the natural course of diabetes and describe the relationship of blood glucose levels to long term complications of diabetes.   Outcome: Progressing Towards Goal  Goal: *Developing strategies to address psychosocial issues  Description: Describe feelings about living with diabetes; identify support needed and support network  Outcome: Progressing Towards Goal  Goal: *Insulin pump training  Outcome: Progressing Towards Goal  Goal: *Sick day guidelines  Outcome: Progressing Towards Goal  Goal: *Patient Specific Goal (EDIT GOAL, INSERT TEXT)  Outcome: Progressing Towards Goal     Problem: Patient Education: Go to Patient Education Activity  Goal: Patient/Family Education  Outcome: Progressing Towards Goal     Problem: Falls - Risk of  Goal: *Absence of Falls  Description: Document Curtis Fall Risk and appropriate interventions in the flowsheet. Outcome: Progressing Towards Goal  Note: Fall Risk Interventions:  Mobility Interventions: Communicate number of staff needed for ambulation/transfer    Mentation Interventions: Adequate sleep, hydration, pain control, Bed/chair exit alarm    Medication Interventions: Evaluate medications/consider consulting pharmacy, Patient to call before getting OOB, Teach patient to arise slowly    Elimination Interventions: Toileting schedule/hourly rounds              Problem: Patient Education: Go to Patient Education Activity  Goal: Patient/Family Education  Outcome: Progressing Towards Goal     Problem: Breathing Pattern - Ineffective  Goal: *Absence of hypoxia  Outcome: Progressing Towards Goal  Goal: *Use of effective breathing techniques  Outcome: Progressing Towards Goal  Goal: *PALLIATIVE CARE:  Alleviation of Dyspnea  Outcome: Progressing Towards Goal     Problem: Patient Education: Go to Patient Education Activity  Goal: Patient/Family Education  Outcome: Progressing Towards Goal     Problem: Pressure Injury - Risk of  Goal: *Prevention of pressure injury  Description: Document Roderick Scale and appropriate interventions in the flowsheet.   Outcome: Progressing Towards Goal  Note: Pressure Injury Interventions:  Sensory Interventions: Pressure redistribution bed/mattress (bed type), Float heels    Moisture Interventions: Absorbent underpads, Apply protective barrier, creams and emollients, Maintain skin hydration (lotion/cream)    Activity Interventions: Increase time out of bed, Pressure redistribution bed/mattress(bed type)    Mobility Interventions: Pressure redistribution bed/mattress (bed type), HOB 30 degrees or less    Nutrition Interventions: Document food/fluid/supplement intake    Friction and Shear Interventions: Lift sheet, Minimize layers                Problem: Patient Education: Go to Patient Education Activity  Goal: Patient/Family Education  Outcome: Progressing Towards Goal     Problem: Airway Clearance - Ineffective  Goal: Achieve or maintain patent airway  Outcome: Progressing Towards Goal     Problem: Gas Exchange - Impaired  Goal: Absence of hypoxia  Outcome: Progressing Towards Goal  Goal: Promote optimal lung function  Outcome: Progressing Towards Goal     Problem: Breathing Pattern - Ineffective  Goal: Ability to achieve and maintain a regular respiratory rate  Outcome: Progressing Towards Goal     Problem:  Body Temperature -  Risk of, Imbalanced  Goal: Ability to maintain a body temperature within defined limits  Outcome: Progressing Towards Goal  Goal: Will regain or maintain usual level of consciousness  Outcome: Progressing Towards Goal  Goal: Complications related to the disease process, condition or treatment will be avoided or minimized  Outcome: Progressing Towards Goal     Problem: Isolation Precautions - Risk of Spread of Infection  Goal: Prevent transmission of infectious organism to others  Outcome: Progressing Towards Goal     Problem: Nutrition Deficits  Goal: Optimize nutrtional status  Outcome: Progressing Towards Goal     Problem: Risk for Fluid Volume Deficit  Goal: Maintain normal heart rhythm  Outcome: Progressing Towards Goal  Goal: Maintain absence of muscle cramping  Outcome: Progressing Towards Goal  Goal: Maintain normal serum potassium, sodium, calcium, phosphorus, and pH  Outcome: Progressing Towards Goal     Problem: Loneliness or Risk for Loneliness  Goal: Demonstrate positive use of time alone when socialization is not possible  Outcome: Progressing Towards Goal     Problem: Fatigue  Goal: Verbalize increase energy and improved vitality  Outcome: Progressing Towards Goal     Problem: Patient Education: Go to Patient Education Activity  Goal: Patient/Family Education  Outcome: Progressing Towards Goal     Problem: Patient Education: Go to Patient Education Activity  Goal: Patient/Family Education  Outcome: Progressing Towards Goal     Problem: Pneumonia: Day 1  Goal: Off Pathway (Use only if patient is Off Pathway)  Outcome: Progressing Towards Goal  Goal: Activity/Safety  Outcome: Progressing Towards Goal  Goal: Consults, if ordered  Outcome: Progressing Towards Goal  Goal: Diagnostic Test/Procedures  Outcome: Progressing Towards Goal  Goal: Nutrition/Diet  Outcome: Progressing Towards Goal  Goal: Medications  Outcome: Progressing Towards Goal  Goal: Respiratory  Outcome: Progressing Towards Goal  Goal: Treatments/Interventions/Procedures  Outcome: Progressing Towards Goal  Goal: Psychosocial  Outcome: Progressing Towards Goal  Goal: *Oxygen saturation within defined limits  Outcome: Progressing Towards Goal  Goal: *Influenza vaccine administered (October-March)  Outcome: Progressing Towards Goal  Goal: *Pneumoccocal vaccine administered  Outcome: Progressing Towards Goal  Goal: *Hemodynamically stable  Outcome: Progressing Towards Goal  Goal: *Demonstrates progressive activity  Outcome: Progressing Towards Goal  Goal: *Tolerating diet  Outcome: Progressing Towards Goal     Problem: Pneumonia: Day 2  Goal: Off Pathway (Use only if patient is Off Pathway)  Outcome: Progressing Towards Goal  Goal: Activity/Safety  Outcome: Progressing Towards Goal  Goal: Consults, if ordered  Outcome: Progressing Towards Goal  Goal: Diagnostic Test/Procedures  Outcome: Progressing Towards Goal  Goal: Nutrition/Diet  Outcome: Progressing Towards Goal  Goal: Discharge Planning  Outcome: Progressing Towards Goal  Goal: Medications  Outcome: Progressing Towards Goal  Goal: Respiratory  Outcome: Progressing Towards Goal  Goal: Treatments/Interventions/Procedures  Outcome: Progressing Towards Goal  Goal: Psychosocial  Outcome: Progressing Towards Goal  Goal: *Oxygen saturation within defined limits  Outcome: Progressing Towards Goal  Goal: *Hemodynamically stable  Outcome: Progressing Towards Goal  Goal: *Demonstrates progressive activity  Outcome: Progressing Towards Goal  Goal: *Tolerating diet  Outcome: Progressing Towards Goal  Goal: *Optimal pain control at patient's stated goal  Outcome: Progressing Towards Goal     Problem: Pneumonia: Day 3  Goal: Off Pathway (Use only if patient is Off Pathway)  Outcome: Progressing Towards Goal  Goal: Activity/Safety  Outcome: Progressing Towards Goal  Goal: Consults, if ordered  Outcome: Progressing Towards Goal  Goal: Diagnostic Test/Procedures  Outcome: Progressing Towards Goal  Goal: Nutrition/Diet  Outcome: Progressing Towards Goal  Goal: Discharge Planning  Outcome: Progressing Towards Goal  Goal: Medications  Outcome: Progressing Towards Goal  Goal: Respiratory  Outcome: Progressing Towards Goal  Goal: Treatments/Interventions/Procedures  Outcome: Progressing Towards Goal  Goal: Psychosocial  Outcome: Progressing Towards Goal  Goal: *Oxygen saturation within defined limits  Outcome: Progressing Towards Goal  Goal: *Hemodynamically stable  Outcome: Progressing Towards Goal  Goal: *Demonstrates progressive activity  Outcome: Progressing Towards Goal  Goal: *Tolerating diet  Outcome: Progressing Towards Goal  Goal: *Describes available resources and support systems  Outcome: Progressing Towards Goal  Goal: *Optimal pain control at patient's stated goal  Outcome: Progressing Towards Goal     Problem: Pneumonia: Day 4  Goal: Off Pathway (Use only if patient is Off Pathway)  Outcome: Progressing Towards Goal  Goal: Activity/Safety  Outcome: Progressing Towards Goal  Goal: Nutrition/Diet  Outcome: Progressing Towards Goal  Goal: Discharge Planning  Outcome: Progressing Towards Goal  Goal: Medications  Outcome: Progressing Towards Goal  Goal: Respiratory  Outcome: Progressing Towards Goal  Goal: Treatments/Interventions/Procedures  Outcome: Progressing Towards Goal  Goal: Psychosocial  Outcome: Progressing Towards Goal     Problem: Pneumonia: Discharge Outcomes  Goal: *Demonstrates progressive activity  Outcome: Progressing Towards Goal  Goal: *Describes follow-up/return visits to physicians  Outcome: Progressing Towards Goal  Goal: *Tolerating diet  Outcome: Progressing Towards Goal  Goal: *Verbalizes name, dosage, time, side effects, and number of days to continue medications  Outcome: Progressing Towards Goal  Goal: *Influenza immunization  Outcome: Progressing Towards Goal  Goal: *Pneumococcal immunization  Outcome: Progressing Towards Goal  Goal: *Respiratory status at baseline  Outcome: Progressing Towards Goal  Goal: *Vital signs within defined limits  Outcome: Progressing Towards Goal  Goal: *Describes available resources and support systems  Outcome: Progressing Towards Goal  Goal: *Optimal pain control at patient's stated goal  Outcome: Progressing Towards Goal

## 2020-09-03 NOTE — PROGRESS NOTES
Bedside shift change report given to [address] (oncoming nurse) by Giselle Wing (offgoing nurse). Report included the following information SBAR, Kardex, MAR and Recent Results.

## 2020-09-03 NOTE — PROGRESS NOTES
ID Progress Note  2020    Subjective:     Alert, orient to self only. Denies any discomfort during the visit  Review of Systems:            Symptom Y/N Comments   Symptom Y/N Comments   Fever/Chills n      Chest Pain  n      Poor Appetite       Edema        Cough n      Abdominal Pain        Sputum       Joint Pain        SOB/WEEKS n      Pruritis/Rash        Nausea/vomit n      Tolerating PT/OT        Diarrhea n      Tolerating Diet        Constipation n      Other           Could NOT obtain due to:       Objective:     Vitals:   Visit Vitals  /73 (BP 1 Location: Right arm, BP Patient Position: At rest)   Pulse 72   Temp 98.1 °F (36.7 °C)   Resp 18   Ht 5' 8\" (1.727 m)   Wt 63.9 kg (140 lb 14 oz)   SpO2 96%   BMI 21.42 kg/m²        Tmax:  Temp (24hrs), Av.2 °F (36.8 °C), Min:97.8 °F (36.6 °C), Max:98.8 °F (37.1 °C)      PHYSICAL EXAM:  General: Ill appearing. Alert, cooperative, no acute distress    EENT:  EOMI. Anicteric sclerae. MMM  Resp:  Clear in apex with decreased breath sounds at bases, no wheezing or rales. No accessory muscle use  CV:  Regular  rhythm,  No edema  GI:  Soft, Non distended, Non tender. +Bowel sounds  Neurologic:  Alert and oriented X 1, normal speech,   Psych:   Poor insight. Not anxious nor agitated  Skin:  No rashes.   No jaundice    Labs:   Lab Results   Component Value Date/Time    WBC 5.2 2020 04:13 AM    Hemoglobin (POC) 15.6 2017 03:57 PM    HGB 11.0 (L) 2020 04:13 AM    Hematocrit (POC) 46 2017 03:57 PM    HCT 36.4 (L) 2020 04:13 AM    PLATELET 381 (L)  04:13 AM    MCV 87.7 2020 04:13 AM     Lab Results   Component Value Date/Time    Sodium 143 2020 04:13 AM    Potassium 4.0 2020 04:13 AM    Chloride 113 (H) 2020 04:13 AM    CO2 25 2020 04:13 AM    Anion gap 5 2020 04:13 AM    Glucose 96 2020 04:13 AM    BUN 19 2020 04:13 AM    Creatinine 1.21 2020 04:13 AM    BUN/Creatinine ratio 16 08/30/2020 04:13 AM    GFR est AA >60 08/30/2020 04:13 AM    GFR est non-AA 58 (L) 08/30/2020 04:13 AM    Calcium 8.4 (L) 08/30/2020 04:13 AM    Bilirubin, total 0.4 08/30/2020 04:13 AM    Alk.  phosphatase 78 08/30/2020 04:13 AM    Protein, total 6.3 (L) 08/30/2020 04:13 AM    Albumin 1.9 (L) 08/30/2020 04:13 AM    Globulin 4.4 (H) 08/30/2020 04:13 AM    A-G Ratio 0.4 (L) 08/30/2020 04:13 AM    ALT (SGPT) 14 08/30/2020 04:13 AM     KUB (8/27): no bowel obstruction  CXR (8/11): right upper lobe PNA  Blood cx (8/10) no growth  Resp cx (8/28) no growth      Assessment and Plan   COVID 19 (was dx on 8/4) & PNA  - RA O2 sat 96%    No leukocytosis, afebrile    Complete total 7 days of ABX; has been receiving IV cefepime & Levo, last dose 9/3    Complete total 10 days of Decadron; last dose 9/5    Continue with Vitamin C and Zinc     Symptomatic tmt for COVID 19                  Nikkie Berman NP    .[email or web address]

## 2020-09-03 NOTE — PROGRESS NOTES
Bedside shift change report given to Cory Schirmer (oncoming nurse) by Fatmata Figueroa (offgoing nurse). Report included the following information SBAR, Kardex, MAR and Recent Results.

## 2020-09-03 NOTE — PROGRESS NOTES
Problem: Falls - Risk of  Goal: *Absence of Falls  Description: Document Haroldo Molina Fall Risk and appropriate interventions in the flowsheet.   Outcome: Progressing Towards Goal  Note: Fall Risk Interventions:  Mobility Interventions: Bed/chair exit alarm    Mentation Interventions: Bed/chair exit alarm    Medication Interventions: Bed/chair exit alarm    Elimination Interventions: Bed/chair exit alarm              Problem: Patient Education: Go to Patient Education Activity  Goal: Patient/Family Education  Outcome: Progressing Towards Goal

## 2020-09-03 NOTE — PROGRESS NOTES
ID Progress Note  9/3/2020    Subjective:     Alert, orient to self only. Asked me where he was every other minute during visit. Denies any discomfort during the visit  Review of Systems:            Symptom Y/N Comments   Symptom Y/N Comments   Fever/Chills n      Chest Pain  n      Poor Appetite       Edema        Cough n      Abdominal Pain        Sputum       Joint Pain        SOB/WEEKS n      Pruritis/Rash        Nausea/vomit n      Tolerating PT/OT        Diarrhea n      Tolerating Diet        Constipation n      Other           Could NOT obtain due to:       Objective:     Vitals:   Visit Vitals  /84 (BP 1 Location: Right arm, BP Patient Position: At rest)   Pulse 72   Temp 98.7 °F (37.1 °C)   Resp 18   Ht 5' 8\" (1.727 m)   Wt 62.2 kg (137 lb 1.6 oz)   SpO2 93%   BMI 20.85 kg/m²        Tmax:  Temp (24hrs), Av.5 °F (36.9 °C), Min:97.8 °F (36.6 °C), Max:99.5 °F (37.5 °C)      PHYSICAL EXAM:  General: Ill appearing. Alert, cooperative, no acute distress    EENT:  EOMI. Anicteric sclerae. MMM  Resp:  Clear in apex with decreased breath sounds at bases, no wheezing or rales. No accessory muscle use  CV:  Regular  rhythm,  No edema  GI:  Soft, Non distended, Non tender. +Bowel sounds  Neurologic:  Alert and oriented X 1, normal speech,   Psych:   Poor insight. Not anxious nor agitated  Skin:  No rashes.   No jaundice    Labs:   Lab Results   Component Value Date/Time    WBC 6.0 2020 02:35 AM    Hemoglobin (POC) 15.6 2017 03:57 PM    HGB 12.1 2020 02:35 AM    Hematocrit (POC) 46 2017 03:57 PM    HCT 39.0 2020 02:35 AM    PLATELET 183  02:35 AM    MCV 86.9 2020 02:35 AM     Lab Results   Component Value Date/Time    Sodium 139 2020 02:35 AM    Potassium 4.0 2020 02:35 AM    Chloride 112 (H) 2020 02:35 AM    CO2 21 2020 02:35 AM    Anion gap 6 2020 02:35 AM    Glucose 143 (H) 2020 02:35 AM    BUN 13 2020 02:35 AM Creatinine 1.37 (H) 09/03/2020 02:35 AM    BUN/Creatinine ratio 9 (L) 09/03/2020 02:35 AM    GFR est AA >60 09/03/2020 02:35 AM    GFR est non-AA 51 (L) 09/03/2020 02:35 AM    Calcium 9.3 09/03/2020 02:35 AM    Bilirubin, total 0.3 09/03/2020 02:35 AM    Alk. phosphatase 78 09/03/2020 02:35 AM    Protein, total 7.0 09/03/2020 02:35 AM    Albumin 2.1 (L) 09/03/2020 02:35 AM    Globulin 4.9 (H) 09/03/2020 02:35 AM    A-G Ratio 0.4 (L) 09/03/2020 02:35 AM    ALT (SGPT) 36 09/03/2020 02:35 AM     KUB (8/27): no bowel obstruction  CXR (8/11): right upper lobe PNA  Blood cx (8/10) no growth  Resp cx (8/28) no growth      Assessment and Plan   COVID 19 (was dx on 8/4) & PNA  - RA O2 sat 96%    No leukocytosis, afebrile    Complete total 7 days of ABX; has been receiving IV cefepime & Levo, last dose 9/3    Complete total 10 days of Decadron; last dose 9/5    Continue with Vitamin C and Zinc     Symptomatic tmt for COVID 19    ID team signing off. Please contact us with any questions or concerns.             Nikkie Berman NP    .[email or web address]

## 2020-09-03 NOTE — PROGRESS NOTES
[address] [facility] Adult  Hospitalist Group                                                                                          Hospitalist Progress Note  Janey Acevedo MD  Answering service: [phone] from in house phone        Date of Service:  9/3/2020  NAME:  Alex Villalta  :  1945  MRN:  [id number]      Admission Summary:     Alex Villalta is a 76 y.o. male who presented via EMS from [facility] with a complaint of  vomiting/coughing up blood x1. EMS reported rectal temperature of 96.1. He was admitted approximately 2 weeks ago for PNA related to COVID and was COVID positive. Workup in ED included largely unremarkable CBC except for low platelets of 669, UA negative for UTI, chemistry w/ , glucose 448, creat 1.54, phos 2.5, negative troponin and A1c 8.4. Glucose of 448 not treated in ED. KUB of abdomen negative. CTA chest showed no PE, substantial interval progression of multifocal bilateral groundglass and patchy consolidations with \"crazy paving\" appearance, consistent with substantial worsening of COVID 19 PNA; unchanged consolidation vs mass lesion of CAMERON measuring [id number] transverse. Patient is alert, oriented only to self with hx alzheimer's dementia. Pt denies HA, dizziness, visual changes, CP, abd pain, n/v/d or dysuria. He endorses continued cough and SOB. He does not recall coughing/vomiting up blood. I called his daughter and updated her on POC. All questions answered. Discussed with Dr. Davide Hicks.     Will send to intermediate care d/t likely need for [facility].  If maintains core temp greater than 97.4, can consider downgrade to tele.       Interval history / Subjective:   F/u Pneumonia  On Room air     Assessment & Plan:     PNA due to COVID 19  -on iv levaquin and cefepime (--9/3), decadron (--),   -off oxygen support, SpO2 95-97% on RA  -positive 2 weeks ago, repeated COVID 19 on  positive  -s/p convalescent plasma on   -Chest CTA no evidence for pulmonary embolism, substantial interval progression of multifocal bilateral groundglass and patchy consolidations with \"crazy paving\" appearance. Findings consistent with substantial worsening in Covid 19 pneumonia. Unchanged consolidation versus mass lesion of left upper lobe measuring 29 x 17 mm transverse.  -SpO2 94-96 % on RA  -respiratory panel negative   -HIV 1/2 non reactive  -Chlamydia panel, legionel ag, mycoplasma positive  -ferritin was normal  -elevated d dimer improving  -ID is on board, will receive last dose of antibiotic    Hematemesis  -x1 at NH; suspect might have been in sputum from excessive coughing  -stool occult pending  -Hgb 11.0, it was 13.4, plavix is held, monitor H/H  -hemoptysis improved,      CAMERON mass: consolidation vs lesion (measuring 29 x 17mm transverse)  -No change since previous imaging  -on cefepime and levaquin, add mucinex  -sputum culture pending  -consult pulmonary to further eval (hx CA)  -further mgt as above     RAPHAEL on CKD Stage II  -creatinine stable   -Off IVF   -avoid nephrotoxins  -monitor renal function      Hyponatremia  -suspect d/t poor PO intake recently  -discontinued IVF  -resolved now     Thrombocytopenia  -Plts 132 (baseline normal), improving   -Unclear reason, no PE on CTA (has hx of DVT not currently on [facility])  -hold plavix and AC for now d/t concern for hemoptysis/hematemesis  -no edema to BLE, doubt current DVT  -monitor labs for now     Hx HTN  -now low/normal BP  -give albumin x3  -hold home meds     Hx COPD  -stable,   -PRN inhaler  -Monitor VS, O2     Hx CHF/Ischemic Heart Disease  -continue coreg  -bumex, cozaar on hold because of   -hold plavix/asa for now d/t ?hemoptysis/hematemesis.  Will leave it up to PMD to restart  -Echo in 2013 w/o evidence of HF  -pro-BNP normal     Hx Seizures  -Continue home vimpat, keppra  -seizure precations     T2DM  -A1c 8.4,   -finger stick glucose is fluctuating, adjused lantus 10 units, disontinue lispro 5 units and continue sliding scale, monitor finger stick glucose      Hx Alzheimer's Dementia   -conscious and alert, calm, cooperative  -Continue supportive care      Code status: Full Code   DVT prophylaxis:SCD    Care Plan discussed with: Patient/Family, Nurse and   Anticipated Disposition: From [facility]  Anticipated Discharge: discharge today    I called and updated daughter Doretha Speaker [phone]         ZSAHHRMQ Problems  Date Reviewed: 8/27/2020          Codes Class Noted POA    Acute kidney injury superimposed on chronic kidney disease ([facility] ICD-10-CM: N17.9, N18.9  ICD-9-CM: 866.00, 585.9  8/28/2020 Yes        Hyponatremia ICD-10-CM: E87.1  ICD-9-CM: 276.1  8/28/2020 Yes        Thrombocytopenia ([facility] ICD-10-CM: D69.6  ICD-9-CM: 287.5  8/28/2020 Yes        CAP (community acquired pneumonia) ICD-10-CM: J18.9  ICD-9-CM: 152  8/28/2020 Unknown        Pneumonia due to COVID-19 virus ICD-10-CM: U07.1, J12.89  ICD-9-CM: 480.8  8/27/2020 Yes        * (Principal) Hematemesis ICD-10-CM: K92.0  ICD-9-CM: 578.0  8/27/2020 Yes        Chronic obstructive pulmonary disease ([facility].) ICD-10-CM: J44.9  ICD-9-CM: 993  Unknown Yes    Overview Signed 8/27/2020  8:14 PM by Vel Leonard NP     CHRONIC BRONCHITIS             Alzheimer's disease ([facility].) ICD-10-CM: G30.9, F02.80  ICD-9-CM: 331.0  Unknown Yes        Diastolic CHF, chronic (HCC) (Chronic) ICD-10-CM: I50.32  ICD-9-CM: 428.32, 428.0  3/22/2013 Yes        Essential hypertension, malignant ICD-10-CM: I10  ICD-9-CM: 401.0  3/22/2013 Yes        Seizure ([facility].) ICD-10-CM: R56.9  ICD-9-CM: 780.39  12/30/2012 Yes    Overview Signed 12/30/2012  2:02 PM by Kenya Alejandre MD     Hx of seizure as well             Diabetes mellitus type 2 in obese [facility]) ICD-10-CM: E11.69, E66.9  ICD-9-CM: 250.00, 278.00  7/29/2011 Yes        Chronic ischemic heart disease ICD-10-CM: I25.9  ICD-9-CM: 414.9  4/18/2011 Yes                Vital Signs:    Last 24hrs VS reviewed since prior progress note. Most recent are:  Visit Vitals  /85 (BP 1 Location: Right arm, BP Patient Position: Supine)   Pulse 68   Temp 99.5 °F (37.5 °C)   Resp 18   Ht 5' 8\" (1.727 m)   Wt 62.2 kg (137 lb 1.6 oz)   SpO2 93%   BMI 20.85 kg/m²         Intake/Output Summary (Last 24 hours) at 9/3/2020 0803  Last data filed at 9/3/2020 0338  Gross per 24 hour   Intake --   Output 400 ml   Net -400 ml        Physical Examination:             Constitutional:  No acute distress, cooperative, pleasant    ENT:  Oral mucosa moist, oropharynx benign. Resp:  Decrease bronchial breath sound bilaterally. No wheezing/rhonchi/rales. No accessory muscle use   CV:  Regular rhythm, normal rate, no murmurs, gallops, rubs    GI:  Soft, non distended, non tender. normoactive bowel sounds, no hepatosplenomegaly     Musculoskeletal:  No edema     Neurologic:  Conscious and alert, disoriented, follows simple command, motor UE 4-5/5, LE 2/5     Skin:  Good turgor, no rashes or ulcers       Data Review:    Review and/or order of clinical lab test  Review and/or order of tests in the radiology section of CPT  Review and/or order of tests in the medicine section of CPT      Labs:     Recent Labs     09/03/20  0235   WBC 6.0   HGB 12.1   HCT 39.0        Recent Labs     09/03/20  0235      K 4.0   *   CO2 21   BUN 13   CREA 1.37*   *   CA 9.3     Recent Labs     09/03/20  0235   ALT 36   AP 78   TBILI 0.3   TP 7.0   ALB 2.1*   GLOB 4.9*     No results for input(s): INR, PTP, APTT, INREXT, INREXT in the last 72 hours. No results for input(s): FE, TIBC, PSAT, FERR in the last 72 hours. Lab Results   Component Value Date/Time    Folate 8.5 10/30/2009 03:30 PM      No results for input(s): PH, PCO2, PO2 in the last 72 hours. No results for input(s): CPK, CKNDX, TROIQ in the last 72 hours.     No lab exists for component: CPKMB  Lab Results   Component Value Date/Time    Cholesterol, total 168 07/29/2011 08:27 AM    HDL Cholesterol 35 (L) 07/29/2011 08:27 AM    LDL, calculated 98 07/29/2011 08:27 AM    Triglyceride 176 (H) 07/29/2011 08:27 AM    CHOL/HDL Ratio 3.3 08/03/2010 10:55 PM     Lab Results   Component Value Date/Time    Glucose (POC) 133 (H) 09/03/2020 06:55 AM    Glucose (POC) 185 (H) 09/02/2020 09:22 PM    Glucose (POC) 221 (H) 09/02/2020 04:30 PM    Glucose (POC) 250 (H) 09/02/2020 11:34 AM    Glucose (POC) 87 09/02/2020 05:55 AM     Lab Results   Component Value Date/Time    Color YELLOW/STRAW 08/27/2020 06:26 PM    Appearance CLEAR 08/27/2020 06:26 PM    Specific gravity 1.027 08/27/2020 06:26 PM    Specific gravity 1.020 08/11/2020 06:17 AM    pH (UA) 5.0 08/27/2020 06:26 PM    Protein Negative 08/27/2020 06:26 PM    Glucose 500 (A) 08/27/2020 06:26 PM    Ketone Negative 08/27/2020 06:26 PM    Bilirubin Negative 08/27/2020 06:26 PM    Urobilinogen 0.2 08/27/2020 06:26 PM    Nitrites Negative 08/27/2020 06:26 PM    Leukocyte Esterase Negative 08/27/2020 06:26 PM    Epithelial cells FEW 08/11/2020 06:17 AM    Bacteria Negative 08/11/2020 06:17 AM    WBC 0-4 08/11/2020 06:17 AM    RBC 0-5 08/11/2020 06:17 AM         Medications Reviewed:     Current Facility-Administered Medications   Medication Dose Route Frequency    insulin glargine (LANTUS) injection 10 Units  10 Units SubCUTAneous QHS    carvediloL (COREG) tablet 6.25 mg  6.25 mg Oral BID WITH MEALS    aspirin chewable tablet 81 mg  81 mg Oral DAILY    allopurinoL (ZYLOPRIM) tablet 100 mg  100 mg Oral DAILY    0.9% sodium chloride infusion 250 mL  250 mL IntraVENous PRN    ascorbic acid (vitamin C) (VITAMIN C) tablet 500 mg  500 mg Oral BID    zinc sulfate (ZINCATE) 220 (50) mg capsule 1 Cap  1 Cap Oral DAILY    HYDROcodone-acetaminophen (NORCO) 5-325 mg per tablet 1 Tab  1 Tab Oral Q6H PRN    enoxaparin (LOVENOX) injection 30 mg  30 mg SubCUTAneous Q12H    cefepime (MAXIPIME) 2 g in 0.9% sodium chloride (MBP/ADV) 100 mL  2 g IntraVENous Q12H    levoFLOXacin (LEVAQUIN) 750 mg in D5W IVPB  750 mg IntraVENous Q48H    sodium chloride (NS) flush 5-40 mL  5-40 mL IntraVENous Q8H    sodium chloride (NS) flush 5-40 mL  5-40 mL IntraVENous PRN    acetaminophen (TYLENOL) tablet 650 mg  650 mg Oral Q6H PRN    Or    acetaminophen (TYLENOL) suppository 650 mg  650 mg Rectal Q6H PRN    polyethylene glycol (MIRALAX) packet 17 g  17 g Oral DAILY PRN    promethazine (PHENERGAN) tablet 12.5 mg  12.5 mg Oral Q6H PRN    Or    ondansetron (ZOFRAN) injection 4 mg  4 mg IntraVENous Q6H PRN    dexAMETHasone (DECADRON) tablet 6 mg  6 mg Oral DAILY WITH BREAKFAST    lacosamide (VIMPAT) tablet 200 mg  200 mg Oral BID    levETIRAcetam (KEPPRA) tablet 750 mg  750 mg Oral BID    pravastatin (PRAVACHOL) tablet 40 mg  40 mg Oral QHS    topiramate (TOPAMAX) tablet 100 mg  100 mg Oral BID    glucose chewable tablet 16 g  4 Tab Oral PRN    glucagon (GLUCAGEN) injection 1 mg  1 mg IntraMUSCular PRN    dextrose 10% infusion 0-250 mL  0-250 mL IntraVENous PRN    insulin lispro (HUMALOG) injection   SubCUTAneous AC&HS    albuterol (PROVENTIL HFA, VENTOLIN HFA, PROAIR HFA) inhaler 2 Puff  2 Puff Inhalation Q4H PRN    guaiFENesin ER (MUCINEX) tablet 1,200 mg  1,200 mg Oral Q12H     ______________________________________________________________________  EXPECTED LENGTH OF STAY: 5d 12h  ACTUAL LENGTH OF STAY:          6                 Michael Murrell MD

## 2020-09-03 NOTE — PROGRESS NOTES
GEOFFREY:  1. Return to [facility] when stable. 2. BLS transport. 3.  ID following- last dose today.       Ronald Chao [facility]

## 2020-09-04 VITALS
HEART RATE: 72 BPM | BODY MASS INDEX: 21.49 KG/M2 | OXYGEN SATURATION: 95 % | SYSTOLIC BLOOD PRESSURE: 117 MMHG | DIASTOLIC BLOOD PRESSURE: 70 MMHG | HEIGHT: 68 IN | RESPIRATION RATE: 18 BRPM | TEMPERATURE: 97.5 F | WEIGHT: 141.8 LBS

## 2020-09-04 LAB
D DIMER PPP FEU-MCNC: 4.18 MG/L FEU (ref 0–0.65)
GLUCOSE BLD STRIP.AUTO-MCNC: 123 MG/DL (ref 65–100)
GLUCOSE BLD STRIP.AUTO-MCNC: 281 MG/DL (ref 65–100)
L PNEUMO POOL 1-6 IGM SER QL: ABNORMAL
SERVICE CMNT-IMP: ABNORMAL
SERVICE CMNT-IMP: ABNORMAL

## 2020-09-04 PROCEDURE — 36415 COLL VENOUS BLD VENIPUNCTURE: CPT

## 2020-09-04 PROCEDURE — 74011250637 HC RX REV CODE- 250/637: Performed by: HOSPITALIST

## 2020-09-04 PROCEDURE — 74011636637 HC RX REV CODE- 636/637: Performed by: NURSE PRACTITIONER

## 2020-09-04 PROCEDURE — 85379 FIBRIN DEGRADATION QUANT: CPT

## 2020-09-04 PROCEDURE — 74011250636 HC RX REV CODE- 250/636: Performed by: INTERNAL MEDICINE

## 2020-09-04 PROCEDURE — 82962 GLUCOSE BLOOD TEST: CPT

## 2020-09-04 PROCEDURE — 74011000258 HC RX REV CODE- 258: Performed by: INTERNAL MEDICINE

## 2020-09-04 PROCEDURE — 74011250636 HC RX REV CODE- 250/636: Performed by: NURSE PRACTITIONER

## 2020-09-04 PROCEDURE — 74011250636 HC RX REV CODE- 250/636: Performed by: HOSPITALIST

## 2020-09-04 PROCEDURE — 74011250637 HC RX REV CODE- 250/637: Performed by: NURSE PRACTITIONER

## 2020-09-04 RX ORDER — ASCORBIC ACID 500 MG
500 TABLET ORAL 2 TIMES DAILY
Qty: 28 TAB | Refills: 0 | Status: ON HOLD | OUTPATIENT
Start: 2020-09-04 | End: 2021-07-15

## 2020-09-04 RX ORDER — METOPROLOL TARTRATE 25 MG/1
TABLET, FILM COATED ORAL
Qty: 12.5 TAB | Refills: 0 | Status: SHIPPED | OUTPATIENT
Start: 2020-09-04

## 2020-09-04 RX ORDER — INSULIN GLARGINE 100 [IU]/ML
INJECTION, SOLUTION SUBCUTANEOUS
Qty: 1 VIAL | Refills: 0 | Status: ON HOLD | OUTPATIENT
Start: 2020-09-04 | End: 2021-07-15

## 2020-09-04 RX ORDER — LOSARTAN POTASSIUM 25 MG/1
TABLET ORAL
Qty: 30 TAB | Refills: 0 | Status: ON HOLD | OUTPATIENT
Start: 2020-09-04 | End: 2021-07-15

## 2020-09-04 RX ORDER — DEXAMETHASONE 4 MG/1
TABLET ORAL
Qty: 2 TAB | Refills: 0 | Status: SHIPPED | OUTPATIENT
Start: 2020-09-05 | End: 2020-09-05

## 2020-09-04 RX ORDER — GUAIFENESIN 100 MG/5ML
81 LIQUID (ML) ORAL DAILY
Qty: 30 TAB | Refills: 1 | Status: SHIPPED | OUTPATIENT
Start: 2020-09-04 | End: 2021-07-16

## 2020-09-04 RX ADMIN — DEXAMETHASONE 6 MG: 4 TABLET ORAL at 07:33

## 2020-09-04 RX ADMIN — HYDROCODONE BITARTRATE AND ACETAMINOPHEN 1 TABLET: 5; 325 TABLET ORAL at 03:50

## 2020-09-04 RX ADMIN — Medication 10 ML: at 07:34

## 2020-09-04 RX ADMIN — GUAIFENESIN 1200 MG: 600 TABLET, EXTENDED RELEASE ORAL at 10:42

## 2020-09-04 RX ADMIN — LEVETIRACETAM 750 MG: 500 TABLET ORAL at 10:47

## 2020-09-04 RX ADMIN — ASPIRIN 81 MG CHEWABLE TABLET 81 MG: 81 TABLET CHEWABLE at 10:42

## 2020-09-04 RX ADMIN — INSULIN LISPRO 5 UNITS: 100 INJECTION, SOLUTION INTRAVENOUS; SUBCUTANEOUS at 12:17

## 2020-09-04 RX ADMIN — TOPIRAMATE 100 MG: 100 TABLET, FILM COATED ORAL at 10:42

## 2020-09-04 RX ADMIN — ENOXAPARIN SODIUM 30 MG: 30 INJECTION SUBCUTANEOUS at 07:33

## 2020-09-04 RX ADMIN — Medication 10 ML: at 00:00

## 2020-09-04 RX ADMIN — ALLOPURINOL 100 MG: 100 TABLET ORAL at 10:42

## 2020-09-04 RX ADMIN — OXYCODONE HYDROCHLORIDE AND ACETAMINOPHEN 500 MG: 500 TABLET ORAL at 10:42

## 2020-09-04 RX ADMIN — CEFEPIME 2 G: 2 INJECTION, POWDER, FOR SOLUTION INTRAVENOUS at 07:33

## 2020-09-04 RX ADMIN — LACOSAMIDE 200 MG: 50 TABLET, FILM COATED ORAL at 10:42

## 2020-09-04 RX ADMIN — CARVEDILOL 6.25 MG: 6.25 TABLET, FILM COATED ORAL at 07:33

## 2020-09-04 RX ADMIN — ZINC SULFATE 220 MG (50 MG) CAPSULE 1 CAPSULE: CAPSULE at 10:42

## 2020-09-04 NOTE — PROGRESS NOTES
Medicare pt has received, reviewed, and signed 2nd IM letter informing them of their right to appeal the discharge. Signed copy has been placed on pt bedside chart. Lobito Lopez [facility]

## 2020-09-04 NOTE — DISCHARGE INSTRUCTIONS
Discharge SNF/Rehab Instructions/LTAC       PATIENT ID: Janny Colon  MRN: [id number]   YOB: 1945    DATE OF ADMISSION: 8/27/2020  1:53 PM    DATE OF DISCHARGE: 9/4/2020    PRIMARY CARE PROVIDER: Justice Lim MD       ATTENDING PHYSICIAN: Zoe Maria MD  DISCHARGING PROVIDER: Regine Thompson MD     To contact this individual call [phone] and ask the  to page. If unavailable ask to be transferred the Adult Hospitalist Department. CONSULTATIONS: IP CONSULT TO INFECTIOUS DISEASES  IP CONSULT TO PULMONOLOGY    PROCEDURES/SURGERIES: * No surgery found *    ADMITTING [address] [place] [address] COURSE:   PNA due to COVID 19  -on iv levaquin and cefepime (8/27--9/3), decadron (8/27--9/5),   -off oxygen support, SpO2 95-97% on RA  -positive 2 weeks ago, repeated COVID 19 on 8/28 positive  -s/p convalescent plasma on 8/29  -Chest CTA no evidence for pulmonary embolism, substantial interval progression of multifocal bilateral groundglass and patchy consolidations with \"crazy paving\" appearance. Findings consistent with substantial worsening in Covid 19 pneumonia. Unchanged consolidation versus mass lesion of left upper lobe measuring 29 x 17 mm transverse.  -SpO2 94-96 % on RA  -respiratory panel negative   -HIV 1/2 non reactive  -Chlamydia panel, legionel ag, mycoplasma positive  -ferritin was normal  -Feels fine  -Completed course of antibiotics  -Discharge today back to [facility]     Hematemesis  -x1 at [facility]; suspect might have been in sputum from excessive coughing  -stool occult pending  -Hgb 11.0, it was 13.4, plavix was held but would be restarting at discharge  -hemoptysis improved,      CAMERON mass: consolidation vs lesion (measuring 29 x 17mm transverse)  -No change since previous imaging  -on cefepime and levaquin, add mucinex  -sputum culture pending  -Outpatient follow up with PMD/Pulmonary.  The daughter has been informed  -further mgt as above     RAPHAEL on CKD Stage II  -creatinine stable -Off IVF   -avoid nephrotoxins  -monitor renal function      Hyponatremia  -suspect d/t poor PO intake recently  -discontinued IVF  -resolved now     Thrombocytopenia  -Plts 132 (baseline normal), improving   -Unclear reason, no PE on CTA (has hx of DVT not currently on [address])  -as above  -no edema to BLE, doubt current DVT  -monitor labs for now     Hx HTN  -home meds were held but starting some home meds at lower dose at discharge, needs to be followed by PMD  -Outpatient may need to restart home meds     Hx COPD  -stable,   -PRN inhaler  -Monitor VS, O2     Hx CHF/Ischemic Heart Disease  -continue coreg  -bumex, cozaar on hold because of low normal BP  -hold plavix/asa for now d/t ?hemoptysis/hematemesis. Will leave it up to PMD to restart  -Echo in 2013 w/o evidence of HF  -pro-BNP normal     Hx Seizures  -Continue home vimpat, keppra  -seizure precations     T2DM  -A1c 8.4,   -SSI/Lantus 10 units     Hx Alzheimer's Dementia   -conscious and alert, calm, cooperative  -Continue supportive care        DISCHARGE DIAGNOSES / PLAN:      1. Pneumonia  2. HTN, may need to readjust antihypertensives  3. History of CHF, may need to readjust meds, check BMP in 4-5 days       PENDING TEST RESULTS:   At the time of discharge the following test results are still pending: none    FOLLOW UP APPOINTMENTS:    Follow-up Information     Follow up With Specialties Details Why Contact Info    Sandro Whyte MD Internal Medicine   [address] [place] [address] Way  [phone]        In 1 week             ADDITIONAL CARE RECOMMENDATIONS:   Follow up with PMD  BMP in 4-5 days    DIET: Cardiac Diet      ACTIVITY: Activity as tolerated      DISCHARGE MEDICATIONS:   See Medication Reconciliation Form      NOTIFY YOUR PHYSICIAN FOR ANY OF THE FOLLOWING:   Fever over 101 degrees for 24 hours. Chest pain, shortness of breath, fever, chills, nausea, vomiting, diarrhea, change in mentation, falling, weakness, bleeding.  Severe pain or pain not relieved by medications. Or, any other signs or symptoms that you may have questions about.     DISPOSITION:    Home With:   OT  PT  HH  RN      x SNF/Inpatient Rehab/LTAC    Independent/assisted living    Hospice    Other:       PATIENT CONDITION AT DISCHARGE:     Functional status    Poor     Deconditioned     Independent      Cognition     Lucid     Forgetful    x Dementia      Catheters/lines (plus indication)    Loyd     PICC     PEG    x None      Code status   x  Full code     DNR      PHYSICAL EXAMINATION AT DISCHARGE:  Please see progress note      CHRONIC MEDICAL DIAGNOSES:  Problem List as of 9/4/2020 Date Reviewed: 8/27/2020          Codes Class Noted - Resolved    Acute kidney injury superimposed on chronic kidney disease ([facility] ICD-10-CM: N17.9, N18.9  ICD-9-CM: 866.00, 585.9  8/28/2020 - Present        Hyponatremia ICD-10-CM: E87.1  ICD-9-CM: 276.1  8/28/2020 - Present        Thrombocytopenia ([facility] ICD-10-CM: D69.6  ICD-9-CM: 287.5  8/28/2020 - Present        CAP (community acquired pneumonia) ICD-10-CM: J18.9  ICD-9-CM: 675  8/28/2020 - Present        Pneumonia due to COVID-19 virus ICD-10-CM: U07.1, J12.89  ICD-9-CM: 480.8  8/27/2020 - Present        * (Principal) Hematemesis ICD-10-CM: K92.0  ICD-9-CM: 578.0  8/27/2020 - Present        Chronic obstructive pulmonary disease ([facility].) ICD-10-CM: J44.9  ICD-9-CM: 962  Unknown - Present    Overview Signed 8/27/2020  8:14 PM by Hair Post NP     CHRONIC BRONCHITIS             Thromboembolus ([facility] ICD-10-CM: I74.9  ICD-9-CM: 444.9  Unknown - Present    Overview Signed 8/27/2020  8:14 PM by Hair Post NP     left leg  (NO PE)             Alzheimer's disease ([facility].) ICD-10-CM: G30.9, F02.80  ICD-9-CM: 331.0  Unknown - Present        Sepsis ([address] ICD-10-CM: A41.9  ICD-9-CM: 038.9, 995.91  8/4/2020 - Present        Left upper lobe pneumonia ICD-10-CM: J18.9  ICD-9-CM: 976  8/4/2020 - Present        RAPHAEL (acute kidney injury) ([facility] ICD-10-CM: N17.9  ICD-9-CM: 584.9  8/4/2020 - Present        Acute encephalopathy ICD-10-CM: G93.40  ICD-9-CM: 348.30  8/4/2020 - Present        Suspected COVID-19 virus infection ICD-10-CM: Z20.828  ICD-9-CM: V01.79  8/4/2020 - Present        Coronary artery disease involving native heart without angina pectoris ICD-10-CM: I25.10  ICD-9-CM: 414.01  6/26/2018 - Present        Gait abnormality ICD-10-CM: R26.9  ICD-9-CM: 781.2  3/12/2018 - Present        Orthostatic hypertension ICD-10-CM: I10  ICD-9-CM: 401.9  2/13/2018 - Present        Prostate cancer ([facility] ICD-10-CM: C61  ICD-9-CM: 185  10/4/2016 - Present        Stroke ([facility] ICD-10-CM: I63.9  ICD-9-CM: 434.91  4/9/2013 - Present        Complex partial seizures evolving to generalized tonic-clonic seizures ([facility] ICD-10-CM: G40.209  ICD-9-CM: 345.40  4/9/2013 - Present        Acute respiratory failure ([facility] ICD-10-CM: J96.00  ICD-9-CM: 518.81  3/22/2013 - Present        Diastolic CHF, chronic (HCC) (Chronic) ICD-10-CM: I50.32  ICD-9-CM: 428.32, 428.0  3/22/2013 - Present        Essential hypertension, malignant ICD-10-CM: I10  ICD-9-CM: 401.0  3/22/2013 - Present        Encephalopathy, unspecified ICD-10-CM: G93.40  ICD-9-CM: 348.30  12/30/2012 - Present        Hx of completed stroke (Chronic) ICD-10-CM: O20.83  ICD-9-CM: V12.54  12/30/2012 - Present        Seizure ([facility] ICD-10-CM: R56.9  ICD-9-CM: 780.39  12/30/2012 - Present    Overview Signed 12/30/2012  2:02 PM by Lashae Pratt MD     Hx of seizure as well             Diabetes mellitus type 2 in [facility]) ICD-10-CM: E11.69, E66.9  ICD-9-CM: 250.00, 278.00  7/29/2011 - Present        Allergic rhinitis, cause unspecified ICD-10-CM: J30.9  ICD-9-CM: 477.9  4/18/2011 - Present        Hemorrhoids ICD-10-CM: K64.9  ICD-9-CM: 455.6  4/18/2011 - Present        Unspecified asthma(493.90) ICD-10-CM: J45.909  ICD-9-CM: 493.90  4/18/2011 - Present        Chronic ischemic heart disease ICD-10-CM: I25.9  ICD-9-CM: 414.9  4/18/2011 - Present        Erectile Dysfunction ICD-10-CM: N52.9  ICD-9-CM: 607.84  4/18/2011 - Present                CDMP Checked:   Yes x     PROBLEM LIST Updated:  Yes x         Signed:   Gus Segundo MD  9/4/2020  9:16 AM

## 2020-09-04 NOTE — PROGRESS NOTES
[facility] Adult  Hospitalist Group                                                                                          Hospitalist Progress Note  Bradly Ramos MD  Answering service: [phone] from in house phone        Date of Service:  2020  NAME:  Oral Cisse  :  1945  MRN:  [id number]      Admission Summary:     Oral iCsse is a 76 y.o. male who presented via EMS from [facility] with a complaint of  vomiting/coughing up blood x1. EMS reported rectal temperature of 96.1. He was admitted approximately 2 weeks ago for PNA related to COVID and was COVID positive. Workup in ED included largely unremarkable CBC except for low platelets of 012, UA negative for UTI, chemistry w/ , glucose 448, creat 1.54, phos 2.5, negative troponin and A1c 8.4. Glucose of 448 not treated in ED. KUB of abdomen negative. CTA chest showed no PE, substantial interval progression of multifocal bilateral groundglass and patchy consolidations with \"crazy paving\" appearance, consistent with substantial worsening of COVID 19 PNA; unchanged consolidation vs mass lesion of CAMERON measuring 48z19wn transverse. Patient is alert, oriented only to self with hx alzheimer's dementia. Pt denies HA, dizziness, visual changes, CP, abd pain, n/v/d or dysuria. He endorses continued cough and SOB. He does not recall coughing/vomiting up blood. I called his daughter and updated her on POC. All questions answered. Discussed with Dr. Janey Anderson.     Will send to intermediate care d/t likely need for [facility].  If maintains core temp greater than 97.4, can consider downgrade to tele.       Interval history / Subjective:   F/u Pneumonia  On Room air  No new issues       Assessment & Plan:     PNA due to COVID 19  -on iv levaquin and cefepime (--9/3), decadron (--),   -off oxygen support, SpO2 95-97% on RA  -positive 2 weeks ago, repeated COVID 19 on  positive  -s/p convalescent plasma on 8/29  -Chest CTA no evidence for pulmonary embolism, substantial interval progression of multifocal bilateral groundglass and patchy consolidations with \"crazy paving\" appearance. Findings consistent with substantial worsening in Covid 19 pneumonia. Unchanged consolidation versus mass lesion of left upper lobe measuring 29 x 17 mm transverse.  -SpO2 94-96 % on RA  -respiratory panel negative   -HIV 1/2 non reactive  -Chlamydia panel, legionel ag, mycoplasma positive  -ferritin was normal  -Feels fine  -Completed course of antibiotics  -Discharge today back to [facility]    Hematemesis  -x1 at [facility]; suspect might have been in sputum from excessive coughing  -stool occult pending  -Hgb 11.0, it was 13.4, plavix is held, monitor H/H  -hemoptysis improved,      CAMERON mass: consolidation vs lesion (measuring 29 x 17mm transverse)  -No change since previous imaging  -on cefepime and levaquin, add mucinex  -sputum culture pending  -consult pulmonary to further eval (hx CA)  -further mgt as above     RAPHAEL on CKD Stage II  -creatinine stable   -Off IVF   -avoid nephrotoxins  -monitor renal function      Hyponatremia  -suspect d/t poor PO intake recently  -discontinued IVF  -resolved now     Thrombocytopenia  -Plts 132 (baseline normal), improving   -Unclear reason, no PE on CTA (has hx of DVT not currently on [address])  -hold plavix and AC for now d/t concern for hemoptysis/hematemesis  -no edema to BLE, doubt current DVT  -monitor labs for now     Hx HTN  -now low/normal BP  -give albumin x3  -hold home meds     Hx COPD  -stable,   -PRN inhaler  -Monitor VS, O2     Hx CHF/Ischemic Heart Disease  -continue coreg  -bumex, cozaar on hold because of   -hold plavix/asa for now d/t ?hemoptysis/hematemesis.  Will leave it up to PMD to restart  -Echo in 2013 w/o evidence of HF  -pro-BNP normal     Hx Seizures  -Continue home vimpat, keppra  -seizure precations     T2DM  -A1c 8.4,   -finger stick glucose is fluctuating, adjused lantus 10 units, disontinue lispro 5 units and continue sliding scale, monitor finger stick glucose      Hx Alzheimer's Dementia   -conscious and alert, calm, cooperative  -Continue supportive care      Code status: Full Code. I have counseled the daughter extensively about reconsidering on code status.  She will think about it  DVT prophylaxis:SCD    Plan: Discharge today back to [address] discussed with: Patient/Family, Nurse and   Anticipated Disposition: From [facility]  Anticipated Discharge: discharge today    I called and updated daughter Bony Davies [phone]         [id number] Problems  Date Reviewed: 8/27/2020          Codes Class Noted POA    Acute kidney injury superimposed on chronic kidney disease ([email or web address] ICD-10-CM: N17.9, N18.9  ICD-9-CM: 866.00, 585.9  8/28/2020 Yes        Hyponatremia ICD-10-CM: E87.1  ICD-9-CM: 276.1  8/28/2020 Yes        Thrombocytopenia ([facility] ICD-10-CM: D69.6  ICD-9-CM: 287.5  8/28/2020 Yes        CAP (community acquired pneumonia) ICD-10-CM: J18.9  ICD-9-CM: 171  8/28/2020 Unknown        Pneumonia due to COVID-19 virus ICD-10-CM: U07.1, J12.89  ICD-9-CM: 480.8  8/27/2020 Yes        * (Principal) Hematemesis ICD-10-CM: K92.0  ICD-9-CM: 578.0  8/27/2020 Yes        Chronic obstructive pulmonary disease ([facility] [[email or web address].) ICD-10-CM: J44.9  ICD-9-CM: 005  Unknown Yes    Overview Signed 8/27/2020  8:14 PM by Hair Post NP     CHRONIC BRONCHITIS             Alzheimer's disease ([facility] [[email or web address] 75.) ICD-10-CM: G30.9, F02.80  ICD-9-CM: 331.0  Unknown Yes        Diastolic CHF, chronic ([facility] (Chronic) ICD-10-CM: I50.32  ICD-9-CM: 428.32, 428.0  3/22/2013 Yes        Essential hypertension, malignant ICD-10-CM: I10  ICD-9-CM: 401.0  3/22/2013 Yes        Seizure ([address] ICD-10-CM: R56.9  ICD-9-CM: 780.39  12/30/2012 Yes    Overview Signed 12/30/2012  2:02 PM by Ap Shaffer MD     Hx of seizure as well             Diabetes mellitus type 2 in [facility]) ICD-10-CM: E11.69, E66.9  ICD-9-CM: 250.00, 278.00  7/29/2011 Yes        Chronic ischemic heart disease ICD-10-CM: I25.9  ICD-9-CM: 414.9  4/18/2011 Yes                Vital Signs:    Last 24hrs VS reviewed since prior progress note. Most recent are:  Visit Vitals  /81 (BP 1 Location: Left arm, BP Patient Position: At rest)   Pulse 72   Temp 98.3 °F (36.8 °C)   Resp 18   Ht 5' 8\" (1.727 m)   Wt 62.2 kg (137 lb 1.6 oz)   SpO2 95%   BMI 20.85 kg/m²         Intake/Output Summary (Last 24 hours) at 9/4/2020 0738  Last data filed at 9/4/2020 0145  Gross per 24 hour   Intake --   Output 580 ml   Net -580 ml        Physical Examination:             Constitutional:  No acute distress, cooperative, pleasant    ENT:  Oral mucosa moist, oropharynx benign. Resp:  Decrease bronchial breath sound bilaterally. No wheezing/rhonchi/rales. No accessory muscle use   CV:  Regular rhythm, normal rate, no murmurs, gallops, rubs    GI:  Soft, non distended, non tender. normoactive bowel sounds, no hepatosplenomegaly     Musculoskeletal:  No edema     Neurologic:  Conscious and alert, disoriented, follows simple command, motor UE 4-5/5, LE 2/5     Skin:  Good turgor, no rashes or ulcers       Data Review:    Review and/or order of clinical lab test  Review and/or order of tests in the radiology section of CPT  Review and/or order of tests in the medicine section of CPT      Labs:     Recent Labs     09/03/20  0235   WBC 6.0   HGB 12.1   HCT 39.0        Recent Labs     09/03/20  0235      K 4.0   *   CO2 21   BUN 13   CREA 1.37*   *   CA 9.3     Recent Labs     09/03/20  0235   ALT 36   AP 78   TBILI 0.3   TP 7.0   ALB 2.1*   GLOB 4.9*     No results for input(s): INR, PTP, APTT, INREXT, INREXT in the last 72 hours. No results for input(s): FE, TIBC, PSAT, FERR in the last 72 hours. Lab Results   Component Value Date/Time    Folate 8.5 10/30/2009 03:30 PM      No results for input(s): PH, PCO2, PO2 in the last 72 hours.   No results for input(s): CPK, CKNDX, TROIQ in the last 72 hours.     No lab exists for component: CPKMB  Lab Results   Component Value Date/Time    Cholesterol, total 168 07/29/2011 08:27 AM    HDL Cholesterol 35 (L) 07/29/2011 08:27 AM    LDL, calculated 98 07/29/2011 08:27 AM    Triglyceride 176 (H) 07/29/2011 08:27 AM    CHOL/HDL Ratio 3.3 08/03/2010 10:55 PM     Lab Results   Component Value Date/Time    Glucose (POC) 123 (H) 09/04/2020 06:47 AM    Glucose (POC) 224 (H) 09/03/2020 09:48 PM    Glucose (POC) 212 (H) 09/03/2020 04:12 PM    Glucose (POC) 191 (H) 09/03/2020 11:32 AM    Glucose (POC) 133 (H) 09/03/2020 06:55 AM     Lab Results   Component Value Date/Time    Color YELLOW/STRAW 08/27/2020 06:26 PM    Appearance CLEAR 08/27/2020 06:26 PM    Specific gravity 1.027 08/27/2020 06:26 PM    Specific gravity 1.020 08/11/2020 06:17 AM    pH (UA) 5.0 08/27/2020 06:26 PM    Protein Negative 08/27/2020 06:26 PM    Glucose 500 (A) 08/27/2020 06:26 PM    Ketone Negative 08/27/2020 06:26 PM    Bilirubin Negative 08/27/2020 06:26 PM    Urobilinogen 0.2 08/27/2020 06:26 PM    Nitrites Negative 08/27/2020 06:26 PM    Leukocyte Esterase Negative 08/27/2020 06:26 PM    Epithelial cells FEW 08/11/2020 06:17 AM    Bacteria Negative 08/11/2020 06:17 AM    WBC 0-4 08/11/2020 06:17 AM    RBC 0-5 08/11/2020 06:17 AM         Medications Reviewed:     Current Facility-Administered Medications   Medication Dose Route Frequency    insulin glargine (LANTUS) injection 10 Units  10 Units SubCUTAneous QHS    carvediloL (COREG) tablet 6.25 mg  6.25 mg Oral BID WITH MEALS    aspirin chewable tablet 81 mg  81 mg Oral DAILY    allopurinoL (ZYLOPRIM) tablet 100 mg  100 mg Oral DAILY    0.9% sodium chloride infusion 250 mL  250 mL IntraVENous PRN    ascorbic acid (vitamin C) (VITAMIN C) tablet 500 mg  500 mg Oral BID    zinc sulfate (ZINCATE) 220 (50) mg capsule 1 Cap  1 Cap Oral DAILY    HYDROcodone-acetaminophen (NORCO) 5-325 mg per tablet 1 Tab 1 Tab Oral Q6H PRN    enoxaparin (LOVENOX) injection 30 mg  30 mg SubCUTAneous Q12H    cefepime (MAXIPIME) 2 g in 0.9% sodium chloride (MBP/ADV) 100 mL  2 g IntraVENous Q12H    levoFLOXacin (LEVAQUIN) 750 mg in D5W IVPB  750 mg IntraVENous Q48H    sodium chloride (NS) flush 5-40 mL  5-40 mL IntraVENous Q8H    sodium chloride (NS) flush 5-40 mL  5-40 mL IntraVENous PRN    acetaminophen (TYLENOL) tablet 650 mg  650 mg Oral Q6H PRN    Or    acetaminophen (TYLENOL) suppository 650 mg  650 mg Rectal Q6H PRN    polyethylene glycol (MIRALAX) packet 17 g  17 g Oral DAILY PRN    promethazine (PHENERGAN) tablet 12.5 mg  12.5 mg Oral Q6H PRN    Or    ondansetron (ZOFRAN) injection 4 mg  4 mg IntraVENous Q6H PRN    dexAMETHasone (DECADRON) tablet 6 mg  6 mg Oral DAILY WITH BREAKFAST    lacosamide (VIMPAT) tablet 200 mg  200 mg Oral BID    levETIRAcetam (KEPPRA) tablet 750 mg  750 mg Oral BID    pravastatin (PRAVACHOL) tablet 40 mg  40 mg Oral QHS    topiramate (TOPAMAX) tablet 100 mg  100 mg Oral BID    glucose chewable tablet 16 g  4 Tab Oral PRN    glucagon (GLUCAGEN) injection 1 mg  1 mg IntraMUSCular PRN    dextrose 10% infusion 0-250 mL  0-250 mL IntraVENous PRN    insulin lispro (HUMALOG) injection   SubCUTAneous AC&HS    albuterol (PROVENTIL HFA, VENTOLIN HFA, PROAIR HFA) inhaler 2 Puff  2 Puff Inhalation Q4H PRN    guaiFENesin ER (MUCINEX) tablet 1,200 mg  1,200 mg Oral Q12H     ______________________________________________________________________  EXPECTED LENGTH OF STAY: 5d 12h  ACTUAL LENGTH OF STAY:          7                 Marianela Ma MD

## 2020-09-04 NOTE — DISCHARGE SUMMARY
Discharge Summary       PATIENT ID: Joe Lauren  MRN: [id number]   YOB: 1945    DATE OF ADMISSION: 8/27/2020  1:53 PM    DATE OF DISCHARGE: 8/4/2020   PRIMARY CARE PROVIDER: Juana Roy MD     ATTENDING PHYSICIAN: Dr Fina Valenzeula  DISCHARGING PROVIDER: Fina Valenzuela MD    To contact this individual call [phone] and ask the  to page. If unavailable ask to be transferred the Adult Hospitalist Department. CONSULTATIONS: IP CONSULT TO INFECTIOUS DISEASES  IP CONSULT TO PULMONOLOGY    PROCEDURES/SURGERIES: * No surgery found *    ADMITTING [address] [place] [address] COURSE:   PNA due to COVID 19  -on iv levaquin and cefepime (8/27--9/3), decadron (8/27--9/5),   -off oxygen support, SpO2 95-97% on RA  -positive 2 weeks ago, repeated COVID 19 on 8/28 positive  -s/p convalescent plasma on 8/29  -Chest CTA no evidence for pulmonary embolism, substantial interval progression of multifocal bilateral groundglass and patchy consolidations with \"crazy paving\" appearance. Findings consistent with substantial worsening in Covid 19 pneumonia. Unchanged consolidation versus mass lesion of left upper lobe measuring 29 x 17 mm transverse.  -SpO2 94-96 % on RA  -respiratory panel negative   -HIV 1/2 non reactive  -Chlamydia panel, legionel ag, mycoplasma positive  -ferritin was normal  -Feels fine  -Completed course of antibiotics  -Discharge today back to [facility]     Hematemesis  -x1 at [facility]; suspect might have been in sputum from excessive coughing  -stool occult pending  -Hgb 11.0, it was 13.4, plavix was held but would be restarting at discharge  -hemoptysis improved,      CAMERON mass: consolidation vs lesion (measuring 29 x 17mm transverse)  -No change since previous imaging  -on cefepime and levaquin, add mucinex  -sputum culture pending  -Outpatient follow up with PMD/Pulmonary.  The daughter has been informed  -further mgt as above     RAPHAEL on CKD Stage II  -creatinine stable   -Off IVF   -avoid nephrotoxins  -monitor renal function      Hyponatremia  -suspect d/t poor PO intake recently  -discontinued IVF  -resolved now     Thrombocytopenia  -Plts 132 (baseline normal), improving   -Unclear reason, no PE on CTA (has hx of DVT not currently on [address])  -as above  -no edema to BLE, doubt current DVT  -monitor labs for now     Hx HTN  -home meds were held but starting some home meds at lower dose at discharge, needs to be followed by PMD  -Outpatient may need to restart home meds     Hx COPD  -stable,   -PRN inhaler  -Monitor VS, O2     Hx CHF/Ischemic Heart Disease  -continue coreg  -bumex, cozaar on hold because of low normal BP  -hold plavix/asa for now d/t ?hemoptysis/hematemesis. Will leave it up to PMD to restart  -Echo in 2013 w/o evidence of HF  -pro-BNP normal     Hx Seizures  -Continue home vimpat, keppra  -seizure precations     T2DM  -A1c 8.4,   -SSI/Lantus 10 units     Hx Alzheimer's Dementia   -conscious and alert, calm, cooperative  -Continue supportive care        DISCHARGE DIAGNOSES / PLAN:      1. Pneumonia  2. HTN, may need to readjust antihypertensives  3. History of CHF, may need to readjust meds, check BMP in 4-5 days       PENDING TEST RESULTS:   At the time of discharge the following test results are still pending: none    FOLLOW UP APPOINTMENTS:    Follow-up Information     Follow up With Specialties Details Why Contact Info    Gene Toure MD Internal Medicine   [address] [place] [address] Way  [phone]        In 1 week             ADDITIONAL CARE RECOMMENDATIONS:   Follow up with PMD  BMP in 4-5 days    DIET: Cardiac Diet      ACTIVITY: Activity as tolerated      DISCHARGE MEDICATIONS:   See Medication Reconciliation Form      NOTIFY YOUR PHYSICIAN FOR ANY OF THE FOLLOWING:   Fever over 101 degrees for 24 hours. Chest pain, shortness of breath, fever, chills, nausea, vomiting, diarrhea, change in mentation, falling, weakness, bleeding.  Severe pain or pain not relieved by medications. Or, any other signs or symptoms that you may have questions about.     DISPOSITION:    Home With:   OT  PT  HH  RN      x SNF/Inpatient Rehab/LTAC    Independent/assisted living    Hospice    Other:       PATIENT CONDITION AT DISCHARGE:     Functional status    Poor     Deconditioned     Independent      Cognition     Lucid     Forgetful    x Dementia      Catheters/lines (plus indication)    Loyd     PICC     PEG    x None      Code status   x  Full code     DNR      PHYSICAL EXAMINATION AT DISCHARGE:  Please see progress note        CHRONIC MEDICAL DIAGNOSES:  Problem List as of 9/4/2020 Date Reviewed: 8/27/2020          Codes Class Noted - Resolved    Acute kidney injury superimposed on chronic kidney disease ([facility] ICD-10-CM: N17.9, N18.9  ICD-9-CM: 866.00, 585.9  8/28/2020 - Present        Hyponatremia ICD-10-CM: E87.1  ICD-9-CM: 276.1  8/28/2020 - Present        Thrombocytopenia ([facility] ICD-10-CM: D69.6  ICD-9-CM: 287.5  8/28/2020 - Present        CAP (community acquired pneumonia) ICD-10-CM: J18.9  ICD-9-CM: 177  8/28/2020 - Present        Pneumonia due to COVID-19 virus ICD-10-CM: U07.1, J12.89  ICD-9-CM: 480.8  8/27/2020 - Present        * (Principal) Hematemesis ICD-10-CM: K92.0  ICD-9-CM: 578.0  8/27/2020 - Present        Chronic obstructive pulmonary disease ([facility] ICD-10-CM: J44.9  ICD-9-CM: 070  Unknown - Present    Overview Signed 8/27/2020  8:14 PM by Kristina Thompson NP     CHRONIC BRONCHITIS             Thromboembolus ([facility] ICD-10-CM: I74.9  ICD-9-CM: 444.9  Unknown - Present    Overview Signed 8/27/2020  8:14 PM by Kristina Thompson NP     left leg  (NO PE)             Alzheimer's disease ([facility] ICD-10-CM: G30.9, F02.80  ICD-9-CM: 331.0  Unknown - Present        Sepsis ([facility].) ICD-10-CM: A41.9  ICD-9-CM: 038.9, 995.91  8/4/2020 - Present        Left upper lobe pneumonia ICD-10-CM: J18.9  ICD-9-CM: 934  8/4/2020 - Present        RAPHAEL (acute kidney injury) ([facility] ICD-10-CM: N17.9  ICD-9-CM: 584.9  8/4/2020 - Present        Acute encephalopathy ICD-10-CM: G93.40  ICD-9-CM: 348.30  8/4/2020 - Present        Suspected COVID-19 virus infection ICD-10-CM: Z20.828  ICD-9-CM: V01.79  8/4/2020 - Present        Coronary artery disease involving native heart without angina pectoris ICD-10-CM: I25.10  ICD-9-CM: 414.01  6/26/2018 - Present        Gait abnormality ICD-10-CM: R26.9  ICD-9-CM: 781.2  3/12/2018 - Present        Orthostatic hypertension ICD-10-CM: I10  ICD-9-CM: 401.9  2/13/2018 - Present        Prostate cancer ([facility] ICD-10-CM: Sharon Fairy  ICD-9-CM: 185  10/4/2016 - Present        Stroke ([facility] ICD-10-CM: I63.9  ICD-9-CM: 434.91  4/9/2013 - Present        Complex partial seizures evolving to generalized tonic-clonic seizures ([facility] ICD-10-CM: G40.209  ICD-9-CM: 345.40  4/9/2013 - Present        Acute respiratory failure ([facility] ICD-10-CM: J96.00  ICD-9-CM: 518.81  3/22/2013 - Present        Diastolic CHF, chronic (HCC) (Chronic) ICD-10-CM: I50.32  ICD-9-CM: 428.32, 428.0  3/22/2013 - Present        Essential hypertension, malignant ICD-10-CM: I10  ICD-9-CM: 401.0  3/22/2013 - Present        Encephalopathy, unspecified ICD-10-CM: G93.40  ICD-9-CM: 348.30  12/30/2012 - Present        Hx of completed stroke (Chronic) ICD-10-CM: Z86.73  ICD-9-CM: V12.54  12/30/2012 - Present        Seizure ([facility] ICD-10-CM: R56.9  ICD-9-CM: 780.39  12/30/2012 - Present    Overview Signed 12/30/2012  2:02 PM by Ian Ba MD     Hx of seizure as well             Diabetes mellitus type 2 in obese [facility]) ICD-10-CM: E11.69, E66.9  ICD-9-CM: 250.00, 278.00  7/29/2011 - Present        Allergic rhinitis, cause unspecified ICD-10-CM: J30.9  ICD-9-CM: 477.9  4/18/2011 - Present        Hemorrhoids ICD-10-CM: K64.9  ICD-9-CM: 455.6  4/18/2011 - Present        Unspecified asthma(493.90) ICD-10-CM: J45.909  ICD-9-CM: 493.90  4/18/2011 - Present        Chronic ischemic heart disease ICD-10-CM: I25.9  ICD-9-CM: 414.9 4/18/2011 - Present        Erectile Dysfunction ICD-10-CM: N52.9  ICD-9-CM: 607.84  4/18/2011 - Present              Greater than 39 minutes were spent with the patient on counseling and coordination of care    Signed:   Diana Sinha MD  9/4/2020  9:24 AM

## 2020-09-04 NOTE — CONSULTS
[facility]  Pulmonary, Critical Care, and Sleep Medicine    Initial Patient Consult    Name: Ana Allred MRN: [id number]   : 1945 [facility]   Date: 2020        IMPRESSION:   · CAMERON mass- concerning for neoplasia- but given dementia, poor baseline Functional status, risks of further invasive workup ( bronch/bx, resection etc) out weigh the benefits. · Alz Dementia  · COVID PNA  · CAD  · CKD      RECOMMENDATIONS:   · At this point would get pt through COVID,  · Reassess CAMERON mass with CT chest in 4-6 weeks. · Long term prognosis poor. · Decadron  · SUP  · Hold off on anticoagulation given episode hemoptysis which could have been from the CAMERON mass. · If this occurs again- would ask rad onc to see and consider SBRT to the mass. Subjective: This patient has been seen and evaluated at the request of Dr. Omid Noguera for SOB. Patient is a 76 y.o. male   alzheimers dementia from NH  admitted with n/v and 1 episode hemoptysis. COVId positive and now on decadron. CT chest no PE but CAMERON 2.9 cm subsolid nodule. Pt confused and unable to give a hx.      Past Medical History:   Diagnosis Date    Allergic rhinitis, cause unspecified 2011    Alzheimer's disease ([address]     Arthritis     GOUT    Cancer ([address] 2016    PROSTATE    Chronic obstructive pulmonary disease (HCC)     CHRONIC BRONCHITIS    CKD (chronic kidney disease), stage II     Congestive heart failure, unspecified 2011    (ON 16 PT & DTR STATE THEY DON'T REMEMBER THIS DIAGNOSIS)    Coronary Artery Disease 2011    Diabetes Mellitus Type I 2011    Erectile Dysfunction 2011    Hemorrhoids 2011    Hypertension     Pneumonia due to COVID-19 virus 2020    Seizures ([address]     STARTED ; \"BLACK-OUT Vernell Spore SEIZURES\", DTR SATES    Stroke ([address]     MULTIPLE MINISTROKES, DTR REPORTS; LEFT-SIDED WEAKNESS    Thromboembolus ([address]     left leg  (NO PE)    Unspecified asthma(493.90) 4/18/2011      Past Surgical History:   Procedure Laterality Date    CARDIAC SURG PROCEDURE UNLIST  2006    CABG    COLONOSCOPY N/A 10/9/2017    COLONOSCOPY performed by Jen Page MD at [address] [place] [address]      PERIPHERAL ANGIOGRAM, STENTS LEFT LEG      Prior to Admission medications    Medication Sig Start Date End Date Taking? Authorizing Provider   aspirin 81 mg chewable tablet Take 1 Tab by mouth daily. 9/4/20  Yes Inez Schneider MD   dexAMETHasone (DECADRON) 4 mg tablet 1 tab PO daily 9/5/20 9/5/20 Yes Inez Schneider MD   ascorbic acid, vitamin C, (VITAMIN C) 500 mg tablet Take 1 Tab by mouth two (2) times a day. 9/4/20  Yes Inez Schneider MD   losartan (COZAAR) 25 mg tablet take 1 tablet by mouth once daily 9/4/20  Yes Inez Schneider MD   metoprolol tartrate (LOPRESSOR) 25 mg tablet take 1 tablet by mouth twice a day 9/4/20  Yes Inez Schneider MD   insulin glargine (LANTUS) 100 unit/mL injection 10 units subcut inj every night 9/4/20  Yes Inez Schneider MD   bumetanide (BUMEX) 1 mg tablet take 1 tablet once daily 1/4/18   Donya Plasencia MD   levETIRAcetam (KEPPRA) 750 mg tablet Take 750 mg by mouth two (2) times a day. 3 tab, twice daily   Indications: PARTIAL EPILEPSY TREATMENT ADJUNCT 12/13/17   Ildefonso Marlow MD   lacosamide (VIMPAT) 200 mg tab tablet Take 200 mg by mouth two (2) times a day. Indications: PARTIAL EPILEPSY TREATMENT ADJUNCT 12/13/17   Ildefonso Marlow MD   topiramate (TOPAMAX) 100 mg tablet Take 100 mg by mouth two (2) times a day. Indications: COMPLEX-PARTIAL EPILEPSY 12/13/17   Ildefonso Marlow MD   carvedilol (COREG) 6.25 mg tablet Take 6.25 mg by mouth two (2) times daily (with meals). Indications: PREVENTION OF A.  FIB POST CARDIO-THORACIC SURGERY    Ildefonso Marlow MD   amLODIPine (NORVASC) 5 mg tablet take 1 tablet by mouth once daily 11/6/17   Donya Plasencia MD   clopidogrel (PLAVIX) 75 mg tab take 1 tablet by mouth once daily 9/5/17   MD Kalin Liao [facility] Rollator walker 8/24/17   Farzana Daniels MD   HYDROcodone-acetaminophen [facility]) 5-325 mg per tablet Take 1 Tab by mouth every four (4) hours as needed for Pain. Max Daily Amount: 6 Tabs. 7/25/17 Autumn Pulse, DO   Insulin Needles, Disposable, (JAZMYN PEN NEEDLE) 32 gauge x 5/32\" ndle Use daily as directed with insulin pen. 7/17/17   Farzana Daniels MD   aspirin (ASPIRIN) 325 mg tablet Take 325 mg by mouth daily. Other, Phys, MD   pravastatin (PRAVACHOL) 40 mg tablet take 1 tablet once daily 6/8/17   Farzana Daniels MD   glucose blood VI test strips (ONETOUCH ULTRA TEST) strip Test 2 times daily    ONETOUCH ULTRA 2 TEST STRIPS ONLY 10/7/16   Farzana Daniels MD   allopurinol (ZYLOPRIM) 100 mg tablet take 1 tablet by mouth once daily 9/15/16   Farzana Dainels MD   ACCU-CHEK ALEXIS PLUS TEST STRP strip TEST BLOOD SUGARS 2 TIMES DAILY 9/29/15   Farzana Daniels MD   Lancets [facility] Patient test 3 x daily  Dm 250 7/8/15   Farzana Daniels MD     No Known Allergies   Social History     Tobacco Use    Smoking status: Former Smoker     Packs/day: 2.00     Years: 30.00     Pack years: 60.00    Smokeless tobacco: Former User     Quit date: 9/20/1990   Substance Use Topics    Alcohol use: No     Comment: IN PAST, MODERATE ALCOHOL.  WEEKENDS ONLY      Family History   Problem Relation Age of Onset    Diabetes Mother     Hypertension Mother    [facility] Diabetes Father    [facility] Anesth Problems Neg Hx         Current Facility-Administered Medications   Medication Dose Route Frequency    insulin glargine (LANTUS) injection 10 Units  10 Units SubCUTAneous QHS    carvediloL (COREG) tablet 6.25 mg  6.25 mg Oral BID WITH MEALS    aspirin chewable tablet 81 mg  81 mg Oral DAILY    allopurinoL (ZYLOPRIM) tablet 100 mg  100 mg Oral DAILY    ascorbic acid (vitamin C) (VITAMIN C) tablet 500 mg  500 mg Oral BID    zinc sulfate (ZINCATE) 220 (50) mg capsule 1 Cap  1 Cap Oral DAILY    enoxaparin (LOVENOX) injection 30 mg  30 mg SubCUTAneous Q12H    cefepime (MAXIPIME) 2 g in 0.9% sodium chloride (MBP/ADV) 100 mL  2 g IntraVENous Q12H    levoFLOXacin (LEVAQUIN) 750 mg in D5W IVPB  750 mg IntraVENous Q48H    sodium chloride (NS) flush 5-40 mL  5-40 mL IntraVENous Q8H    dexAMETHasone (DECADRON) tablet 6 mg  6 mg Oral DAILY WITH BREAKFAST    lacosamide (VIMPAT) tablet 200 mg  200 mg Oral BID    levETIRAcetam (KEPPRA) tablet 750 mg  750 mg Oral BID    pravastatin (PRAVACHOL) tablet 40 mg  40 mg Oral QHS    topiramate (TOPAMAX) tablet 100 mg  100 mg Oral BID    insulin lispro (HUMALOG) injection   SubCUTAneous AC&HS    guaiFENesin ER (MUCINEX) tablet 1,200 mg  1,200 mg Oral Q12H       Review of Systems:  Review of systems not obtained due to patient factors. Objective:   Vital Signs:    Visit Vitals  /70 (BP 1 Location: Left arm, BP Patient Position: At rest)   Pulse 72   Temp 97.5 °F (36.4 °C)   Resp 18   Ht 5' 8\" (1.727 m)   Wt 62.2 kg (137 lb 1.6 oz)   SpO2 95%   BMI 20.85 kg/m²       O2 Device: Room air   O2 Flow Rate (L/min): 2 l/min   Temp (24hrs), Av.2 °F (36.8 °C), Min:97.5 °F (36.4 °C), Max:98.6 °F (37 °C)       Intake/Output:   Last shift:      No intake/output data recorded.   Last 3 shifts:  1901 -  0700  In: -   Out: 980 [Urine:980]    Intake/Output Summary (Last 24 hours) at 2020 0935  Last data filed at 2020 0145  Gross per 24 hour   Intake --   Output 580 ml   Net -580 ml      Physical Exam: NCAT, confused, on RA no WOB, normal symmetric chest expansion, no abd paradox, no edema , trachea midline, no cyanosis     Data review:     Recent Results (from the past 24 hour(s))   GLUCOSE, POC    Collection Time: 20 11:32 AM   Result Value Ref Range    Glucose (POC) 191 (H) 65 - 100 mg/dL    Performed by Tamanna Sanchez    GLUCOSE, POC    Collection Time: 20  4:12 PM   Result Value Ref Range    Glucose (POC) 212 (H) 65 - 100 mg/dL    Performed by Alex Villa, POC    Collection Time: 09/03/20  9:48 PM   Result Value Ref Range    Glucose (POC) 224 (H) 65 - 100 mg/dL    Performed by [employer]    D DIMER    Collection Time: 09/04/20  3:43 AM   Result Value Ref Range    D-dimer 4.18 (H) 0.00 - 0.65 mg/L FEU   GLUCOSE, POC    Collection Time: 09/04/20  6:47 AM   Result Value Ref Range    Glucose (POC) 123 (H) 65 - 100 mg/dL    Performed by [employer]        Imaging:  I have personally reviewed the patients radiographs and have reviewed the reports:  CT chest with CAMERON 2.9 cm subsolid nodule and patchy GGOs + paraseptal emphysema no LAD no effusions        Stacy Glass MD

## 2020-09-04 NOTE — PROGRESS NOTES
Patient for discharge today. Appetite is good. Problem: Diabetes Self-Management  Goal: *Disease process and treatment process  Description: Define diabetes and identify own type of diabetes; list 3 options for treating diabetes. Outcome: Resolved/Met  Goal: *Incorporating nutritional management into lifestyle  Description: Describe effect of type, amount and timing of food on blood glucose; list 3 methods for planning meals. Outcome: Resolved/Met  Goal: *Incorporating physical activity into lifestyle  Description: State effect of exercise on blood glucose levels. Outcome: Resolved/Met  Goal: *Developing strategies to promote health/change behavior  Description: Define the ABC's of diabetes; identify appropriate screenings, schedule and personal plan for screenings. Outcome: Resolved/Met  Goal: *Using medications safely  Description: State effect of diabetes medications on diabetes; name diabetes medication taking, action and side effects. Outcome: Resolved/Met  Goal: *Monitoring blood glucose, interpreting and using results  Description: Identify recommended blood glucose targets  and personal targets. Outcome: Resolved/Met  Goal: *Prevention, detection, treatment of acute complications  Description: List symptoms of hyper- and hypoglycemia; describe how to treat low blood sugar and actions for lowering  high blood glucose level. Outcome: Resolved/Met  Goal: *Prevention, detection and treatment of chronic complications  Description: Define the natural course of diabetes and describe the relationship of blood glucose levels to long term complications of diabetes.   Outcome: Resolved/Met  Goal: *Developing strategies to address psychosocial issues  Description: Describe feelings about living with diabetes; identify support needed and support network  Outcome: Resolved/Met  Goal: *Insulin pump training  Outcome: Resolved/Met  Goal: *Sick day guidelines  Outcome: Resolved/Met  Goal: *Patient Specific Goal (EDIT GOAL, INSERT TEXT)  Outcome: Resolved/Met     Problem: Patient Education: Go to Patient Education Activity  Goal: Patient/Family Education  Outcome: Resolved/Met     Problem: Falls - Risk of  Goal: *Absence of Falls  Description: Document Curtis Fall Risk and appropriate interventions in the flowsheet. Outcome: Resolved/Met  Note: Fall Risk Interventions:  Mobility Interventions: Communicate number of staff needed for ambulation/transfer    Mentation Interventions: Adequate sleep, hydration, pain control    Medication Interventions: Evaluate medications/consider consulting pharmacy    Elimination Interventions: Toileting schedule/hourly rounds, Bed/chair exit alarm              Problem: Patient Education: Go to Patient Education Activity  Goal: Patient/Family Education  Outcome: Resolved/Met     Problem: Breathing Pattern - Ineffective  Goal: *Absence of hypoxia  Outcome: Resolved/Met  Goal: *Use of effective breathing techniques  Outcome: Resolved/Met  Goal: *PALLIATIVE CARE:  Alleviation of Dyspnea  Outcome: Resolved/Met     Problem: Patient Education: Go to Patient Education Activity  Goal: Patient/Family Education  Outcome: Resolved/Met     Problem: Pressure Injury - Risk of  Goal: *Prevention of pressure injury  Description: Document Roderick Scale and appropriate interventions in the flowsheet.   Outcome: Resolved/Met  Note: Pressure Injury Interventions:  Sensory Interventions: Pressure redistribution bed/mattress (bed type), Float heels    Moisture Interventions: Absorbent underpads, Apply protective barrier, creams and emollients, Maintain skin hydration (lotion/cream)    Activity Interventions: Increase time out of bed, Pressure redistribution bed/mattress(bed type)    Mobility Interventions: HOB 30 degrees or less, Pressure redistribution bed/mattress (bed type)    Nutrition Interventions: Document food/fluid/supplement intake    Friction and Shear Interventions: Lift sheet, Minimize layers Problem: Patient Education: Go to Patient Education Activity  Goal: Patient/Family Education  Outcome: Resolved/Met     Problem: Airway Clearance - Ineffective  Goal: Achieve or maintain patent airway  Outcome: Resolved/Met     Problem: Gas Exchange - Impaired  Goal: Absence of hypoxia  Outcome: Resolved/Met  Goal: Promote optimal lung function  Outcome: Resolved/Met     Problem: Breathing Pattern - Ineffective  Goal: Ability to achieve and maintain a regular respiratory rate  Outcome: Resolved/Met     Problem:  Body Temperature -  Risk of, Imbalanced  Goal: Ability to maintain a body temperature within defined limits  Outcome: Resolved/Met  Goal: Will regain or maintain usual level of consciousness  Outcome: Resolved/Met  Goal: Complications related to the disease process, condition or treatment will be avoided or minimized  Outcome: Resolved/Met     Problem: Isolation Precautions - Risk of Spread of Infection  Goal: Prevent transmission of infectious organism to others  Outcome: Resolved/Met     Problem: Nutrition Deficits  Goal: Optimize nutrtional status  Outcome: Resolved/Met     Problem: Risk for Fluid Volume Deficit  Goal: Maintain normal heart rhythm  Outcome: Resolved/Met  Goal: Maintain absence of muscle cramping  Outcome: Resolved/Met  Goal: Maintain normal serum potassium, sodium, calcium, phosphorus, and pH  Outcome: Resolved/Met     Problem: Loneliness or Risk for Loneliness  Goal: Demonstrate positive use of time alone when socialization is not possible  Outcome: Resolved/Met     Problem: Fatigue  Goal: Verbalize increase energy and improved vitality  Outcome: Resolved/Met     Problem: Patient Education: Go to Patient Education Activity  Goal: Patient/Family Education  Outcome: Resolved/Met     Problem: Patient Education: Go to Patient Education Activity  Goal: Patient/Family Education  Outcome: Resolved/Met     Problem: Pneumonia: Day 1  Goal: Off Pathway (Use only if patient is Off Pathway)  Outcome: Resolved/Met  Goal: Activity/Safety  Outcome: Resolved/Met  Goal: Consults, if ordered  Outcome: Resolved/Met  Goal: Diagnostic Test/Procedures  Outcome: Resolved/Met  Goal: Nutrition/Diet  Outcome: Resolved/Met  Goal: Medications  Outcome: Resolved/Met  Goal: Respiratory  Outcome: Resolved/Met  Goal: Treatments/Interventions/Procedures  Outcome: Resolved/Met  Goal: Psychosocial  Outcome: Resolved/Met  Goal: *Oxygen saturation within defined limits  Outcome: Resolved/Met  Goal: *Influenza vaccine administered (October-March)  Outcome: Resolved/Met  Goal: *Pneumoccocal vaccine administered  Outcome: Resolved/Met  Goal: *Hemodynamically stable  Outcome: Resolved/Met  Goal: *Demonstrates progressive activity  Outcome: Resolved/Met  Goal: *Tolerating diet  Outcome: Resolved/Met     Problem: Pneumonia: Day 2  Goal: Off Pathway (Use only if patient is Off Pathway)  Outcome: Resolved/Met  Goal: Activity/Safety  Outcome: Resolved/Met  Goal: Consults, if ordered  Outcome: Resolved/Met  Goal: Diagnostic Test/Procedures  Outcome: Resolved/Met  Goal: Nutrition/Diet  Outcome: Resolved/Met  Goal: Discharge Planning  Outcome: Resolved/Met  Goal: Medications  Outcome: Resolved/Met  Goal: Respiratory  Outcome: Resolved/Met  Goal: Treatments/Interventions/Procedures  Outcome: Resolved/Met  Goal: Psychosocial  Outcome: Resolved/Met  Goal: *Oxygen saturation within defined limits  Outcome: Resolved/Met  Goal: *Hemodynamically stable  Outcome: Resolved/Met  Goal: *Demonstrates progressive activity  Outcome: Resolved/Met  Goal: *Tolerating diet  Outcome: Resolved/Met  Goal: *Optimal pain control at patient's stated goal  Outcome: Resolved/Met     Problem: Pneumonia: Day 3  Goal: Off Pathway (Use only if patient is Off Pathway)  Outcome: Resolved/Met  Goal: Activity/Safety  Outcome: Resolved/Met  Goal: Consults, if ordered  Outcome: Resolved/Met  Goal: Diagnostic Test/Procedures  Outcome: Resolved/Met  Goal: Nutrition/Diet  Outcome: Resolved/Met  Goal: Discharge Planning  Outcome: Resolved/Met  Goal: Medications  Outcome: Resolved/Met  Goal: Respiratory  Outcome: Resolved/Met  Goal: Treatments/Interventions/Procedures  Outcome: Resolved/Met  Goal: Psychosocial  Outcome: Resolved/Met  Goal: *Oxygen saturation within defined limits  Outcome: Resolved/Met  Goal: *Hemodynamically stable  Outcome: Resolved/Met  Goal: *Demonstrates progressive activity  Outcome: Resolved/Met  Goal: *Tolerating diet  Outcome: Resolved/Met  Goal: *Describes available resources and support systems  Outcome: Resolved/Met  Goal: *Optimal pain control at patient's stated goal  Outcome: Resolved/Met     Problem: Pneumonia: Day 4  Goal: Off Pathway (Use only if patient is Off Pathway)  Outcome: Resolved/Met  Goal: Activity/Safety  Outcome: Resolved/Met  Goal: Nutrition/Diet  Outcome: Resolved/Met  Goal: Discharge Planning  Outcome: Resolved/Met  Goal: Medications  Outcome: Resolved/Met  Goal: Respiratory  Outcome: Resolved/Met  Goal: Treatments/Interventions/Procedures  Outcome: Resolved/Met  Goal: Psychosocial  Outcome: Resolved/Met     Problem: Pneumonia: Discharge Outcomes  Goal: *Demonstrates progressive activity  Outcome: Resolved/Met  Goal: *Describes follow-up/return visits to physicians  Outcome: Resolved/Met  Goal: *Tolerating diet  Outcome: Resolved/Met  Goal: *Verbalizes name, dosage, time, side effects, and number of days to continue medications  Outcome: Resolved/Met  Goal: *Influenza immunization  Outcome: Resolved/Met  Goal: *Pneumococcal immunization  Outcome: Resolved/Met  Goal: *Respiratory status at baseline  Outcome: Resolved/Met  Goal: *Vital signs within defined limits  Outcome: Resolved/Met  Goal: *Describes available resources and support systems  Outcome: Resolved/Met  Goal: *Optimal pain control at patient's stated goal  Outcome: Resolved/Met     Problem: Nutrition Deficit  Goal: *Optimize nutritional status  Outcome: Resolved/Met

## 2020-09-04 NOTE — PROGRESS NOTES
Reviewed chart for transitions of care,and discussed in rounds. Patient is returning back to [facility] via [facility] transport at 1300. Report can be called to [phone]. Folder placed on chart, and cm left voicemail for his daughter Carl Chawla [phone].       Jori Rivera, [facility]

## 2020-09-08 ENCOUNTER — PATIENT OUTREACH (OUTPATIENT)
Dept: CASE MANAGEMENT | Age: 75
End: 2020-09-08

## 2020-09-08 LAB
C PNEUM IGG TITR SER IF: ABNORMAL {TITER}
C PNEUM IGM TITR SER IF: ABNORMAL {TITER}
C PSITTACI IGG TITR SER IF: ABNORMAL {TITER}
C PSITTACI IGM TITR SER IF: ABNORMAL {TITER}
C TRACH IGG TITR SER IF: ABNORMAL {TITER}
C TRACH IGM TITR SER IF: ABNORMAL {TITER}

## 2020-09-08 NOTE — PROGRESS NOTES
Transition of care outreach postponed for 14 days due to patient's discharge to SNF.   [facility] Hematb[facility]is ORANGE d/c to [facility] F/U 14d

## 2020-09-11 ENCOUNTER — EXTERNAL NURSING HOME DOCUMENTATION (OUTPATIENT)
Dept: INTERNAL MEDICINE CLINIC | Age: 75
End: 2020-09-11
Payer: MEDICARE

## 2020-09-11 DIAGNOSIS — J18.9 PNEUMONIA OF LEFT UPPER LOBE DUE TO INFECTIOUS ORGANISM: ICD-10-CM

## 2020-09-11 DIAGNOSIS — R91.8 LUNG MASS: ICD-10-CM

## 2020-09-11 DIAGNOSIS — U07.1 COVID-19: Primary | ICD-10-CM

## 2020-09-11 DIAGNOSIS — I25.10 CORONARY ARTERY DISEASE INVOLVING NATIVE HEART WITHOUT ANGINA PECTORIS, UNSPECIFIED VESSEL OR LESION TYPE: ICD-10-CM

## 2020-09-11 DIAGNOSIS — I10 ESSENTIAL HYPERTENSION: ICD-10-CM

## 2020-09-11 DIAGNOSIS — E11.65 TYPE 2 DIABETES MELLITUS WITH HYPERGLYCEMIA, WITH LONG-TERM CURRENT USE OF INSULIN (HCC): ICD-10-CM

## 2020-09-11 DIAGNOSIS — Z79.4 TYPE 2 DIABETES MELLITUS WITH HYPERGLYCEMIA, WITH LONG-TERM CURRENT USE OF INSULIN (HCC): ICD-10-CM

## 2020-09-11 PROCEDURE — 99306 1ST NF CARE HIGH MDM 50: CPT | Performed by: INTERNAL MEDICINE

## 2020-09-18 ENCOUNTER — EXTERNAL NURSING HOME DOCUMENTATION (OUTPATIENT)
Dept: INTERNAL MEDICINE CLINIC | Age: 75
End: 2020-09-18
Payer: MEDICARE

## 2020-09-18 DIAGNOSIS — E66.9 DIABETES MELLITUS TYPE 2 IN OBESE (HCC): ICD-10-CM

## 2020-09-18 DIAGNOSIS — E11.65 TYPE 2 DIABETES MELLITUS WITH HYPERGLYCEMIA, WITH LONG-TERM CURRENT USE OF INSULIN (HCC): ICD-10-CM

## 2020-09-18 DIAGNOSIS — J12.82 PNEUMONIA DUE TO COVID-19 VIRUS: Primary | ICD-10-CM

## 2020-09-18 DIAGNOSIS — E11.69 DIABETES MELLITUS TYPE 2 IN OBESE (HCC): ICD-10-CM

## 2020-09-18 DIAGNOSIS — U07.1 PNEUMONIA DUE TO COVID-19 VIRUS: Primary | ICD-10-CM

## 2020-09-18 DIAGNOSIS — Z79.4 TYPE 2 DIABETES MELLITUS WITH HYPERGLYCEMIA, WITH LONG-TERM CURRENT USE OF INSULIN (HCC): ICD-10-CM

## 2020-09-18 DIAGNOSIS — I25.10 CORONARY ARTERY DISEASE INVOLVING NATIVE HEART WITHOUT ANGINA PECTORIS, UNSPECIFIED VESSEL OR LESION TYPE: ICD-10-CM

## 2020-09-18 DIAGNOSIS — R91.8 LUNG MASS: ICD-10-CM

## 2020-09-18 PROCEDURE — 99309 SBSQ NF CARE MODERATE MDM 30: CPT | Performed by: INTERNAL MEDICINE

## 2020-09-22 ENCOUNTER — PATIENT OUTREACH (OUTPATIENT)
Dept: CASE MANAGEMENT | Age: 75
End: 2020-09-22

## 2020-09-22 NOTE — PROGRESS NOTES
Transition of care outreach postponed for 14 days due to patient's discharge to SNF.   D/C to [facility] 9/4/20 orange still admitted f/u 14d

## 2020-09-25 ENCOUNTER — EXTERNAL NURSING HOME DOCUMENTATION (OUTPATIENT)
Dept: INTERNAL MEDICINE CLINIC | Age: 75
End: 2020-09-25
Payer: MEDICARE

## 2020-09-25 DIAGNOSIS — Z79.4 TYPE 2 DIABETES MELLITUS WITH HYPERGLYCEMIA, WITH LONG-TERM CURRENT USE OF INSULIN (HCC): ICD-10-CM

## 2020-09-25 DIAGNOSIS — I10 ESSENTIAL HYPERTENSION: ICD-10-CM

## 2020-09-25 DIAGNOSIS — U07.1 PNEUMONIA DUE TO COVID-19 VIRUS: ICD-10-CM

## 2020-09-25 DIAGNOSIS — E11.65 TYPE 2 DIABETES MELLITUS WITH HYPERGLYCEMIA, WITH LONG-TERM CURRENT USE OF INSULIN (HCC): ICD-10-CM

## 2020-09-25 DIAGNOSIS — J12.82 PNEUMONIA DUE TO COVID-19 VIRUS: ICD-10-CM

## 2020-09-25 DIAGNOSIS — K59.00 CONSTIPATION, UNSPECIFIED CONSTIPATION TYPE: ICD-10-CM

## 2020-09-25 DIAGNOSIS — R41.3 MEMORY IMPAIRMENT: Primary | ICD-10-CM

## 2020-09-25 PROCEDURE — 99309 SBSQ NF CARE MODERATE MDM 30: CPT | Performed by: INTERNAL MEDICINE

## 2020-09-28 ENCOUNTER — EXTERNAL NURSING HOME DOCUMENTATION (OUTPATIENT)
Dept: INTERNAL MEDICINE CLINIC | Age: 75
End: 2020-09-28
Payer: MEDICARE

## 2020-09-28 DIAGNOSIS — Z79.4 TYPE 2 DIABETES MELLITUS WITH HYPERGLYCEMIA, WITH LONG-TERM CURRENT USE OF INSULIN (HCC): Primary | ICD-10-CM

## 2020-09-28 DIAGNOSIS — I10 ESSENTIAL HYPERTENSION: ICD-10-CM

## 2020-09-28 DIAGNOSIS — R91.8 LUNG MASS: ICD-10-CM

## 2020-09-28 DIAGNOSIS — E11.65 TYPE 2 DIABETES MELLITUS WITH HYPERGLYCEMIA, WITH LONG-TERM CURRENT USE OF INSULIN (HCC): Primary | ICD-10-CM

## 2020-09-28 PROCEDURE — 99309 SBSQ NF CARE MODERATE MDM 30: CPT | Performed by: INTERNAL MEDICINE

## 2020-09-28 NOTE — PROGRESS NOTES
HISTORY OF PRESENT ILLNESS  Stefania Tillman is a 76 y.o. male. This patient is currently under the care of Dr. Surya Zamudio at [facility].  The patient's past medical history includes seizure disorder, dementia, diabetes, hyperlipidemia, hypertension, CAD, and CVA. Patient was recently readmitted back to [facility]. In the ER with AMS. CT of head was done, showed old infarct.  CT chest was done, showed focal patches of infiltrate in the left upper lobe bilaterally, suspicious for infection.  He was admitted to the hospital, received oxygen, completed Rocephin and Azithromycin courses.  Repeat chest x-ray was done because of his spiked fever, but he was found to have possible aspiration pneumonia. He was treated again with Zosyn followed by PO Levaquin and Flagyl. Patient was also COVID positive while admitted. Patients xray showed a pulmonary nodule. Pulmonary was consulted and wanted a follow up in 4-6 weeks for a CT of the chest  HPI    Review of Systems   Unable to perform ROS: Dementia   Musculoskeletal: Negative for joint pain. Physical Exam  Vitals signs and nursing note reviewed. Constitutional:       Appearance: He is not ill-appearing. HENT:      Head: Normocephalic and atraumatic. Neck:      Musculoskeletal: Neck supple. Cardiovascular:      Pulses: Normal pulses. Pulmonary:      Effort: Pulmonary effort is normal.      Breath sounds: Examination of the right-upper field reveals decreased breath sounds. Examination of the left-upper field reveals decreased breath sounds. Decreased breath sounds present. No wheezing. Abdominal:      General: Bowel sounds are normal. There is no distension. Palpations: Abdomen is soft. Musculoskeletal:      Right lower leg: No edema. Left lower leg: No edema. Skin:     General: Skin is warm. Neurological:      Mental Status: He is alert. Mental status is at baseline.    Psychiatric:         Mood and Affect: Mood normal.         ASSESSMENT and PLAN  Diagnoses and all orders for this visit:    1. Type 2 diabetes mellitus with hyperglycemia, with long-term current use of insulin ([facility] [address]    2. Essential hypertension    3. Lung mass        PLAN;  1. Order placed to see pulmonary for CT follow up  2. continue current plan   3.  Nursing to report changes to MD or NP    lab results and schedule of future lab studies reviewed with patient  reviewed diet, exercise and weight control  reviewed medications and side effects in detail

## 2020-10-06 ENCOUNTER — PATIENT OUTREACH (OUTPATIENT)
Dept: CASE MANAGEMENT | Age: 75
End: 2020-10-06

## 2020-10-09 ENCOUNTER — EXTERNAL NURSING HOME DOCUMENTATION (OUTPATIENT)
Dept: INTERNAL MEDICINE CLINIC | Age: 75
End: 2020-10-09

## 2020-10-09 DIAGNOSIS — R41.3 MEMORY CHANGE: ICD-10-CM

## 2020-10-09 DIAGNOSIS — E11.65 TYPE 2 DIABETES MELLITUS WITH HYPERGLYCEMIA, WITH LONG-TERM CURRENT USE OF INSULIN (HCC): ICD-10-CM

## 2020-10-09 DIAGNOSIS — J12.82 PNEUMONIA DUE TO COVID-19 VIRUS: Primary | ICD-10-CM

## 2020-10-09 DIAGNOSIS — I10 ESSENTIAL HYPERTENSION: ICD-10-CM

## 2020-10-09 DIAGNOSIS — Z79.4 TYPE 2 DIABETES MELLITUS WITH HYPERGLYCEMIA, WITH LONG-TERM CURRENT USE OF INSULIN (HCC): ICD-10-CM

## 2020-10-09 DIAGNOSIS — U07.1 PNEUMONIA DUE TO COVID-19 VIRUS: Primary | ICD-10-CM

## 2020-10-09 DIAGNOSIS — I25.10 CORONARY ARTERY DISEASE INVOLVING NATIVE HEART WITHOUT ANGINA PECTORIS, UNSPECIFIED VESSEL OR LESION TYPE: ICD-10-CM

## 2020-11-24 ENCOUNTER — EXTERNAL NURSING HOME DOCUMENTATION (OUTPATIENT)
Dept: INTERNAL MEDICINE CLINIC | Age: 75
End: 2020-11-24
Payer: MEDICARE

## 2020-11-24 DIAGNOSIS — U07.1 COVID-19: ICD-10-CM

## 2020-11-24 DIAGNOSIS — I25.10 CORONARY ARTERY DISEASE INVOLVING NATIVE HEART WITHOUT ANGINA PECTORIS, UNSPECIFIED VESSEL OR LESION TYPE: ICD-10-CM

## 2020-11-24 DIAGNOSIS — I10 ESSENTIAL HYPERTENSION: ICD-10-CM

## 2020-11-24 DIAGNOSIS — E11.65 TYPE 2 DIABETES MELLITUS WITH HYPERGLYCEMIA, WITH LONG-TERM CURRENT USE OF INSULIN (HCC): ICD-10-CM

## 2020-11-24 DIAGNOSIS — Z79.4 TYPE 2 DIABETES MELLITUS WITH HYPERGLYCEMIA, WITH LONG-TERM CURRENT USE OF INSULIN (HCC): ICD-10-CM

## 2020-11-24 DIAGNOSIS — F03.91 DEMENTIA WITH BEHAVIORAL DISTURBANCE, UNSPECIFIED DEMENTIA TYPE: Primary | ICD-10-CM

## 2020-11-24 PROCEDURE — 99309 SBSQ NF CARE MODERATE MDM 30: CPT | Performed by: INTERNAL MEDICINE

## 2021-01-15 ENCOUNTER — OFFICE VISIT (OUTPATIENT)
Dept: INTERNAL MEDICINE CLINIC | Age: 76
End: 2021-01-15
Payer: MEDICARE

## 2021-01-15 DIAGNOSIS — F03.91 DEMENTIA WITH BEHAVIORAL DISTURBANCE, UNSPECIFIED DEMENTIA TYPE: Primary | ICD-10-CM

## 2021-01-15 DIAGNOSIS — I10 ESSENTIAL HYPERTENSION: ICD-10-CM

## 2021-01-15 DIAGNOSIS — Z79.4 TYPE 2 DIABETES MELLITUS WITH HYPERGLYCEMIA, WITH LONG-TERM CURRENT USE OF INSULIN (HCC): ICD-10-CM

## 2021-01-15 DIAGNOSIS — E11.65 TYPE 2 DIABETES MELLITUS WITH HYPERGLYCEMIA, WITH LONG-TERM CURRENT USE OF INSULIN (HCC): ICD-10-CM

## 2021-01-15 PROCEDURE — 99309 SBSQ NF CARE MODERATE MDM 30: CPT | Performed by: INTERNAL MEDICINE

## 2021-01-15 NOTE — PROGRESS NOTES
THIS IS NOT A COMPLETED MEDICAL RECORD ON THIS PATIENT. THIS IS A PATIENT OF [facility]   PLEASE CONTACT THE FACILITY BELOW FOR MORE INFORMATION ON THIS PATIENT   THE COMPLETE RECORD RESIDES WITH THIS LONG TERM CARE FACILITY    THIS IS NOT A COMPLETED MEDICAL RECORD ON THIS PATIENT. THIS IS A PATIENT OF [facility]   PLEASE CONTACT THE FACILITY BELOW FOR MORE INFORMATION ON THIS PATIENT   THE COMPLETE RECORD RESIDES WITH THIS LONG TERM CARE FACILITY    HISTORY OF PRESENT ILLNESS  Yann Epstein is a 76 y.o. male. This patient is currently under the care of Dr. Johanna Presley at [facility].  The patient's past medical history includes seizure disorder, dementia, diabetes, hyperlipidemia, hypertension, CAD, and CVA. Saw patient for a follow up. Nursing reports no concerns. ADL's are partially assisted. PO intake is stable. Vitals are stable. HPI    Review of Systems   Constitutional: Negative for fever. Respiratory: Negative for cough. Cardiovascular: Negative for chest pain. Gastrointestinal: Negative for abdominal pain. Musculoskeletal: Positive for joint pain. Negative for falls. Neurological: Negative for headaches. Physical Exam  Vitals signs and nursing note reviewed. Cardiovascular:      Rate and Rhythm: Normal rate and regular rhythm. Pulmonary:      Effort: Pulmonary effort is normal.      Breath sounds: Normal breath sounds. Abdominal:      General: Bowel sounds are normal. There is no distension. Palpations: Abdomen is soft. Musculoskeletal:      Right lower leg: No edema. Left lower leg: No edema. Neurological:      Mental Status: He is alert. Mental status is at baseline. Comments: Patient can answer simple questions appropriately          ASSESSMENT and PLAN  Diagnoses and all orders for this visit:    1. Dementia with behavioral disturbance, unspecified dementia type ([facility] [address]    2.  Type 2 diabetes mellitus with hyperglycemia, with long-term current use of insulin ([facility] [address]    3. Essential hypertension        PLAN:  1. Continue plan of care   2.  Nursing to report changes to MD/NP    reviewed medications and side effects in detail

## 2021-01-29 ENCOUNTER — EXTERNAL NURSING HOME DOCUMENTATION (OUTPATIENT)
Dept: INTERNAL MEDICINE CLINIC | Age: 76
End: 2021-01-29
Payer: MEDICARE

## 2021-01-29 DIAGNOSIS — R62.7 FTT (FAILURE TO THRIVE) IN ADULT: Primary | ICD-10-CM

## 2021-01-29 DIAGNOSIS — G40.909 SEIZURE DISORDER (HCC): ICD-10-CM

## 2021-01-29 DIAGNOSIS — E11.65 TYPE 2 DIABETES MELLITUS WITH HYPERGLYCEMIA, WITH LONG-TERM CURRENT USE OF INSULIN (HCC): ICD-10-CM

## 2021-01-29 DIAGNOSIS — I10 ESSENTIAL HYPERTENSION: ICD-10-CM

## 2021-01-29 DIAGNOSIS — F03.91 DEMENTIA WITH BEHAVIORAL DISTURBANCE, UNSPECIFIED DEMENTIA TYPE: ICD-10-CM

## 2021-01-29 DIAGNOSIS — Z79.4 TYPE 2 DIABETES MELLITUS WITH HYPERGLYCEMIA, WITH LONG-TERM CURRENT USE OF INSULIN (HCC): ICD-10-CM

## 2021-01-29 PROCEDURE — 99309 SBSQ NF CARE MODERATE MDM 30: CPT | Performed by: INTERNAL MEDICINE

## 2021-03-26 ENCOUNTER — EXTERNAL NURSING HOME DOCUMENTATION (OUTPATIENT)
Dept: INTERNAL MEDICINE CLINIC | Age: 76
End: 2021-03-26
Payer: MEDICAID

## 2021-03-26 DIAGNOSIS — R91.8 LUNG MASS: ICD-10-CM

## 2021-03-26 DIAGNOSIS — I10 ESSENTIAL HYPERTENSION: ICD-10-CM

## 2021-03-26 DIAGNOSIS — R26.9 GAIT ABNORMALITY: ICD-10-CM

## 2021-03-26 DIAGNOSIS — E11.65 TYPE 2 DIABETES MELLITUS WITH HYPERGLYCEMIA, WITH LONG-TERM CURRENT USE OF INSULIN (HCC): Primary | ICD-10-CM

## 2021-03-26 DIAGNOSIS — I50.32 DIASTOLIC CHF, CHRONIC (HCC): ICD-10-CM

## 2021-03-26 DIAGNOSIS — Z79.4 TYPE 2 DIABETES MELLITUS WITH HYPERGLYCEMIA, WITH LONG-TERM CURRENT USE OF INSULIN (HCC): Primary | ICD-10-CM

## 2021-03-26 PROCEDURE — 99309 SBSQ NF CARE MODERATE MDM 30: CPT | Performed by: INTERNAL MEDICINE

## 2021-05-28 ENCOUNTER — EXTERNAL NURSING HOME DOCUMENTATION (OUTPATIENT)
Dept: INTERNAL MEDICINE CLINIC | Age: 76
End: 2021-05-28
Payer: MEDICARE

## 2021-05-28 DIAGNOSIS — E11.65 TYPE 2 DIABETES MELLITUS WITH HYPERGLYCEMIA, WITH LONG-TERM CURRENT USE OF INSULIN (HCC): ICD-10-CM

## 2021-05-28 DIAGNOSIS — Z79.4 TYPE 2 DIABETES MELLITUS WITH HYPERGLYCEMIA, WITH LONG-TERM CURRENT USE OF INSULIN (HCC): ICD-10-CM

## 2021-05-28 DIAGNOSIS — I10 ESSENTIAL HYPERTENSION: ICD-10-CM

## 2021-05-28 DIAGNOSIS — I25.10 CORONARY ARTERY DISEASE INVOLVING NATIVE HEART WITHOUT ANGINA PECTORIS, UNSPECIFIED VESSEL OR LESION TYPE: Primary | ICD-10-CM

## 2021-05-28 DIAGNOSIS — G40.909 SEIZURE DISORDER (HCC): ICD-10-CM

## 2021-05-28 DIAGNOSIS — I50.32 DIASTOLIC CHF, CHRONIC (HCC): ICD-10-CM

## 2021-05-28 PROCEDURE — 99309 SBSQ NF CARE MODERATE MDM 30: CPT | Performed by: INTERNAL MEDICINE

## 2021-05-28 NOTE — PROGRESS NOTES
Progress Note     Subjective:  Mr. Arelis Chase is doing well right now. He was frail for long time after COVID infection. He is doing well. No cough right now. Diabetes is running high. He is trying to watch his diet and exercise. Objective:    VITALS:  T:  97.5 degrees Fahrenheit. P:  78 per minute. BP:  145/65 mmHg. SaO2:  99% on room air. Blood sugar is 245. Weight is 138 pounds. HEENT:  No abnormality detected. NECK:  Supple, no JVD, no carotid bruits, no thyromegaly. CHEST:  Chest is clear to auscultation bilaterally. No rales, no rhonchi. CARDIOVASCULAR:  S1, S2 normal.  Regular rate and rhythm. ABDOMEN:  Soft, nontender, nondistended, bowel sounds present. EXTREMITIES:  No edema is noted. Dorsalis pedis pulse normal.    NEUROLOGICAL:  Alert and oriented x3. No other neurological deficit. Laboratory Data:   Labs were reviewed. A1c over 9, that was done in March. Creatinine was 1.7. Assessment and Plan:   1. Type 2 diabetes mellitus. Uncontrolled. A1c was very high. We will increase Lantus dosage. We will recheck CMP and hemoglobin A1c next month. 2. CKD. Creatinine is still elevated. We will repeat CMP. 3. Coronary artery disease. The patient is on aspirin, Plavix, statin and metoprolol. We will continue it. 4. Seizure disorder. He is on Vimpat and Keppra, doing well. 5. Frail elderly since COVID infection. He is improving slowly. 6. Gait weakness. The patient to continue physical therapy and occupational therapy. THIS IS NOT A COMPLETE MEDICAL RECORD ON THIS PATIENT.    THIS IS A PATIENT AT [facility].    PLEASE CONTACT THE FACILITY LISTED BELOW FOR MORE INFORMATION ON THIS PATIENT.    THE COMPLETE RECORD RESIDES WITH THIS LONG TERM CARE FACILITY

## 2021-07-14 ENCOUNTER — APPOINTMENT (OUTPATIENT)
Dept: CT IMAGING | Age: 76
DRG: 087 | End: 2021-07-14
Attending: FAMILY MEDICINE
Payer: MEDICARE

## 2021-07-14 ENCOUNTER — HOSPITAL ENCOUNTER (INPATIENT)
Age: 76
LOS: 2 days | Discharge: NURSING, REHAB OR LONG-TERM CARE | DRG: 087 | End: 2021-07-16
Attending: EMERGENCY MEDICINE | Admitting: FAMILY MEDICINE
Payer: MEDICARE

## 2021-07-14 ENCOUNTER — APPOINTMENT (OUTPATIENT)
Dept: CT IMAGING | Age: 76
DRG: 087 | End: 2021-07-14
Attending: EMERGENCY MEDICINE
Payer: MEDICARE
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
## 2021-07-14 DIAGNOSIS — S06.5XAA SDH (SUBDURAL HEMATOMA): ICD-10-CM

## 2021-07-14 LAB
ALBUMIN SERPL-MCNC: 3 G/DL (ref 3.5–5)
ALBUMIN/GLOB SERPL: 0.7 {RATIO} (ref 1.1–2.2)
ALP SERPL-CCNC: 78 U/L (ref 45–117)
ALT SERPL-CCNC: 32 U/L (ref 12–78)
ANION GAP SERPL CALC-SCNC: 4 MMOL/L (ref 5–15)
ANION GAP SERPL CALC-SCNC: 7 MMOL/L (ref 5–15)
AST SERPL-CCNC: 36 U/L (ref 15–37)
BASOPHILS # BLD: 0.1 K/UL (ref 0–0.1)
BASOPHILS NFR BLD: 1 % (ref 0–1)
BILIRUB SERPL-MCNC: 0.3 MG/DL (ref 0.2–1)
BUN SERPL-MCNC: 22 MG/DL (ref 6–20)
BUN SERPL-MCNC: 23 MG/DL (ref 6–20)
BUN/CREAT SERPL: 18 (ref 12–20)
BUN/CREAT SERPL: 21 (ref 12–20)
CALCIUM SERPL-MCNC: 9.2 MG/DL (ref 8.5–10.1)
CALCIUM SERPL-MCNC: 9.5 MG/DL (ref 8.5–10.1)
CHLORIDE SERPL-SCNC: 112 MMOL/L (ref 97–108)
CHLORIDE SERPL-SCNC: 114 MMOL/L (ref 97–108)
CHOLEST SERPL-MCNC: 115 MG/DL
CO2 SERPL-SCNC: 21 MMOL/L (ref 21–32)
CO2 SERPL-SCNC: 23 MMOL/L (ref 21–32)
COMMENT, HOLDF: NORMAL
CREAT SERPL-MCNC: 1.07 MG/DL (ref 0.7–1.3)
CREAT SERPL-MCNC: 1.23 MG/DL (ref 0.7–1.3)
DIFFERENTIAL METHOD BLD: ABNORMAL
EOSINOPHIL # BLD: 0.1 K/UL (ref 0–0.4)
EOSINOPHIL NFR BLD: 1 % (ref 0–7)
ERYTHROCYTE [DISTWIDTH] IN BLOOD BY AUTOMATED COUNT: 13.9 % (ref 11.5–14.5)
EST. AVERAGE GLUCOSE BLD GHB EST-MCNC: 157 MG/DL
GLOBULIN SER CALC-MCNC: 4.3 G/DL (ref 2–4)
GLUCOSE BLD STRIP.AUTO-MCNC: 238 MG/DL (ref 65–117)
GLUCOSE SERPL-MCNC: 117 MG/DL (ref 65–100)
GLUCOSE SERPL-MCNC: 117 MG/DL (ref 65–100)
HBA1C MFR BLD: 7.1 % (ref 4–5.6)
HCT VFR BLD AUTO: 45.7 % (ref 36.6–50.3)
HDLC SERPL-MCNC: 60 MG/DL
HDLC SERPL: 1.9 {RATIO} (ref 0–5)
HGB BLD-MCNC: 13.8 G/DL (ref 12.1–17)
IMM GRANULOCYTES # BLD AUTO: 0 K/UL (ref 0–0.04)
IMM GRANULOCYTES NFR BLD AUTO: 1 % (ref 0–0.5)
INR PPP: 1 (ref 0.9–1.1)
LDLC SERPL CALC-MCNC: 33.2 MG/DL (ref 0–100)
LYMPHOCYTES # BLD: 1.6 K/UL (ref 0.8–3.5)
LYMPHOCYTES NFR BLD: 23 % (ref 12–49)
MCH RBC QN AUTO: 28.2 PG (ref 26–34)
MCHC RBC AUTO-ENTMCNC: 30.2 G/DL (ref 30–36.5)
MCV RBC AUTO: 93.5 FL (ref 80–99)
MONOCYTES # BLD: 0.5 K/UL (ref 0–1)
MONOCYTES NFR BLD: 8 % (ref 5–13)
NEUTS SEG # BLD: 4.4 K/UL (ref 1.8–8)
NEUTS SEG NFR BLD: 66 % (ref 32–75)
NRBC # BLD: 0 K/UL (ref 0–0.01)
NRBC BLD-RTO: 0 PER 100 WBC
PLATELET # BLD AUTO: 167 K/UL (ref 150–400)
PMV BLD AUTO: 11.1 FL (ref 8.9–12.9)
POTASSIUM SERPL-SCNC: 4.9 MMOL/L (ref 3.5–5.1)
POTASSIUM SERPL-SCNC: 5.3 MMOL/L (ref 3.5–5.1)
PROT SERPL-MCNC: 7.3 G/DL (ref 6.4–8.2)
PROTHROMBIN TIME: 10.8 SEC (ref 9–11.1)
RBC # BLD AUTO: 4.89 M/UL (ref 4.1–5.7)
SAMPLES BEING HELD,HOLD: NORMAL
SERVICE CMNT-IMP: ABNORMAL
SODIUM SERPL-SCNC: 140 MMOL/L (ref 136–145)
SODIUM SERPL-SCNC: 141 MMOL/L (ref 136–145)
TRIGL SERPL-MCNC: 109 MG/DL (ref ?–150)
VLDLC SERPL CALC-MCNC: 21.8 MG/DL
WBC # BLD AUTO: 6.7 K/UL (ref 4.1–11.1)

## 2021-07-14 PROCEDURE — 36415 COLL VENOUS BLD VENIPUNCTURE: CPT

## 2021-07-14 PROCEDURE — 77030010507 HC ADH SKN DERMBND J&J -B

## 2021-07-14 PROCEDURE — 83036 HEMOGLOBIN GLYCOSYLATED A1C: CPT

## 2021-07-14 PROCEDURE — 70450 CT HEAD/BRAIN W/O DYE: CPT

## 2021-07-14 PROCEDURE — 65660000000 HC RM CCU STEPDOWN

## 2021-07-14 PROCEDURE — 82962 GLUCOSE BLOOD TEST: CPT

## 2021-07-14 PROCEDURE — 74011250636 HC RX REV CODE- 250/636: Performed by: FAMILY MEDICINE

## 2021-07-14 PROCEDURE — 80053 COMPREHEN METABOLIC PANEL: CPT

## 2021-07-14 PROCEDURE — 74011000258 HC RX REV CODE- 258: Performed by: FAMILY MEDICINE

## 2021-07-14 PROCEDURE — 99285 EMERGENCY DEPT VISIT HI MDM: CPT

## 2021-07-14 PROCEDURE — 74011000250 HC RX REV CODE- 250: Performed by: EMERGENCY MEDICINE

## 2021-07-14 PROCEDURE — 75810000293 HC SIMP/SUPERF WND  RPR

## 2021-07-14 PROCEDURE — 0HQ1XZZ REPAIR FACE SKIN, EXTERNAL APPROACH: ICD-10-PCS | Performed by: HOSPITALIST

## 2021-07-14 PROCEDURE — 72125 CT NECK SPINE W/O DYE: CPT

## 2021-07-14 PROCEDURE — 85610 PROTHROMBIN TIME: CPT

## 2021-07-14 PROCEDURE — 74011250637 HC RX REV CODE- 250/637: Performed by: FAMILY MEDICINE

## 2021-07-14 PROCEDURE — 85025 COMPLETE CBC W/AUTO DIFF WBC: CPT

## 2021-07-14 PROCEDURE — 93005 ELECTROCARDIOGRAM TRACING: CPT

## 2021-07-14 PROCEDURE — 74011636637 HC RX REV CODE- 636/637: Performed by: FAMILY MEDICINE

## 2021-07-14 PROCEDURE — 80061 LIPID PANEL: CPT

## 2021-07-14 RX ORDER — CARVEDILOL 3.12 MG/1
6.25 TABLET ORAL 2 TIMES DAILY WITH MEALS
Status: DISCONTINUED | OUTPATIENT
Start: 2021-07-14 | End: 2021-07-15

## 2021-07-14 RX ORDER — PRAVASTATIN SODIUM 10 MG/1
10 TABLET ORAL
Status: DISCONTINUED | OUTPATIENT
Start: 2021-07-14 | End: 2021-07-15

## 2021-07-14 RX ORDER — LACOSAMIDE 50 MG/1
200 TABLET ORAL 2 TIMES DAILY
Status: DISCONTINUED | OUTPATIENT
Start: 2021-07-15 | End: 2021-07-16 | Stop reason: HOSPADM

## 2021-07-14 RX ORDER — ACETAMINOPHEN 650 MG/1
650 SUPPOSITORY RECTAL
Status: DISCONTINUED | OUTPATIENT
Start: 2021-07-14 | End: 2021-07-16 | Stop reason: HOSPADM

## 2021-07-14 RX ORDER — TOPIRAMATE 100 MG/1
100 TABLET, FILM COATED ORAL 2 TIMES DAILY
Status: DISCONTINUED | OUTPATIENT
Start: 2021-07-15 | End: 2021-07-16 | Stop reason: HOSPADM

## 2021-07-14 RX ORDER — INSULIN LISPRO 100 [IU]/ML
INJECTION, SOLUTION INTRAVENOUS; SUBCUTANEOUS EVERY 6 HOURS
Status: DISCONTINUED | OUTPATIENT
Start: 2021-07-14 | End: 2021-07-14

## 2021-07-14 RX ORDER — DEXTROSE 50 % IN WATER (D50W) INTRAVENOUS SYRINGE
25-50 AS NEEDED
Status: DISCONTINUED | OUTPATIENT
Start: 2021-07-14 | End: 2021-07-16 | Stop reason: HOSPADM

## 2021-07-14 RX ORDER — BUMETANIDE 1 MG/1
1 TABLET ORAL DAILY
Status: DISCONTINUED | OUTPATIENT
Start: 2021-07-15 | End: 2021-07-15

## 2021-07-14 RX ORDER — ONDANSETRON 2 MG/ML
4 INJECTION INTRAMUSCULAR; INTRAVENOUS
Status: DISCONTINUED | OUTPATIENT
Start: 2021-07-14 | End: 2021-07-16 | Stop reason: HOSPADM

## 2021-07-14 RX ORDER — ASCORBIC ACID 500 MG
500 TABLET ORAL 2 TIMES DAILY
Status: DISCONTINUED | OUTPATIENT
Start: 2021-07-14 | End: 2021-07-15

## 2021-07-14 RX ORDER — INSULIN LISPRO 100 [IU]/ML
INJECTION, SOLUTION INTRAVENOUS; SUBCUTANEOUS
Status: DISCONTINUED | OUTPATIENT
Start: 2021-07-14 | End: 2021-07-16 | Stop reason: HOSPADM

## 2021-07-14 RX ORDER — BACITRACIN 500 UNIT/G
1 PACKET (EA) TOPICAL
Status: COMPLETED | OUTPATIENT
Start: 2021-07-14 | End: 2021-07-14

## 2021-07-14 RX ORDER — ALLOPURINOL 100 MG/1
100 TABLET ORAL DAILY
Status: DISCONTINUED | OUTPATIENT
Start: 2021-07-15 | End: 2021-07-16 | Stop reason: HOSPADM

## 2021-07-14 RX ORDER — ACETAMINOPHEN 325 MG/1
650 TABLET ORAL
Status: DISCONTINUED | OUTPATIENT
Start: 2021-07-14 | End: 2021-07-16 | Stop reason: HOSPADM

## 2021-07-14 RX ORDER — INSULIN GLARGINE 100 [IU]/ML
10 INJECTION, SOLUTION SUBCUTANEOUS
Status: DISCONTINUED | OUTPATIENT
Start: 2021-07-14 | End: 2021-07-16 | Stop reason: HOSPADM

## 2021-07-14 RX ORDER — HYDRALAZINE HYDROCHLORIDE 20 MG/ML
10 INJECTION INTRAMUSCULAR; INTRAVENOUS
Status: DISCONTINUED | OUTPATIENT
Start: 2021-07-14 | End: 2021-07-16 | Stop reason: HOSPADM

## 2021-07-14 RX ORDER — LIDOCAINE HYDROCHLORIDE AND EPINEPHRINE 10; 10 MG/ML; UG/ML
1.5 INJECTION, SOLUTION INFILTRATION; PERINEURAL
Status: COMPLETED | OUTPATIENT
Start: 2021-07-14 | End: 2021-07-14

## 2021-07-14 RX ORDER — FAMOTIDINE 20 MG/1
20 TABLET, FILM COATED ORAL 2 TIMES DAILY
Status: DISCONTINUED | OUTPATIENT
Start: 2021-07-14 | End: 2021-07-16 | Stop reason: HOSPADM

## 2021-07-14 RX ORDER — HYDROCODONE BITARTRATE AND ACETAMINOPHEN 5; 325 MG/1; MG/1
1 TABLET ORAL
Status: DISCONTINUED | OUTPATIENT
Start: 2021-07-14 | End: 2021-07-16 | Stop reason: HOSPADM

## 2021-07-14 RX ORDER — MAGNESIUM SULFATE 100 %
4 CRYSTALS MISCELLANEOUS AS NEEDED
Status: DISCONTINUED | OUTPATIENT
Start: 2021-07-14 | End: 2021-07-16 | Stop reason: HOSPADM

## 2021-07-14 RX ORDER — NALOXONE HYDROCHLORIDE 0.4 MG/ML
0.4 INJECTION, SOLUTION INTRAMUSCULAR; INTRAVENOUS; SUBCUTANEOUS AS NEEDED
Status: DISCONTINUED | OUTPATIENT
Start: 2021-07-14 | End: 2021-07-16 | Stop reason: HOSPADM

## 2021-07-14 RX ADMIN — LEVETIRACETAM 500 MG: 100 INJECTION, SOLUTION INTRAVENOUS at 22:44

## 2021-07-14 RX ADMIN — INSULIN LISPRO 1 UNITS: 100 INJECTION, SOLUTION INTRAVENOUS; SUBCUTANEOUS at 22:42

## 2021-07-14 RX ADMIN — LIDOCAINE HYDROCHLORIDE,EPINEPHRINE BITARTRATE 15 MG: 10; .01 INJECTION, SOLUTION INFILTRATION; PERINEURAL at 15:23

## 2021-07-14 RX ADMIN — BACITRACIN 1 PACKET: 500 OINTMENT TOPICAL at 15:23

## 2021-07-14 RX ADMIN — FAMOTIDINE 20 MG: 20 TABLET ORAL at 18:00

## 2021-07-14 NOTE — H&P
History & Physical    Primary Care Provider: Jeremy Yee MD  Source of Information: Patient and chart review    History of Presenting Illness:   Vicki Buenrostro is a 68 y.o. male who presents with presents fall    History was obtained from the patient as well as chart review    Patient apparently lives at [facility], had a fall today which was unwitnessed. Patient struck the front part of his head. Post that started having some bleeding from the laceration and was sent to the hospital for further management evaluation. He continued to have headache, came to the ER, had a CT scan done and was found to have subdural hematoma and was requested to be admitted to the hospital service. Currently patient reports of some headache but denies any other complaints or problems. Patient is a poor historian and reports that he feels like his head is swimming. The patient denies any  blurry vision, sore throat, trouble swallowing, trouble with speech, chest pain, SOB, cough, fever, chills, N/V/D, abd pain, urinary symptoms, constipation, recent travels, sick contacts, focal or generalized neurological symptoms,  falls, injuries, rashes, contact with COVID-19 diagnosed patients, hematemesis, melena, hemoptysis, hematuria, rashes, denies starting any new medications and denies any other concerns or problems besides as mentioned above. Review of Systems:  A comprehensive review of systems was negative except for that written in the History of Present Illness.    All other systems reviewed, pertinent positives and negatives noted in HPI    Past Medical History:   Diagnosis Date    Allergic rhinitis, cause unspecified 4/18/2011    [facility]'s [facility]     Arthritis     GOUT    Cancer ([facility] 09/20/2016    PROSTATE    Chronic obstructive pulmonary disease (HCC)     CHRONIC BRONCHITIS    CKD (chronic kidney disease), stage II     Congestive heart failure, unspecified 4/18/2011    (ON 9/20/16 PT & DTR STATE THEY DON'T REMEMBER THIS DIAGNOSIS)    Coronary Artery Disease 4/18/2011    Diabetes Mellitus Type I 4/18/2011    Erectile Dysfunction 4/18/2011    Hemorrhoids 4/18/2011    Hypertension     Pneumonia due to COVID-19 virus 8/27/2020    Seizures ([address]     STARTED 2005; \"BLACK-OUT Becky Caper", DTR SATES    Stroke ([facility]     MULTIPLE MINISTROKES, DTR REPORTS; LEFT-SIDED WEAKNESS    Thromboembolus ([address]     left leg  (NO PE)    Unspecified asthma(493.90) 4/18/2011      Past Surgical History:   Procedure Laterality Date    COLONOSCOPY N/A 10/9/2017    COLONOSCOPY performed by Kevin Kaba MD at [address] [place] [address] UNLIST  2006    CABG    VASCULAR SURGERY PROCEDURE UNLIST      PERIPHERAL ANGIOGRAM, STENTS LEFT LEG     Prior to Admission medications    Medication Sig Start Date End Date Taking? Authorizing Provider   aspirin 81 mg chewable tablet Take 1 Tab by mouth daily. 9/4/20   Shanta mSith MD   ascorbic acid, vitamin C, (VITAMIN C) 500 mg tablet Take 1 Tab by mouth two (2) times a day. 9/4/20   Shanta Smith MD   losartan (COZAAR) 25 mg tablet take 1 tablet by mouth once daily 9/4/20   Shanta Smith MD   metoprolol tartrate (LOPRESSOR) 25 mg tablet take 1 tablet by mouth twice a day 9/4/20   Shanta Smith MD   insulin glargine (LANTUS) 100 unit/mL injection 10 units subcut inj every night 9/4/20   Shanta Smith MD   bumetanide (BUMEX) 1 mg tablet take 1 tablet once daily 1/4/18   Lora Ferrara MD   levETIRAcetam (KEPPRA) 750 mg tablet Take 750 mg by mouth two (2) times a day. 3 tab, twice daily   Indications: PARTIAL EPILEPSY TREATMENT ADJUNCT 12/13/17   Rianna Garcia MD   lacosamide (VIMPAT) 200 mg tab tablet Take 200 mg by mouth two (2) times a day. Indications: PARTIAL EPILEPSY TREATMENT ADJUNCT 12/13/17   Rianna Garcia MD   topiramate (TOPAMAX) 100 mg tablet Take 100 mg by mouth two (2) times a day. Indications: COMPLEX-PARTIAL EPILEPSY 12/13/17   Kai Paul MD   carvedilol (COREG) 6.25 mg tablet Take 6.25 mg by mouth two (2) times daily (with meals). Indications: PREVENTION OF A. FIB POST CARDIO-THORACIC SURGERY    Kai Paul MD   clopidogrel (PLAVIX) 75 mg tab take 1 tablet by mouth once daily 9/5/17   MD Kalin Lloyd [place] [facility] [place] [place] Rollator walker 8/24/17   Jone Wilson MD   HYDROcodone-acetaminophen [facility]) 5-325 mg per tablet Take 1 Tab by mouth every four (4) hours as needed for Pain. Max Daily Amount: 6 Tabs. 7/25/17   Charyl Bio, DO   Insulin Needles, Disposable, (JAZMYN PEN NEEDLE) 32 gauge x 5/32\" ndle Use daily as directed with insulin pen. 7/17/17   Jone Wilson MD   pravastatin (PRAVACHOL) 40 mg tablet take 1 tablet once daily 6/8/17   Jone Wilson MD   glucose blood VI test strips (ONETOUCH ULTRA TEST) strip Test 2 times daily    ONETOUCH ULTRA 2 TEST STRIPS ONLY 10/7/16   Jone Wilosn MD   allopurinol (ZYLOPRIM) 100 mg tablet take 1 tablet by mouth once daily 9/15/16   Jone Wilson MD   ACCU-CHEK ALEXIS PLUS TEST STRP strip TEST BLOOD SUGARS 2 TIMES DAILY 9/29/15   MD Asha Lloyd misc Patient test 3 x daily  Dm 250 7/8/15   Jone Wilson MD     No Known Allergies   Family History   Problem Relation Age of Onset    Diabetes Mother     Hypertension Mother     Diabetes Father    Judie Nakai Anesth Problems Neg Hx       Family history was discussed with the patient, all pertinent and relevant details are mentioned as above, no other pertinent and relevant family history was noted on my discussion with the patient.   Patient specifically denies any history of Gaucher disease in the family  SOCIAL HISTORY:  Patient resides:  Independently    Assisted Living x   SNF    With family care       Smoking history:   None    Former x   Chronic      Alcohol history:   None    Social x   Chronic      Ambulates:   Independently x   w/cane    w/walker    w/wc    CODE STATUS:  DNR    Full x   Other      Objective:     Physical Exam:     Visit Vitals  BP (!) 149/77   Pulse (!) 56   Temp 97.9 °F (36.6 °C)   Resp 15   Wt 69 kg (152 lb 1.9 oz)   SpO2 94%   BMI 23.13 kg/m²      O2 Device: None (Room air)    General : alert x 2, awake, pleasant male, appears to be stated age  HEENT: PEERL, repaired laceration noted on the left frontal forehead moist mucus membrane, TM clear  Neck: supple,   Chest: Decreased basal breath sound  CVS: S1 S2 heard,   Abd: soft/ Non tender, non distended, BS physiological,   Ext: no clubbing, no cyanosis, no edema, brisk 2+ DP pulses  Neuro/Psych: pleasant mood and affect, CN 2-12 grossly intact, moves all 4, strength 5 x 5 in all extremities  Skin: warm     EKG:   Sinus arrhythmia with nonspecific ST change      Data Review:     Recent Days:  Recent Labs     07/14/21  1505   WBC 6.7   HGB 13.8   HCT 45.7        Recent Labs     07/14/21  1505      K 4.9   *   CO2 23   *   BUN 22*   CREA 1.23   CA 9.2   ALB 3.0*   ALT 32   INR 1.0     No results for input(s): PH, PCO2, PO2, HCO3, FIO2 in the last 72 hours. 24 Hour Results:  Recent Results (from the past 24 hour(s))   EKG, 12 LEAD, INITIAL    Collection Time: 07/14/21  2:38 PM   Result Value Ref Range    Ventricular Rate 51 BPM    Atrial Rate 52 BPM    QRS Duration 134 ms    Q-T Interval 446 ms    QTC Calculation (Bezet) 411 ms    Calculated R Axis -74 degrees    Calculated T Axis 17 degrees    Diagnosis       Wide QRS rhythm  Left axis deviation  Right bundle branch block  When compared with ECG of 27-AUG-2020 18:20,  Wide QRS rhythm has replaced Sinus rhythm  Vent. rate has decreased BY  26 BPM     CBC WITH AUTOMATED DIFF    Collection Time: 07/14/21  3:05 PM   Result Value Ref Range    WBC 6.7 4.1 - 11.1 K/uL    RBC 4.89 4. 10 - 5.70 M/uL    HGB 13.8 12.1 - 17.0 g/dL    HCT 45.7 36.6 - 50.3 %    MCV 93.5 80.0 - 99.0 FL    MCH 28.2 26.0 - 34.0 PG    MCHC 30.2 30.0 - 36.5 g/dL    RDW 13.9 11.5 - 14.5 %    PLATELET 379 711 - 961 K/uL    MPV 11.1 8.9 - 12.9 FL    NRBC 0.0 0  WBC    ABSOLUTE NRBC 0.00 0.00 - 0.01 K/uL    NEUTROPHILS 66 32 - 75 %    LYMPHOCYTES 23 12 - 49 %    MONOCYTES 8 5 - 13 %    EOSINOPHILS 1 0 - 7 %    BASOPHILS 1 0 - 1 %    IMMATURE GRANULOCYTES 1 (H) 0.0 - 0.5 %    ABS. NEUTROPHILS 4.4 1.8 - 8.0 K/UL    ABS. LYMPHOCYTES 1.6 0.8 - 3.5 K/UL    ABS. MONOCYTES 0.5 0.0 - 1.0 K/UL    ABS. EOSINOPHILS 0.1 0.0 - 0.4 K/UL    ABS. BASOPHILS 0.1 0.0 - 0.1 K/UL    ABS. IMM. GRANS. 0.0 0.00 - 0.04 K/UL    DF AUTOMATED     PROTHROMBIN TIME + INR    Collection Time: 07/14/21  3:05 PM   Result Value Ref Range    INR 1.0 0.9 - 1.1      Prothrombin time 10.8 9.0 - 97.3 sec   METABOLIC PANEL, COMPREHENSIVE    Collection Time: 07/14/21  3:05 PM   Result Value Ref Range    Sodium 141 136 - 145 mmol/L    Potassium 4.9 3.5 - 5.1 mmol/L    Chloride 114 (H) 97 - 108 mmol/L    CO2 23 21 - 32 mmol/L    Anion gap 4 (L) 5 - 15 mmol/L    Glucose 117 (H) 65 - 100 mg/dL    BUN 22 (H) 6 - 20 MG/DL    Creatinine 1.23 0.70 - 1.30 MG/DL    BUN/Creatinine ratio 18 12 - 20      GFR est AA >60 >60 ml/min/1.73m2    GFR est non-AA 57 (L) >60 ml/min/1.73m2    Calcium 9.2 8.5 - 10.1 MG/DL    Bilirubin, total 0.3 0.2 - 1.0 MG/DL    ALT (SGPT) 32 12 - 78 U/L    AST (SGOT) 36 15 - 37 U/L    Alk. phosphatase 78 45 - 117 U/L    Protein, total 7.3 6.4 - 8.2 g/dL    Albumin 3.0 (L) 3.5 - 5.0 g/dL    Globulin 4.3 (H) 2.0 - 4.0 g/dL    A-G Ratio 0.7 (L) 1.1 - 2.2     SAMPLES BEING HELD    Collection Time: 07/14/21  3:05 PM   Result Value Ref Range    SAMPLES BEING HELD 1RED     COMMENT        Add-on orders for these samples will be processed based on acceptable specimen integrity and analyte stability, which may vary by analyte. Imaging:   CT HEAD WO CONT    Result Date: 7/14/2021  Small subdural hemorrhage overlying the right frontal lobe.  Severe chronic microvascular ischemic change. Extensive scattered foci of encephalomalacia most predominant in the left parietal lobe. CT SPINE CERV WO CONT    Result Date: 7/14/2021  No acute bony abnormality of the cervical spine. Multilevel degenerative change. Assessment/Plan     Traumatic subdural hematoma  Fall  Hypertension  Dementia  COPD  Chronic disease stage II  Diabetes mellitus type 2  Hypertension        Patient will be admitted on a telemetry. Neurosurgery has been consulted, neurovascular checks, hold aspirin and Plavix, continues neurological status will mandate emergent intervention, repeat CT of the head in the morning, optimize blood pressure control, Keppra for seizure prophylaxis, supportive care and further intervention per hospital course, continue to monitor, keep systolic less than 168  Fall precaution, PT OT consult, supportive care and close monitoring  DuoNeb as needed  Monitor renal function, trend creatinine  Sliding scale insulin, Accu-Cheks, diet control, close monitoring, reassess as needed       GI DVT prophylaxis: Patient will be on SCD                     Please note that this dictation was completed with [employer], the computer voice recognition software. Quite often unanticipated grammatical, syntax, homophones, and other interpretive errors are inadvertently transcribed by the computer software. Please disregard these errors. Please excuse any errors that have escaped final proofreading.          Signed By: Shameka Santiago MD     July 14, 2021

## 2021-07-14 NOTE — PROGRESS NOTES
Bedside and Verbal shift change report given to [address] [place] [address] (oncoming nurse) by Indira Carranza (offgoing nurse). Report included the following information SBAR, Kardex, MAR, Cardiac Rhythm SB, Quality Measures and Dual Neuro Assessment.

## 2021-07-14 NOTE — CONSULTS
76yo who lives at [facility] came to ED after unwitnessed GLF. Found to have head laceration. Work up in ED included CT that showed small right frontal SDH without mass effect as well as extensive encephalomalacia. He complains of headache. He takes ASA 81mg daily as well as keppra, vimpat and topamax for seizure disorder. Agree with admission for neurochecks, BP control (SBP<140) and repeat CT tomorrow. Will be available as needed. Thank you for this consultation.

## 2021-07-14 NOTE — ED PROVIDER NOTES
59-year-old male presents from [facility] with complaint of fall and head injury. Fall was unwitnessed by staff. Patient states he just lost his balance and fell forward striking the front of his head. He denies any loss of consciousness. He is currently complaining of a little bit of head pain but denies any vomiting since the injury. He is taking no pain medications. Bleeding has been controlled with pressure dressing. Past medical history significant for prostate cancer, COPD, CKD, congestive heart failure, diabetes, hypertension, seizures, stroke.            Past Medical History:   Diagnosis Date    Allergic rhinitis, cause unspecified 4/18/2011    Alzheimer's disease ([address]     Arthritis     GOUT    Cancer ([address] 09/20/2016    PROSTATE    Chronic obstructive pulmonary disease (HCC)     CHRONIC BRONCHITIS    CKD (chronic kidney disease), stage II     Congestive heart failure, unspecified 4/18/2011    (ON 9/20/16 PT & DTR STATE THEY DON'T REMEMBER THIS DIAGNOSIS)    Coronary Artery Disease 4/18/2011    Diabetes Mellitus Type I 4/18/2011    Erectile Dysfunction 4/18/2011    Hemorrhoids 4/18/2011    Hypertension     Pneumonia due to COVID-19 virus 8/27/2020    Seizures ([address]     STARTED 2005; \"BLACK-OUT Lyndol Pouch SEIZURES\", DTR SATES    Stroke ([address]     MULTIPLE MINISTROKES, DTR REPORTS; LEFT-SIDED WEAKNESS    Thromboembolus ([address]     left leg  (NO PE)    Unspecified asthma(493.90) 4/18/2011       Past Surgical History:   Procedure Laterality Date    COLONOSCOPY N/A 10/9/2017    COLONOSCOPY performed by Kevin Kaba MD at [address] [place] [address] UNLIST  2006    CABG    VASCULAR SURGERY PROCEDURE UNLIST      PERIPHERAL ANGIOGRAM, STENTS LEFT LEG         Family History:   Problem Relation Age of Onset    Diabetes Mother     Hypertension Mother     Diabetes Father     Anesth Problems Neg Hx        Social History     Socioeconomic History    Marital status:      Spouse name: Not on file    Number of children: Not on file    Years of education: Not on file    Highest education level: Not on file   Occupational History    Not on file   Tobacco Use    Smoking status: Former Smoker     Packs/day: 2.00     Years: 30.00     Pack years: 60.00    Smokeless tobacco: Former User     Quit date: 9/20/1990   Substance and Sexual Activity    Alcohol use: No     Comment: IN PAST, MODERATE ALCOHOL. WEEKENDS ONLY    Drug use: No    Sexual activity: Not Currently   Other Topics Concern    Not on file   Social History Narrative    Not on file     Social Determinants of Health     Financial Resource Strain:     Difficulty of Paying Living Expenses:    Food Insecurity:     Worried About Running Out of Food in the Last Year:     [address] in the Last Year:    Transportation Needs:     Lack of Transportation (Medical):  Lack of Transportation (Non-Medical):    Physical Activity:     Days of Exercise per Week:     Minutes of Exercise per Session:    Stress:     Feeling of Stress :    Social Connections:     Frequency of Communication with Friends and Family:     Frequency of Social Gatherings with Friends and Family:     Attends [employer] Services:     Active Member of Clubs or Organizations:     Attends Club or Organization Meetings:     Marital Status:    Intimate Partner Violence:     Fear of Current or Ex-Partner:     Emotionally Abused:     Physically Abused:     Sexually Abused: ALLERGIES: Patient has no known allergies. Review of Systems   Unable to perform ROS: Dementia       Vitals:    07/14/21 1317   BP: (!) 146/71   Pulse: (!) 49   Resp: 15   Temp: 97.9 °F (36.6 °C)   SpO2: 98%   Weight: 69 kg (152 lb 1.9 oz)            Physical Exam  Vitals and nursing note reviewed. Constitutional:       General: He is not in acute distress. Appearance: He is well-developed. HENT:      Head: Normocephalic.       Comments: Small stellate wound to the center of his forehead. No active bleeding. Eyes:      Pupils: Pupils are equal, round, and reactive to light. Cardiovascular:      Rate and Rhythm: Normal rate. Pulmonary:      Effort: Pulmonary effort is normal. No respiratory distress. Abdominal:      General: There is no distension. Musculoskeletal:         General: Normal range of motion. Cervical back: Normal range of motion and neck supple. Skin:     General: Skin is warm and dry. Neurological:      Mental Status: He is alert and oriented to person, place, and time. MDM  Number of Diagnoses or Management Options     Amount and/or Complexity of Data Reviewed  Tests in the radiology section of CPT®: reviewed      ED Course as of Jul 14 1502   Wed Jul 14, 2021   1442 ED EKG interpretation:  Rhythm: normal sinus rhythm. Rate (approx.): 51. Axis: left. ST segment:  No concerning ST elevations or depressions. This EKG was interpreted by Caren Garcia MD,ED Provider. EKG, 12 LEAD, INITIAL [JM]      ED Course User Index  [JM] Yonatan Iglesias MD     Perfect Serve Consult for Admission  3:02 PM    ED Room Number: ER07/07  Patient Name and age: Claudia Bautista 68 y.o.  male  Working Diagnosis:   1. Fall, initial encounter    2. SDH (subdural hematoma) ([facility]        COVID-19 Suspicion:  no  Sepsis present:  no  Reassessment needed: no  Code Status:  Full Code  Readmission: no  Isolation Requirements:  no  Recommended Level of Care:  telemetry  Department:  [facility] Adult ED - 21   Other:  2-5mm subdural hematoma from GLF. No other complaints. Neurosurgery called. Total critical care time spent exclusive of procedures:  35 minutes. Procedures    3:36 PM  Procedure Note - LACERATION SUTURE:    Wound Location: forehead  Wound Size: 2cm  Debridement: No  Nerve Involvement: No  Tendon Involvement: No  Foreign Bodies Found:  None    Wound edges anesthetized with 5cc of lidocaine 1% with epi.   Wound irrigated using 500cc of saline under high pressure. Wound explored for foreign bodies and none found. Wound edges reapproximated and closed using 5-0 prolene. Technique: Simple Interrupted  Number of sutures: 4    Procedure was well tolerated with no complications. Clean dressing placed. Pt advised on strategies to minimize scarring including but not limited to the use of antibiotic ointment for next 3 days to minimize scabbing and avoidance of direct sunlight for 6 months.

## 2021-07-14 NOTE — ED NOTES
Bedside shift change report given to Rachel Aschoff (oncoming nurse) by Angela Youssef (offgoing nurse). Report included the following information SBAR, Kardex, ED Summary, Procedure Summary, Intake/Output and MAR.

## 2021-07-15 ENCOUNTER — APPOINTMENT (OUTPATIENT)
Dept: CT IMAGING | Age: 76
DRG: 087 | End: 2021-07-15
Attending: FAMILY MEDICINE
Payer: MEDICARE

## 2021-07-15 LAB
ANION GAP SERPL CALC-SCNC: 4 MMOL/L (ref 5–15)
BUN SERPL-MCNC: 23 MG/DL (ref 6–20)
BUN/CREAT SERPL: 19 (ref 12–20)
CALCIUM SERPL-MCNC: 8.9 MG/DL (ref 8.5–10.1)
CHLORIDE SERPL-SCNC: 112 MMOL/L (ref 97–108)
CO2 SERPL-SCNC: 22 MMOL/L (ref 21–32)
CREAT SERPL-MCNC: 1.24 MG/DL (ref 0.7–1.3)
ERYTHROCYTE [DISTWIDTH] IN BLOOD BY AUTOMATED COUNT: 13.7 % (ref 11.5–14.5)
GLUCOSE BLD STRIP.AUTO-MCNC: 151 MG/DL (ref 65–117)
GLUCOSE BLD STRIP.AUTO-MCNC: 152 MG/DL (ref 65–117)
GLUCOSE BLD STRIP.AUTO-MCNC: 179 MG/DL (ref 65–117)
GLUCOSE BLD STRIP.AUTO-MCNC: 242 MG/DL (ref 65–117)
GLUCOSE BLD STRIP.AUTO-MCNC: 250 MG/DL (ref 65–117)
GLUCOSE SERPL-MCNC: 258 MG/DL (ref 65–100)
HCT VFR BLD AUTO: 42.7 % (ref 36.6–50.3)
HGB BLD-MCNC: 13 G/DL (ref 12.1–17)
MCH RBC QN AUTO: 28.3 PG (ref 26–34)
MCHC RBC AUTO-ENTMCNC: 30.4 G/DL (ref 30–36.5)
MCV RBC AUTO: 93 FL (ref 80–99)
NRBC # BLD: 0 K/UL (ref 0–0.01)
NRBC BLD-RTO: 0 PER 100 WBC
PLATELET # BLD AUTO: 149 K/UL (ref 150–400)
PMV BLD AUTO: 11.1 FL (ref 8.9–12.9)
POTASSIUM SERPL-SCNC: 4.5 MMOL/L (ref 3.5–5.1)
RBC # BLD AUTO: 4.59 M/UL (ref 4.1–5.7)
SERVICE CMNT-IMP: ABNORMAL
SODIUM SERPL-SCNC: 138 MMOL/L (ref 136–145)
WBC # BLD AUTO: 8.4 K/UL (ref 4.1–11.1)

## 2021-07-15 PROCEDURE — 74011636637 HC RX REV CODE- 636/637: Performed by: FAMILY MEDICINE

## 2021-07-15 PROCEDURE — 80048 BASIC METABOLIC PNL TOTAL CA: CPT

## 2021-07-15 PROCEDURE — 82962 GLUCOSE BLOOD TEST: CPT

## 2021-07-15 PROCEDURE — 97165 OT EVAL LOW COMPLEX 30 MIN: CPT

## 2021-07-15 PROCEDURE — 97530 THERAPEUTIC ACTIVITIES: CPT

## 2021-07-15 PROCEDURE — 74011000258 HC RX REV CODE- 258: Performed by: HOSPITALIST

## 2021-07-15 PROCEDURE — 85027 COMPLETE CBC AUTOMATED: CPT

## 2021-07-15 PROCEDURE — 70450 CT HEAD/BRAIN W/O DYE: CPT

## 2021-07-15 PROCEDURE — 74011250637 HC RX REV CODE- 250/637: Performed by: HOSPITALIST

## 2021-07-15 PROCEDURE — 36415 COLL VENOUS BLD VENIPUNCTURE: CPT

## 2021-07-15 PROCEDURE — 74011250637 HC RX REV CODE- 250/637: Performed by: FAMILY MEDICINE

## 2021-07-15 PROCEDURE — 65660000000 HC RM CCU STEPDOWN

## 2021-07-15 PROCEDURE — 97161 PT EVAL LOW COMPLEX 20 MIN: CPT

## 2021-07-15 PROCEDURE — 74011250636 HC RX REV CODE- 250/636: Performed by: FAMILY MEDICINE

## 2021-07-15 PROCEDURE — 74011250636 HC RX REV CODE- 250/636: Performed by: HOSPITALIST

## 2021-07-15 RX ORDER — POLYETHYLENE GLYCOL 3350 17 G/17G
17 POWDER, FOR SOLUTION ORAL DAILY
COMMUNITY

## 2021-07-15 RX ORDER — IPRATROPIUM BROMIDE AND ALBUTEROL SULFATE 2.5; .5 MG/3ML; MG/3ML
3 SOLUTION RESPIRATORY (INHALATION)
COMMUNITY

## 2021-07-15 RX ORDER — PRAVASTATIN SODIUM 40 MG/1
40 TABLET ORAL
Status: DISCONTINUED | OUTPATIENT
Start: 2021-07-15 | End: 2021-07-16 | Stop reason: HOSPADM

## 2021-07-15 RX ORDER — SERTRALINE HYDROCHLORIDE 50 MG/1
50 TABLET, FILM COATED ORAL DAILY
COMMUNITY

## 2021-07-15 RX ORDER — INSULIN ASPART 100 [IU]/ML
0-8 INJECTION, SOLUTION INTRAVENOUS; SUBCUTANEOUS
COMMUNITY

## 2021-07-15 RX ORDER — ALBUTEROL SULFATE 90 UG/1
2 AEROSOL, METERED RESPIRATORY (INHALATION)
COMMUNITY

## 2021-07-15 RX ORDER — LOSARTAN POTASSIUM 25 MG/1
50 TABLET ORAL DAILY
COMMUNITY

## 2021-07-15 RX ORDER — ASCORBIC ACID 500 MG
500 TABLET ORAL DAILY
Status: DISCONTINUED | OUTPATIENT
Start: 2021-07-15 | End: 2021-07-16 | Stop reason: HOSPADM

## 2021-07-15 RX ORDER — INSULIN GLARGINE 100 [IU]/ML
26 INJECTION, SOLUTION SUBCUTANEOUS
COMMUNITY

## 2021-07-15 RX ORDER — SENNOSIDES 8.6 MG/1
2 TABLET ORAL EVERY OTHER DAY
COMMUNITY

## 2021-07-15 RX ORDER — ASCORBIC ACID 500 MG
500 TABLET ORAL DAILY
COMMUNITY

## 2021-07-15 RX ORDER — ERGOCALCIFEROL 1.25 MG/1
50000 CAPSULE ORAL
COMMUNITY

## 2021-07-15 RX ADMIN — INSULIN LISPRO 3 UNITS: 100 INJECTION, SOLUTION INTRAVENOUS; SUBCUTANEOUS at 08:35

## 2021-07-15 RX ADMIN — ALLOPURINOL 100 MG: 100 TABLET ORAL at 08:35

## 2021-07-15 RX ADMIN — FAMOTIDINE 20 MG: 20 TABLET ORAL at 17:50

## 2021-07-15 RX ADMIN — INSULIN GLARGINE 10 UNITS: 100 INJECTION, SOLUTION SUBCUTANEOUS at 21:29

## 2021-07-15 RX ADMIN — TOPIRAMATE 100 MG: 100 TABLET, FILM COATED ORAL at 08:35

## 2021-07-15 RX ADMIN — LACOSAMIDE 200 MG: 50 TABLET, FILM COATED ORAL at 21:12

## 2021-07-15 RX ADMIN — FAMOTIDINE 20 MG: 20 TABLET ORAL at 08:35

## 2021-07-15 RX ADMIN — OXYCODONE HYDROCHLORIDE AND ACETAMINOPHEN 500 MG: 500 TABLET ORAL at 08:35

## 2021-07-15 RX ADMIN — LEVETIRACETAM 500 MG: 100 INJECTION, SOLUTION INTRAVENOUS at 08:44

## 2021-07-15 RX ADMIN — LEVETIRACETAM 500 MG: 100 INJECTION, SOLUTION INTRAVENOUS at 16:35

## 2021-07-15 RX ADMIN — TOPIRAMATE 100 MG: 100 TABLET, FILM COATED ORAL at 21:12

## 2021-07-15 RX ADMIN — LACOSAMIDE 200 MG: 50 TABLET, FILM COATED ORAL at 08:34

## 2021-07-15 RX ADMIN — INSULIN LISPRO 3 UNITS: 100 INJECTION, SOLUTION INTRAVENOUS; SUBCUTANEOUS at 21:29

## 2021-07-15 RX ADMIN — HYDRALAZINE HYDROCHLORIDE 10 MG: 20 INJECTION INTRAMUSCULAR; INTRAVENOUS at 21:29

## 2021-07-15 RX ADMIN — PRAVASTATIN SODIUM 40 MG: 40 TABLET ORAL at 21:12

## 2021-07-15 NOTE — PROGRESS NOTES
[facility] [facility]  Hospitalist Group                                                                                          Hospitalist Progress Note  Azul Jasso MD  Answering service: [phone] from in house phone        Date of Service:  7/15/2021  NAME:  Manisha Perea  :  1945  MRN:  [id number]      Admission Summary:   Manisha Perea is a 68 y.o. male who presents with presents fall     Patient apparently lives at [facility], had a fall today which was unwitnessed. Patient struck the front part of his head. Post that started having some bleeding from the laceration and was sent to the hospital for further management evaluation. He continued to have headache, came to the ER, had a CT scan done and was found to have subdural hematoma and was requested to be admitted to the hospital service. Currently patient reports of some headache but denies any other complaints or problems. Patient is a poor historian and reports that he feels like his head is swimming.      Interval history / Subjective:   Seen and examined alert oriented to place and person CT scan reviewed compared to yesterday  Size of the bleeding remains same seen by neurosurgery discussed with them possibly not a candidate for any further intervention     Assessment & Plan:     Traumatic subdural hematoma status post accidental fall per patient  -CT remains a stable overnight no acute intervention as per neurosurgery  -Hold antiplatelet aspirin and Plavix  -Keppra for seizure prophylaxis    Fall  -Start PT OT lives in [facility] long-term care may need acute rehab    Hypertension  -Goal blood pressure less than 140    Dementia-supportive care  COPD-stable not in exacerbation  Chronic disease stage II-stable  Diabetes mellitus type 2-continue Lantus and sliding scale insulin       Code status: Full  DVT prophylaxis: SCD    Care Plan discussed with: Patient/Family and Nurse  Anticipated Disposition: Home w/Family  Anticipated Discharge: 24 hours to 48 hours     Hospital Problems  Date Reviewed: 8/27/2020        Codes Class Noted POA    SDH (subdural hematoma) ([facility] [address] ICD-10-CM: H19.3C7D  ICD-9-CM: 432.1  7/14/2021 Unknown                Review of Systems:   A comprehensive review of systems was negative. Vital Signs:    Last 24hrs VS reviewed since prior progress note. Most recent are:  Visit Vitals  BP (!) 145/71 (BP 1 Location: Right upper arm, BP Patient Position: Supine)   Pulse 75   Temp 98.4 °F (36.9 °C)   Resp 12   Wt 69 kg (152 lb 1.9 oz)   SpO2 97%   BMI 23.13 kg/m²       No intake or output data in the 24 hours ending 07/15/21 1315     Physical Examination:     I had a face to face encounter with this patient and independently examined them on 7/15/2021 as outlined below:          Constitutional:  No acute distress, cooperative, pleasant    ENT:  Oral mucosa moist, oropharynx benign. Resp:  CTA bilaterally. No wheezing/rhonchi/rales. No accessory muscle use   CV:  Regular rhythm, normal rate, no murmurs, gallops, rubs    GI:  Soft, non distended, non tender. normoactive bowel sounds, no hepatosplenomegaly     Musculoskeletal:  No edema, warm, 2+ pulses throughout    Neurologic:  Moves all extremities. AAOx3, CN II-XII reviewed            Data Review:    I personally reviewed  Image and labs    CT HEAD WO CONT    Result Date: 7/15/2021  1. No interval change in small right frontal convexity subdural hematoma and small focus of subarachnoid hemorrhage. 3. Evidence of multiple prior cortical and white matter infarcts. CT HEAD WO CONT    Result Date: 7/14/2021  Small subdural hemorrhage overlying the right frontal lobe. Severe chronic microvascular ischemic change. Extensive scattered foci of encephalomalacia most predominant in the left parietal lobe. CT SPINE CERV WO CONT    Result Date: 7/14/2021  No acute bony abnormality of the cervical spine. Multilevel degenerative change.     Labs: Recent Labs     07/15/21  0156 07/14/21  1505   WBC 8.4 6.7   HGB 13.0 13.8   HCT 42.7 45.7   * 167     Recent Labs     07/15/21  0156 07/14/21  1505    140  141   K 4.5 5.3*  4.9   * 112*  114*   CO2 22 21  23   BUN 23* 23*  22*   CREA 1.24 1.07  1.23   * 117*  117*   CA 8.9 9.5  9.2     Recent Labs     07/14/21  1505   ALT 32   AP 78   TBILI 0.3   TP 7.3   ALB 3.0*   GLOB 4.3*     Recent Labs     07/14/21  1505   INR 1.0   PTP 10.8      No results for input(s): FE, TIBC, PSAT, FERR in the last 72 hours. Lab Results   Component Value Date/Time    Folate 8.5 10/30/2009 03:30 PM      No results for input(s): PH, PCO2, PO2 in the last 72 hours. No results for input(s): CPK, CKNDX, TROIQ in the last 72 hours.     No lab exists for component: CPKMB  Lab Results   Component Value Date/Time    Cholesterol, total 115 07/14/2021 03:05 PM    HDL Cholesterol 60 07/14/2021 03:05 PM    LDL, calculated 33.2 07/14/2021 03:05 PM    Triglyceride 109 07/14/2021 03:05 PM    CHOL/HDL Ratio 1.9 07/14/2021 03:05 PM     Lab Results   Component Value Date/Time    Glucose (POC) 152 (H) 07/15/2021 11:41 AM    Glucose (POC) 151 (H) 07/15/2021 11:23 AM    Glucose (POC) 250 (H) 07/15/2021 06:49 AM    Glucose (POC) 238 (H) 07/14/2021 10:35 PM    Glucose (POC) 281 (H) 09/04/2020 11:34 AM     Lab Results   Component Value Date/Time    Color YELLOW/STRAW 08/27/2020 06:26 PM    Appearance CLEAR 08/27/2020 06:26 PM    Specific gravity 1.027 08/27/2020 06:26 PM    Specific gravity 1.020 08/11/2020 06:17 AM    pH (UA) 5.0 08/27/2020 06:26 PM    Protein Negative 08/27/2020 06:26 PM    Glucose 500 (A) 08/27/2020 06:26 PM    Ketone Negative 08/27/2020 06:26 PM    Bilirubin Negative 08/27/2020 06:26 PM    Urobilinogen 0.2 08/27/2020 06:26 PM    Nitrites Negative 08/27/2020 06:26 PM    Leukocyte Esterase Negative 08/27/2020 06:26 PM    Epithelial cells FEW 08/11/2020 06:17 AM    Bacteria Negative 08/11/2020 06:17 AM WBC 0-4 08/11/2020 06:17 AM    RBC 0-5 08/11/2020 06:17 AM         Medications Reviewed:     Current Facility-Administered Medications   Medication Dose Route Frequency    ascorbic acid (vitamin C) (VITAMIN C) tablet 500 mg  500 mg Oral DAILY    levETIRAcetam (KEPPRA) 500 mg in 0.9% sodium chloride 100 mL IVPB  500 mg IntraVENous Q8H    pravastatin (PRAVACHOL) tablet 40 mg  40 mg Oral QHS    allopurinoL (ZYLOPRIM) tablet 100 mg  100 mg Oral DAILY    HYDROcodone-acetaminophen (NORCO) 5-325 mg per tablet 1 Tablet  1 Tablet Oral Q4H PRN    insulin glargine (LANTUS) injection 10 Units  10 Units SubCUTAneous QHS    lacosamide (VIMPAT) tablet 200 mg  200 mg Oral BID    topiramate (TOPAMAX) tablet 100 mg  100 mg Oral BID    ondansetron (ZOFRAN) injection 4 mg  4 mg IntraVENous Q6H PRN    naloxone (NARCAN) injection 0.4 mg  0.4 mg IntraVENous PRN    acetaminophen (TYLENOL) tablet 650 mg  650 mg Oral Q4H PRN    Or    acetaminophen (TYLENOL) solution 650 mg  650 mg Per NG tube Q4H PRN    Or    acetaminophen (TYLENOL) suppository 650 mg  650 mg Rectal Q4H PRN    famotidine (PEPCID) tablet 20 mg  20 mg Oral BID    glucose chewable tablet 16 g  4 Tablet Oral PRN    dextrose (D50W) injection syrg 12.5-25 g  25-50 mL IntraVENous PRN    glucagon (GLUCAGEN) injection 1 mg  1 mg IntraMUSCular PRN    insulin lispro (HUMALOG) injection   SubCUTAneous AC&HS    hydrALAZINE (APRESOLINE) 20 mg/mL injection 10 mg  10 mg IntraVENous Q6H PRN     ______________________________________________________________________  EXPECTED LENGTH OF STAY: 3d 7h  ACTUAL LENGTH OF STAY:          1                 Keshia Anaya MD

## 2021-07-15 NOTE — PROGRESS NOTES
Admission Medication Reconciliation:    Information obtained from: PTA medication list updated with information provided by [facility] dated 7/15/21. Summary:     Medications added: albuterol, ergocalciferol, Miralax, ipratropium-albuterol, insulin aspart, linagliptin, senna, sertraline  Medications deleted: bumetanide, carvedilol, hydrocodone-APAP  Doses changed: insulin glargine 26 units HS (vs 10 units HS), levetiracetam 500 mg TID (vs 750 mg BID), losartan 50 mg daily (vs 25 mg daily)    Inpatient orders have been reviewed and the hospitalist will be contacted regarding changes to the PTA medication record. Chief Complaint for this Admission:  SDH    Significant PMH/Disease States:   Past Medical History:   Diagnosis Date    Allergic rhinitis, cause unspecified 4/18/2011    Alzheimer's disease ([facility] [address]     Arthritis     GOUT    Cancer ([facility] [address] 09/20/2016    PROSTATE    Chronic obstructive pulmonary disease ([facility] [address]     CHRONIC BRONCHITIS    CKD (chronic kidney disease), stage II     Congestive heart failure, unspecified 4/18/2011    (ON 9/20/16 PT & DTR STATE THEY DON'T REMEMBER THIS DIAGNOSIS)    Coronary Artery Disease 4/18/2011    Diabetes Mellitus Type I 4/18/2011    Erectile Dysfunction 4/18/2011    Hemorrhoids 4/18/2011    Hypertension     Pneumonia due to COVID-19 virus 8/27/2020    Seizures ([facility] [address]     STARTED 2005; \"BLACK-OUT Geiger Cart SEIZURES\", DTR SATES    Stroke ([facility] [address]     MULTIPLE MINISTROKES, DTR REPORTS; LEFT-SIDED WEAKNESS    Thromboembolus ([facility] [address]     left leg  (NO PE)    Unspecified asthma(493.90) 4/18/2011       Allergies:  Patient has no known allergies. Prior to Admission Medications:   Prior to Admission Medications   Prescriptions Last Dose Informant Patient Reported? Taking? albuterol (PROVENTIL HFA, VENTOLIN HFA, PROAIR HFA) 90 mcg/actuation inhaler   Yes Yes   Sig: Take 2 Puffs by inhalation every six (6) hours as needed for Wheezing.    albuterol-ipratropium (DUO-NEB) 2.5 mg-0.5 mg/3 ml nebu   Yes Yes   Sig: 3 mL by Nebulization route every eight (8) hours as needed for Wheezing. allopurinol (ZYLOPRIM) 100 mg tablet   No Yes   Sig: take 1 tablet by mouth once daily   ascorbic acid, vitamin C, (VITAMIN C) 500 mg tablet   Yes Yes   Sig: Take 500 mg by mouth daily. aspirin 81 mg chewable tablet   No Yes   Sig: Take 1 Tab by mouth daily. clopidogrel (PLAVIX) 75 mg tab   No Yes   Sig: take 1 tablet by mouth once daily   ergocalciferol (ERGOCALCIFEROL) 1,250 mcg (50,000 unit) capsule   Yes Yes   Sig: Take 50,000 Units by mouth every month. (6th of the month)   insulin aspart U-100 (NOVOLOG) 100 unit/mL injection   Yes Yes   Si-8 Units by SubCUTAneous route Before breakfast, lunch, dinner and at bedtime. (sliding scale insulin)   insulin glargine (Lantus U-100 Insulin) 100 unit/mL injection   Yes Yes   Si Units by SubCUTAneous route nightly. lacosamide (VIMPAT) 200 mg tab tablet   Yes Yes   Sig: Take 200 mg by mouth two (2) times a day. levETIRAcetam (KEPPRA) 500 mg tablet   Yes No   Sig: Take 500 mg by mouth three (3) times daily. Indications: additional medication to treat partial seizures   linaGLIPtin (TRADJENTA) 5 mg tablet   Yes Yes   Sig: Take 5 mg by mouth daily. losartan (COZAAR) 25 mg tablet   Yes Yes   Sig: Take 50 mg by mouth daily. metoprolol tartrate (LOPRESSOR) 25 mg tablet   No Yes   Sig: take 1 tablet by mouth twice a day   polyethylene glycol (Miralax) 17 gram packet   Yes Yes   Sig: Take 17 g by mouth daily. pravastatin (PRAVACHOL) 40 mg tablet   No Yes   Sig: take 1 tablet once daily   senna (Senna) 8.6 mg tablet   Yes Yes   Sig: Take 2 Tablets by mouth every other day. (at bedtime)   sertraline (ZOLOFT) 50 mg tablet   Yes Yes   Sig: Take 50 mg by mouth daily. topiramate (TOPAMAX) 100 mg tablet   Yes Yes   Sig: Take 100 mg by mouth two (2) times a day.       Facility-Administered Medications: None     Thank you for allowing me to participate in the care of this patient. Please contact the pharmacy at  or the medication reconciliation pharmacist at  with any questions. Linda Pineda D., BCPS, BCPPS

## 2021-07-15 NOTE — PROGRESS NOTES
Bedside and Verbal shift change report given to Justa (oncoming nurse) by Erica Herrera (offgoing nurse). Report included the following information SBAR, Kardex, Procedure Summary, Intake/Output, MAR, Recent Results, Cardiac Rhythm NSR, Quality Measures and Dual Neuro Assessment.

## 2021-07-15 NOTE — PROGRESS NOTES
Bedside and Verbal shift change report given to [facility] (oncoming nurse) by [address] (offgoing nurse). Report included the following information SBAR, Kardex, MAR, Cardiac Rhythm NSR, Quality Measures and Dual Neuro Assessment.

## 2021-07-15 NOTE — PROGRESS NOTES
CM called both of patient's daughters listed on file to attempt initial assessment; no answer, VM full. CM spoke with Alyssa Alston at [facility], patient has been LTC resident for 2 years. Plan to go back to [facility] when stable for discharge unless told otherwise from patient's daughters. Unable to give IM letter at this time. ALMA Daugherty.

## 2021-07-15 NOTE — PROGRESS NOTES
Problem: Mobility Impaired (Adult and Pediatric)  Goal: *Acute Goals and Plan of Care (Insert Text)  Description: FUNCTIONAL STATUS PRIOR TO ADMISSION: Per chart review, pt with baseline dementia. Pt reported he was ambulatory without AD. History of a fall resulting in this admission    [address]: The patient lived with at [facility] per chart review; however, pt reported he lives at [facility]. Physical Therapy Goals  Initiated 7/15/2021  1. Patient will move from supine to sit and sit to supine  in bed with supervision/set-up within 7 day(s). 2.  Patient will transfer from bed to chair and chair to bed with minimal assistance/contact guard assist using the least restrictive device within 7 day(s). 3.  Patient will perform sit to stand with minimal assistance/contact guard assist within 7 day(s). 4.  Patient will ambulate with minimal assistance/contact guard assist for 50 feet with the least restrictive device within 7 day(s). Outcome: Progressing Towards Goal   PHYSICAL THERAPY EVALUATION  Patient: Shawn Joy (5 y.o. male)  Date: 7/15/2021  Primary Diagnosis: SDH (subdural hematoma) (HCC) [S06.5X9A]        Precautions:   Fall      ASSESSMENT  Based on the objective data described below, the patient presents with generalized weakness, decreased functional mobility, impaired seated balance, decreased tolerance to activity, impaired cognition and confusion s/p admission on 7/14 with a GLF resulting in small R SDH and laceration to head. Pt received supine in bed with agreeable to therapy. Pt oriented x 3. Pt denied numbness or tingling in bilateral LEs. Pt demonstrated fair strength in LEs. Pt completed supine to sit EOB with CGA. While seated EOB noted pt with decreased command following, and level of alertness, and pt reported increase in headache. Pt assisted back to supine position with max A. Notified RN.  Pt will continue to benefit from PT to progress mobility as tolerated. Anticipate pt wll be able to return to LTC with therapy services if available    Current Level of Function Impacting Discharge (mobility/balance): CGA supine to sit, max A sit to supine due to worsening mental status and command following    Functional Outcome Measure: The patient scored Total: 15/100 on the Barthel Index which is indicative of 85% impaired ability to care for basic self needs/dependency on others. Other factors to consider for discharge: pt reported previously ambulatory without AD, lives at [facility] (? [facility] or [facility])     Patient will benefit from skilled therapy intervention to address the above noted impairments. PLAN :  Recommendations and Planned Interventions: bed mobility training, transfer training, gait training, therapeutic exercises, neuromuscular re-education, patient and family training/education, and therapeutic activities      Frequency/Duration: Patient will be followed by physical therapy:  3 times a week to address goals. Recommendation for discharge: (in order for the patient to meet his/her long term goals)  Return to LTC with therapy services if available    This discharge recommendation:  Has been made in collaboration with the attending provider and/or case management    IF patient discharges home will need the following DME: to be determined (TBD)         SUBJECTIVE:   Patient stated I'm all right.     OBJECTIVE DATA SUMMARY:   HISTORY:    Past Medical History:   Diagnosis Date    Allergic rhinitis, cause unspecified 4/18/2011    [facility]'s [facility] ([facility] [address]     Arthritis     GOUT    Cancer ([facility] [address] 09/20/2016    PROSTATE    Chronic obstructive pulmonary disease (HCC)     CHRONIC BRONCHITIS    CKD (chronic kidney disease), stage II     Congestive heart failure, unspecified 4/18/2011    (ON 9/20/16 PT & DTR STATE THEY DON'T REMEMBER THIS DIAGNOSIS)    Coronary Artery Disease 4/18/2011    Diabetes Mellitus Type I 4/18/2011    Erectile Dysfunction 4/18/2011    Hemorrhoids 4/18/2011    Hypertension     Pneumonia due to COVID-19 virus 8/27/2020    Seizures ([facility] [address]     STARTED 2005; \"BLACK-OUT SEIZURES,ABSENCE SEIZURES\", DTR SATES    Stroke ([facility] [address]     MULTIPLE MINISTROKES, DTR REPORTS; LEFT-SIDED WEAKNESS    Thromboembolus ([facility] [address]     left leg  (NO PE)    Unspecified asthma(493.90) 4/18/2011     Past Surgical History:   Procedure Laterality Date    COLONOSCOPY N/A 10/9/2017    COLONOSCOPY performed by Desi Richards MD at [address] [place] [address]  2006    CABG    VASCULAR SURGERY PROCEDURE UNLIST      PERIPHERAL ANGIOGRAM, STENTS LEFT LEG       Personal factors and/or comorbidities impacting plan of care:     [address] [place] [address] Name: [facility]  One/Two Story Residence: One story  Support Systems: Assisted living  Patient Expects to be Discharged to[de-identified] Skilled nursing facility  Current DME Used/Available at Home: None    EXAMINATION/PRESENTATION/DECISION MAKING:   Critical Behavior:  Neurologic State: Alert, Confused  Orientation Level: Oriented to person, Oriented to place, Oriented to situation, Disoriented to time  Cognition: Decreased attention/concentration, Follows commands, Impaired decision making, Poor safety awareness  Safety/Judgement: Decreased awareness of environment, Decreased awareness of need for safety, Decreased awareness of need for assistance, Decreased insight into deficits  Hearing:     Skin: dressing to forehead in place  Range Of Motion:  AROM: Generally decreased, functional (limited proximal BUE AROM)                       Strength:    Strength: Generally decreased, functional (decreased L grasp, difficult to assess)              Coordination:  Coordination: Generally decreased, functional  Vision:   Tracking: Requires cues, head turns, or add eye shifts to track; Able to track left of midline; Able to track right of midline  Diplopia: No  Acuity:  (diffiulcyt to assess properly with decr cognition)  Functional Mobility:  Bed Mobility:     Supine to Sit: Contact guard assistance; Additional time  Sit to Supine: Maximum assistance (decreased cognition with continued activity)  Scooting: Contact guard assistance;Minimum assistance  Transfers:  Sit to Stand:  (NT)          Balance:   Sitting: Impaired  Sitting - Static: Good (unsupported)  Sitting - Dynamic: Fair (occasional)  Standing:  (NT)  Ambulation/Gait Training:                           Functional Measure:  Barthel Index:    Bathin  Bladder: 5  Bowels: 5  Groomin  Dressin  Feedin  Mobility: 0  Stairs: 0  Toilet Use: 0  Transfer (Bed to Chair and Back): 0  Total: 15/100       The Barthel ADL Index: Guidelines  1. The index should be used as a record of what a patient does, not as a record of what a patient could do. 2. The main aim is to establish degree of independence from any help, physical or verbal, however minor and for whatever reason. 3. The need for supervision renders the patient not independent. 4. A patient's performance should be established using the best available evidence. Asking the patient, friends/relatives and nurses are the usual sources, but direct observation and common sense are also important. However direct testing is not needed. 5. Usually the patient's performance over the preceding 24-48 hours is important, but occasionally longer periods will be relevant. 6. Middle categories imply that the patient supplies over 50 per cent of the effort. 7. Use of aids to be independent is allowed. Nael Myles., Barthel DGuzmanW. (0241). Functional evaluation: the Barthel Index. [address] [facility] (14)2. Tonie Mead javier KIMBERLY Garsia, Tova Keys., Sendy Pulido, [address] [place], [place] [address]e (). Measuring the change indisability after inpatient rehabilitation; comparison of the responsiveness of the Barthel Index and Functional Romeo Measure. Journal of Neurology, Neurosurgery, and Psychiatry, 66(4), 918-608.   Tonie Mead Exel, N.J.A, Veronica Rider MGuzmanA. (2004.) Assessment of post-stroke quality of life in cost-effectiveness studies: The usefulness of the Barthel Index and the EuroQoL-5D. Quality of Life Research, 13, 083-69        Based on the above components, the patient evaluation is determined to be of the following complexity level: LOW     Pain Rating:  Pt reported headache; did not quantify    Activity Tolerance:   Fair    After treatment patient left in no apparent distress:   Supine in bed, Call bell within reach, Bed / chair alarm activated, and Side rails x 3    COMMUNICATION/EDUCATION:   The patients plan of care was discussed with: Occupational therapist and Registered nurse. Fall prevention education was provided and the patient/caregiver indicated understanding., Patient/family have participated as able in goal setting and plan of care. , and Patient/family agree to work toward stated goals and plan of care.     Thank you for this referral.  Noe Garcia, PT, DPT   Time Calculation: 16 mins

## 2021-07-15 NOTE — PROGRESS NOTES
Problem: Diabetes Self-Management  Goal: *Disease process and treatment process  Description: Define diabetes and identify own type of diabetes; list 3 options for treating diabetes. Outcome: Progressing Towards Goal  Goal: *Incorporating nutritional management into lifestyle  Description: Describe effect of type, amount and timing of food on blood glucose; list 3 methods for planning meals. Outcome: Progressing Towards Goal  Goal: *Incorporating physical activity into lifestyle  Description: State effect of exercise on blood glucose levels. Outcome: Progressing Towards Goal  Goal: *Developing strategies to promote health/change behavior  Description: Define the ABC's of diabetes; identify appropriate screenings, schedule and personal plan for screenings. Outcome: Progressing Towards Goal  Goal: *Using medications safely  Description: State effect of diabetes medications on diabetes; name diabetes medication taking, action and side effects. Outcome: Progressing Towards Goal  Goal: *Monitoring blood glucose, interpreting and using results  Description: Identify recommended blood glucose targets  and personal targets. Outcome: Progressing Towards Goal  Goal: *Prevention, detection, treatment of acute complications  Description: List symptoms of hyper- and hypoglycemia; describe how to treat low blood sugar and actions for lowering  high blood glucose level. Outcome: Progressing Towards Goal  Goal: *Prevention, detection and treatment of chronic complications  Description: Define the natural course of diabetes and describe the relationship of blood glucose levels to long term complications of diabetes.   Outcome: Progressing Towards Goal  Goal: *Developing strategies to address psychosocial issues  Description: Describe feelings about living with diabetes; identify support needed and support network  Outcome: Progressing Towards Goal  Goal: *Insulin pump training  Outcome: Progressing Towards Goal  Goal: *Sick day guidelines  Outcome: Progressing Towards Goal  Goal: *Patient Specific Goal (EDIT GOAL, INSERT TEXT)  Outcome: Progressing Towards Goal     Problem: Patient Education: Go to Patient Education Activity  Goal: Patient/Family Education  Outcome: Progressing Towards Goal     Problem: Pressure Injury - Risk of  Goal: *Prevention of pressure injury  Description: Document Roderick Scale and appropriate interventions in the flowsheet. Outcome: Progressing Towards Goal  Note: Pressure Injury Interventions:  Sensory Interventions: Assess changes in LOC, Assess need for specialty bed, Check visual cues for pain, Discuss PT/OT consult with provider, Float heels, Keep linens dry and wrinkle-free, Maintain/enhance activity level, Minimize linen layers, Pressure redistribution bed/mattress (bed type), Turn and reposition approx. every two hours (pillows and wedges if needed)    Moisture Interventions: Absorbent underpads, Apply protective barrier, creams and emollients, Check for incontinence Q2 hours and as needed, Internal/External urinary devices, Maintain skin hydration (lotion/cream), Minimize layers, Moisture barrier, Offer toileting Q_hr    Activity Interventions: Increase time out of bed, Pressure redistribution bed/mattress(bed type), PT/OT evaluation    Mobility Interventions: Pressure redistribution bed/mattress (bed type), PT/OT evaluation, Turn and reposition approx. every two hours(pillow and wedges)    Nutrition Interventions: Document food/fluid/supplement intake, Offer support with meals,snacks and hydration                     Problem: Falls - Risk of  Goal: *Absence of Falls  Description: Document Curtis Fall Risk and appropriate interventions in the flowsheet.   Outcome: Progressing Towards Goal  Note: Fall Risk Interventions:  Mobility Interventions: Bed/chair exit alarm, Communicate number of staff needed for ambulation/transfer, OT consult for ADLs, Patient to call before getting OOB, PT Consult for mobility concerns, PT Consult for assist device competence, Strengthening exercises (ROM-active/passive), Utilize walker, cane, or other assistive device    Mentation Interventions: Adequate sleep, hydration, pain control, Bed/chair exit alarm, Door open when patient unattended, Reorient patient, More frequent rounding, Toileting rounds, Update white board    Medication Interventions: Bed/chair exit alarm, Patient to call before getting OOB, Teach patient to arise slowly    Elimination Interventions: Call light in reach, Bed/chair exit alarm, Stay With Me (per policy), Toilet paper/wipes in reach, Toileting schedule/hourly rounds, Urinal in reach, Patient to call for help with toileting needs    History of Falls Interventions: Bed/chair exit alarm, Consult care management for discharge planning, Door open when patient unattended, Investigate reason for fall, Assess for delayed presentation/identification of injury for 48 hrs (comment for end date), Vital signs minimum Q4HRs X 24 hrs (comment for end date)         Problem: Patient Education: Go to Patient Education Activity  Goal: Patient/Family Education  Outcome: Progressing Towards Goal     Problem: TIA/CVA Stroke: 0-24 hours  Goal: Off Pathway (Use only if patient is Off Pathway)  Outcome: Progressing Towards Goal  Goal: Activity/Safety  Outcome: Progressing Towards Goal  Goal: Consults, if ordered  Outcome: Progressing Towards Goal  Goal: Diagnostic Test/Procedures  Outcome: Progressing Towards Goal  Goal: Nutrition/Diet  Outcome: Progressing Towards Goal  Goal: Discharge Planning  Outcome: Progressing Towards Goal  Goal: Medications  Outcome: Progressing Towards Goal  Goal: Respiratory  Outcome: Progressing Towards Goal  Goal: Treatments/Interventions/Procedures  Outcome: Progressing Towards Goal  Goal: Minimize risk of bleeding post-thrombolytic infusion  Outcome: Progressing Towards Goal  Goal: Monitor for complications post-thrombolytic infusion  Outcome: Progressing Towards Goal  Goal: Psychosocial  Outcome: Progressing Towards Goal  Goal: *Hemodynamically stable  Outcome: Progressing Towards Goal  Goal: *Neurologically stable  Description: Absence of additional neurological deficits    Outcome: Progressing Towards Goal  Goal: *Verbalizes anxiety and depression are reduced or absent  Outcome: Progressing Towards Goal  Goal: *Absence of Signs of Aspiration on Current Diet  Outcome: Progressing Towards Goal  Goal: *Absence of deep venous thrombosis signs and symptoms(Stroke Metric)  Outcome: Progressing Towards Goal  Goal: *Ability to perform ADLs and demonstrates progressive mobility and function  Outcome: Progressing Towards Goal  Goal: *Stroke education started(Stroke Metric)  Outcome: Progressing Towards Goal  Goal: *Dysphagia screen performed(Stroke Metric)  Outcome: Progressing Towards Goal  Goal: *Rehab consulted(Stroke Metric)  Outcome: Progressing Towards Goal     Problem: TIA/CVA Stroke: Day 2 Until Discharge  Goal: Off Pathway (Use only if patient is Off Pathway)  Outcome: Progressing Towards Goal  Goal: Activity/Safety  Outcome: Progressing Towards Goal  Goal: Diagnostic Test/Procedures  Outcome: Progressing Towards Goal  Goal: Nutrition/Diet  Outcome: Progressing Towards Goal  Goal: Discharge Planning  Outcome: Progressing Towards Goal  Goal: Medications  Outcome: Progressing Towards Goal  Goal: Respiratory  Outcome: Progressing Towards Goal  Goal: Treatments/Interventions/Procedures  Outcome: Progressing Towards Goal  Goal: Psychosocial  Outcome: Progressing Towards Goal  Goal: *Verbalizes anxiety and depression are reduced or absent  Outcome: Progressing Towards Goal  Goal: *Absence of aspiration  Outcome: Progressing Towards Goal  Goal: *Absence of deep venous thrombosis signs and symptoms(Stroke Metric)  Outcome: Progressing Towards Goal  Goal: *Optimal pain control at patient's stated goal  Outcome: Progressing Towards Goal  Goal: *Tolerating diet  Outcome: Progressing Towards Goal  Goal: *Ability to perform ADLs and demonstrates progressive mobility and function  Outcome: Progressing Towards Goal  Goal: *Stroke education continued(Stroke Metric)  Outcome: Progressing Towards Goal     Problem: Ischemic Stroke: Discharge Outcomes  Goal: *Verbalizes anxiety and depression are reduced or absent  Outcome: Progressing Towards Goal  Goal: *Verbalize understanding of risk factor modification(Stroke Metric)  Outcome: Progressing Towards Goal  Goal: *Hemodynamically stable  Outcome: Progressing Towards Goal  Goal: *Absence of aspiration pneumonia  Outcome: Progressing Towards Goal  Goal: *Aware of needed dietary changes  Outcome: Progressing Towards Goal  Goal: *Verbalize understanding of prescribed medications including anti-coagulants, anti-lipid, and/or anti-platelets(Stroke Metric)  Outcome: Progressing Towards Goal  Goal: *Tolerating diet  Outcome: Progressing Towards Goal  Goal: *Aware of follow-up diagnostics related to anticoagulants  Outcome: Progressing Towards Goal  Goal: *Ability to perform ADLs and demonstrates progressive mobility and function  Outcome: Progressing Towards Goal  Goal: *Absence of DVT(Stroke Metric)  Outcome: Progressing Towards Goal  Goal: *Absence of aspiration  Outcome: Progressing Towards Goal  Goal: *Optimal pain control at patient's stated goal  Outcome: Progressing Towards Goal  Goal: *Home safety concerns addressed  Outcome: Progressing Towards Goal  Goal: *Describes available resources and support systems  Outcome: Progressing Towards Goal  Goal: *Verbalizes understanding of activation of EMS(911) for stroke symptoms(Stroke Metric)  Outcome: Progressing Towards Goal  Goal: *Understands and describes signs and symptoms to report to providers(Stroke Metric)  Outcome: Progressing Towards Goal  Goal: *Neurolgocially stable (absence of additional neurological deficits)  Outcome: Progressing Towards Goal  Goal: *Verbalizes importance of follow-up with primary care physician(Stroke Metric)  Outcome: Progressing Towards Goal  Goal: *Smoking cessation discussed,if applicable(Stroke Metric)  Outcome: Progressing Towards Goal  Goal: *Depression screening completed(Stroke Metric)  Outcome: Progressing Towards Goal

## 2021-07-15 NOTE — PROGRESS NOTES
Occupational Therapy  07/15/21    Orders received, chart reviewed and patient evaluated by occupational therapy. Pending progression with skilled acute occupational therapy, recommend:  Therapy up to 5 days/week in SNF setting     Recommend with nursing ADLs with assist, OOB to chair 3x/day vs chair position, and toileting via bedpan with 1 assist. Thank you for completing as able in order to maintain patient strength, endurance and independence. Full evaluation to follow.     Thank you,   Abiola Gardner, OTD, OTR/L

## 2021-07-15 NOTE — PROGRESS NOTES
Problem: Self Care Deficits Care Plan (Adult)  Goal: *Acute Goals and Plan of Care (Insert Text)  Description:   FUNCTIONAL STATUS PRIOR TO ADMISSION: Patient admitted from [address] [place] [address], infer he receives assist for ADLs/IADLs and mobility, unclear of level of assist--hx of dementia and not a reliable historian (reports he was IND with mobility and ADLs but not confirmed at this time). HOME SUPPORT: Admitted from [address] [place] [address], unclear of level of assist required. Occupational Therapy Goals  Initiated 7/15/2021  1. Patient will perform grooming with minimal assistance/contact guard assist within 7 day(s). 2.  Patient will perform bathing with moderate assistance within 7 day(s). 3.  Patient will perform lower body dressing with moderate assistance  within 7 day(s). 4.  Patient will perform toilet transfers to [facility] with minimal assistance/contact guard assist within 7 day(s). 5.  Patient will perform all aspects of toileting with moderate assistance within 7 day(s). 6.  Patient will participate in upper extremity therapeutic exercise/activities with supervision/set-up for 5 minutes within 7 day(s). 7.  Patient will utilize energy conservation techniques during functional activities with verbal, visual, and tactile cues within 7 day(s). 8.  Patient will complete the [id number] within 7 days. Outcome: Not Met     OCCUPATIONAL THERAPY EVALUATION  Patient: Henrietta Olivier (21 y.o. male)  Date: 7/15/2021  Primary Diagnosis: SDH (subdural hematoma) (HCC) [S06.5X9A]        Precautions: SBP <140, Skin, Fall    ASSESSMENT  Based on the objective data described below, the patient presents with decreased balance, activity tolerance, cognition (attention, processing, command following, problem solving, judgment, memory), strength, ROM, coordination, & insight into deficits s/p admission for GLF with resulting R frontal SDH.  Patient with hx of dementia, admitted from [address] [place] [address] and reports being IND, however unsure of his accuracy as a historian. He now is able to minimally participate in functional activity despite mobilizing to EOB with CGA, decreasing arousal, dizziness, and impaired visual tracking & acuity noted with assessment and attempted functional activity. Intermittent visual & cognitive \"absent\" episodes (?seizure) noted at EOB requiring mod-max cues for redirection/attention to task. D/t increased pain, worsening cognition, and continued dizziness with BP (145/71), returned to supine with all needs met, RN aware. Current Level of Function Impacting Discharge (ADLs/self-care): Mod-Total A for ADLs and CGA-Max A for bed mobility    Functional Outcome Measure: The patient scored 15/100 on the Barthel Index, indicating 85% impairment in ADLs and mobility, infer 100% dependence on others for IADLs    Other factors to consider for discharge: PMH, PLOF, LTC resident     Patient will benefit from skilled therapy intervention to address the above noted impairments. PLAN :  Recommendations and Planned Interventions: self care training, functional mobility training, therapeutic exercise, balance training, visual/perceptual training, therapeutic activities, cognitive retraining, endurance activities, neuromuscular re-education, patient education, home safety training and family training/education    Frequency/Duration: Patient will be followed by occupational therapy 3 times a week to address goals. Recommendation for discharge: (in order for the patient to meet his/her long term goals)  Return to LTC with therapies of available    This discharge recommendation:  A follow-up discussion with the attending provider and/or case management is planned    IF patient discharges home will need the following DME: To be determined (TBD)         SUBJECTIVE:   Patient stated There's 2, and three, three, two and three.  re: when asked to indicate how many fingers were held on R & L side, unable to accurately indicate despite max cues    OBJECTIVE DATA SUMMARY:   HISTORY:   Past Medical History:   Diagnosis Date    Allergic rhinitis, cause unspecified 4/18/2011    Alzheimer's disease ([facility]     Arthritis     GOUT    Cancer ([facility] 09/20/2016    PROSTATE    Chronic obstructive pulmonary disease (HCC)     CHRONIC BRONCHITIS    CKD (chronic kidney disease), stage II     Congestive heart failure, unspecified 4/18/2011    (ON 9/20/16 PT & DTR STATE THEY DON'T REMEMBER THIS DIAGNOSIS)    Coronary Artery Disease 4/18/2011    Diabetes Mellitus Type I 4/18/2011    Erectile Dysfunction 4/18/2011    Hemorrhoids 4/18/2011    Hypertension     Pneumonia due to COVID-19 virus 8/27/2020    Seizures ([facility]     STARTED 2005; \"BLACK-OUT Travis Sensor", DTR SATES    Stroke ([facility]     MULTIPLE MINISTROKES, DTR REPORTS; LEFT-SIDED WEAKNESS    Thromboembolus ([facility]     left leg  (NO PE)    Unspecified asthma(493.90) 4/18/2011     Past Surgical History:   Procedure Laterality Date    COLONOSCOPY N/A 10/9/2017    COLONOSCOPY performed by Idania Mas MD at [address] [place] [address] UNLIST  2006    CABG    VASCULAR SURGERY PROCEDURE UNLIST      PERIPHERAL ANGIOGRAM, STENTS LEFT LEG     Expanded or extensive additional review of patient history:     [address] [place] [address] [place] Name: Cecilia & Baltazar    Hand dominance: Right    EXAMINATION OF PERFORMANCE DEFICITS:  Cognitive/Behavioral Status:  Neurologic State: Alert;Confused  Orientation Level: Oriented to person;Oriented to place;Oriented to situation;Disoriented to time  Cognition: Decreased attention/concentration; Follows commands; Impaired decision making;Poor safety awareness  Perception: Cues to attend left visual field;Cues to attend right visual field (decreasing arousal with continued EOB sitting)  Perseveration: Perseverates during conversation  Safety/Judgement: Decreased awareness of environment;Decreased awareness of need for safety;Decreased awareness of need for assistance;Decreased insight into deficits    Skin: Appears intact    Edema: None    Hearing:       Vision/Perceptual:    Tracking: Requires cues, head turns, or add eye shifts to track; Able to track left of midline; Able to track right of midline                 Diplopia: No    Acuity:  (diffiulcyt to assess properly with decr cognition)         Range of Motion:  AROM: Generally decreased, functional (limited proximal BUE AROM)                         Strength:  Strength: Generally decreased, functional (decreased L grasp, difficult to assess)                Coordination:  Coordination: Generally decreased, functional       Gross Motor Skills-Upper: Left Impaired;Right Impaired    Balance:  Sitting: Impaired  Sitting - Static: Good (unsupported)  Sitting - Dynamic: Fair (occasional)    Functional Mobility and Transfers for ADLs:  Bed Mobility:  Supine to Sit: Contact guard assistance; Additional time  Sit to Supine: Maximum assistance (decreased cognition with continued activity)  Scooting: Contact guard assistance;Minimum assistance    Transfers: Toilet Transfer : Total assistance (unsafe for OOB mobility at this time, infer CGA for bedpan)    ADL Assessment:  Feeding: Moderate assistance    Oral Facial Hygiene/Grooming: Moderate assistance    Bathing: Maximum assistance    Upper Body Dressing: Maximum assistance    Lower Body Dressing: Total assistance    Toileting:  Total assistance                ADL Intervention and task modifications:     Cognitive Retraining  Safety/Judgement: Decreased awareness of environment;Decreased awareness of need for safety;Decreased awareness of need for assistance;Decreased insight into deficits    Functional Measure:  Unable to complete Fugl Montanez d/t impaired cognition & command following    Barthel Index:    Bathin  Bladder: 5  Bowels: 5  Groomin  Dressin  Feedin  Mobility: 0  Stairs: 0  Toilet Use: 0  Transfer (Bed to Chair and Back): 0  Total: 15/100        The Barthel ADL Index: Guidelines  1. The index should be used as a record of what a patient does, not as a record of what a patient could do. 2. The main aim is to establish degree of independence from any help, physical or verbal, however minor and for whatever reason. 3. The need for supervision renders the patient not independent. 4. A patient's performance should be established using the best available evidence. Asking the patient, friends/relatives and nurses are the usual sources, but direct observation and common sense are also important. However direct testing is not needed. 5. Usually the patient's performance over the preceding 24-48 hours is important, but occasionally longer periods will be relevant. 6. Middle categories imply that the patient supplies over 50 per cent of the effort. 7. Use of aids to be independent is allowed. Mitcheal Olp., Barthel, D.W. (0024). Functional evaluation: the Barthel Index. [facility] (14)2. Noemy Chester javier KIMBERLY Garsia, Mars Aragon., Crystal Berrios., [place], [address] [place] [address] (1999). Measuring the change indisability after inpatient rehabilitation; comparison of the responsiveness of the Barthel Index and Functional [place] Measure. Journal of Neurology, Neurosurgery, and Psychiatry, 66(4), 747-159. Sandra Burns, RAYOJ.A, THIERRY Hoff, & Ame Lin MELLA. (2004.) Assessment of post-stroke quality of life in cost-effectiveness studies: The usefulness of the Barthel Index and the EuroQoL-5D.  Quality of Life Research, 15, 740-93         Occupational Therapy Evaluation Charge Determination   History Examination Decision-Making   LOW Complexity : Brief history review  MEDIUM Complexity : 3-5 performance deficits relating to physical, cognitive , or psychosocial skils that result in activity limitations and / or participation restrictions MEDIUM Complexity : Patient may present with comorbidities that affect occupational performnce. Miniml to moderate modification of tasks or assistance (eg, physical or verbal ) with assesment(s) is necessary to enable patient to complete evaluation       Based on the above components, the patient evaluation is determined to be of the following complexity level: LOW   Pain Rating:  Pain reported with BUE AROM, unable to specify further    Activity Tolerance:   Poor    After treatment patient left in no apparent distress:    Supine in bed    COMMUNICATION/EDUCATION:   The patients plan of care was discussed with: Physical therapist and Registered nurse. Patient not appropriate for deficit and BE FAST education at this time, no family/caregivers in room, will follow up as able & appropriate. Patient/family agree to work toward stated goals and plan of care. This patients plan of care is appropriate for delegation to [facility].     Thank you for this referral.  STEPHANIE Iyer, OTR/L  Time Calculation: 17 mins

## 2021-07-16 VITALS
RESPIRATION RATE: 11 BRPM | WEIGHT: 140 LBS | DIASTOLIC BLOOD PRESSURE: 76 MMHG | OXYGEN SATURATION: 98 % | TEMPERATURE: 97.5 F | HEART RATE: 76 BPM | BODY MASS INDEX: 22.5 KG/M2 | HEIGHT: 66 IN | SYSTOLIC BLOOD PRESSURE: 141 MMHG

## 2021-07-16 LAB
COVID-19 RAPID TEST, COVR: NOT DETECTED
GLUCOSE BLD STRIP.AUTO-MCNC: 188 MG/DL (ref 65–117)
GLUCOSE BLD STRIP.AUTO-MCNC: 203 MG/DL (ref 65–117)
SARS-COV-2, COV2: NORMAL
SERVICE CMNT-IMP: ABNORMAL
SERVICE CMNT-IMP: ABNORMAL
SOURCE, COVRS: NORMAL

## 2021-07-16 PROCEDURE — 74011636637 HC RX REV CODE- 636/637: Performed by: FAMILY MEDICINE

## 2021-07-16 PROCEDURE — 74011000258 HC RX REV CODE- 258: Performed by: HOSPITALIST

## 2021-07-16 PROCEDURE — 74011250636 HC RX REV CODE- 250/636: Performed by: HOSPITALIST

## 2021-07-16 PROCEDURE — 87635 SARS-COV-2 COVID-19 AMP PRB: CPT

## 2021-07-16 PROCEDURE — 74011250637 HC RX REV CODE- 250/637: Performed by: HOSPITALIST

## 2021-07-16 PROCEDURE — 82962 GLUCOSE BLOOD TEST: CPT

## 2021-07-16 PROCEDURE — 74011250637 HC RX REV CODE- 250/637: Performed by: FAMILY MEDICINE

## 2021-07-16 RX ADMIN — LEVETIRACETAM 500 MG: 100 INJECTION, SOLUTION INTRAVENOUS at 08:33

## 2021-07-16 RX ADMIN — ALLOPURINOL 100 MG: 100 TABLET ORAL at 08:29

## 2021-07-16 RX ADMIN — TOPIRAMATE 100 MG: 100 TABLET, FILM COATED ORAL at 08:29

## 2021-07-16 RX ADMIN — LACOSAMIDE 200 MG: 50 TABLET, FILM COATED ORAL at 08:29

## 2021-07-16 RX ADMIN — LEVETIRACETAM 500 MG: 100 INJECTION, SOLUTION INTRAVENOUS at 01:42

## 2021-07-16 RX ADMIN — INSULIN LISPRO 2 UNITS: 100 INJECTION, SOLUTION INTRAVENOUS; SUBCUTANEOUS at 11:30

## 2021-07-16 RX ADMIN — FAMOTIDINE 20 MG: 20 TABLET ORAL at 08:29

## 2021-07-16 RX ADMIN — OXYCODONE HYDROCHLORIDE AND ACETAMINOPHEN 500 MG: 500 TABLET ORAL at 08:29

## 2021-07-16 NOTE — PROGRESS NOTES
Problem: Diabetes Self-Management  Goal: *Disease process and treatment process  Description: Define diabetes and identify own type of diabetes; list 3 options for treating diabetes. 7/16/2021 1221 by Garrett Weaver RN  Outcome: Resolved/Met  7/16/2021 0952 by Garrett Weaver RN  Outcome: Progressing Towards Goal  Goal: *Incorporating nutritional management into lifestyle  Description: Describe effect of type, amount and timing of food on blood glucose; list 3 methods for planning meals. 7/16/2021 1221 by Garrett Weaver RN  Outcome: Resolved/Met  7/16/2021 0952 by Garrett Weaver RN  Outcome: Progressing Towards Goal  Goal: *Incorporating physical activity into lifestyle  Description: State effect of exercise on blood glucose levels. 7/16/2021 1221 by Garertt Weaver RN  Outcome: Resolved/Met  7/16/2021 0952 by Garrett Weaver RN  Outcome: Progressing Towards Goal  Goal: *Developing strategies to promote health/change behavior  Description: Define the ABC's of diabetes; identify appropriate screenings, schedule and personal plan for screenings. 7/16/2021 1221 by Garrett Weaver RN  Outcome: Resolved/Met  7/16/2021 0952 by Garrett Weaver RN  Outcome: Progressing Towards Goal  Goal: *Using medications safely  Description: State effect of diabetes medications on diabetes; name diabetes medication taking, action and side effects. 7/16/2021 1221 by Garrett Weaver RN  Outcome: Resolved/Met  7/16/2021 0952 by Garrett Weaver RN  Outcome: Progressing Towards Goal  Goal: *Monitoring blood glucose, interpreting and using results  Description: Identify recommended blood glucose targets  and personal targets.   7/16/2021 1221 by Garrett Weaver RN  Outcome: Resolved/Met  7/16/2021 0952 by Garrett Weaver RN  Outcome: Progressing Towards Goal  Goal: *Prevention, detection, treatment of acute complications  Description: List symptoms of hyper- and hypoglycemia; describe how to treat low blood sugar and actions for lowering  high blood glucose level. 7/16/2021 1221 by Ahsan Shearer RN  Outcome: Resolved/Met  7/16/2021 0952 by Ahsan Shearer RN  Outcome: Progressing Towards Goal  Goal: *Prevention, detection and treatment of chronic complications  Description: Define the natural course of diabetes and describe the relationship of blood glucose levels to long term complications of diabetes. 7/16/2021 1221 by Ahsan Shearer RN  Outcome: Resolved/Met  7/16/2021 0952 by Ahsan Shearer RN  Outcome: Progressing Towards Goal  Goal: *Developing strategies to address psychosocial issues  Description: Describe feelings about living with diabetes; identify support needed and support network  7/16/2021 1221 by Ahsan Shearer RN  Outcome: Resolved/Met  7/16/2021 0952 by Ahsan Shearer RN  Outcome: Progressing Towards Goal  Goal: *Insulin pump training  7/16/2021 1221 by Ahsan Shearer RN  Outcome: Resolved/Met  7/16/2021 0952 by Ahsan Shearer RN  Outcome: Progressing Towards Goal  Goal: *Sick day guidelines  7/16/2021 1221 by Ahsan Shearer RN  Outcome: Resolved/Met  7/16/2021 0952 by Ahsan Shearer RN  Outcome: Progressing Towards Goal  Goal: *Patient Specific Goal (EDIT GOAL, INSERT TEXT)  7/16/2021 [phone] by Ahsan Shearer RN  Outcome: Resolved/Met  7/16/2021 0952 by Ahsan Shearer RN  Outcome: Progressing Towards Goal     Problem: Patient Education: Go to Patient Education Activity  Goal: Patient/Family Education  7/16/2021 1221 by Ahsan Shearer RN  Outcome: Resolved/Met  7/16/2021 0952 by Ahsan Shearer RN  Outcome: Progressing Towards Goal     Problem: Pressure Injury - Risk of  Goal: *Prevention of pressure injury  Description: Document Roderick Scale and appropriate interventions in the flowsheet.   7/16/2021 1221 by Ahsan Shearer RN  Outcome: Resolved/Met  Note: Pressure Injury Interventions:  Sensory Interventions: Assess changes in LOC, Discuss PT/OT consult with provider    Moisture Interventions: Absorbent underpads, Check for incontinence Q2 hours and as needed    Activity Interventions: PT/OT evaluation, Pressure redistribution bed/mattress(bed type)    Mobility Interventions: HOB 30 degrees or less, PT/OT evaluation    Nutrition Interventions: Document food/fluid/supplement intake    Friction and Shear Interventions: HOB 30 degrees or less, Lift sheet             7/16/2021 0952 by Carmencita Ingram RN  Outcome: Progressing Towards Goal  Note: Pressure Injury Interventions:  Sensory Interventions: Assess changes in LOC, Discuss PT/OT consult with provider    Moisture Interventions: Absorbent underpads, Check for incontinence Q2 hours and as needed    Activity Interventions: PT/OT evaluation, Pressure redistribution bed/mattress(bed type)    Mobility Interventions: HOB 30 degrees or less, PT/OT evaluation    Nutrition Interventions: Document food/fluid/supplement intake    Friction and Shear Interventions: HOB 30 degrees or less, Lift sheet                Problem: Patient Education: Go to Patient Education Activity  Goal: Patient/Family Education  7/16/2021 1221 by Carmencita Ingram RN  Outcome: Resolved/Met  7/16/2021 0952 by Carmencita Ingram RN  Outcome: Progressing Towards Goal     Problem: Falls - Risk of  Goal: *Absence of Falls  Description: Document Curtis Fall Risk and appropriate interventions in the flowsheet.   7/16/2021 1221 by Carmencita Ingram RN  Outcome: Resolved/Met  Note: Fall Risk Interventions:  Mobility Interventions: Bed/chair exit alarm, PT Consult for mobility concerns, Patient to call before getting OOB    Mentation Interventions: Bed/chair exit alarm, Door open when patient unattended    Medication Interventions: Bed/chair exit alarm, Patient to call before getting OOB    Elimination Interventions: Bed/chair exit alarm, Call light in reach, Patient to call for help with toileting needs    History of Falls Interventions: Bed/chair exit alarm, Door open when patient unattended      7/16/2021 0952 by Jamil Salvador RN  Outcome: Progressing Towards Goal  Note: Fall Risk Interventions:  Mobility Interventions: Bed/chair exit alarm, PT Consult for mobility concerns, Patient to call before getting OOB    Mentation Interventions: Bed/chair exit alarm, Door open when patient unattended    Medication Interventions: Bed/chair exit alarm, Patient to call before getting OOB    Elimination Interventions: Bed/chair exit alarm, Call light in reach, Patient to call for help with toileting needs    History of Falls Interventions: Bed/chair exit alarm, Door open when patient unattended         Problem: Patient Education: Go to Patient Education Activity  Goal: Patient/Family Education  7/16/2021 1221 by Jamil Salvador RN  Outcome: Resolved/Met  7/16/2021 0952 by Jamil Salvador RN  Outcome: Progressing Towards Goal     Problem: Patient Education: Go to Patient Education Activity  Goal: Patient/Family Education  7/16/2021 1221 by Jamil Salvador RN  Outcome: Resolved/Met  7/16/2021 0952 by Jamil Salvador RN  Outcome: Progressing Towards Goal     Problem: TIA/CVA Stroke: 0-24 hours  Goal: Off Pathway (Use only if patient is Off Pathway)  7/16/2021 1221 by Jamil Salvador RN  Outcome: Resolved/Met  7/16/2021 0952 by Jamil Salvador RN  Outcome: Progressing Towards Goal  Goal: Activity/Safety  7/16/2021 1221 by Jamil Salvador RN  Outcome: Resolved/Met  7/16/2021 0952 by Jamil Salvador RN  Outcome: Progressing Towards Goal  Goal: Consults, if ordered  7/16/2021 1221 by Jamil Salvador RN  Outcome: Resolved/Met  7/16/2021 0952 by Jamil Salvador RN  Outcome: Progressing Towards Goal  Goal: Diagnostic Test/Procedures  7/16/2021 1221 by Jamil Salvador RN  Outcome: Resolved/Met  7/16/2021 0952 by Jamil Salvador RN  Outcome: Progressing Towards Goal  Goal: Nutrition/Diet  7/16/2021 1221 by Jamil Salvador RN  Outcome: Resolved/Met  7/16/2021 0952 by Jamil Salvador RN  Outcome: Progressing Towards Goal  Goal: Discharge Planning  7/16/2021 1221 by Jamil Salvador, RN  Outcome: Resolved/Met  7/16/2021 0952 by Jamil Salvador, RN  Outcome: Progressing Towards Goal  Goal: Medications  7/16/2021 1221 by Jamil Salvador, RN  Outcome: Resolved/Met  7/16/2021 0952 by Jamil Salvador, RN  Outcome: Progressing Towards Goal  Goal: Respiratory  7/16/2021 1221 by Jamil Salvador, RN  Outcome: Resolved/Met  7/16/2021 0952 by Jamil Salvador, RN  Outcome: Progressing Towards Goal  Goal: Treatments/Interventions/Procedures  7/16/2021 1221 by Jamil Salvador, RN  Outcome: Resolved/Met  7/16/2021 0952 by Jamil Salvador, RN  Outcome: Progressing Towards Goal  Goal: Minimize risk of bleeding post-thrombolytic infusion  7/16/2021 1221 by Jamil Salvador, RN  Outcome: Resolved/Met  7/16/2021 0952 by Jamil Salvador, RN  Outcome: Progressing Towards Goal  Goal: Monitor for complications post-thrombolytic infusion  7/16/2021 1221 by Jamil Salvador, RN  Outcome: Resolved/Met  7/16/2021 0952 by Jamil Savlador, RN  Outcome: Progressing Towards Goal  Goal: Psychosocial  7/16/2021 1221 by Jamil Salvador, RN  Outcome: Resolved/Met  7/16/2021 0952 by Jamil Salvador, RN  Outcome: Progressing Towards Goal  Goal: *Hemodynamically stable  7/16/2021 1221 by Jamil Salvador, RN  Outcome: Resolved/Met  7/16/2021 0952 by Jamil Salvador, RN  Outcome: Progressing Towards Goal  Goal: *Neurologically stable  Description: Absence of additional neurological deficits    7/16/2021 1221 by Jamil Salvador, RN  Outcome: Resolved/Met  7/16/2021 0952 by Jamil Salvador, RN  Outcome: Progressing Towards Goal  Goal: *Verbalizes anxiety and depression are reduced or absent  7/16/2021 1221 by Jamil Salvador, RN  Outcome: Resolved/Met  7/16/2021 0952 by Jamil Salvador, RN  Outcome: Progressing Towards Goal  Goal: *Absence of Signs of Aspiration on Current Diet  7/16/2021 1221 by Jamil Salvador, RN  Outcome: Resolved/Met  7/16/2021 0952 by Jamil Salvador, RN  Outcome: Progressing Towards Goal  Goal: *Absence of deep venous thrombosis signs and symptoms(Stroke Metric)  7/16/2021 1221 by Gwendolyn Blankenship, RN  Outcome: Resolved/Met  7/16/2021 0952 by Gwendolyn Blankenship, RN  Outcome: Progressing Towards Goal  Goal: *Ability to perform ADLs and demonstrates progressive mobility and function  7/16/2021 1221 by Gwendolyn Blankenship, RN  Outcome: Resolved/Met  7/16/2021 0952 by Gwendolyn Blankenship, RN  Outcome: Progressing Towards Goal  Goal: *Stroke education started(Stroke Metric)  7/16/2021 1221 by Gwendolyn Blankenship, RN  Outcome: Resolved/Met  7/16/2021 0952 by Gwendolyn Blankenship, RN  Outcome: Progressing Towards Goal  Goal: *Dysphagia screen performed(Stroke Metric)  7/16/2021 1221 by Gwendolyn Blankenship, RN  Outcome: Resolved/Met  7/16/2021 0952 by Gwendolyn Blankenship, RN  Outcome: Progressing Towards Goal  Goal: *Rehab consulted(Stroke Metric)  7/16/2021 1221 by Gwendolyn Blankenship, RN  Outcome: Resolved/Met  7/16/2021 0952 by Gwendolyn Blankenship, RN  Outcome: Progressing Towards Goal     Problem: TIA/CVA Stroke: Day 2 Until Discharge  Goal: Off Pathway (Use only if patient is Off Pathway)  7/16/2021 1221 by Gwendolyn Blankenship, RN  Outcome: Resolved/Met  7/16/2021 0952 by Gwendolyn Blankenship, RN  Outcome: Progressing Towards Goal  Goal: Activity/Safety  7/16/2021 1221 by Gwendolyn Blankenship, RN  Outcome: Resolved/Met  7/16/2021 0952 by Gwendolyn Blankenship, RN  Outcome: Progressing Towards Goal  Goal: Diagnostic Test/Procedures  7/16/2021 1221 by Gwendolyn Blankenship, RN  Outcome: Resolved/Met  7/16/2021 0952 by Gwendolyn Blankenship, RN  Outcome: Progressing Towards Goal  Goal: Nutrition/Diet  7/16/2021 1221 by Gwendolyn Blankenship, RN  Outcome: Resolved/Met  7/16/2021 0952 by Gwendolyn Blankenship, RN  Outcome: Progressing Towards Goal  Goal: Discharge Planning  7/16/2021 1221 by Gwendolyn Blankenship, RN  Outcome: Resolved/Met  7/16/2021 0952 by Gwendolyn Blankenship, RN  Outcome: Progressing Towards Goal  Goal: Medications  7/16/2021 1221 by Gwendolyn Blankenship RN  Outcome: Resolved/Met  7/16/2021 1038 by Thaddeus Brittle, RN  Outcome: Progressing Towards Goal  Goal: Respiratory  7/16/2021 1221 by Thaddeus Brittle, RN  Outcome: Resolved/Met  7/16/2021 0952 by Thaddeus Brittle, RN  Outcome: Progressing Towards Goal  Goal: Treatments/Interventions/Procedures  7/16/2021 1221 by Thaddeus Brittle, RN  Outcome: Resolved/Met  7/16/2021 0952 by Thaddeus Brittle, RN  Outcome: Progressing Towards Goal  Goal: Psychosocial  7/16/2021 1221 by Thaddeus Brittle, RN  Outcome: Resolved/Met  7/16/2021 0952 by Thaddeus Brittle, RN  Outcome: Progressing Towards Goal  Goal: *Verbalizes anxiety and depression are reduced or absent  7/16/2021 1221 by Thaddeus Brittle, RN  Outcome: Resolved/Met  7/16/2021 0952 by Thaddeus Brittle, RN  Outcome: Progressing Towards Goal  Goal: *Absence of aspiration  7/16/2021 1221 by Thaddeus Brittle, RN  Outcome: Resolved/Met  7/16/2021 0952 by Thaddeus Brittle, RN  Outcome: Progressing Towards Goal  Goal: *Absence of deep venous thrombosis signs and symptoms(Stroke Metric)  7/16/2021 1221 by Thaddeus Brittle, RN  Outcome: Resolved/Met  7/16/2021 0952 by Thaddeus Brittle, RN  Outcome: Progressing Towards Goal  Goal: *Optimal pain control at patient's stated goal  7/16/2021 1221 by Thaddeus Brittle, RN  Outcome: Resolved/Met  7/16/2021 0952 by Thaddeus Brittle, RN  Outcome: Progressing Towards Goal  Goal: *Tolerating diet  7/16/2021 1221 by Thaddeus Brittle, RN  Outcome: Resolved/Met  7/16/2021 0952 by Thaddeus Brittle, RN  Outcome: Progressing Towards Goal  Goal: *Ability to perform ADLs and demonstrates progressive mobility and function  7/16/2021 1221 by Thaddeus Brittle, RN  Outcome: Resolved/Met  7/16/2021 0952 by Thaddeus Brittle, RN  Outcome: Progressing Towards Goal  Goal: *Stroke education continued(Stroke Metric)  7/16/2021 1221 by Thaddeus Brittle, RN  Outcome: Resolved/Met  7/16/2021 0952 by Thaddeus Brittle, RN  Outcome: Progressing Towards Goal     Problem: Ischemic Stroke: Discharge Outcomes  Goal: *Verbalizes anxiety and depression are reduced or absent  7/16/2021 1221 by Jennifer Castellon RN  Outcome: Resolved/Met  7/16/2021 0952 by Jennifer Castellon RN  Outcome: Progressing Towards Goal  Goal: *Verbalize understanding of risk factor modification(Stroke Metric)  7/16/2021 1221 by Jennifer Castellon RN  Outcome: Resolved/Met  7/16/2021 0952 by Jennifer Castellon RN  Outcome: Progressing Towards Goal  Goal: *Hemodynamically stable  7/16/2021 1221 by Jennifer Castellon RN  Outcome: Resolved/Met  7/16/2021 0952 by Jennifer Castellon RN  Outcome: Progressing Towards Goal  Goal: *Absence of aspiration pneumonia  7/16/2021 1221 by Jennifer Castellon RN  Outcome: Resolved/Met  7/16/2021 0952 by Jennifer Castellon RN  Outcome: Progressing Towards Goal  Goal: *Aware of needed dietary changes  7/16/2021 1221 by Jennifer Castellon RN  Outcome: Resolved/Met  7/16/2021 0952 by Jennifer Castellon RN  Outcome: Progressing Towards Goal  Goal: *Verbalize understanding of prescribed medications including anti-coagulants, anti-lipid, and/or anti-platelets(Stroke Metric)  7/16/2021 1221 by Jennifer Castellon RN  Outcome: Resolved/Met  7/16/2021 0952 by Jennifer Castellon RN  Outcome: Progressing Towards Goal  Goal: *Tolerating diet  7/16/2021 1221 by Jennifer Castellon RN  Outcome: Resolved/Met  7/16/2021 0952 by Jennifer Castellon RN  Outcome: Progressing Towards Goal  Goal: *Aware of follow-up diagnostics related to anticoagulants  7/16/2021 1221 by Jennifer Castellon RN  Outcome: Resolved/Met  7/16/2021 0952 by Jennifer Castellon RN  Outcome: Progressing Towards Goal  Goal: *Ability to perform ADLs and demonstrates progressive mobility and function  7/16/2021 1221 by Jennifer Castellon RN  Outcome: Resolved/Met  7/16/2021 0952 by Jennifer Castellon RN  Outcome: Progressing Towards Goal  Goal: *Absence of DVT(Stroke Metric)  7/16/2021 1221 by Jennifer Castellon, RN  Outcome: Resolved/Met  7/16/2021 0952 by Jennifer Castellon, RN  Outcome: Progressing Towards Goal  Goal: *Absence of aspiration  7/16/2021 1221 by Alexandra Brown RN  Outcome: Resolved/Met  7/16/2021 0952 by Alexandra Brown RN  Outcome: Progressing Towards Goal  Goal: *Optimal pain control at patient's stated goal  7/16/2021 1221 by Alexandra Brown RN  Outcome: Resolved/Met  7/16/2021 0952 by Alexandra Brown RN  Outcome: Progressing Towards Goal  Goal: *Home safety concerns addressed  7/16/2021 1221 by Alexandra Brown RN  Outcome: Resolved/Met  7/16/2021 0952 by Alexandra Brown RN  Outcome: Progressing Towards Goal  Goal: *Describes available resources and support systems  7/16/2021 1221 by Alexandra Brown RN  Outcome: Resolved/Met  7/16/2021 0952 by Alexandra Brown RN  Outcome: Progressing Towards Goal  Goal: *Verbalizes understanding of activation of EMS(911) for stroke symptoms(Stroke Metric)  7/16/2021 1221 by Alexandra Brown RN  Outcome: Resolved/Met  7/16/2021 0952 by Alexandra Brown RN  Outcome: Progressing Towards Goal  Goal: *Understands and describes signs and symptoms to report to providers(Stroke Metric)  7/16/2021 1221 by Alexandra Brown RN  Outcome: Resolved/Met  7/16/2021 0952 by Alexandra Brown RN  Outcome: Progressing Towards Goal  Goal: *Neurolgocially stable (absence of additional neurological deficits)  7/16/2021 1221 by Alexandra Brown RN  Outcome: Resolved/Met  7/16/2021 0952 by Alexandra Brown RN  Outcome: Progressing Towards Goal  Goal: Lisa Mcclain importance of follow-up with primary care physician(Stroke Metric)  7/16/2021 1221 by Alexandra Brown RN  Outcome: Resolved/Met  7/16/2021 0952 by Alexandra Brown RN  Outcome: Progressing Towards Goal  Goal: *Smoking cessation discussed,if applicable(Stroke Metric)  7/16/2021 1221 by Alexandra Brown RN  Outcome: Resolved/Met  7/16/2021 0952 by Alexandra Brown RN  Outcome: Progressing Towards Goal  Goal: *Depression screening completed(Stroke Metric)  7/16/2021 1221 by Alexandra Brown, RN  Outcome: Resolved/Met  7/16/2021 0952 by Alexandra Brown, RN  Outcome: Progressing Towards Goal

## 2021-07-16 NOTE — PROGRESS NOTES
[id number]: MT notified RN that pt's bed alarm was sounding. RN entered room to the pt attempting to get out of bed unassisted. Pt stated he had to have a bowel movement. RN called another RN for assistance in walking the pt to the bathroom. RN's successfully helped pt to the restroom. Upon standing up from the toilet, the pt began leaning forward and became less responsive than his normal. RN was able to safely assist pt down to toilet. Upon sitting, the pt stated he was dizzy. Primary RN and secondary RN able to get pt to the recliner chair, and then to the bed. Once in the bed, RN obtained pt's BP, which was 109/70. Pt remained complaining of dizziness and weakness. After a few minutes of resting in bed, pt did not have anymore complaints.

## 2021-07-16 NOTE — PROGRESS NOTES
Problem: Diabetes Self-Management  Goal: *Disease process and treatment process  Description: Define diabetes and identify own type of diabetes; list 3 options for treating diabetes. Outcome: Progressing Towards Goal  Goal: *Incorporating nutritional management into lifestyle  Description: Describe effect of type, amount and timing of food on blood glucose; list 3 methods for planning meals. Outcome: Progressing Towards Goal  Goal: *Incorporating physical activity into lifestyle  Description: State effect of exercise on blood glucose levels. Outcome: Progressing Towards Goal  Goal: *Developing strategies to promote health/change behavior  Description: Define the ABC's of diabetes; identify appropriate screenings, schedule and personal plan for screenings. Outcome: Progressing Towards Goal  Goal: *Using medications safely  Description: State effect of diabetes medications on diabetes; name diabetes medication taking, action and side effects. Outcome: Progressing Towards Goal  Goal: *Monitoring blood glucose, interpreting and using results  Description: Identify recommended blood glucose targets  and personal targets. Outcome: Progressing Towards Goal  Goal: *Prevention, detection, treatment of acute complications  Description: List symptoms of hyper- and hypoglycemia; describe how to treat low blood sugar and actions for lowering  high blood glucose level. Outcome: Progressing Towards Goal  Goal: *Prevention, detection and treatment of chronic complications  Description: Define the natural course of diabetes and describe the relationship of blood glucose levels to long term complications of diabetes.   Outcome: Progressing Towards Goal  Goal: *Developing strategies to address psychosocial issues  Description: Describe feelings about living with diabetes; identify support needed and support network  Outcome: Progressing Towards Goal  Goal: *Insulin pump training  Outcome: Progressing Towards Goal  Goal: *Sick day guidelines  Outcome: Progressing Towards Goal  Goal: *Patient Specific Goal (EDIT GOAL, INSERT TEXT)  Outcome: Progressing Towards Goal     Problem: Patient Education: Go to Patient Education Activity  Goal: Patient/Family Education  Outcome: Progressing Towards Goal     Problem: Pressure Injury - Risk of  Goal: *Prevention of pressure injury  Description: Document Roderick Scale and appropriate interventions in the flowsheet. Outcome: Progressing Towards Goal  Note: Pressure Injury Interventions:  Sensory Interventions: Assess changes in LOC, Discuss PT/OT consult with provider    Moisture Interventions: Absorbent underpads, Check for incontinence Q2 hours and as needed    Activity Interventions: PT/OT evaluation, Pressure redistribution bed/mattress(bed type)    Mobility Interventions: HOB 30 degrees or less, PT/OT evaluation    Nutrition Interventions: Document food/fluid/supplement intake    Friction and Shear Interventions: HOB 30 degrees or less, Lift sheet                Problem: Patient Education: Go to Patient Education Activity  Goal: Patient/Family Education  Outcome: Progressing Towards Goal     Problem: Falls - Risk of  Goal: *Absence of Falls  Description: Document Curtis Fall Risk and appropriate interventions in the flowsheet.   Outcome: Progressing Towards Goal  Note: Fall Risk Interventions:  Mobility Interventions: Bed/chair exit alarm, PT Consult for mobility concerns, Patient to call before getting OOB    Mentation Interventions: Bed/chair exit alarm, Door open when patient unattended    Medication Interventions: Bed/chair exit alarm, Patient to call before getting OOB    Elimination Interventions: Bed/chair exit alarm, Call light in reach, Patient to call for help with toileting needs    History of Falls Interventions: Bed/chair exit alarm, Door open when patient unattended         Problem: Patient Education: Go to Patient Education Activity  Goal: Patient/Family Education  Outcome: Progressing Towards Goal     Problem: Patient Education: Go to Patient Education Activity  Goal: Patient/Family Education  Outcome: Progressing Towards Goal     Problem: TIA/CVA Stroke: 0-24 hours  Goal: Off Pathway (Use only if patient is Off Pathway)  Outcome: Progressing Towards Goal  Goal: Activity/Safety  Outcome: Progressing Towards Goal  Goal: Consults, if ordered  Outcome: Progressing Towards Goal  Goal: Diagnostic Test/Procedures  Outcome: Progressing Towards Goal  Goal: Nutrition/Diet  Outcome: Progressing Towards Goal  Goal: Discharge Planning  Outcome: Progressing Towards Goal  Goal: Medications  Outcome: Progressing Towards Goal  Goal: Respiratory  Outcome: Progressing Towards Goal  Goal: Treatments/Interventions/Procedures  Outcome: Progressing Towards Goal  Goal: Minimize risk of bleeding post-thrombolytic infusion  Outcome: Progressing Towards Goal  Goal: Monitor for complications post-thrombolytic infusion  Outcome: Progressing Towards Goal  Goal: Psychosocial  Outcome: Progressing Towards Goal  Goal: *Hemodynamically stable  Outcome: Progressing Towards Goal  Goal: *Neurologically stable  Description: Absence of additional neurological deficits    Outcome: Progressing Towards Goal  Goal: *Verbalizes anxiety and depression are reduced or absent  Outcome: Progressing Towards Goal  Goal: *Absence of Signs of Aspiration on Current Diet  Outcome: Progressing Towards Goal  Goal: *Absence of deep venous thrombosis signs and symptoms(Stroke Metric)  Outcome: Progressing Towards Goal  Goal: *Ability to perform ADLs and demonstrates progressive mobility and function  Outcome: Progressing Towards Goal  Goal: *Stroke education started(Stroke Metric)  Outcome: Progressing Towards Goal  Goal: *Dysphagia screen performed(Stroke Metric)  Outcome: Progressing Towards Goal  Goal: *Rehab consulted(Stroke Metric)  Outcome: Progressing Towards Goal     Problem: TIA/CVA Stroke: Day 2 Until Discharge  Goal: Off Pathway (Use only if patient is Off Pathway)  Outcome: Progressing Towards Goal  Goal: Activity/Safety  Outcome: Progressing Towards Goal  Goal: Diagnostic Test/Procedures  Outcome: Progressing Towards Goal  Goal: Nutrition/Diet  Outcome: Progressing Towards Goal  Goal: Discharge Planning  Outcome: Progressing Towards Goal  Goal: Medications  Outcome: Progressing Towards Goal  Goal: Respiratory  Outcome: Progressing Towards Goal  Goal: Treatments/Interventions/Procedures  Outcome: Progressing Towards Goal  Goal: Psychosocial  Outcome: Progressing Towards Goal  Goal: *Verbalizes anxiety and depression are reduced or absent  Outcome: Progressing Towards Goal  Goal: *Absence of aspiration  Outcome: Progressing Towards Goal  Goal: *Absence of deep venous thrombosis signs and symptoms(Stroke Metric)  Outcome: Progressing Towards Goal  Goal: *Optimal pain control at patient's stated goal  Outcome: Progressing Towards Goal  Goal: *Tolerating diet  Outcome: Progressing Towards Goal  Goal: *Ability to perform ADLs and demonstrates progressive mobility and function  Outcome: Progressing Towards Goal  Goal: *Stroke education continued(Stroke Metric)  Outcome: Progressing Towards Goal     Problem: Ischemic Stroke: Discharge Outcomes  Goal: *Verbalizes anxiety and depression are reduced or absent  Outcome: Progressing Towards Goal  Goal: *Verbalize understanding of risk factor modification(Stroke Metric)  Outcome: Progressing Towards Goal  Goal: *Hemodynamically stable  Outcome: Progressing Towards Goal  Goal: *Absence of aspiration pneumonia  Outcome: Progressing Towards Goal  Goal: *Aware of needed dietary changes  Outcome: Progressing Towards Goal  Goal: *Verbalize understanding of prescribed medications including anti-coagulants, anti-lipid, and/or anti-platelets(Stroke Metric)  Outcome: Progressing Towards Goal  Goal: *Tolerating diet  Outcome: Progressing Towards Goal  Goal: *Aware of follow-up diagnostics related to anticoagulants  Outcome: Progressing Towards Goal  Goal: *Ability to perform ADLs and demonstrates progressive mobility and function  Outcome: Progressing Towards Goal  Goal: *Absence of DVT(Stroke Metric)  Outcome: Progressing Towards Goal  Goal: *Absence of aspiration  Outcome: Progressing Towards Goal  Goal: *Optimal pain control at patient's stated goal  Outcome: Progressing Towards Goal  Goal: *Home safety concerns addressed  Outcome: Progressing Towards Goal  Goal: *Describes available resources and support systems  Outcome: Progressing Towards Goal  Goal: *Verbalizes understanding of activation of EMS(911) for stroke symptoms(Stroke Metric)  Outcome: Progressing Towards Goal  Goal: *Understands and describes signs and symptoms to report to providers(Stroke Metric)  Outcome: Progressing Towards Goal  Goal: *Neurolgocially stable (absence of additional neurological deficits)  Outcome: Progressing Towards Goal  Goal: *Verbalizes importance of follow-up with primary care physician(Stroke Metric)  Outcome: Progressing Towards Goal  Goal: *Smoking cessation discussed,if applicable(Stroke Metric)  Outcome: Progressing Towards Goal  Goal: *Depression screening completed(Stroke Metric)  Outcome: Progressing Towards Goal

## 2021-07-16 NOTE — DISCHARGE SUMMARY
Discharge Summary       PATIENT ID: Burnette Dandy  MRN: [id number]   YOB: 1945    DATE OF ADMISSION: 7/14/2021  1:07 PM    DATE OF DISCHARGE: 7/16/21   PRIMARY CARE PROVIDER: Brittany Chatterjee MD     ATTENDING PHYSICIAN: Tresea Siemens  DISCHARGING PROVIDER: Shun Wills MD    To contact this individual call [phone] and ask the  to page. If unavailable ask to be transferred the Adult Hospitalist Department. CONSULTATIONS: IP CONSULT TO NEUROSURGERY  IP CONSULT TO HOSPITALIST    PROCEDURES/SURGERIES: * No surgery found *    ADMITTING [facility] COURSE:   yNla Dumont a 68 y. o. male who presents with presents fall     Patient apparently lives at [facility], had a fall today which was unwitnessed. [address] struck the front part of his head.  Post that started having some bleeding from the laceration and was sent to the hospital for further management evaluation.  He continued to have headache, came to the ER, had a CT scan done and was found to have subdural hematoma and was requested to be admitted to the hospital service.  Currently patient reports of some headache but denies any other complaints or problems.  Patient is a poor historian and reports that he feels like his head is swimming.       Assessment & Plan:      Traumatic subdural hematoma status post accidental fall per patient  -CT remains a stable overnight no acute intervention as per neurosurgery  -Hold antiplatelet aspirin and Plavix- for 2 weeks resume after that   -Keppra for seizure prophylaxis     Fall  -Start PT OT lives in [facility] long-term care     Hypertension  Dementia-supportive care  COPD-stable not in exacerbation  Chronic disease stage II-stable  Diabetes mellitus type 2-continue Lantus and sliding scale insulin       DISCHARGE DIAGNOSES / PLAN:      1. [facility]      ADDITIONAL CARE RECOMMENDATIONS:        PENDING TEST RESULTS:   At the time of discharge the following test results are still pending:     FOLLOW UP APPOINTMENTS:    Follow-up Information     Follow up With Specialties Details Why Contact Info    Juan Navarro MD Internal Medicine   [address]  [phone]      Arvind Foreman NP Nurse Practitioner In 1 week  [address] [place] [id number] [phone]               DIET: Regular Diet and Cardiac Diet    ACTIVITY: Activity as tolerated    WOUND CARE:     EQUIPMENT needed:       DISCHARGE MEDICATIONS:  Current Discharge Medication List      CONTINUE these medications which have NOT CHANGED    Details   ascorbic acid, vitamin C, (VITAMIN C) 500 mg tablet Take 500 mg by mouth daily. ergocalciferol (ERGOCALCIFEROL) 1,250 mcg (50,000 unit) capsule Take 50,000 Units by mouth every month. (6th of the month)      polyethylene glycol (Miralax) 17 gram packet Take 17 g by mouth daily. albuterol-ipratropium (DUO-NEB) 2.5 mg-0.5 mg/3 ml nebu 3 mL by Nebulization route every eight (8) hours as needed for Wheezing. insulin glargine (Lantus U-100 Insulin) 100 unit/mL injection 26 Units by SubCUTAneous route nightly. losartan (COZAAR) 25 mg tablet Take 50 mg by mouth daily. insulin aspart U-100 (NOVOLOG) 100 unit/mL injection 0-8 Units by SubCUTAneous route Before breakfast, lunch, dinner and at bedtime. (sliding scale insulin)      albuterol (PROVENTIL HFA, VENTOLIN HFA, PROAIR HFA) 90 mcg/actuation inhaler Take 2 Puffs by inhalation every six (6) hours as needed for Wheezing. senna (Senna) 8.6 mg tablet Take 2 Tablets by mouth every other day. (at bedtime)      sertraline (ZOLOFT) 50 mg tablet Take 50 mg by mouth daily. linaGLIPtin (TRADJENTA) 5 mg tablet Take 5 mg by mouth daily. metoprolol tartrate (LOPRESSOR) 25 mg tablet take 1 tablet by mouth twice a day  Qty: 12.5 Tab, Refills: 0      lacosamide (VIMPAT) 200 mg tab tablet Take 200 mg by mouth two (2) times a day.       topiramate (TOPAMAX) 100 mg tablet Take 100 mg by mouth two (2) times a day. pravastatin (PRAVACHOL) 40 mg tablet take 1 tablet once daily  Qty: 30 Tab, Refills: 9      allopurinol (ZYLOPRIM) 100 mg tablet take 1 tablet by mouth once daily  Qty: 30 Tab, Refills: 12      levETIRAcetam (KEPPRA) 500 mg tablet Take 500 mg by mouth three (3) times daily. Indications: additional medication to treat partial seizures         STOP taking these medications       aspirin 81 mg chewable tablet Comments:   Reason for Stopping:         clopidogrel (PLAVIX) 75 mg tab Comments:   Reason for Stopping:         HYDROcodone-acetaminophen (NORCO) 5-325 mg per tablet Comments:   Reason for Stopping:                 NOTIFY YOUR PHYSICIAN FOR ANY OF THE FOLLOWING:   Fever over 101 degrees for 24 hours. Chest pain, shortness of breath, fever, chills, nausea, vomiting, diarrhea, change in mentation, falling, weakness, bleeding. Severe pain or pain not relieved by medications. Or, any other signs or symptoms that you may have questions about. DISPOSITION:    Home With:   OT  PT  HH  RN      x Long term SNF/Inpatient Rehab    Independent/assisted living    Hospice    Other:       PATIENT CONDITION AT DISCHARGE:     Functional status    Poor    x Deconditioned     Independent      Cognition     Lucid     Forgetful    x Dementia      Catheters/lines (plus indication)    Loyd     PICC     PEG    x None      Code status   x  Full code     DNR      PHYSICAL EXAMINATION AT DISCHARGE:  General:          Alert, cooperative, no distress, appears stated age. HEENT:           Atraumatic, anicteric sclerae, pink conjunctivae                          No oral ulcers, mucosa moist, throat clear, dentition fair  Neck:               Supple, symmetrical  Lungs:             Clear to auscultation bilaterally. No Wheezing or Rhonchi. No rales. Chest wall:      No tenderness  No Accessory muscle use.   Heart:              Regular  rhythm,  No  murmur   No edema  Abdomen:        Soft, non-tender. Not distended. Bowel sounds normal  Extremities:     No cyanosis. No clubbing,                            Skin turgor normal, Capillary refill normal  Skin:                Not pale. Not Jaundiced  No rashes   Psych:             Not anxious or agitated.   Neurologic:      Alert, moves all extremities, answers questions appropriately and responds to commands       CHRONIC MEDICAL DIAGNOSES:  Problem List as of 7/16/2021 Date Reviewed: 8/27/2020        Codes Class Noted - Resolved    SDH (subdural hematoma) ([facility] ICD-10-CM: J31.8V3D  ICD-9-CM: 432.1  7/14/2021 - Present        Acute kidney injury superimposed on chronic kidney disease ([facility] ICD-10-CM: N17.9, N18.9  ICD-9-CM: 866.00, 585.9  8/28/2020 - Present        Hyponatremia ICD-10-CM: E87.1  ICD-9-CM: 276.1  8/28/2020 - Present        Thrombocytopenia ([facility] ICD-10-CM: D69.6  ICD-9-CM: 287.5  8/28/2020 - Present        CAP (community acquired pneumonia) ICD-10-CM: J18.9  ICD-9-CM: 558  8/28/2020 - Present        Pneumonia due to COVID-19 virus ICD-10-CM: U07.1, J12.82  ICD-9-CM: 480.8, 079.89  8/27/2020 - Present        Hematemesis ICD-10-CM: K92.0  ICD-9-CM: 578.0  8/27/2020 - Present        Chronic obstructive pulmonary disease ([facility] ICD-10-CM: J44.9  ICD-9-CM: 621  Unknown - Present    Overview Signed 8/27/2020  8:14 PM by Baylee Garcia NP     CHRONIC BRONCHITIS             Thromboembolus [facility]) ICD-10-CM: I74.9  ICD-9-CM: 444.9  Unknown - Present    Overview Signed 8/27/2020  8:14 PM by Baylee Garcia NP     left leg  (NO PE)             Alzheimer's disease ([facility] ICD-10-CM: G30.9, F02.80  ICD-9-CM: 331.0  Unknown - Present        Sepsis ([facility] ICD-10-CM: A41.9  ICD-9-CM: 038.9, 995.91  8/4/2020 - Present        Left upper lobe pneumonia ICD-10-CM: J18.9  ICD-9-CM: 708  8/4/2020 - Present        RAPHAEL (acute kidney injury) ([facility] ICD-10-CM: N17.9  ICD-9-CM: 584.9  8/4/2020 - Present        Acute encephalopathy ICD-10-CM: G93.40  ICD-9-CM: 348.30  8/4/2020 - Present        Suspected COVID-19 virus infection ICD-10-CM: Z20.822  ICD-9-CM: V01.79  8/4/2020 - Present        Coronary artery disease involving native heart without angina pectoris ICD-10-CM: I25.10  ICD-9-CM: 414.01  6/26/2018 - Present        Gait abnormality ICD-10-CM: R26.9  ICD-9-CM: 781.2  3/12/2018 - Present        Orthostatic hypertension ICD-10-CM: I10  ICD-9-CM: 401.9  2/13/2018 - Present        Prostate cancer ([facility] ICD-10-CM: Rain Federico  ICD-9-CM: 185  10/4/2016 - Present        Stroke ([facility] ICD-10-CM: I63.9  ICD-9-CM: 434.91  4/9/2013 - Present        Complex partial seizures evolving to generalized tonic-clonic seizures ([facility] ICD-10-CM: G40.209  ICD-9-CM: 345.40  4/9/2013 - Present        Acute respiratory failure ([facility] ICD-10-CM: J96.00  ICD-9-CM: 518.81  3/22/2013 - Present        Diastolic CHF, chronic (HCC) (Chronic) ICD-10-CM: I50.32  ICD-9-CM: 428.32, 428.0  3/22/2013 - Present        Essential hypertension, malignant ICD-10-CM: I10  ICD-9-CM: 401.0  3/22/2013 - Present        Encephalopathy, unspecified ICD-10-CM: G93.40  ICD-9-CM: 348.30  12/30/2012 - Present        Hx of completed stroke (Chronic) ICD-10-CM: T95.05  ICD-9-CM: V12.54  12/30/2012 - Present        Seizure ([facility] ICD-10-CM: R56.9  ICD-9-CM: 780.39  12/30/2012 - Present    Overview Signed 12/30/2012  2:02 PM by Ann Loyola MD     Hx of seizure as well             Diabetes mellitus type 2 in [facility]) ICD-10-CM: E11.69, E66.9  ICD-9-CM: 250.00, 278.00  7/29/2011 - Present        Allergic rhinitis, cause unspecified ICD-10-CM: J30.9  ICD-9-CM: 477.9  4/18/2011 - Present        Hemorrhoids ICD-10-CM: K64.9  ICD-9-CM: 455.6  4/18/2011 - Present        Unspecified asthma(493.90) ICD-10-CM: J45.909  ICD-9-CM: 493.90  4/18/2011 - Present        Chronic ischemic heart disease ICD-10-CM: I25.9  ICD-9-CM: 414.9  4/18/2011 - Present        Erectile Dysfunction ICD-10-CM: N52.9  ICD-9-CM: 607.84  4/18/2011 - Present              Greater than 30  minutes were spent with the patient on counseling and coordination of care    Signed:   Jennifer Vallecillo MD  7/16/2021  9:56 AM

## 2021-07-16 NOTE — PROGRESS NOTES
I have reviewed discharge instructions with the patient. The patient verbalized understanding. patient got discharged to [facility] and transport by [facility] . RN called for report to [facility] BEBA Levin.

## 2021-07-16 NOTE — PROGRESS NOTES
Transition of Care Plan to SNF/Rehab    SNF/Rehab Transition:  Patient has been accepted to [facility] and meets criteria for admission. Patient will transported by [facility] and expected to leave at 13:00. Communication to Patient/Family:  Met with patient and Korey Perez, and they are agreeable to the transition plan. Communication to SNF/Rehab:  Bedside RN, Tacos Webb, has been notified to update the transition plan to the facility and call report (phone number [phone] wing 1). Discharge information has been updated on the AVS.     Discharge instructions to be fax'd to facility at [email or web address]). Nursing Please include all hard scripts for controlled substances, med rec and dc summary, and AVS in packet. Reviewed and confirmed with facility, [facility], can manage the patient care needs for the following:     SNF/Rehab Transition:    PCP/Specialist: Dr. Marlin Abdullahi and confirmed with facility, [facility] they can manage the patient care needs for the following:     Janay Padilla with (X) only those applicable:    Medication:  [x]  Medications will be available at the facility  []  IV Antibiotics   [x]  Controlled Substance - hard copy to be sent with patient   [x]  Weekly Labs   Documents:  [x] Hard RX  [x] MAR  [x] Kardex  [x] AVS  [x]Transfer Summary  [x]Discharge   Equipment:  []  CPAP/BiPAP  []  Wound Vacuum  []  Loyd or Urinary Device  []  PICC/Central Line  []  Nebulizer  []  Ventilator   Treatment:  []Isolation (for MRSA, VRE, etc.)  []Surgical Drain Management  []Tracheostomy Care  []Dressing Changes  []Dialysis with transportation and chair time   []PEG Care  []Oxygen  []Daily Weights for Heart Failure   Dietary:  [x]Any diet limitations - Regular diet and cardiac diet  []Tube Feedings   []Total Parenteral Management (TPN)   Eligible for Medicaid Long Term Services and Supports  Yes:  [x] Eligible for medical assistance or will become eligible within 180 days and UAI completed.    [] Provider/Patient and/or support system has requested screening. [] UAI copy provided to patient or responsible party,  [] UAI unavailable at discharge will send once processed to SNF provider. [] UAI unavailable at discharged mailed to patient  No:   [] Private pay and is not financially eligible for Medicaid within the next 180 days. [] Reside out-of-state. [] A residents of a state owned/operated facility that is licensed  by [facility] or [facility]  [] Enrollment in [facility] hospice services  [] [address] [place] [address] [address]  [] Patient /Family declines to have screening completed or provide financial information for screening     Financial Resources:  Medicaid    [] Initiated and application pending   [x] Full coverage     Advanced Care Plan:  [x]Surrogate Decision Maker of Care, Americo Lora  []POA  [x]Communicated Code Status FULL   Boogie Eaton M.S.CAROLYN.

## 2021-07-16 NOTE — DISCHARGE INSTRUCTIONS
Discharge Instructions       PATIENT ID: Lorinda Rinne  MRN: [id number]   YOB: 1945    DATE OF ADMISSION: 7/14/2021  1:07 PM    DATE OF DISCHARGE: 7/16/2021    PRIMARY CARE PROVIDER: Jose L Dumont MD     ATTENDING PHYSICIAN: Shira Sanchez MD  DISCHARGING PROVIDER: Shanice Webb MD    To contact this individual call [phone] and ask the  to page. If unavailable ask to be transferred the Adult Hospitalist Department. DISCHARGE DIAGNOSES Traumatic SDH rt frontal    CONSULTATIONS: IP CONSULT TO NEUROSURGERY  IP CONSULT TO HOSPITALIST    PROCEDURES/SURGERIES: * No surgery found *    PENDING TEST RESULTS:   At the time of discharge the following test results are still pending:     FOLLOW UP APPOINTMENTS:   Follow-up Information     Follow up With Specialties Details Why Contact Info    Jose L Dumont MD Internal Medicine   [address]  [phone]      Jo Ann Saldana NP Nurse Practitioner In 1 week  [address]  [facility] [address]  [phone]             ADDITIONAL CARE RECOMMENDATIONS:  Stop taking asa / plavix for 2 weeks may resume after wards     DIET: Regular Diet and Cardiac Diet    ACTIVITY: Activity as tolerated    WOUND CARE:     EQUIPMENT needed:       Radiology      DISCHARGE MEDICATIONS:   See Medication Reconciliation Form    · It is important that you take the medication exactly as they are prescribed. · Keep your medication in the bottles provided by the pharmacist and keep a list of the medication names, dosages, and times to be taken in your wallet. · Do not take other medications without consulting your doctor. NOTIFY YOUR PHYSICIAN FOR ANY OF THE FOLLOWING:   Fever over 101 degrees for 24 hours. Chest pain, shortness of breath, fever, chills, nausea, vomiting, diarrhea, change in mentation, falling, weakness, bleeding. Severe pain or pain not relieved by medications.   Or, any other signs or symptoms that you may have questions about.       DISPOSITION:    Home With:   OT  PT  HH  RN      x SNF/Inpatient Rehab/LTAC    Independent/assisted living    Hospice    Other:     CDMP Checked:   Yes x     PROBLEM LIST Updated:  Yes x       Signed:   Mary Jo Mendez MD  7/16/2021  9:54 AM

## 2021-07-16 NOTE — PROGRESS NOTES
Transition of Care Plan   RUR- Med. 22%   DISPOSITION: Return to [address] [place] [address] when medically stable   F/U with PCP/Specialist     Transport: [facility]      Reason for Admission: fall/subdural hematoma                      RUR Score:     22%             PCP: First and Last name:   Em Ayala MD     Name of Practice:    Are you a current patient: Yes/No: Yes   Approximate date of last visit: Seen [facility]   Can you participate in a virtual visit if needed:     Do you (patient/family) have any concerns for transition/discharge? No concerns              Plan for utilizing home health:   N/A - patient is LTC resident    Current Advanced Directive/Advance Care Plan:  Full Code      Healthcare Decision Maker:   Click here to complete [address] including selection of the Healthcare Decision Maker Relationship (ie \"Primary\")            Primary Decision MakerTmunira Mejia - Daughter - [phone]    Primary Decision Maker: Sarwat Iglesias - Daughter - [phone]    Transition of Care Plan:      CM spoke with patient's daughter, Kannan Montiel to complete initial assessment. It is patient's daughter's wish for patient to return to [address] [place] [address] when stable. Living situation: Lives at [address] [place] [address] for 2 years  ADLs: Needs assistance with all except feeding  DME: [employer]  Previous IPR/SNF: [facility]  Previous home health: N/A  Demographics: confirmed  Pharmacy: [facility]  Main point of contact: Rober Love, [phone]    CM to follow patient progress and assist as recommended with GEOFFREY plan. 10:45am: Patient is medically stable for discharge back to [facility]. CM spoke with Caryn Cruz at [facility], call report #[phone]; wing 1. CM attempted to call patient's daughter, Refugio Jenkins, [place] box full. Care Management Interventions  PCP Verified by CM:  Yes  Palliative Care Criteria Met (RRAT>21 & CHF Dx)?: Yes  Mode of Transport at Discharge: BLS  MyChart Signup: No  Discharge Durable Medical Equipment: No  Health Maintenance Reviewed: Yes  Physical Therapy Consult: Yes  Occupational Therapy Consult: Yes  Speech Therapy Consult: No  Current Support Network: Nursing Facility  Confirm Follow Up Transport: Other (see comment) ([facility])  Discharge Location  Discharge Placement: [address] Medicare pt has received, reviewed, and signed 1st IM letter informing them of their right to appeal the discharge. Signed copy has been placed on pt bedside chart. ALMA Joseph.

## 2021-07-17 LAB
ATRIAL RATE: 52 BPM
CALCULATED R AXIS, ECG10: -74 DEGREES
CALCULATED T AXIS, ECG11: 17 DEGREES
DIAGNOSIS, 93000: NORMAL
Q-T INTERVAL, ECG07: 446 MS
QRS DURATION, ECG06: 134 MS
QTC CALCULATION (BEZET), ECG08: 411 MS
VENTRICULAR RATE, ECG03: 51 BPM

## 2021-07-19 ENCOUNTER — PATIENT OUTREACH (OUTPATIENT)
Dept: CASE MANAGEMENT | Age: 76
End: 2021-07-19

## 2021-07-19 NOTE — PROGRESS NOTES
No transition of care outreach indicated due to patient discharge to a [address] [place] [address].   D/c to [facility]

## 2021-07-22 NOTE — PROGRESS NOTES
Physician Progress Note      PATIENT:               Kenya Anderson  [facility] #:                  [id number]  :                       1945  ADMIT DATE:       2021 1:07 PM  Alysia Boykin [place] DATE:        2021 1:34 PM  RESPONDING  PROVIDER #:        Fabiano Blackwell MD          QUERY TEXT:    Francisco Magana,    Patient admitted with SDH. Noted documentation of Congested Heart Failure in PMH with a note below that states \"ON 16 PT & DTR STATE THEY DON'T REMEMBER THIS DIAGNOSIS. \" Patient does have a history of HTN and CKD. It is noted that Coreg and Bumex were initially ordered for the patient but were never given and were discontinued with the reason stated \"Not a Current Medication\" in the [facility] One ECHO on record 2013 \"LEFT VENTRICLE: Size was normal. Ejection fraction was estimated to be 60 %. here were no regional wall motion abnormalities. There was mild  concentric hypertrophy. RIGHT VENTRICLE: The size was normal. Systolic function was normal.\"    For clarity could you please specify: The medical record reflects the following:    Risk Factors: 76AAM HTN CKD II  Clinical Indicators:    H&P , Progress Notes, and Discharge Summary  Congestive heart failure, unspecified   2011  (ON 16 PT & DTR STATE THEY DON'T REMEMBER THIS DIAGNOSIS)    Other findings as stated in query body    Treatment: None    Thank you,    [address] [place] [address]  Clinical Documentation Improvement  [phone]  Options provided:  -- CHF entered in error. Patient does not have a history of or is currently being treated for CHF  -- CHF currently being treated/evaluated  -- CHF is PMH only  -- Other - I will add my own diagnosis  -- Disagree - Not applicable / Not valid  -- Disagree - Clinically unable to determine / Unknown  -- Refer to Clinical Documentation Reviewer    PROVIDER RESPONSE TEXT:    CHF entered in error. Patient does not have a history of or is currently being treated for CHF.     Query created by: Jesús Wiggins on 7/22/2021 7:16 AM      Electronically signed by:  Adwoa Garay MD 7/22/2021 9:34 AM

## 2021-07-23 ENCOUNTER — EXTERNAL NURSING HOME DOCUMENTATION (OUTPATIENT)
Dept: INTERNAL MEDICINE CLINIC | Age: 76
End: 2021-07-23
Payer: MEDICARE

## 2021-07-23 DIAGNOSIS — E11.65 TYPE 2 DIABETES MELLITUS WITH HYPERGLYCEMIA, WITH LONG-TERM CURRENT USE OF INSULIN (HCC): ICD-10-CM

## 2021-07-23 DIAGNOSIS — I10 ESSENTIAL HYPERTENSION: ICD-10-CM

## 2021-07-23 DIAGNOSIS — F03.91 DEMENTIA WITH BEHAVIORAL DISTURBANCE, UNSPECIFIED DEMENTIA TYPE: ICD-10-CM

## 2021-07-23 DIAGNOSIS — S06.5XAA SUBDURAL HEMATOMA: Primary | ICD-10-CM

## 2021-07-23 DIAGNOSIS — Z79.4 TYPE 2 DIABETES MELLITUS WITH HYPERGLYCEMIA, WITH LONG-TERM CURRENT USE OF INSULIN (HCC): ICD-10-CM

## 2021-07-23 DIAGNOSIS — I25.10 CORONARY ARTERY DISEASE INVOLVING NATIVE HEART WITHOUT ANGINA PECTORIS, UNSPECIFIED VESSEL OR LESION TYPE: ICD-10-CM

## 2021-07-23 DIAGNOSIS — G40.909 SEIZURE DISORDER (HCC): ICD-10-CM

## 2021-07-23 PROCEDURE — 99306 1ST NF CARE HIGH MDM 50: CPT | Performed by: INTERNAL MEDICINE

## 2021-07-23 NOTE — PROGRESS NOTES
History of Present Illness:  Mr. Alan Stauffer is a 70-year-old pleasant -American male with past medical history significant for coronary artery disease and type 2 diabetes mellitus and COVID pneumonia presented to [facility] after a fall in the nursing home in [facility] where he resides. He had an un-witnessed fall. The patient struck the front part of his head. He had some bleeding from the laceration. He was taken back to nursing home and sent home. CT head was done found to have subdural hematoma and he was admitted to the hospital.  He had no blurred vision, no trouble swallowing, no shortness of breath. He was monitored closely. Neuro check was done and no advancement of the neurological complaint. His aspirin and Plavix was on hold for a couple of weeks. He was stabilized and was transferred to [facility]. He is doing well here. Not in distress. No more headache, no blurred vision right now. Past Medical History:  Type 2 diabetes mellitus, coronary artery disease, hypertension, hemorrhoids, CKD, COPD, multiple mini strokes, left leg DVT, asthma, erectile dysfunction, history of prostate cancer, gout, Alzheimer's disease and allergic rhinitis. Past Surgical History:  Colonoscopy, bypass surgery, peripheral angiogram, stent placement of the left leg. Allergies:  He is not allergic to any medications. Social History:  The patient lives in a nursing home. He is a former smoker. No history of drinking. He ambulates with a walker. Family History: Mother has diabetes and hypertension, father has diabetes. Medications:   The patient is on vitamin C 500 mg once a day, vitamin D 50,000 once a month, MiraLax 17 gm once a day, DuoNeb nebulizer every eight hours as needed, Lantus insulin 26 units every night, losartan 50 mg once a day, NovoLog sliding scale of insulin, albuterol HFA two puffs every six hours as needed, Senna 8.6 mg two tablets once a day, sertraline 50 mg once a day, Tradjenta 5 mg once a day, metoprolol 25 mg twice day, Vimpat 200 mg twice a day, Topamax 100 mg twice a day, Pravachol 40 mg every day, allopurinol 100 mg once a day, Keppra 500 mg three times a day. Review of Systems:  Complete review of systems done. Right now essentially negative. Physical Examination:    General:  This is a pleasant -American male, not in apparent distress. VITALS:  T:  97.2 degrees Fahrenheit. P:  68 per minute. BP:  131/50 mmHg. SaO2:  97% on room air. Weight is 138 pounds. HEENT:  No abnormality detected. NECK:  Supple, no JVD, no carotid bruits, no thyromegaly. CHEST:  Chest is clear to auscultation bilaterally. No rales, no rhonchi. CARDIOVASCULAR:  S1, S2 normal.  Regular rate and rhythm. ABDOMEN:  Soft, nontender, nondistended, bowel sounds present. EXTREMITIES:  No edema noticed. Dorsalis pedis pulse normal.  NEUROLOGICAL:  Alert and oriented x2. Mild dementia present. Cranial nerves II through XII grossly intact. Motor 5/5 bilaterally. Sensory within normal limits. Assessment and Plan:   1. Traumatic subdural hematoma status post accidental fall. The patient's CT head was normal.  No active intervention necessary as per the neurosurgeon. Aspirin and Plavix is on hold for two weeks, will resume this on July 30th. Keppra for seizure prophylaxis given. 2. Type 2 diabetes mellitus. The patient is on Lantus insulin and Tradjenta. We will monitor blood sugar closely. 3. Hypertension. Stable blood pressure. He is on losartan. 4. CKD stage 3, stable. 5. History of asthma and COPD. The patient is on DuoNeb nebulizer and will continue it. 6. Depression. He is taking Zoloft. He will continue it. 7. Gait weakness. The patient to continue physical therapy and occupational therapy. THIS IS NOT A COMPLETE MEDICAL RECORD ON THIS PATIENT.    THIS IS A PATIENT AT [facility].    PLEASE CONTACT THE FACILITY LISTED BELOW FOR MORE INFORMATION ON THIS PATIENT.    THE COMPLETE RECORD RESIDES WITH THIS LONG TERM CARE [facility]

## 2021-07-29 ENCOUNTER — PATIENT OUTREACH (OUTPATIENT)
Dept: CASE MANAGEMENT | Age: 76
End: 2021-07-29

## 2021-07-29 NOTE — PROGRESS NOTES
Post Acute Facility Update     *The information contained in this note was received during a weekly care coordination call with the post acute facility*    Current Facility: [facility]  Anticipated Discharge Date: 8/5/2021    Facility Nursing Update: No current updates   Facility Social Work Update: Return home on 8/5/2021  Bundle Program Status: none  Upper Extremity Assistance: Stand by Assist - Presence and Cueing  Lower Extremity Assistance: Contact Guard Assist - hands on patient for balance   Bed Mobility: Independent  Transfers: Stand by Assist - Presence and Cueing  Ambulation: Stand by Assist - Presence and Cueing  How far (in feet) is the patient ambulating?  225  Device Used: walker  Barriers to Discharge: ABIDA Marino MSW  [place]

## 2021-08-05 ENCOUNTER — PATIENT OUTREACH (OUTPATIENT)
Dept: CASE MANAGEMENT | Age: 76
End: 2021-08-05

## 2021-08-06 NOTE — PROGRESS NOTES
[address] [place] [address] Dr Discharge Note    *The information contained in this note was received during a weekly care coordination call with the post acute facility*      Patient was discharged from [facility]Skilled Nursing services  on 8/6/2021, will convert to LTC on 8/6/2021    PCP: MD TANGELA Reese appointment: No future appointments. managing patient: [facility] Team will sign off at this time. Medications were not reconciled and general patient assessment was not completed during this skilled nursing facility outreach.      Merline VEEN, RN  [place]et

## 2021-09-24 ENCOUNTER — EXTERNAL NURSING HOME DOCUMENTATION (OUTPATIENT)
Dept: INTERNAL MEDICINE CLINIC | Age: 76
End: 2021-09-24
Payer: COMMERCIAL

## 2021-09-24 DIAGNOSIS — G40.909 SEIZURE DISORDER (HCC): ICD-10-CM

## 2021-09-24 DIAGNOSIS — Z86.73 HISTORY OF CVA (CEREBROVASCULAR ACCIDENT): ICD-10-CM

## 2021-09-24 DIAGNOSIS — Z79.4 TYPE 2 DIABETES MELLITUS WITH HYPERGLYCEMIA, WITH LONG-TERM CURRENT USE OF INSULIN (HCC): Primary | ICD-10-CM

## 2021-09-24 DIAGNOSIS — I10 ESSENTIAL HYPERTENSION: ICD-10-CM

## 2021-09-24 DIAGNOSIS — E11.65 TYPE 2 DIABETES MELLITUS WITH HYPERGLYCEMIA, WITH LONG-TERM CURRENT USE OF INSULIN (HCC): Primary | ICD-10-CM

## 2021-09-24 DIAGNOSIS — S06.5XAA SUBDURAL HEMATOMA: ICD-10-CM

## 2021-09-24 PROCEDURE — 99309 SBSQ NF CARE MODERATE MDM 30: CPT | Performed by: INTERNAL MEDICINE

## 2021-09-24 NOTE — PROGRESS NOTES
Progress Note    Subjective:  Mr. Asha Moe is a nursing home resident. He is doing well. He looks frail, but he mentions he is eating and sleeping good. No other discomfort. Objective:  VITALS:  T:  98 degrees Fahrenheit. P:  53 per minute. BP:  133/72 mmHg. SaO2:  98% on room air. Weight is 140 pounds. HEENT:  No abnormality detected. NECK:  Supple, no JVD, no carotid bruits, no thyromegaly. CHEST:  Chest is clear to auscultation bilaterally. No rales, no rhonchi. CARDIOVASCULAR:  S1, S2 normal.  Regular rate and rhythm. ABDOMEN:  Soft, nontender, nondistended, bowel sounds present. EXTREMITIES:  No edema is noticed. Dorsalis pedis pulse normal.  NEUROLOGICAL:  Alert and oriented x2. No other neurological deficits. Laboratory Data:  Labs reviewed. CMP okay. No recent A1c done. Assessment and Plan:  1. Type 2 diabetes mellitus. The patient is on Tradjenta and Lantus insulin. We will do CMP and hemoglobin A1c.  2. Hypertension. Stable blood pressure, on losartan. We will check CMP. 3. Asthma and COPD. The patient is on DuoNeb nebulizer. We will continue it. 4. Stable depression. On Zoloft. We will continue it. 5. History of subdural hematoma. The patient is on Keppra for seizure prophylaxis. He is on aspirin and Plavix right now. 6. Gait weakness. The patient to continue physical therapy and occupational therapy. THIS IS NOT A COMPLETE MEDICAL RECORD ON THIS PATIENT.    THIS IS A PATIENT AT [facility].    PLEASE CONTACT THE FACILITY LISTED BELOW FOR MORE INFORMATION ON THIS PATIENT.    THE COMPLETE RECORD RESIDES WITH THIS LONG TERM CARE FACILITY

## 2021-11-19 ENCOUNTER — EXTERNAL NURSING HOME DOCUMENTATION (OUTPATIENT)
Dept: INTERNAL MEDICINE CLINIC | Age: 76
End: 2021-11-19
Payer: COMMERCIAL

## 2021-11-19 DIAGNOSIS — G40.909 SEIZURE DISORDER (HCC): ICD-10-CM

## 2021-11-19 DIAGNOSIS — I10 ESSENTIAL HYPERTENSION: ICD-10-CM

## 2021-11-19 DIAGNOSIS — E11.65 TYPE 2 DIABETES MELLITUS WITH HYPERGLYCEMIA, WITH LONG-TERM CURRENT USE OF INSULIN (HCC): Primary | ICD-10-CM

## 2021-11-19 DIAGNOSIS — I25.10 CORONARY ARTERY DISEASE INVOLVING NATIVE HEART WITHOUT ANGINA PECTORIS, UNSPECIFIED VESSEL OR LESION TYPE: ICD-10-CM

## 2021-11-19 DIAGNOSIS — Z86.73 HISTORY OF CVA (CEREBROVASCULAR ACCIDENT): ICD-10-CM

## 2021-11-19 DIAGNOSIS — Z79.4 TYPE 2 DIABETES MELLITUS WITH HYPERGLYCEMIA, WITH LONG-TERM CURRENT USE OF INSULIN (HCC): Primary | ICD-10-CM

## 2021-11-19 PROCEDURE — 99309 SBSQ NF CARE MODERATE MDM 30: CPT | Performed by: INTERNAL MEDICINE

## 2021-11-19 NOTE — PROGRESS NOTES
Progress Note    Subjective:  Mr. Angelo Bloom is bedbound mostly. He looks frail. He is able to ambulate, but not walking much. Eating and sleeping good. Objective:    VITALS:  T:  98.2 degrees Fahrenheit. P:  72 per minute. BP:  132/69 mmHg. SaO2:  98% on room air. Weight is 138.6 pounds. HEENT:  No abnormality detected. NECK:  Supple, no JVD, no carotid bruits, no thyromegaly. CHEST:  Chest is clear to auscultation bilaterally. No rales, no rhonchi. CARDIOVASCULAR:  S1, S2 normal.  Regular rate and rhythm. ABDOMEN:  Soft, nontender, nondistended, bowel sounds present. EXTREMITIES:  No edema is noted. Dorsalis pedis pulse normal.    NEUROLOGICAL:  Alert and oriented x2. No other neurological deficits. Laboratory Data:   Labs were reviewed. Hemoglobin A1c 7.7. CBC and CMP were stable. Assessment and Plan:   1. Type 2 diabetes mellitus. The patient is on Lantus insulin and Tradjenta. 2. Asthma and COPD. The patient is on a nebulizer. We will continue it. 3. Depression. Stable on Zoloft. We will continue it. 4. History of subdural hematoma. The patient is on Keppra for seizure prophylaxis, doing well. 5. Hypertension. Stable on losartan. 6. Gait weakness. The patient is mostly bedbound. THIS IS NOT A COMPLETE MEDICAL RECORD ON THIS PATIENT.    THIS IS A PATIENT AT [facility].    PLEASE CONTACT THE FACILITY LISTED BELOW FOR MORE INFORMATION ON THIS PATIENT.    THE COMPLETE RECORD RESIDES WITH THIS LONG TERM CARE FACILITY

## 2022-01-25 ENCOUNTER — EXTERNAL NURSING HOME DOCUMENTATION (OUTPATIENT)
Dept: INTERNAL MEDICINE CLINIC | Age: 77
End: 2022-01-25
Payer: COMMERCIAL

## 2022-01-25 DIAGNOSIS — Z79.4 TYPE 2 DIABETES MELLITUS WITH HYPERGLYCEMIA, WITH LONG-TERM CURRENT USE OF INSULIN (HCC): ICD-10-CM

## 2022-01-25 DIAGNOSIS — G40.909 SEIZURE DISORDER (HCC): ICD-10-CM

## 2022-01-25 DIAGNOSIS — I25.10 CORONARY ARTERY DISEASE INVOLVING NATIVE HEART WITHOUT ANGINA PECTORIS, UNSPECIFIED VESSEL OR LESION TYPE: ICD-10-CM

## 2022-01-25 DIAGNOSIS — I10 ESSENTIAL HYPERTENSION: Primary | ICD-10-CM

## 2022-01-25 DIAGNOSIS — Z86.73 HISTORY OF CVA (CEREBROVASCULAR ACCIDENT): ICD-10-CM

## 2022-01-25 DIAGNOSIS — E11.65 TYPE 2 DIABETES MELLITUS WITH HYPERGLYCEMIA, WITH LONG-TERM CURRENT USE OF INSULIN (HCC): ICD-10-CM

## 2022-01-25 DIAGNOSIS — F03.91 DEMENTIA WITH BEHAVIORAL DISTURBANCE, UNSPECIFIED DEMENTIA TYPE: ICD-10-CM

## 2022-01-25 PROCEDURE — 99309 SBSQ NF CARE MODERATE MDM 30: CPT | Performed by: INTERNAL MEDICINE

## 2022-01-25 NOTE — PROGRESS NOTES
Progress Note     Subjective:  Mr. Bess Fernando is doing well in the rehab. He is eating okay, but he looks frail. He is a diabetic; blood sugar is running okay. No other discomfort. Objective:    VITALS:  T:  98 degrees Fahrenheit. P:  79 per minute. BP:  123/71 mmHg. SaO2:  98% on room air. Weight is 142 pounds. HEENT:  No abnormality detected. NECK:  Supple, no JVD, no carotid bruits, no thyromegaly. CHEST:  Chest is clear to auscultation bilaterally. No rales, no rhonchi. CARDIOVASCULAR:  S1, S2 normal.  Regular rate and rhythm. ABDOMEN:  Soft, nontender, nondistended, bowel sounds present. EXTREMITIES:  No edema is noticed. Dorsalis pedis pulse normal.  NEUROLOGICAL:  Alert and oriented x3. No neurological deficits. Laboratory Data:   Last labs done. Hemoglobin A1c 7.7, CMP stable. Assessment and Plan:   1. Type 2 diabetes mellitus. On Lantus insulin. We will check CBC, CMP, hemoglobin A1c, and vitamin D.  2. Hyperlipidemia. On statin. We will continue it. 3. Gait weakness. The patient is mostly bedbound and wheelchair bound. 4. Coronary artery disease. The patient is on aspirin, Plavix, statin, and metoprolol. We will continue it. 5. COPD. The patient is taking inhaler as needed. 6. Seizure disorder. He is on Vimpat and Keppra. We will continue it. THIS IS NOT A COMPLETE MEDICAL RECORD ON THIS PATIENT.    THIS IS A PATIENT AT [facility].    PLEASE CONTACT THE FACILITY LISTED BELOW FOR MORE INFORMATION ON THIS PATIENT.    THE COMPLETE RECORD RESIDES WITH THIS LONG TERM CARE FACILITY

## 2022-03-07 ENCOUNTER — OFFICE VISIT (OUTPATIENT)
Dept: INTERNAL MEDICINE CLINIC | Age: 77
End: 2022-03-07
Payer: COMMERCIAL

## 2022-03-07 DIAGNOSIS — G40.909 SEIZURE DISORDER (HCC): ICD-10-CM

## 2022-03-07 DIAGNOSIS — Z86.73 HISTORY OF CVA (CEREBROVASCULAR ACCIDENT): ICD-10-CM

## 2022-03-07 DIAGNOSIS — I10 ESSENTIAL HYPERTENSION: ICD-10-CM

## 2022-03-07 DIAGNOSIS — I25.10 CORONARY ARTERY DISEASE INVOLVING NATIVE HEART WITHOUT ANGINA PECTORIS, UNSPECIFIED VESSEL OR LESION TYPE: ICD-10-CM

## 2022-03-07 DIAGNOSIS — Z79.4 TYPE 2 DIABETES MELLITUS WITH HYPERGLYCEMIA, WITH LONG-TERM CURRENT USE OF INSULIN (HCC): Primary | ICD-10-CM

## 2022-03-07 DIAGNOSIS — E11.65 TYPE 2 DIABETES MELLITUS WITH HYPERGLYCEMIA, WITH LONG-TERM CURRENT USE OF INSULIN (HCC): Primary | ICD-10-CM

## 2022-03-07 DIAGNOSIS — I50.32 DIASTOLIC CHF, CHRONIC (HCC): ICD-10-CM

## 2022-03-07 DIAGNOSIS — F03.91 DEMENTIA WITH BEHAVIORAL DISTURBANCE, UNSPECIFIED DEMENTIA TYPE: ICD-10-CM

## 2022-03-07 PROCEDURE — 99308 SBSQ NF CARE LOW MDM 20: CPT | Performed by: NURSE PRACTITIONER

## 2022-03-07 NOTE — PROGRESS NOTES
THIS IS NOT A COMPLETE MEDICAL RECORD ON THIS PATIENT. THIS IS A PATIENT AT [facility]. PLEASE CONTACT THE FACILITY LISTED BELOW FOR MORE INFORMATION ON THIS PATIENT. THE COMPLETE RECORD RESIDES WITH THIS LONG TERM CARE FACILITY. HISTORY OF PRESENT ILLNESS  Clay Elliott is a 68 y.o. male. This patient is currently under the care of Dr. Vladislav Olmos at [facility].  The patient's past medical history includes seizure disorder, dementia, diabetes, hyperlipidemia, hypertension, CAD, and CVA. The patient is seen today per nursing request for lab review. HgbA1c collected 03/04/21- 8.1. Current diabetic medications include Trulicity 0.02CF weekly, Lantus 28 units sq qhs, and Tradjenta 5 mg po daily. Patient says he is generally feeling well at the time of today's visit. HPI    Review of Systems   Constitutional: Negative for chills and fever. Respiratory: Negative for cough and shortness of breath. Cardiovascular: Negative for chest pain. Gastrointestinal: Negative. Genitourinary: Negative. Musculoskeletal: Negative. Neurological: Negative for headaches. Endo/Heme/Allergies: Negative. Physical Exam  Constitutional:       General: He is not in acute distress. HENT:      Head: Normocephalic. Nose: No congestion. Eyes:      Conjunctiva/sclera: Conjunctivae normal.   Cardiovascular:      Rate and Rhythm: Normal rate and regular rhythm. Pulmonary:      Effort: Pulmonary effort is normal.      Breath sounds: Normal breath sounds. Abdominal:      General: Bowel sounds are normal.      Palpations: Abdomen is soft. Musculoskeletal:      Right lower leg: No edema. Left lower leg: No edema. Skin:     General: Skin is warm and dry. Neurological:      Mental Status: He is alert. Comments: Responding to simple questions appropriately         ASSESSMENT and PLAN  Encounter Diagnoses   Name Primary?     Type 2 diabetes mellitus with hyperglycemia, with long-term current use of insulin ([address] Yes    Essential hypertension     Seizure disorder ([address]     Coronary artery disease involving native heart without angina pectoris, unspecified vessel or lesion type     History of CVA (cerebrovascular accident)     Dementia with behavioral disturbance, unspecified dementia type ([address]     Diastolic CHF, chronic ([address]      Continue carbohydrate controlled diet. Reviewed medications and side effects in detail. Continue current medications at this time. Nursing to continue to monitor closely and notify MD/NP of any change in condition.

## 2022-03-18 ENCOUNTER — EXTERNAL NURSING HOME DOCUMENTATION (OUTPATIENT)
Dept: INTERNAL MEDICINE CLINIC | Age: 77
End: 2022-03-18
Payer: COMMERCIAL

## 2022-03-18 DIAGNOSIS — I25.10 CORONARY ARTERY DISEASE INVOLVING NATIVE HEART WITHOUT ANGINA PECTORIS, UNSPECIFIED VESSEL OR LESION TYPE: ICD-10-CM

## 2022-03-18 DIAGNOSIS — Z79.4 TYPE 2 DIABETES MELLITUS WITH HYPERGLYCEMIA, WITH LONG-TERM CURRENT USE OF INSULIN (HCC): Primary | ICD-10-CM

## 2022-03-18 DIAGNOSIS — F03.91 DEMENTIA WITH BEHAVIORAL DISTURBANCE, UNSPECIFIED DEMENTIA TYPE: ICD-10-CM

## 2022-03-18 DIAGNOSIS — E11.65 TYPE 2 DIABETES MELLITUS WITH HYPERGLYCEMIA, WITH LONG-TERM CURRENT USE OF INSULIN (HCC): Primary | ICD-10-CM

## 2022-03-18 DIAGNOSIS — I10 ESSENTIAL HYPERTENSION: ICD-10-CM

## 2022-03-18 PROBLEM — J18.9 CAP (COMMUNITY ACQUIRED PNEUMONIA): Status: ACTIVE | Noted: 2020-08-28

## 2022-03-18 PROBLEM — Z20.822 SUSPECTED COVID-19 VIRUS INFECTION: Status: ACTIVE | Noted: 2020-08-04

## 2022-03-18 PROCEDURE — 99309 SBSQ NF CARE MODERATE MDM 30: CPT | Performed by: INTERNAL MEDICINE

## 2022-03-18 NOTE — PROGRESS NOTES
Progress Note     Subjective:  Mr. Maryellen Padilla is doing well in the rehab. He looks frail, but he is eating and sleeping good. Blood sugar running slightly high. No other discomfort. Objective:    VITALS:  T:  98.9 degrees Fahrenheit. P:  73 per minute. BP:  142/75 mmHg. SaO2:  98% on room air. Blood sugar is 145 this morning. Weight is 133 pounds. HEENT:  No abnormality detected. NECK:  Supple, no JVD, no carotid bruits, no thyromegaly. CHEST:  Chest is clear to auscultation bilaterally. No rales, no rhonchi. CARDIOVASCULAR:  S1, S2 normal.  Regular rate and rhythm. ABDOMEN:  Soft, nontender, nondistended, bowel sounds present. EXTREMITIES:  No edema is noted. Dorsalis pedis pulse normal.    NEUROLOGICAL:  Alert and oriented x3. No neurological deficits. Laboratory Data:   Labs were reviewed. Hemoglobin A1c 8.1. CMP stable. Assessment and Plan:   1. Type 2 diabetes mellitus. Blood sugar is slightly up. We will increase Lantus insulin to 30 units at night. We will continue Trulicity and Megagliptin at same dosage. 2. Coronary artery disease on aspirin, Plavix, statin and metoprolol, doing well. 3. COPD on inhaler, doing well. 4. Seizure disorder. He is seizure free. He is on Vimpat and Keppra. THIS IS NOT A COMPLETE MEDICAL RECORD ON THIS PATIENT.    THIS IS A PATIENT AT [facility].    PLEASE CONTACT THE FACILITY LISTED BELOW FOR MORE INFORMATION ON THIS PATIENT.    THE COMPLETE RECORD RESIDES WITH THIS LONG TERM CARE FACILITY

## 2022-03-19 PROBLEM — A41.9 SEPSIS (HCC): Status: ACTIVE | Noted: 2020-08-04

## 2022-03-19 PROBLEM — R26.9 GAIT ABNORMALITY: Status: ACTIVE | Noted: 2018-03-12

## 2022-03-19 PROBLEM — G93.40 ACUTE ENCEPHALOPATHY: Status: ACTIVE | Noted: 2020-08-04

## 2022-03-19 PROBLEM — U07.1 PNEUMONIA DUE TO COVID-19 VIRUS: Status: ACTIVE | Noted: 2020-08-27

## 2022-03-19 PROBLEM — I25.10 CORONARY ARTERY DISEASE INVOLVING NATIVE HEART WITHOUT ANGINA PECTORIS: Status: ACTIVE | Noted: 2018-06-26

## 2022-03-19 PROBLEM — J12.82 PNEUMONIA DUE TO COVID-19 VIRUS: Status: ACTIVE | Noted: 2020-08-27

## 2022-03-19 PROBLEM — S06.5XAA SDH (SUBDURAL HEMATOMA) (HCC): Status: ACTIVE | Noted: 2021-07-14

## 2022-03-19 PROBLEM — K92.0 HEMATEMESIS: Status: ACTIVE | Noted: 2020-08-27

## 2022-03-19 PROBLEM — N17.9 AKI (ACUTE KIDNEY INJURY) (HCC): Status: ACTIVE | Noted: 2020-08-04

## 2022-03-19 PROBLEM — E87.1 HYPONATREMIA: Status: ACTIVE | Noted: 2020-08-28

## 2022-03-20 PROBLEM — J18.9 LEFT UPPER LOBE PNEUMONIA: Status: ACTIVE | Noted: 2020-08-04

## 2022-03-20 PROBLEM — N18.9 ACUTE KIDNEY INJURY SUPERIMPOSED ON CHRONIC KIDNEY DISEASE (HCC): Status: ACTIVE | Noted: 2020-08-28

## 2022-03-20 PROBLEM — N17.9 ACUTE KIDNEY INJURY SUPERIMPOSED ON CHRONIC KIDNEY DISEASE (HCC): Status: ACTIVE | Noted: 2020-08-28

## 2022-03-20 PROBLEM — D69.6 THROMBOCYTOPENIA (HCC): Status: ACTIVE | Noted: 2020-08-28

## 2022-05-20 ENCOUNTER — EXTERNAL NURSING HOME DOCUMENTATION (OUTPATIENT)
Dept: INTERNAL MEDICINE CLINIC | Age: 77
End: 2022-05-20
Payer: COMMERCIAL

## 2022-05-20 DIAGNOSIS — Z79.4 TYPE 2 DIABETES MELLITUS WITH HYPERGLYCEMIA, WITH LONG-TERM CURRENT USE OF INSULIN (HCC): Primary | ICD-10-CM

## 2022-05-20 DIAGNOSIS — I25.10 CORONARY ARTERY DISEASE INVOLVING NATIVE HEART WITHOUT ANGINA PECTORIS, UNSPECIFIED VESSEL OR LESION TYPE: ICD-10-CM

## 2022-05-20 DIAGNOSIS — I10 ESSENTIAL HYPERTENSION: ICD-10-CM

## 2022-05-20 DIAGNOSIS — G40.909 SEIZURE DISORDER (HCC): ICD-10-CM

## 2022-05-20 DIAGNOSIS — E11.65 TYPE 2 DIABETES MELLITUS WITH HYPERGLYCEMIA, WITH LONG-TERM CURRENT USE OF INSULIN (HCC): Primary | ICD-10-CM

## 2022-05-20 PROCEDURE — 99309 SBSQ NF CARE MODERATE MDM 30: CPT | Performed by: INTERNAL MEDICINE

## 2022-05-20 NOTE — PROGRESS NOTES
Progress Note     Subjective:  Mr. Sarita Estrada is doing well in the nursing home. He is eating and sleeping good. No discomfort right now. Objective:    VITALS:  T:  98 degrees Fahrenheit. P:  64 per minute. BP:  124/66 mmHg. SaO2:  95% on room air. Weight is 142 pounds. HEENT:  No abnormality detected. NECK:  Supple, no JVD, no carotid bruits, no thyromegaly. CHEST:  Chest is clear to auscultation bilaterally. No rales, no rhonchi. CARDIOVASCULAR:  S1, S2 normal.  Regular rate and rhythm. ABDOMEN:  Soft, nontender, nondistended, bowel sounds present. EXTREMITIES:  No edema is noted. Dorsalis pedis pulse normal.    NEUROLOGICAL:  Alert and oriented x3. Laboratory Data:   Labs were reviewed. Recent A1c 8.1 and Keppra level is okay. Assessment and Plan:   1. Type 2 diabetes mellitus. A1c 8.1. The patient is on Lantus, linagliptin, Lispro and Trulicity. We will continue it. Repeat A1c in three months. 2. Coronary artery disease on aspirin, Plavix, statin and metoprolol. We will do CMP. 3. COPD. The patient is using inhaler, doing well. 4. Seizure disorder. Keppra level seems stable. The patient is on Vimpat and Keppra. He is seizure free. THIS IS NOT A COMPLETE MEDICAL RECORD ON THIS PATIENT.    THIS IS A PATIENT AT [facility].    PLEASE CONTACT THE FACILITY LISTED BELOW FOR MORE INFORMATION ON THIS PATIENT.    THE COMPLETE RECORD RESIDES WITH THIS LONG TERM CARE FACILITY

## 2022-07-22 ENCOUNTER — EXTERNAL NURSING HOME DOCUMENTATION (OUTPATIENT)
Dept: INTERNAL MEDICINE CLINIC | Age: 77
End: 2022-07-22
Payer: COMMERCIAL

## 2022-07-22 DIAGNOSIS — G40.909 SEIZURE DISORDER (HCC): ICD-10-CM

## 2022-07-22 DIAGNOSIS — E11.65 TYPE 2 DIABETES MELLITUS WITH HYPERGLYCEMIA, WITH LONG-TERM CURRENT USE OF INSULIN (HCC): Primary | ICD-10-CM

## 2022-07-22 DIAGNOSIS — I10 ESSENTIAL HYPERTENSION: ICD-10-CM

## 2022-07-22 DIAGNOSIS — I25.10 CORONARY ARTERY DISEASE INVOLVING NATIVE HEART WITHOUT ANGINA PECTORIS, UNSPECIFIED VESSEL OR LESION TYPE: ICD-10-CM

## 2022-07-22 DIAGNOSIS — Z79.4 TYPE 2 DIABETES MELLITUS WITH HYPERGLYCEMIA, WITH LONG-TERM CURRENT USE OF INSULIN (HCC): Primary | ICD-10-CM

## 2022-07-22 DIAGNOSIS — Z86.73 HISTORY OF CVA (CEREBROVASCULAR ACCIDENT): ICD-10-CM

## 2022-07-22 PROCEDURE — 99309 SBSQ NF CARE MODERATE MDM 30: CPT | Performed by: INTERNAL MEDICINE

## 2022-07-22 NOTE — Clinical Note
Progress Note     Subjective:  Mr. Oren Begum is doing well in the nursing home. Eating and sleeping good. Blood sugar is running slightly high. No other discomfort. Objective:    VITALS:  T:  96.28 degrees Fahrenheit. P:  65 per minute. BP:  121/66 mmHg. SaO2:  98% on room air. Weight is 143 pounds. HEENT:  No abnormality detected. NECK:  Supple, no JVD, no carotid bruits, no thyromegaly. CHEST:  Chest is clear to auscultation bilaterally. No rales, no rhonchi. CARDIOVASCULAR:  S1, S2 normal.  Regular rate and rhythm. ABDOMEN:  Soft, nontender, nondistended, bowel sounds present. EXTREMITIES:  No edema is noted. Dorsalis pedis pulse normal.    NEUROLOGICAL:  Alert and oriented x3. Laboratory Data:   Labs were reviewed. Last labs show hemoglobin A1c 8.1. CBC is stable. CMP shows creatinine 1.4. Assessment and Plan:   1. Type 2 diabetes mellitus. The patient is on Trulicity, Linagliptin, Lantus and Lispro. Blood sugar is not quite controlled. We will repeat CBC, CMP and hemoglobin A1c and vitamin D.  2. COPD. The patient is taking inhaler, doing well. 3. Seizure disorder. He is on Vimpat and Keppra. He is seizure free. 4. Coronary artery disease. Taking aspirin, Plavix, statin and metoprolol, doing well. 5. Gait weakness. The patient is mostly bedbound and wheelchair bound. THIS IS NOT A COMPLETE MEDICAL RECORD ON THIS PATIENT. THIS IS A PATIENT AT [facility]. PLEASE CONTACT THE FACILITY LISTED BELOW FOR MORE INFORMATION ON THIS PATIENT.     THE COMPLETE RECORD RESIDES WITH THIS LONG TERM CARE FACILITY

## 2022-07-22 NOTE — Clinical Note
Progress Note     Subjective:  Mr. Karan Lopes is doing well in the nursing home. Eating and sleeping good. Blood sugar is running slightly high. No other discomfort. Objective:    VITALS:  T:  96.8 degrees Fahrenheit. P:  65 per minute. BP:  121/66 mmHg. SaO2:  98% on room air. Weight is 143 pounds. HEENT:  No abnormality detected. NECK:  Supple, no JVD, no carotid bruits, no thyromegaly. CHEST:  Chest is clear to auscultation bilaterally. No rales, no rhonchi. CARDIOVASCULAR:  S1, S2 normal.  Regular rate and rhythm. ABDOMEN:  Soft, nontender, nondistended, bowel sounds present. EXTREMITIES:  No edema is noted. Dorsalis pedis pulse normal.    NEUROLOGICAL:  Alert and oriented x3. Laboratory Data:   Labs were reviewed. Last lab shows hemoglobin A1c 8.1. CBC is stable. CMP shows creatinine 1.4,    Assessment and Plan:   1. Type 2 diabetes mellitus. The patient is on Trulicity, Linagliptin, Lantus and Lispro. Blood sugar is not quite controlled. We will repeat CBC and CMP and hemoglobin A1c and vitamin D.  2. COPD. The patient is taking inhaler, doing well. 3. Seizure disorder. He is on Vimpat and Keppra. He is seizure free. 4. Coronary artery disease. Taking aspirin and Plavix, statin and metoprolol, doing well. 5. Gait weakness. The patient is mostly bedbound and wheelchair bound. THIS IS NOT A COMPLETE MEDICAL RECORD ON THIS PATIENT. THIS IS A PATIENT AT [facility]. PLEASE CONTACT THE FACILITY LISTED BELOW FOR MORE INFORMATION ON THIS PATIENT.     THE COMPLETE RECORD RESIDES WITH THIS LONG TERM CARE FACILITY

## 2022-07-26 NOTE — PROGRESS NOTES
Progress Note     Subjective:  Mr. Marco Antonio Dumont is doing well in the nursing home. Eating and sleeping good. Blood sugar is running slightly high. No other discomfort. Objective:    VITALS:  T:  96.28 degrees Fahrenheit. P:  65 per minute. BP:  121/66 mmHg. SaO2:  98% on room air. Weight is 143 pounds. HEENT:  No abnormality detected. NECK:  Supple, no JVD, no carotid bruits, no thyromegaly. CHEST:  Chest is clear to auscultation bilaterally. No rales, no rhonchi. CARDIOVASCULAR:  S1, S2 normal.  Regular rate and rhythm. ABDOMEN:  Soft, nontender, nondistended, bowel sounds present. EXTREMITIES:  No edema is noted. Dorsalis pedis pulse normal.    NEUROLOGICAL:  Alert and oriented x3. Laboratory Data:   Labs were reviewed. Last labs show hemoglobin A1c 8.1. CBC is stable. CMP shows creatinine 1.4. Assessment and Plan:   1. Type 2 diabetes mellitus. The patient is on Trulicity, Linagliptin, Lantus and Lispro. Blood sugar is not quite controlled. We will repeat CBC, CMP and hemoglobin A1c and vitamin D.  2. COPD. The patient is taking inhaler, doing well. 3. Seizure disorder. He is on Vimpat and Keppra. He is seizure free. 4. Coronary artery disease. Taking aspirin, Plavix, statin and metoprolol, doing well. 5. Gait weakness. The patient is mostly bedbound and wheelchair bound.

## 2022-09-23 ENCOUNTER — EXTERNAL NURSING HOME DOCUMENTATION (OUTPATIENT)
Dept: INTERNAL MEDICINE CLINIC | Age: 77
End: 2022-09-23
Payer: COMMERCIAL

## 2022-09-23 DIAGNOSIS — I25.10 CORONARY ARTERY DISEASE INVOLVING NATIVE HEART WITHOUT ANGINA PECTORIS, UNSPECIFIED VESSEL OR LESION TYPE: ICD-10-CM

## 2022-09-23 DIAGNOSIS — I10 ESSENTIAL HYPERTENSION: ICD-10-CM

## 2022-09-23 DIAGNOSIS — Z79.4 TYPE 2 DIABETES MELLITUS WITH HYPERGLYCEMIA, WITH LONG-TERM CURRENT USE OF INSULIN (HCC): Primary | ICD-10-CM

## 2022-09-23 DIAGNOSIS — R56.9 SEIZURE (HCC): ICD-10-CM

## 2022-09-23 DIAGNOSIS — F03.91 DEMENTIA WITH BEHAVIORAL DISTURBANCE, UNSPECIFIED DEMENTIA TYPE: ICD-10-CM

## 2022-09-23 DIAGNOSIS — E11.65 TYPE 2 DIABETES MELLITUS WITH HYPERGLYCEMIA, WITH LONG-TERM CURRENT USE OF INSULIN (HCC): Primary | ICD-10-CM

## 2022-09-23 PROCEDURE — 99309 SBSQ NF CARE MODERATE MDM 30: CPT | Performed by: INTERNAL MEDICINE

## 2022-09-23 NOTE — Clinical Note
Progress Note     Subjective:  Mr. Nessa Colon is doing well in the nursing home. He is eating and sleeping good. No depression. Objective:    VITALS:  T:  97.6 degrees Fahrenheit. P:  74 per minute. BP:  121/70 mmHg. SaO2:  98% on room air. Weight is 145 pounds. HEENT:  No abnormality detected. NECK:  Supple, no JVD, no carotid bruits, no thyromegaly. CHEST:  Chest is clear to auscultation bilaterally. No rales, no rhonchi. CARDIOVASCULAR:  S1, S2 normal.  Regular rate and rhythm. ABDOMEN:  Soft, nontender, nondistended, bowel sounds present. EXTREMITIES:  No edema is noticed. Dorsalis pedis pulse normal.    NEUROLOGICAL:  Alert and oriented x3. No neurological deficits. Laboratory Data:   Labs were reviewed. CBC and CMP seem stable. Hemoglobin A1c 7.4. Assessment and Plan:   1. Coronary artery disease. On aspirin and Plavix, statin and metoprolol, doing well. CMP stable. 2. Type 2 diabetes mellitus. A1c is stable. The patient is on Lantus insulin. Also on Megagliptin and Trulicity. We will continue it. 3. COPD on inhaler doing well. 4. Seizure disorder. The patient is on Vimpat and Keppra. He is seizure free. 5. Gait weakness. The patient is using walker most of the time. THIS IS NOT A COMPLETE MEDICAL RECORD ON THIS PATIENT. THIS IS A PATIENT AT [facility]. PLEASE CONTACT THE FACILITY LISTED BELOW FOR MORE INFORMATION ON THIS PATIENT.     THE COMPLETE RECORD RESIDES WITH THIS LONG TERM CARE FACILITY

## 2022-09-24 NOTE — PROGRESS NOTES
Progress Note     Subjective:  Mr. Angel Deluca is doing well in the nursing home. He is eating and sleeping good. No depression. Objective:    VITALS:  T:  97.6 degrees Fahrenheit. P:  74 per minute. BP:  121/70 mmHg. SaO2:  98% on room air. Weight is 145 pounds. HEENT:  No abnormality detected. NECK:  Supple, no JVD, no carotid bruits, no thyromegaly. CHEST:  Chest is clear to auscultation bilaterally. No rales, no rhonchi. CARDIOVASCULAR:  S1, S2 normal.  Regular rate and rhythm. ABDOMEN:  Soft, nontender, nondistended, bowel sounds present. EXTREMITIES:  No edema is noticed. Dorsalis pedis pulse normal.    NEUROLOGICAL:  Alert and oriented x3. No neurological deficits. Laboratory Data:   Labs were reviewed. CBC and CMP seem stable. Hemoglobin A1c 7.4. Assessment and Plan:   1. Coronary artery disease. On aspirin and Plavix, statin and metoprolol, doing well. CMP stable. 2. Type 2 diabetes mellitus. A1c is stable. The patient is on Lantus insulin. Also on Megagliptin and Trulicity. We will continue it. 3. COPD on inhaler doing well. 4. Seizure disorder. The patient is on Vimpat and Keppra. He is seizure free. 5. Gait weakness. The patient is using walker most of the time.

## 2022-12-02 ENCOUNTER — EXTERNAL NURSING HOME DOCUMENTATION (OUTPATIENT)
Dept: INTERNAL MEDICINE CLINIC | Age: 77
End: 2022-12-02
Payer: COMMERCIAL

## 2022-12-02 DIAGNOSIS — I10 ESSENTIAL HYPERTENSION: ICD-10-CM

## 2022-12-02 DIAGNOSIS — Z79.4 TYPE 2 DIABETES MELLITUS WITH HYPERGLYCEMIA, WITH LONG-TERM CURRENT USE OF INSULIN (HCC): ICD-10-CM

## 2022-12-02 DIAGNOSIS — I25.10 CORONARY ARTERY DISEASE INVOLVING NATIVE HEART WITHOUT ANGINA PECTORIS, UNSPECIFIED VESSEL OR LESION TYPE: Primary | ICD-10-CM

## 2022-12-02 DIAGNOSIS — E11.65 TYPE 2 DIABETES MELLITUS WITH HYPERGLYCEMIA, WITH LONG-TERM CURRENT USE OF INSULIN (HCC): ICD-10-CM

## 2022-12-02 DIAGNOSIS — Z86.73 HISTORY OF CVA (CEREBROVASCULAR ACCIDENT): ICD-10-CM

## 2022-12-02 DIAGNOSIS — G40.909 SEIZURE DISORDER (HCC): ICD-10-CM

## 2022-12-02 NOTE — Clinical Note
Progress Note     Subjective:  Mr. Ester Kelsey is doing well in the nursing home. He is eating and sleeping good. He mentioned blood sugar is running okay. No discomfort. Objective:    VITALS:  T:  96.9 degrees Fahrenheit. P:  66 per minute. BP:  106/68 mmHg. SaO2:  98% on room air. Weight is 146 pounds. HEENT:  No abnormality detected. NECK:  Supple, no JVD, no carotid bruits, no thyromegaly. CHEST:  Chest is clear to auscultation bilaterally. No rales, no rhonchi. CARDIOVASCULAR:  S1, S2 normal.  Regular rate and rhythm. ABDOMEN:  Soft, nontender, nondistended, bowel sounds present. EXTREMITIES:  No edema is noted. Dorsalis pedis pulse normal.    NEUROLOGICAL:  Alert and oriented x3. No neurological deficits. Assessment and Plan:   1. Type 2 diabetes mellitus. Last A1c 7.8%. The patient is on Trulicity and Lantus. We will repeat hemoglobin A1c and CMP. 2. Seizure disorder on Vimpat and Keppra. He is seizure free. 3. Coronary artery disease. The patient is on aspirin, Plavix, statin and metoprolol. We will repeat CBC, CMP. 4. Gait weakness. The patient is mostly bedbound and sometimes he uses walker to walk. 5. Dementia. He is stable. He is being evaluated by the psychologist.  6. Depression. The patient is on low dose of sertraline, doing well. THIS IS NOT A COMPLETE MEDICAL RECORD ON THIS PATIENT. THIS IS A PATIENT AT [facility]. PLEASE CONTACT THE FACILITY LISTED BELOW FOR MORE INFORMATION ON THIS PATIENT.     THE COMPLETE RECORD RESIDES WITH THIS LONG TERM CARE FACILITY

## 2022-12-03 NOTE — PROGRESS NOTES
Progress Note     Subjective:  Mr. Willa Closs is doing well in the nursing home. He is eating and sleeping good. He mentioned blood sugar is running okay. No discomfort. Objective:    VITALS:  T:  96.9 degrees Fahrenheit. P:  66 per minute. BP:  106/68 mmHg. SaO2:  98% on room air. Weight is 146 pounds. HEENT:  No abnormality detected. NECK:  Supple, no JVD, no carotid bruits, no thyromegaly. CHEST:  Chest is clear to auscultation bilaterally. No rales, no rhonchi. CARDIOVASCULAR:  S1, S2 normal.  Regular rate and rhythm. ABDOMEN:  Soft, nontender, nondistended, bowel sounds present. EXTREMITIES:  No edema is noted. Dorsalis pedis pulse normal.    NEUROLOGICAL:  Alert and oriented x3. No neurological deficits. Assessment and Plan:   1. Type 2 diabetes mellitus. Last A1c 7.8%. The patient is on Trulicity and Lantus. We will repeat hemoglobin A1c and CMP. 2. Seizure disorder on Vimpat and Keppra. He is seizure free. 3. Coronary artery disease. The patient is on aspirin, Plavix, statin and metoprolol. We will repeat CBC, CMP. 4. Gait weakness. The patient is mostly bedbound and sometimes he uses walker to walk. 5. Dementia. He is stable. He is being evaluated by the psychologist.  6. Depression. The patient is on low dose of sertraline, doing well.

## 2023-03-28 ENCOUNTER — EXTERNAL NURSING HOME DOCUMENTATION (OUTPATIENT)
Dept: INTERNAL MEDICINE CLINIC | Age: 78
End: 2023-03-28
Payer: COMMERCIAL

## 2023-03-28 DIAGNOSIS — G40.909 SEIZURE DISORDER (HCC): ICD-10-CM

## 2023-03-28 DIAGNOSIS — F03.B18 MODERATE DEMENTIA WITH OTHER BEHAVIORAL DISTURBANCE, UNSPECIFIED DEMENTIA TYPE (HCC): ICD-10-CM

## 2023-03-28 DIAGNOSIS — I50.32 DIASTOLIC CHF, CHRONIC (HCC): ICD-10-CM

## 2023-03-28 DIAGNOSIS — E11.65 TYPE 2 DIABETES MELLITUS WITH HYPERGLYCEMIA, WITH LONG-TERM CURRENT USE OF INSULIN (HCC): ICD-10-CM

## 2023-03-28 DIAGNOSIS — I25.10 CORONARY ARTERY DISEASE INVOLVING NATIVE HEART WITHOUT ANGINA PECTORIS, UNSPECIFIED VESSEL OR LESION TYPE: Primary | ICD-10-CM

## 2023-03-28 DIAGNOSIS — Z79.4 TYPE 2 DIABETES MELLITUS WITH HYPERGLYCEMIA, WITH LONG-TERM CURRENT USE OF INSULIN (HCC): ICD-10-CM

## 2023-03-28 DIAGNOSIS — I10 ESSENTIAL HYPERTENSION: ICD-10-CM

## 2023-03-28 PROCEDURE — 99309 SBSQ NF CARE MODERATE MDM 30: CPT | Performed by: INTERNAL MEDICINE

## 2023-03-28 NOTE — Clinical Note
Progress Note     Subjective:  Mr. Anabella Allen is doing well in the nursing home. He has dementia which is slightly getting worse. He is also diabetic. Blood sugar running okay. He is eating and sleeping well. Objective:    VITALS:  T:  97.3 degrees Fahrenheit. P:  69 per minute. BP:  124/69 mmHg. SaO2:  98% on room air. Weight is 146 pounds. Blood sugar is 138 mg/dL. HEENT:  No abnormality detected. NECK:  Supple, no JVD, no carotid bruits, no thyromegaly. CHEST:  Chest is clear to auscultation bilaterally. No rales, no rhonchi. CARDIOVASCULAR:  S1, S2 normal.  Regular rate and rhythm. ABDOMEN:  Soft, nontender, nondistended, bowel sounds present. EXTREMITIES:  No edema is noted. Dorsalis pedis pulse normal.    NEUROLOGICAL:  Alert and oriented x2. Moderate dementia is present. Laboratory Data:   Labs were reviewed. A1c slightly elevated. CBC and CMP are stable. Assessment and Plan:   1. Seizure disorder on Vimpat and Keppra. He is seizure free. 2. Coronary artery disease. The patient is taking Plavix, aspirin, statin and metoprolol, doing well. CMP stable. 3. Type 2 diabetes mellitus. A1c slightly elevated. On Trulicity and Lantus. He is on medications. He is advised to measure blood sugars closely. 4. Depression. The patient is sertraline. She is doing well. 5. Gait weakness. The patient is mostly bedbound and wheelchair bound. THIS IS NOT A COMPLETE MEDICAL RECORD ON THIS PATIENT. THIS IS A PATIENT AT [facility]. PLEASE CONTACT THE FACILITY LISTED BELOW FOR MORE INFORMATION ON THIS PATIENT.     THE COMPLETE RECORD RESIDES WITH THIS LONG TERM CARE FACILITY

## 2023-03-29 NOTE — PROGRESS NOTES
Progress Note     Subjective:  Mr. Suha Barahona is doing well in the nursing home. He has dementia which is slightly getting worse. He is also diabetic. Blood sugar running okay. He is eating and sleeping well. Objective:    VITALS:  T:  97.3 degrees Fahrenheit. P:  69 per minute. BP:  124/69 mmHg. SaO2:  98% on room air. Weight is 146 pounds. Blood sugar is 138 mg/dL. HEENT:  No abnormality detected. NECK:  Supple, no JVD, no carotid bruits, no thyromegaly. CHEST:  Chest is clear to auscultation bilaterally. No rales, no rhonchi. CARDIOVASCULAR:  S1, S2 normal.  Regular rate and rhythm. ABDOMEN:  Soft, nontender, nondistended, bowel sounds present. EXTREMITIES:  No edema is noted. Dorsalis pedis pulse normal.    NEUROLOGICAL:  Alert and oriented x2. Moderate dementia is present. Laboratory Data:   Labs were reviewed. A1c slightly elevated. CBC and CMP are stable. Assessment and Plan:   1. Seizure disorder on Vimpat and Keppra. He is seizure free. 2. Coronary artery disease. The patient is taking Plavix, aspirin, statin and metoprolol, doing well. CMP stable. 3. Type 2 diabetes mellitus. A1c slightly elevated. On Trulicity and Lantus. He is on medications. He is advised to measure blood sugars closely. 4. Depression. The patient is sertraline. She is doing well. 5. Gait weakness. The patient is mostly bedbound and wheelchair bound.

## 2023-06-06 ENCOUNTER — OUTSIDE SERVICES (OUTPATIENT)
Age: 78
End: 2023-06-06
Payer: COMMERCIAL

## 2023-06-06 DIAGNOSIS — I10 ESSENTIAL (PRIMARY) HYPERTENSION: ICD-10-CM

## 2023-06-06 DIAGNOSIS — E11.65 TYPE 2 DIABETES MELLITUS WITH HYPERGLYCEMIA, WITH LONG-TERM CURRENT USE OF INSULIN (HCC): Primary | ICD-10-CM

## 2023-06-06 DIAGNOSIS — I25.10 ATHEROSCLEROSIS OF NATIVE CORONARY ARTERY OF NATIVE HEART WITHOUT ANGINA PECTORIS: ICD-10-CM

## 2023-06-06 DIAGNOSIS — Z79.4 TYPE 2 DIABETES MELLITUS WITH HYPERGLYCEMIA, WITH LONG-TERM CURRENT USE OF INSULIN (HCC): Primary | ICD-10-CM

## 2023-06-06 DIAGNOSIS — I50.32 CHRONIC DIASTOLIC (CONGESTIVE) HEART FAILURE (HCC): ICD-10-CM

## 2023-06-06 PROCEDURE — 99309 SBSQ NF CARE MODERATE MDM 30: CPT | Performed by: INTERNAL MEDICINE

## 2023-06-06 NOTE — PROGRESS NOTES
Progress note    Subjective: Mr. Pandya Both is nursing home resident. He is diabetic, blood sugar has been running well. Taking insulin. Doing well. Has coronary artery disease, taking Plavix and statin. No chest pain palpitation. He has been constipated lately. Would like to get a stool softener. He suffered from a seizure, no active seizures noticed. On medication. Objective    Vitals: Temperature 97.3 °F, pulse 71/min, blood pressure 126/71 mmHg, SaO2 97% room air, weight 138 pound. HEENT: NAD. Neck: Supple, no JVD no carotid bruit  Chest: Clear to auscultate bilaterally, no rales no rhonchi. Cardiovascular system: S1, S2 normal.  Regular rate and rhythm. Neurological: Alert oriented x2, mild dementia noticed. Labs reviewed, A1c and CBC CMP stable. Assessment and plan      Type 2 diabetes mellitus  A1c has been stable. On Lantus, lispro and Trulicity along with linagliptin. Seizure disorder on Keppra  And Vimpat. He is seizure-free. COPD  Using inhaler. Constipation  We will give a Tiki-Colace 1 tablet daily as needed. THIS IS NOT A COMPLETE MEDICAL RECORD ON THIS PATIENT. THIS IS A PATIENT AT [facility]. PLEASE CONTACT THE FACILITY LISTED BELOW FOR MORE INFORMATION ON THIS PATIENT.     THE COMPLETE RECORD RESIDES WITH THIS LONG TERM CARE FACILITY

## 2023-06-27 ENCOUNTER — APPOINTMENT (OUTPATIENT)
Facility: HOSPITAL | Age: 78
DRG: 100 | End: 2023-06-27
Payer: COMMERCIAL

## 2023-06-27 ENCOUNTER — HOSPITAL ENCOUNTER (INPATIENT)
Facility: HOSPITAL | Age: 78
LOS: 7 days | Discharge: HOME | DRG: 100 | End: 2023-07-04
Attending: EMERGENCY MEDICINE | Admitting: INTERNAL MEDICINE
Payer: COMMERCIAL

## 2023-06-27 DIAGNOSIS — J96.01 ACUTE RESPIRATORY FAILURE WITH HYPOXIA (HCC): ICD-10-CM

## 2023-06-27 DIAGNOSIS — R56.9 SEIZURE (HCC): Primary | ICD-10-CM

## 2023-06-27 DIAGNOSIS — R41.82 ALTERED MENTAL STATUS, UNSPECIFIED ALTERED MENTAL STATUS TYPE: ICD-10-CM

## 2023-06-27 LAB
ALBUMIN SERPL-MCNC: 2.6 G/DL (ref 3.5–5)
ALBUMIN/GLOB SERPL: 0.6 (ref 1.1–2.2)
ALP SERPL-CCNC: 69 U/L (ref 45–117)
ALT SERPL-CCNC: 29 U/L (ref 12–78)
AMMONIA PLAS-SCNC: 30 UMOL/L
AMORPH CRY URNS QL MICRO: ABNORMAL
ANION GAP SERPL CALC-SCNC: 8 MMOL/L (ref 5–15)
APPEARANCE UR: CLEAR
ARTERIAL PATENCY WRIST A: ABNORMAL
ARTERIAL PATENCY WRIST A: POSITIVE
AST SERPL-CCNC: 28 U/L (ref 15–37)
BACTERIA URNS QL MICRO: NEGATIVE /HPF
BASE DEFICIT BLD-SCNC: 3 MMOL/L
BASE DEFICIT BLD-SCNC: 4.2 MMOL/L
BASOPHILS # BLD: 0 K/UL (ref 0–0.1)
BASOPHILS # BLD: NORMAL K/UL
BASOPHILS NFR BLD: 0 % (ref 0–1)
BASOPHILS NFR BLD: NORMAL % (ref 0–1)
BDY SITE: ABNORMAL
BDY SITE: ABNORMAL
BILIRUB SERPL-MCNC: 0.5 MG/DL (ref 0.2–1)
BILIRUB UR QL: NEGATIVE
BUN SERPL-MCNC: 21 MG/DL (ref 6–20)
BUN/CREAT SERPL: 16 (ref 12–20)
CALCIUM SERPL-MCNC: 8.5 MG/DL (ref 8.5–10.1)
CHLORIDE SERPL-SCNC: 112 MMOL/L (ref 97–108)
CO2 SERPL-SCNC: 18 MMOL/L (ref 21–32)
COLOR UR: ABNORMAL
COMMENT:: NORMAL
CREAT SERPL-MCNC: 1.35 MG/DL (ref 0.7–1.3)
DIFFERENTIAL METHOD BLD: ABNORMAL
DIFFERENTIAL METHOD BLD: NORMAL
EKG ATRIAL RATE: 121 BPM
EKG DIAGNOSIS: NORMAL
EKG P-R INTERVAL: 144 MS
EKG Q-T INTERVAL: 384 MS
EKG QRS DURATION: 130 MS
EKG QTC CALCULATION (BAZETT): 545 MS
EKG R AXIS: -76 DEGREES
EKG T AXIS: 63 DEGREES
EKG VENTRICULAR RATE: 121 BPM
EOSINOPHIL # BLD: 0 K/UL (ref 0–0.4)
EOSINOPHIL # BLD: NORMAL K/UL
EOSINOPHIL NFR BLD: 0 % (ref 0–7)
EOSINOPHIL NFR BLD: NORMAL % (ref 0–5)
EPITH CASTS URNS QL MICRO: ABNORMAL /LPF
ERYTHROCYTE [DISTWIDTH] IN BLOOD BY AUTOMATED COUNT: 13.6 % (ref 11.5–14.5)
ERYTHROCYTE [DISTWIDTH] IN BLOOD BY AUTOMATED COUNT: NORMAL % (ref 11.5–14.5)
GAS FLOW.O2 O2 DELIVERY SYS: ABNORMAL
GAS FLOW.O2 O2 DELIVERY SYS: ABNORMAL
GAS FLOW.O2 SETTING OXYMISER: 14 BPM
GAS FLOW.O2 SETTING OXYMISER: 18 BPM
GLOBULIN SER CALC-MCNC: 4.5 G/DL (ref 2–4)
GLUCOSE BLD STRIP.AUTO-MCNC: 153 MG/DL (ref 65–117)
GLUCOSE BLD STRIP.AUTO-MCNC: 175 MG/DL (ref 65–117)
GLUCOSE BLD STRIP.AUTO-MCNC: 217 MG/DL (ref 65–117)
GLUCOSE SERPL-MCNC: 202 MG/DL (ref 65–100)
GLUCOSE UR STRIP.AUTO-MCNC: NEGATIVE MG/DL
HCO3 BLD-SCNC: 18.1 MMOL/L (ref 22–26)
HCO3 BLD-SCNC: 19.6 MMOL/L (ref 22–26)
HCT VFR BLD AUTO: 39.9 % (ref 36.6–50.3)
HCT VFR BLD AUTO: NORMAL %
HGB BLD-MCNC: 12.6 G/DL (ref 12.1–17)
HGB BLD-MCNC: NORMAL G/DL (ref 13–16)
HGB UR QL STRIP: NEGATIVE
IMM GRANULOCYTES # BLD AUTO: 0 K/UL (ref 0–0.04)
IMM GRANULOCYTES # BLD AUTO: NORMAL K/UL (ref 0–0.5)
IMM GRANULOCYTES NFR BLD AUTO: 0 % (ref 0–0.5)
IMM GRANULOCYTES NFR BLD AUTO: NORMAL % (ref 0–5)
KETONES UR QL STRIP.AUTO: 15 MG/DL
LACTATE SERPL-SCNC: 1.4 MMOL/L (ref 0.4–2)
LEUKOCYTE ESTERASE UR QL STRIP.AUTO: NEGATIVE
LYMPHOCYTES # BLD: 1.3 K/UL (ref 0.8–3.5)
LYMPHOCYTES # BLD: NORMAL K/UL
LYMPHOCYTES NFR BLD: 14 % (ref 12–49)
LYMPHOCYTES NFR BLD: NORMAL % (ref 12–49)
MAGNESIUM SERPL-MCNC: 1.8 MG/DL (ref 1.6–2.4)
MCH RBC QN AUTO: 27.6 PG (ref 26–34)
MCH RBC QN AUTO: NORMAL PG (ref 25–35)
MCHC RBC AUTO-ENTMCNC: 31.6 G/DL (ref 30–36.5)
MCHC RBC AUTO-ENTMCNC: NORMAL G/DL (ref 31–37)
MCV RBC AUTO: 87.5 FL (ref 80–99)
MCV RBC AUTO: NORMAL FL (ref 78–98)
MONOCYTES # BLD: 0.6 K/UL (ref 0–1)
MONOCYTES # BLD: NORMAL K/UL
MONOCYTES NFR BLD: 7 % (ref 5–13)
MONOCYTES NFR BLD: NORMAL % (ref 5–13)
NEUTS SEG # BLD: 7.2 K/UL (ref 1.8–8)
NEUTS SEG # BLD: NORMAL K/UL
NEUTS SEG NFR BLD: 79 % (ref 32–75)
NEUTS SEG NFR BLD: NORMAL % (ref 42–75)
NITRITE UR QL STRIP.AUTO: NEGATIVE
NRBC # BLD: 0 K/UL (ref 0–0.01)
NRBC # BLD: NORMAL K/UL
NRBC BLD-RTO: 0 PER 100 WBC
NRBC BLD-RTO: NORMAL PER 100 WBC
O2/TOTAL GAS SETTING VFR VENT: 60 %
O2/TOTAL GAS SETTING VFR VENT: 80 %
PCO2 BLD: 25.6 MMHG (ref 35–45)
PCO2 BLD: 28.4 MMHG (ref 35–45)
PEEP RESPIRATORY: 8 CMH2O
PEEP RESPIRATORY: 8 CMH2O
PH BLD: 7.45 (ref 7.35–7.45)
PH BLD: 7.46 (ref 7.35–7.45)
PH UR STRIP: 5.5 (ref 5–8)
PLATELET # BLD AUTO: 144 K/UL (ref 150–400)
PLATELET # BLD AUTO: NORMAL K/UL
PMV BLD AUTO: 10.3 FL (ref 8.9–12.9)
PMV BLD AUTO: NORMAL FL (ref 7.4–10.4)
PO2 BLD: 88 MMHG (ref 80–100)
PO2 BLD: 88 MMHG (ref 80–100)
POTASSIUM SERPL-SCNC: 3.9 MMOL/L (ref 3.5–5.1)
PROT SERPL-MCNC: 7.1 G/DL (ref 6.4–8.2)
PROT UR STRIP-MCNC: 100 MG/DL
RBC # BLD AUTO: 4.56 M/UL (ref 4.1–5.7)
RBC # BLD AUTO: NORMAL M/UL
RBC #/AREA URNS HPF: ABNORMAL /HPF (ref 0–5)
SAO2 % BLD: 97.3 % (ref 92–97)
SAO2 % BLD: 97.5 % (ref 92–97)
SERVICE CMNT-IMP: ABNORMAL
SODIUM SERPL-SCNC: 138 MMOL/L (ref 136–145)
SP GR UR REFRACTOMETRY: 1.02 (ref 1–1.03)
SPECIMEN HOLD: NORMAL
SPECIMEN TYPE: ABNORMAL
SPECIMEN TYPE: ABNORMAL
UROBILINOGEN UR QL STRIP.AUTO: 0.2 EU/DL (ref 0.2–1)
VENTILATION MODE VENT: ABNORMAL
VENTILATION MODE VENT: ABNORMAL
VT SETTING VENT: 420 ML
VT SETTING VENT: 480 ML
WBC # BLD AUTO: 9.2 K/UL (ref 4.1–11.1)
WBC # BLD AUTO: NORMAL K/UL
WBC URNS QL MICRO: ABNORMAL /HPF (ref 0–4)

## 2023-06-27 PROCEDURE — 31500 INSERT EMERGENCY AIRWAY: CPT

## 2023-06-27 PROCEDURE — 0BH17EZ INSERTION OF ENDOTRACHEAL AIRWAY INTO TRACHEA, VIA NATURAL OR ARTIFICIAL OPENING: ICD-10-PCS | Performed by: INTERNAL MEDICINE

## 2023-06-27 PROCEDURE — 93005 ELECTROCARDIOGRAM TRACING: CPT | Performed by: INTERNAL MEDICINE

## 2023-06-27 PROCEDURE — 95822 EEG COMA OR SLEEP ONLY: CPT | Performed by: PSYCHIATRY & NEUROLOGY

## 2023-06-27 PROCEDURE — 96374 THER/PROPH/DIAG INJ IV PUSH: CPT

## 2023-06-27 PROCEDURE — 82140 ASSAY OF AMMONIA: CPT

## 2023-06-27 PROCEDURE — 70450 CT HEAD/BRAIN W/O DYE: CPT

## 2023-06-27 PROCEDURE — 83605 ASSAY OF LACTIC ACID: CPT

## 2023-06-27 PROCEDURE — 6360000002 HC RX W HCPCS: Performed by: INTERNAL MEDICINE

## 2023-06-27 PROCEDURE — 2580000003 HC RX 258: Performed by: EMERGENCY MEDICINE

## 2023-06-27 PROCEDURE — 6370000000 HC RX 637 (ALT 250 FOR IP): Performed by: INTERNAL MEDICINE

## 2023-06-27 PROCEDURE — 85025 COMPLETE CBC W/AUTO DIFF WBC: CPT

## 2023-06-27 PROCEDURE — 82803 BLOOD GASES ANY COMBINATION: CPT

## 2023-06-27 PROCEDURE — 2580000003 HC RX 258: Performed by: INTERNAL MEDICINE

## 2023-06-27 PROCEDURE — C9254 INJECTION, LACOSAMIDE: HCPCS | Performed by: INTERNAL MEDICINE

## 2023-06-27 PROCEDURE — 80053 COMPREHEN METABOLIC PANEL: CPT

## 2023-06-27 PROCEDURE — 99285 EMERGENCY DEPT VISIT HI MDM: CPT

## 2023-06-27 PROCEDURE — 81001 URINALYSIS AUTO W/SCOPE: CPT

## 2023-06-27 PROCEDURE — 2500000003 HC RX 250 WO HCPCS

## 2023-06-27 PROCEDURE — 71045 X-RAY EXAM CHEST 1 VIEW: CPT

## 2023-06-27 PROCEDURE — 36600 WITHDRAWAL OF ARTERIAL BLOOD: CPT

## 2023-06-27 PROCEDURE — 5A1945Z RESPIRATORY VENTILATION, 24-96 CONSECUTIVE HOURS: ICD-10-PCS | Performed by: INTERNAL MEDICINE

## 2023-06-27 PROCEDURE — 36415 COLL VENOUS BLD VENIPUNCTURE: CPT

## 2023-06-27 PROCEDURE — 6360000002 HC RX W HCPCS: Performed by: EMERGENCY MEDICINE

## 2023-06-27 PROCEDURE — 74018 RADEX ABDOMEN 1 VIEW: CPT

## 2023-06-27 PROCEDURE — 82962 GLUCOSE BLOOD TEST: CPT

## 2023-06-27 PROCEDURE — 80235 DRUG ASSAY LACOSAMIDE: CPT

## 2023-06-27 PROCEDURE — 83735 ASSAY OF MAGNESIUM: CPT

## 2023-06-27 PROCEDURE — 51702 INSERT TEMP BLADDER CATH: CPT

## 2023-06-27 PROCEDURE — 2000000000 HC ICU R&B

## 2023-06-27 PROCEDURE — 2500000003 HC RX 250 WO HCPCS: Performed by: INTERNAL MEDICINE

## 2023-06-27 RX ORDER — ENOXAPARIN SODIUM 100 MG/ML
40 INJECTION SUBCUTANEOUS DAILY
Status: DISCONTINUED | OUTPATIENT
Start: 2023-06-27 | End: 2023-07-04 | Stop reason: HOSPADM

## 2023-06-27 RX ORDER — ROCURONIUM BROMIDE 10 MG/ML
INJECTION, SOLUTION INTRAVENOUS
Status: COMPLETED
Start: 2023-06-27 | End: 2023-06-27

## 2023-06-27 RX ORDER — ETOMIDATE 2 MG/ML
10 INJECTION INTRAVENOUS
Status: COMPLETED | OUTPATIENT
Start: 2023-06-27 | End: 2023-06-27

## 2023-06-27 RX ORDER — SODIUM CHLORIDE 0.9 % (FLUSH) 0.9 %
5-40 SYRINGE (ML) INJECTION PRN
Status: DISCONTINUED | OUTPATIENT
Start: 2023-06-27 | End: 2023-07-04 | Stop reason: HOSPADM

## 2023-06-27 RX ORDER — INSULIN GLARGINE 100 [IU]/ML
13 INJECTION, SOLUTION SUBCUTANEOUS NIGHTLY
Status: DISCONTINUED | OUTPATIENT
Start: 2023-06-27 | End: 2023-07-02

## 2023-06-27 RX ORDER — PROPOFOL 10 MG/ML
0-50 INJECTION, EMULSION INTRAVENOUS CONTINUOUS
Status: DISCONTINUED | OUTPATIENT
Start: 2023-06-27 | End: 2023-06-29

## 2023-06-27 RX ORDER — SENNA PLUS 8.6 MG/1
1 TABLET ORAL NIGHTLY
Status: DISCONTINUED | OUTPATIENT
Start: 2023-06-27 | End: 2023-07-04 | Stop reason: HOSPADM

## 2023-06-27 RX ORDER — LACOSAMIDE 10 MG/ML
200 INJECTION, SOLUTION INTRAVENOUS 2 TIMES DAILY
Status: DISCONTINUED | OUTPATIENT
Start: 2023-06-27 | End: 2023-07-04

## 2023-06-27 RX ORDER — POLYETHYLENE GLYCOL 3350 17 G/17G
17 POWDER, FOR SOLUTION ORAL DAILY PRN
Status: DISCONTINUED | OUTPATIENT
Start: 2023-06-27 | End: 2023-07-04 | Stop reason: HOSPADM

## 2023-06-27 RX ORDER — IPRATROPIUM BROMIDE AND ALBUTEROL SULFATE 2.5; .5 MG/3ML; MG/3ML
1 SOLUTION RESPIRATORY (INHALATION) EVERY 4 HOURS PRN
Status: DISCONTINUED | OUTPATIENT
Start: 2023-06-27 | End: 2023-07-04 | Stop reason: HOSPADM

## 2023-06-27 RX ORDER — SODIUM CHLORIDE 0.9 % (FLUSH) 0.9 %
5-40 SYRINGE (ML) INJECTION EVERY 12 HOURS SCHEDULED
Status: DISCONTINUED | OUTPATIENT
Start: 2023-06-27 | End: 2023-07-04 | Stop reason: HOSPADM

## 2023-06-27 RX ORDER — 0.9 % SODIUM CHLORIDE 0.9 %
1000 INTRAVENOUS SOLUTION INTRAVENOUS ONCE
Status: COMPLETED | OUTPATIENT
Start: 2023-06-27 | End: 2023-06-27

## 2023-06-27 RX ORDER — CHLORHEXIDINE GLUCONATE 0.12 MG/ML
15 RINSE ORAL 2 TIMES DAILY
Status: DISCONTINUED | OUTPATIENT
Start: 2023-06-27 | End: 2023-06-29

## 2023-06-27 RX ORDER — ROCURONIUM BROMIDE 10 MG/ML
80 INJECTION, SOLUTION INTRAVENOUS
Status: COMPLETED | OUTPATIENT
Start: 2023-06-27 | End: 2023-06-27

## 2023-06-27 RX ORDER — ONDANSETRON 4 MG/1
4 TABLET, ORALLY DISINTEGRATING ORAL EVERY 8 HOURS PRN
Status: DISCONTINUED | OUTPATIENT
Start: 2023-06-27 | End: 2023-07-04 | Stop reason: HOSPADM

## 2023-06-27 RX ORDER — LORAZEPAM 2 MG/ML
1 INJECTION INTRAMUSCULAR EVERY 6 HOURS PRN
Status: DISCONTINUED | OUTPATIENT
Start: 2023-06-27 | End: 2023-07-04 | Stop reason: HOSPADM

## 2023-06-27 RX ORDER — ACETAMINOPHEN 650 MG/1
650 SUPPOSITORY RECTAL EVERY 6 HOURS PRN
Status: DISCONTINUED | OUTPATIENT
Start: 2023-06-27 | End: 2023-07-04 | Stop reason: HOSPADM

## 2023-06-27 RX ORDER — ONDANSETRON 2 MG/ML
4 INJECTION INTRAMUSCULAR; INTRAVENOUS EVERY 6 HOURS PRN
Status: DISCONTINUED | OUTPATIENT
Start: 2023-06-27 | End: 2023-07-04 | Stop reason: HOSPADM

## 2023-06-27 RX ORDER — ACETAMINOPHEN 325 MG/1
650 TABLET ORAL EVERY 6 HOURS PRN
Status: DISCONTINUED | OUTPATIENT
Start: 2023-06-27 | End: 2023-07-04 | Stop reason: HOSPADM

## 2023-06-27 RX ORDER — FENTANYL CITRATE 50 UG/ML
25 INJECTION, SOLUTION INTRAMUSCULAR; INTRAVENOUS
Status: DISCONTINUED | OUTPATIENT
Start: 2023-06-27 | End: 2023-07-04 | Stop reason: HOSPADM

## 2023-06-27 RX ORDER — INSULIN LISPRO 100 [IU]/ML
0-8 INJECTION, SOLUTION INTRAVENOUS; SUBCUTANEOUS EVERY 6 HOURS
Status: DISCONTINUED | OUTPATIENT
Start: 2023-06-27 | End: 2023-07-04 | Stop reason: HOSPADM

## 2023-06-27 RX ORDER — PRAVASTATIN SODIUM 40 MG
40 TABLET ORAL NIGHTLY
Status: DISCONTINUED | OUTPATIENT
Start: 2023-06-27 | End: 2023-07-04 | Stop reason: HOSPADM

## 2023-06-27 RX ORDER — PROPOFOL 10 MG/ML
5-50 INJECTION, EMULSION INTRAVENOUS CONTINUOUS
Status: DISCONTINUED | OUTPATIENT
Start: 2023-06-27 | End: 2023-06-27

## 2023-06-27 RX ORDER — SODIUM CHLORIDE 9 MG/ML
INJECTION, SOLUTION INTRAVENOUS PRN
Status: DISCONTINUED | OUTPATIENT
Start: 2023-06-27 | End: 2023-07-04 | Stop reason: HOSPADM

## 2023-06-27 RX ORDER — FENTANYL CITRATE 50 UG/ML
50 INJECTION, SOLUTION INTRAMUSCULAR; INTRAVENOUS ONCE
Status: DISCONTINUED | OUTPATIENT
Start: 2023-06-27 | End: 2023-07-02

## 2023-06-27 RX ORDER — LEVETIRACETAM 500 MG/5ML
1000 INJECTION, SOLUTION, CONCENTRATE INTRAVENOUS EVERY 12 HOURS
Status: DISCONTINUED | OUTPATIENT
Start: 2023-06-27 | End: 2023-07-04

## 2023-06-27 RX ORDER — DEXTROSE, SODIUM CHLORIDE, SODIUM LACTATE, POTASSIUM CHLORIDE, AND CALCIUM CHLORIDE 5; .6; .31; .03; .02 G/100ML; G/100ML; G/100ML; G/100ML; G/100ML
INJECTION, SOLUTION INTRAVENOUS CONTINUOUS
Status: DISCONTINUED | OUTPATIENT
Start: 2023-06-27 | End: 2023-06-30

## 2023-06-27 RX ORDER — TOPIRAMATE 100 MG/1
100 TABLET, FILM COATED ORAL 2 TIMES DAILY
Status: DISCONTINUED | OUTPATIENT
Start: 2023-06-27 | End: 2023-07-04 | Stop reason: HOSPADM

## 2023-06-27 RX ORDER — LEVETIRACETAM 500 MG/5ML
1000 INJECTION, SOLUTION, CONCENTRATE INTRAVENOUS
Status: COMPLETED | OUTPATIENT
Start: 2023-06-27 | End: 2023-06-27

## 2023-06-27 RX ORDER — ETOMIDATE 2 MG/ML
INJECTION INTRAVENOUS
Status: COMPLETED
Start: 2023-06-27 | End: 2023-06-27

## 2023-06-27 RX ADMIN — SODIUM CHLORIDE 1000 ML: 9 INJECTION, SOLUTION INTRAVENOUS at 12:35

## 2023-06-27 RX ADMIN — PRAVASTATIN SODIUM 40 MG: 40 TABLET ORAL at 20:21

## 2023-06-27 RX ADMIN — PROPOFOL 10 MCG/KG/MIN: 10 INJECTION, EMULSION INTRAVENOUS at 13:10

## 2023-06-27 RX ADMIN — ROCURONIUM BROMIDE 80 MG: 10 INJECTION, SOLUTION INTRAVENOUS at 13:05

## 2023-06-27 RX ADMIN — SODIUM CHLORIDE, PRESERVATIVE FREE 20 MG: 5 INJECTION INTRAVENOUS at 16:28

## 2023-06-27 RX ADMIN — LEVETIRACETAM 1000 MG: 100 INJECTION, SOLUTION INTRAVENOUS at 20:33

## 2023-06-27 RX ADMIN — ETOMIDATE 10 MG: 2 INJECTION INTRAVENOUS at 13:05

## 2023-06-27 RX ADMIN — LACOSAMIDE 200 MG: 10 INJECTION INTRAVENOUS at 20:29

## 2023-06-27 RX ADMIN — SODIUM CHLORIDE, SODIUM LACTATE, POTASSIUM CHLORIDE, CALCIUM CHLORIDE AND DEXTROSE MONOHYDRATE: 5; 600; 310; 30; 20 INJECTION, SOLUTION INTRAVENOUS at 18:36

## 2023-06-27 RX ADMIN — SODIUM CHLORIDE, PRESERVATIVE FREE 10 ML: 5 INJECTION INTRAVENOUS at 20:36

## 2023-06-27 RX ADMIN — ENOXAPARIN SODIUM 40 MG: 100 INJECTION SUBCUTANEOUS at 16:28

## 2023-06-27 RX ADMIN — CHLORHEXIDINE GLUCONATE 15 ML: 1.2 RINSE ORAL at 20:19

## 2023-06-27 RX ADMIN — LEVETIRACETAM 1000 MG: 100 INJECTION, SOLUTION INTRAVENOUS at 14:45

## 2023-06-27 RX ADMIN — PROPOFOL 30 MCG/KG/MIN: 10 INJECTION, EMULSION INTRAVENOUS at 18:32

## 2023-06-27 RX ADMIN — TOPIRAMATE 100 MG: 100 TABLET, FILM COATED ORAL at 20:21

## 2023-06-27 RX ADMIN — INSULIN GLARGINE 13 UNITS: 100 INJECTION, SOLUTION SUBCUTANEOUS at 20:28

## 2023-06-27 RX ADMIN — ROCURONIUM BROMIDE 80 MG: 50 INJECTION, SOLUTION INTRAVENOUS at 13:05

## 2023-06-27 RX ADMIN — SENNOSIDES 8.6 MG: 8.6 TABLET, FILM COATED ORAL at 20:21

## 2023-06-27 ASSESSMENT — PAIN SCALES - WONG BAKER: WONGBAKER_NUMERICALRESPONSE: 0

## 2023-06-27 ASSESSMENT — PULMONARY FUNCTION TESTS
PIF_VALUE: 27
PIF_VALUE: 24
PIF_VALUE: 22
PIF_VALUE: 33

## 2023-06-27 ASSESSMENT — PAIN SCALES - GENERAL: PAINLEVEL_OUTOF10: 0

## 2023-06-28 ENCOUNTER — APPOINTMENT (OUTPATIENT)
Facility: HOSPITAL | Age: 78
DRG: 100 | End: 2023-06-28
Payer: COMMERCIAL

## 2023-06-28 LAB
ALBUMIN SERPL-MCNC: 2.4 G/DL (ref 3.5–5)
ALBUMIN/GLOB SERPL: 0.5 (ref 1.1–2.2)
ALP SERPL-CCNC: 65 U/L (ref 45–117)
ALT SERPL-CCNC: 24 U/L (ref 12–78)
ANION GAP SERPL CALC-SCNC: 8 MMOL/L (ref 5–15)
ARTERIAL PATENCY WRIST A: ABNORMAL
AST SERPL-CCNC: 22 U/L (ref 15–37)
BASE DEFICIT BLD-SCNC: 0.5 MMOL/L
BASOPHILS # BLD: 0.1 K/UL (ref 0–0.1)
BASOPHILS NFR BLD: 1 % (ref 0–1)
BDY SITE: ABNORMAL
BILIRUB SERPL-MCNC: 0.8 MG/DL (ref 0.2–1)
BUN SERPL-MCNC: 19 MG/DL (ref 6–20)
BUN/CREAT SERPL: 14 (ref 12–20)
CA-I BLD-SCNC: 1.25 MMOL/L (ref 1.12–1.32)
CALCIUM SERPL-MCNC: 8.7 MG/DL (ref 8.5–10.1)
CHLORIDE SERPL-SCNC: 110 MMOL/L (ref 97–108)
CO2 SERPL-SCNC: 22 MMOL/L (ref 21–32)
CREAT SERPL-MCNC: 1.36 MG/DL (ref 0.7–1.3)
DIFFERENTIAL METHOD BLD: ABNORMAL
EKG ATRIAL RATE: 75 BPM
EKG ATRIAL RATE: 77 BPM
EKG DIAGNOSIS: NORMAL
EKG DIAGNOSIS: NORMAL
EKG P AXIS: -14 DEGREES
EKG P AXIS: 149 DEGREES
EKG P-R INTERVAL: 156 MS
EKG P-R INTERVAL: 158 MS
EKG Q-T INTERVAL: 414 MS
EKG Q-T INTERVAL: 424 MS
EKG QRS DURATION: 136 MS
EKG QRS DURATION: 138 MS
EKG QTC CALCULATION (BAZETT): 462 MS
EKG QTC CALCULATION (BAZETT): 479 MS
EKG R AXIS: -78 DEGREES
EKG R AXIS: 257 DEGREES
EKG T AXIS: 131 DEGREES
EKG T AXIS: 52 DEGREES
EKG VENTRICULAR RATE: 75 BPM
EKG VENTRICULAR RATE: 77 BPM
EOSINOPHIL # BLD: 0.1 K/UL (ref 0–0.4)
EOSINOPHIL NFR BLD: 1 % (ref 0–7)
ERYTHROCYTE [DISTWIDTH] IN BLOOD BY AUTOMATED COUNT: 13.6 % (ref 11.5–14.5)
GAS FLOW.O2 O2 DELIVERY SYS: ABNORMAL
GAS FLOW.O2 SETTING OXYMISER: 16 BPM
GLOBULIN SER CALC-MCNC: 4.4 G/DL (ref 2–4)
GLUCOSE BLD STRIP.AUTO-MCNC: 102 MG/DL (ref 65–117)
GLUCOSE BLD STRIP.AUTO-MCNC: 114 MG/DL (ref 65–117)
GLUCOSE BLD STRIP.AUTO-MCNC: 134 MG/DL (ref 65–117)
GLUCOSE BLD STRIP.AUTO-MCNC: 206 MG/DL (ref 65–117)
GLUCOSE BLD STRIP.AUTO-MCNC: 91 MG/DL (ref 65–117)
GLUCOSE SERPL-MCNC: 217 MG/DL (ref 65–100)
HCO3 BLD-SCNC: 21.7 MMOL/L (ref 22–26)
HCT VFR BLD AUTO: 40.3 % (ref 36.6–50.3)
HGB BLD-MCNC: 12.5 G/DL (ref 12.1–17)
IMM GRANULOCYTES # BLD AUTO: 0 K/UL (ref 0–0.04)
IMM GRANULOCYTES NFR BLD AUTO: 0 % (ref 0–0.5)
LYMPHOCYTES # BLD: 2.6 K/UL (ref 0.8–3.5)
LYMPHOCYTES NFR BLD: 22 % (ref 12–49)
MCH RBC QN AUTO: 27.4 PG (ref 26–34)
MCHC RBC AUTO-ENTMCNC: 31 G/DL (ref 30–36.5)
MCV RBC AUTO: 88.2 FL (ref 80–99)
MONOCYTES # BLD: 1.1 K/UL (ref 0–1)
MONOCYTES NFR BLD: 10 % (ref 5–13)
NEUTS SEG # BLD: 8.1 K/UL (ref 1.8–8)
NEUTS SEG NFR BLD: 67 % (ref 32–75)
NRBC # BLD: 0.02 K/UL (ref 0–0.01)
NRBC BLD-RTO: 0.2 PER 100 WBC
O2/TOTAL GAS SETTING VFR VENT: 40 %
PAW @ MEAN EXP FLOW ON VENT: 9.2 CMH2O
PCO2 BLD: 28.3 MMHG (ref 35–45)
PEEP RESPIRATORY: 5 CMH2O
PH BLD: 7.49 (ref 7.35–7.45)
PHOSPHATE SERPL-MCNC: 2.1 MG/DL (ref 2.6–4.7)
PLATELET # BLD AUTO: 143 K/UL (ref 150–400)
PMV BLD AUTO: 10.7 FL (ref 8.9–12.9)
PO2 BLD: 112 MMHG (ref 80–100)
POTASSIUM SERPL-SCNC: 4.1 MMOL/L (ref 3.5–5.1)
PROT SERPL-MCNC: 6.8 G/DL (ref 6.4–8.2)
RBC # BLD AUTO: 4.57 M/UL (ref 4.1–5.7)
SAO2 % BLD: 98.8 % (ref 92–97)
SERVICE CMNT-IMP: ABNORMAL
SERVICE CMNT-IMP: ABNORMAL
SERVICE CMNT-IMP: NORMAL
SODIUM SERPL-SCNC: 140 MMOL/L (ref 136–145)
SPECIMEN TYPE: ABNORMAL
VENTILATION MODE VENT: ABNORMAL
VT SETTING VENT: 420 ML
WBC # BLD AUTO: 12 K/UL (ref 4.1–11.1)

## 2023-06-28 PROCEDURE — 6370000000 HC RX 637 (ALT 250 FOR IP): Performed by: INTERNAL MEDICINE

## 2023-06-28 PROCEDURE — 6360000002 HC RX W HCPCS: Performed by: INTERNAL MEDICINE

## 2023-06-28 PROCEDURE — 94002 VENT MGMT INPAT INIT DAY: CPT

## 2023-06-28 PROCEDURE — 36600 WITHDRAWAL OF ARTERIAL BLOOD: CPT

## 2023-06-28 PROCEDURE — 95822 EEG COMA OR SLEEP ONLY: CPT

## 2023-06-28 PROCEDURE — 80053 COMPREHEN METABOLIC PANEL: CPT

## 2023-06-28 PROCEDURE — 2500000003 HC RX 250 WO HCPCS: Performed by: NURSE PRACTITIONER

## 2023-06-28 PROCEDURE — 71045 X-RAY EXAM CHEST 1 VIEW: CPT

## 2023-06-28 PROCEDURE — 82962 GLUCOSE BLOOD TEST: CPT

## 2023-06-28 PROCEDURE — 2000000000 HC ICU R&B

## 2023-06-28 PROCEDURE — 6360000004 HC RX CONTRAST MEDICATION

## 2023-06-28 PROCEDURE — 2500000003 HC RX 250 WO HCPCS: Performed by: INTERNAL MEDICINE

## 2023-06-28 PROCEDURE — 36415 COLL VENOUS BLD VENIPUNCTURE: CPT

## 2023-06-28 PROCEDURE — 84100 ASSAY OF PHOSPHORUS: CPT

## 2023-06-28 PROCEDURE — C9254 INJECTION, LACOSAMIDE: HCPCS | Performed by: INTERNAL MEDICINE

## 2023-06-28 PROCEDURE — 85025 COMPLETE CBC W/AUTO DIFF WBC: CPT

## 2023-06-28 PROCEDURE — 70553 MRI BRAIN STEM W/O & W/DYE: CPT

## 2023-06-28 PROCEDURE — 2580000003 HC RX 258: Performed by: INTERNAL MEDICINE

## 2023-06-28 PROCEDURE — A9579 GAD-BASE MR CONTRAST NOS,1ML: HCPCS

## 2023-06-28 PROCEDURE — A4216 STERILE WATER/SALINE, 10 ML: HCPCS | Performed by: INTERNAL MEDICINE

## 2023-06-28 PROCEDURE — 2580000003 HC RX 258: Performed by: NURSE PRACTITIONER

## 2023-06-28 PROCEDURE — 99223 1ST HOSP IP/OBS HIGH 75: CPT | Performed by: PSYCHIATRY & NEUROLOGY

## 2023-06-28 PROCEDURE — 82803 BLOOD GASES ANY COMBINATION: CPT

## 2023-06-28 RX ORDER — CLOPIDOGREL BISULFATE 75 MG/1
75 TABLET ORAL DAILY
Status: DISCONTINUED | OUTPATIENT
Start: 2023-06-28 | End: 2023-07-04 | Stop reason: HOSPADM

## 2023-06-28 RX ORDER — ALLOPURINOL 100 MG/1
100 TABLET ORAL DAILY
Status: DISCONTINUED | OUTPATIENT
Start: 2023-06-29 | End: 2023-07-04 | Stop reason: HOSPADM

## 2023-06-28 RX ORDER — NOREPINEPHRINE BITARTRATE 0.06 MG/ML
.5-2 INJECTION, SOLUTION INTRAVENOUS CONTINUOUS
Status: DISPENSED | OUTPATIENT
Start: 2023-06-28 | End: 2023-06-29

## 2023-06-28 RX ORDER — SODIUM CHLORIDE, SODIUM LACTATE, POTASSIUM CHLORIDE, AND CALCIUM CHLORIDE .6; .31; .03; .02 G/100ML; G/100ML; G/100ML; G/100ML
500 INJECTION, SOLUTION INTRAVENOUS ONCE
Status: COMPLETED | OUTPATIENT
Start: 2023-06-28 | End: 2023-06-28

## 2023-06-28 RX ORDER — DEXMEDETOMIDINE HYDROCHLORIDE 4 UG/ML
.1-1.5 INJECTION, SOLUTION INTRAVENOUS CONTINUOUS
Status: DISCONTINUED | OUTPATIENT
Start: 2023-06-28 | End: 2023-06-29

## 2023-06-28 RX ORDER — LATANOPROST 50 UG/ML
1 SOLUTION/ DROPS OPHTHALMIC NIGHTLY
Status: DISCONTINUED | OUTPATIENT
Start: 2023-06-28 | End: 2023-07-04 | Stop reason: HOSPADM

## 2023-06-28 RX ADMIN — LACOSAMIDE 200 MG: 10 INJECTION INTRAVENOUS at 20:19

## 2023-06-28 RX ADMIN — Medication 2 UNITS: at 04:12

## 2023-06-28 RX ADMIN — SODIUM PHOSPHATE, MONOBASIC, MONOHYDRATE AND SODIUM PHOSPHATE, DIBASIC, ANHYDROUS 15 MMOL: 142; 276 INJECTION, SOLUTION INTRAVENOUS at 06:21

## 2023-06-28 RX ADMIN — SODIUM CHLORIDE, POTASSIUM CHLORIDE, SODIUM LACTATE AND CALCIUM CHLORIDE 500 ML: 600; 310; 30; 20 INJECTION, SOLUTION INTRAVENOUS at 01:01

## 2023-06-28 RX ADMIN — SODIUM CHLORIDE, SODIUM LACTATE, POTASSIUM CHLORIDE, CALCIUM CHLORIDE AND DEXTROSE MONOHYDRATE: 5; 600; 310; 30; 20 INJECTION, SOLUTION INTRAVENOUS at 21:36

## 2023-06-28 RX ADMIN — SODIUM CHLORIDE, PRESERVATIVE FREE 10 ML: 5 INJECTION INTRAVENOUS at 20:18

## 2023-06-28 RX ADMIN — LACOSAMIDE 200 MG: 10 INJECTION INTRAVENOUS at 08:30

## 2023-06-28 RX ADMIN — TOPIRAMATE 100 MG: 100 TABLET, FILM COATED ORAL at 20:15

## 2023-06-28 RX ADMIN — INSULIN GLARGINE 13 UNITS: 100 INJECTION, SOLUTION SUBCUTANEOUS at 20:14

## 2023-06-28 RX ADMIN — LEVETIRACETAM 1000 MG: 100 INJECTION, SOLUTION INTRAVENOUS at 20:20

## 2023-06-28 RX ADMIN — PROPOFOL 10 MCG/KG/MIN: 10 INJECTION, EMULSION INTRAVENOUS at 06:25

## 2023-06-28 RX ADMIN — CLOPIDOGREL BISULFATE 75 MG: 75 TABLET, FILM COATED ORAL at 16:29

## 2023-06-28 RX ADMIN — ENOXAPARIN SODIUM 40 MG: 100 INJECTION SUBCUTANEOUS at 08:30

## 2023-06-28 RX ADMIN — NOREPINEPHRINE BITARTRATE 4 MCG/MIN: 1 SOLUTION INTRAVENOUS at 23:11

## 2023-06-28 RX ADMIN — SODIUM CHLORIDE, SODIUM LACTATE, POTASSIUM CHLORIDE, CALCIUM CHLORIDE AND DEXTROSE MONOHYDRATE: 5; 600; 310; 30; 20 INJECTION, SOLUTION INTRAVENOUS at 08:41

## 2023-06-28 RX ADMIN — SODIUM CHLORIDE, POTASSIUM CHLORIDE, SODIUM LACTATE AND CALCIUM CHLORIDE 500 ML: 600; 310; 30; 20 INJECTION, SOLUTION INTRAVENOUS at 11:31

## 2023-06-28 RX ADMIN — LEVETIRACETAM 1000 MG: 100 INJECTION, SOLUTION INTRAVENOUS at 08:30

## 2023-06-28 RX ADMIN — CHLORHEXIDINE GLUCONATE 15 ML: 1.2 RINSE ORAL at 20:07

## 2023-06-28 RX ADMIN — PRAVASTATIN SODIUM 40 MG: 40 TABLET ORAL at 20:15

## 2023-06-28 RX ADMIN — LATANOPROST 1 DROP: 50 SOLUTION OPHTHALMIC at 20:28

## 2023-06-28 RX ADMIN — SERTRALINE HYDROCHLORIDE 25 MG: 50 TABLET ORAL at 08:31

## 2023-06-28 RX ADMIN — GADOTERIDOL 15 ML: 279.3 INJECTION, SOLUTION INTRAVENOUS at 15:48

## 2023-06-28 RX ADMIN — SODIUM CHLORIDE, PRESERVATIVE FREE 10 ML: 5 INJECTION INTRAVENOUS at 08:31

## 2023-06-28 RX ADMIN — DEXMEDETOMIDINE HYDROCHLORIDE 0.2 MCG/KG/HR: 4 INJECTION, SOLUTION INTRAVENOUS at 09:24

## 2023-06-28 RX ADMIN — PROPOFOL 10 MCG/KG/MIN: 10 INJECTION, EMULSION INTRAVENOUS at 14:45

## 2023-06-28 RX ADMIN — SENNOSIDES 8.6 MG: 8.6 TABLET, FILM COATED ORAL at 20:15

## 2023-06-28 RX ADMIN — SODIUM CHLORIDE, PRESERVATIVE FREE 20 MG: 5 INJECTION INTRAVENOUS at 08:29

## 2023-06-28 RX ADMIN — TOPIRAMATE 100 MG: 100 TABLET, FILM COATED ORAL at 08:31

## 2023-06-28 RX ADMIN — CHLORHEXIDINE GLUCONATE 15 ML: 1.2 RINSE ORAL at 08:32

## 2023-06-28 ASSESSMENT — PULMONARY FUNCTION TESTS
PIF_VALUE: 14
PIF_VALUE: 18
PIF_VALUE: 20
PIF_VALUE: 20

## 2023-06-28 ASSESSMENT — PAIN SCALES - GENERAL
PAINLEVEL_OUTOF10: 0

## 2023-06-29 ENCOUNTER — APPOINTMENT (OUTPATIENT)
Facility: HOSPITAL | Age: 78
DRG: 100 | End: 2023-06-29
Payer: COMMERCIAL

## 2023-06-29 PROBLEM — R41.89 UNRESPONSIVE STATE: Status: ACTIVE | Noted: 2023-06-29

## 2023-06-29 PROBLEM — Z86.73 HISTORY OF STROKE: Status: ACTIVE | Noted: 2023-06-29

## 2023-06-29 LAB
ALBUMIN SERPL-MCNC: 2.4 G/DL (ref 3.5–5)
ALBUMIN/GLOB SERPL: 0.5 (ref 1.1–2.2)
ALP SERPL-CCNC: 69 U/L (ref 45–117)
ALT SERPL-CCNC: 22 U/L (ref 12–78)
ANION GAP SERPL CALC-SCNC: 5 MMOL/L (ref 5–15)
AST SERPL-CCNC: 18 U/L (ref 15–37)
BASOPHILS # BLD: 0.1 K/UL (ref 0–0.1)
BASOPHILS NFR BLD: 1 % (ref 0–1)
BILIRUB SERPL-MCNC: 1.2 MG/DL (ref 0.2–1)
BUN SERPL-MCNC: 14 MG/DL (ref 6–20)
BUN/CREAT SERPL: 11 (ref 12–20)
CALCIUM SERPL-MCNC: 8.9 MG/DL (ref 8.5–10.1)
CHLORIDE SERPL-SCNC: 113 MMOL/L (ref 97–108)
CO2 SERPL-SCNC: 22 MMOL/L (ref 21–32)
CREAT SERPL-MCNC: 1.3 MG/DL (ref 0.7–1.3)
DIFFERENTIAL METHOD BLD: ABNORMAL
EOSINOPHIL # BLD: 0.2 K/UL (ref 0–0.4)
EOSINOPHIL NFR BLD: 2 % (ref 0–7)
ERYTHROCYTE [DISTWIDTH] IN BLOOD BY AUTOMATED COUNT: 13.9 % (ref 11.5–14.5)
GLOBULIN SER CALC-MCNC: 4.5 G/DL (ref 2–4)
GLUCOSE BLD STRIP.AUTO-MCNC: 140 MG/DL (ref 65–117)
GLUCOSE BLD STRIP.AUTO-MCNC: 160 MG/DL (ref 65–117)
GLUCOSE BLD STRIP.AUTO-MCNC: 234 MG/DL (ref 65–117)
GLUCOSE BLD STRIP.AUTO-MCNC: 283 MG/DL (ref 65–117)
GLUCOSE SERPL-MCNC: 190 MG/DL (ref 65–100)
HCT VFR BLD AUTO: 41.8 % (ref 36.6–50.3)
HGB BLD-MCNC: 12.4 G/DL (ref 12.1–17)
IMM GRANULOCYTES # BLD AUTO: 0.1 K/UL (ref 0–0.04)
IMM GRANULOCYTES NFR BLD AUTO: 1 % (ref 0–0.5)
LYMPHOCYTES # BLD: 2.2 K/UL (ref 0.8–3.5)
LYMPHOCYTES NFR BLD: 22 % (ref 12–49)
MAGNESIUM SERPL-MCNC: 1.8 MG/DL (ref 1.6–2.4)
MCH RBC QN AUTO: 27 PG (ref 26–34)
MCHC RBC AUTO-ENTMCNC: 29.7 G/DL (ref 30–36.5)
MCV RBC AUTO: 91.1 FL (ref 80–99)
MONOCYTES # BLD: 1.1 K/UL (ref 0–1)
MONOCYTES NFR BLD: 12 % (ref 5–13)
NEUTS SEG # BLD: 6.3 K/UL (ref 1.8–8)
NEUTS SEG NFR BLD: 64 % (ref 32–75)
NRBC # BLD: 0 K/UL (ref 0–0.01)
NRBC BLD-RTO: 0 PER 100 WBC
PHOSPHATE SERPL-MCNC: 2.6 MG/DL (ref 2.6–4.7)
PLATELET # BLD AUTO: 131 K/UL (ref 150–400)
PMV BLD AUTO: 11 FL (ref 8.9–12.9)
POTASSIUM SERPL-SCNC: 4 MMOL/L (ref 3.5–5.1)
PROT SERPL-MCNC: 6.9 G/DL (ref 6.4–8.2)
RBC # BLD AUTO: 4.59 M/UL (ref 4.1–5.7)
SERVICE CMNT-IMP: ABNORMAL
SODIUM SERPL-SCNC: 140 MMOL/L (ref 136–145)
WBC # BLD AUTO: 9.9 K/UL (ref 4.1–11.1)

## 2023-06-29 PROCEDURE — 82962 GLUCOSE BLOOD TEST: CPT

## 2023-06-29 PROCEDURE — 71045 X-RAY EXAM CHEST 1 VIEW: CPT

## 2023-06-29 PROCEDURE — 36415 COLL VENOUS BLD VENIPUNCTURE: CPT

## 2023-06-29 PROCEDURE — 2500000003 HC RX 250 WO HCPCS: Performed by: INTERNAL MEDICINE

## 2023-06-29 PROCEDURE — 80053 COMPREHEN METABOLIC PANEL: CPT

## 2023-06-29 PROCEDURE — 94003 VENT MGMT INPAT SUBQ DAY: CPT

## 2023-06-29 PROCEDURE — 6370000000 HC RX 637 (ALT 250 FOR IP): Performed by: INTERNAL MEDICINE

## 2023-06-29 PROCEDURE — 6360000002 HC RX W HCPCS: Performed by: NURSE PRACTITIONER

## 2023-06-29 PROCEDURE — 6360000002 HC RX W HCPCS: Performed by: INTERNAL MEDICINE

## 2023-06-29 PROCEDURE — 74018 RADEX ABDOMEN 1 VIEW: CPT

## 2023-06-29 PROCEDURE — C9254 INJECTION, LACOSAMIDE: HCPCS | Performed by: INTERNAL MEDICINE

## 2023-06-29 PROCEDURE — 85025 COMPLETE CBC W/AUTO DIFF WBC: CPT

## 2023-06-29 PROCEDURE — 99232 SBSQ HOSP IP/OBS MODERATE 35: CPT | Performed by: PSYCHIATRY & NEUROLOGY

## 2023-06-29 PROCEDURE — 2000000000 HC ICU R&B

## 2023-06-29 PROCEDURE — 83735 ASSAY OF MAGNESIUM: CPT

## 2023-06-29 PROCEDURE — 84100 ASSAY OF PHOSPHORUS: CPT

## 2023-06-29 PROCEDURE — A4216 STERILE WATER/SALINE, 10 ML: HCPCS | Performed by: INTERNAL MEDICINE

## 2023-06-29 PROCEDURE — APPNB45 APP NON BILLABLE 31-45 MINUTES: Performed by: NURSE PRACTITIONER

## 2023-06-29 PROCEDURE — 2580000003 HC RX 258: Performed by: INTERNAL MEDICINE

## 2023-06-29 RX ORDER — HALOPERIDOL 5 MG/ML
2.5 INJECTION INTRAMUSCULAR ONCE
Status: COMPLETED | OUTPATIENT
Start: 2023-06-29 | End: 2023-06-29

## 2023-06-29 RX ORDER — HALOPERIDOL 5 MG/ML
INJECTION INTRAMUSCULAR
Status: DISPENSED
Start: 2023-06-29 | End: 2023-06-30

## 2023-06-29 RX ORDER — HYDRALAZINE HYDROCHLORIDE 20 MG/ML
10 INJECTION INTRAMUSCULAR; INTRAVENOUS EVERY 4 HOURS PRN
Status: DISCONTINUED | OUTPATIENT
Start: 2023-06-29 | End: 2023-07-04 | Stop reason: HOSPADM

## 2023-06-29 RX ORDER — HALOPERIDOL 5 MG/ML
5 INJECTION INTRAMUSCULAR ONCE
Status: COMPLETED | OUTPATIENT
Start: 2023-06-29 | End: 2023-06-29

## 2023-06-29 RX ADMIN — SODIUM CHLORIDE, PRESERVATIVE FREE 20 MG: 5 INJECTION INTRAVENOUS at 08:17

## 2023-06-29 RX ADMIN — HALOPERIDOL LACTATE 5 MG: 5 INJECTION, SOLUTION INTRAMUSCULAR at 22:09

## 2023-06-29 RX ADMIN — LEVETIRACETAM 1000 MG: 100 INJECTION, SOLUTION INTRAVENOUS at 08:32

## 2023-06-29 RX ADMIN — HYDRALAZINE HYDROCHLORIDE 10 MG: 20 INJECTION INTRAMUSCULAR; INTRAVENOUS at 13:04

## 2023-06-29 RX ADMIN — HALOPERIDOL LACTATE 2.5 MG: 5 INJECTION, SOLUTION INTRAMUSCULAR at 20:56

## 2023-06-29 RX ADMIN — Medication 2 UNITS: at 08:31

## 2023-06-29 RX ADMIN — LATANOPROST 1 DROP: 50 SOLUTION OPHTHALMIC at 21:16

## 2023-06-29 RX ADMIN — CLOPIDOGREL BISULFATE 75 MG: 75 TABLET, FILM COATED ORAL at 08:18

## 2023-06-29 RX ADMIN — ENOXAPARIN SODIUM 40 MG: 100 INJECTION SUBCUTANEOUS at 08:32

## 2023-06-29 RX ADMIN — LEVETIRACETAM 1000 MG: 100 INJECTION, SOLUTION INTRAVENOUS at 21:16

## 2023-06-29 RX ADMIN — TOPIRAMATE 100 MG: 100 TABLET, FILM COATED ORAL at 08:32

## 2023-06-29 RX ADMIN — CHLORHEXIDINE GLUCONATE 15 ML: 1.2 RINSE ORAL at 08:20

## 2023-06-29 RX ADMIN — SERTRALINE HYDROCHLORIDE 25 MG: 50 TABLET ORAL at 08:18

## 2023-06-29 RX ADMIN — SODIUM CHLORIDE, PRESERVATIVE FREE 10 ML: 5 INJECTION INTRAVENOUS at 21:16

## 2023-06-29 RX ADMIN — SODIUM CHLORIDE, PRESERVATIVE FREE 10 ML: 5 INJECTION INTRAVENOUS at 08:20

## 2023-06-29 RX ADMIN — SODIUM CHLORIDE, SODIUM LACTATE, POTASSIUM CHLORIDE, CALCIUM CHLORIDE AND DEXTROSE MONOHYDRATE: 5; 600; 310; 30; 20 INJECTION, SOLUTION INTRAVENOUS at 08:50

## 2023-06-29 RX ADMIN — ALLOPURINOL 100 MG: 100 TABLET ORAL at 08:18

## 2023-06-29 RX ADMIN — LACOSAMIDE 200 MG: 10 INJECTION INTRAVENOUS at 08:32

## 2023-06-29 RX ADMIN — INSULIN GLARGINE 13 UNITS: 100 INJECTION, SOLUTION SUBCUTANEOUS at 21:16

## 2023-06-29 RX ADMIN — Medication 2 UNITS: at 14:05

## 2023-06-29 RX ADMIN — LACOSAMIDE 200 MG: 10 INJECTION INTRAVENOUS at 21:16

## 2023-06-29 ASSESSMENT — PULMONARY FUNCTION TESTS
PIF_VALUE: 17
PIF_VALUE: 20
PIF_VALUE: 19
PIF_VALUE: 16

## 2023-06-29 ASSESSMENT — PAIN SCALES - GENERAL
PAINLEVEL_OUTOF10: 0

## 2023-06-29 ASSESSMENT — PAIN SCALES - WONG BAKER: WONGBAKER_NUMERICALRESPONSE: 0

## 2023-06-30 ENCOUNTER — APPOINTMENT (OUTPATIENT)
Facility: HOSPITAL | Age: 78
DRG: 100 | End: 2023-06-30
Payer: COMMERCIAL

## 2023-06-30 LAB
ALBUMIN SERPL-MCNC: 2.1 G/DL (ref 3.5–5)
ALBUMIN/GLOB SERPL: 0.5 (ref 1.1–2.2)
ALP SERPL-CCNC: 73 U/L (ref 45–117)
ALT SERPL-CCNC: 23 U/L (ref 12–78)
ANION GAP SERPL CALC-SCNC: 7 MMOL/L (ref 5–15)
ARTERIAL PATENCY WRIST A: YES
AST SERPL-CCNC: 17 U/L (ref 15–37)
BASE DEFICIT BLDA-SCNC: 2.9 MMOL/L
BASOPHILS # BLD: 0 K/UL (ref 0–0.1)
BASOPHILS NFR BLD: 0 % (ref 0–1)
BDY SITE: ABNORMAL
BILIRUB SERPL-MCNC: 1.2 MG/DL (ref 0.2–1)
BUN SERPL-MCNC: 9 MG/DL (ref 6–20)
BUN/CREAT SERPL: 9 (ref 12–20)
CALCIUM SERPL-MCNC: 8.8 MG/DL (ref 8.5–10.1)
CHLORIDE SERPL-SCNC: 114 MMOL/L (ref 97–108)
CO2 SERPL-SCNC: 21 MMOL/L (ref 21–32)
CREAT SERPL-MCNC: 1.05 MG/DL (ref 0.7–1.3)
DIFFERENTIAL METHOD BLD: ABNORMAL
EOSINOPHIL # BLD: 0.1 K/UL (ref 0–0.4)
EOSINOPHIL NFR BLD: 1 % (ref 0–7)
ERYTHROCYTE [DISTWIDTH] IN BLOOD BY AUTOMATED COUNT: 13.4 % (ref 11.5–14.5)
GLOBULIN SER CALC-MCNC: 4.6 G/DL (ref 2–4)
GLUCOSE BLD STRIP.AUTO-MCNC: 172 MG/DL (ref 65–117)
GLUCOSE BLD STRIP.AUTO-MCNC: 180 MG/DL (ref 65–117)
GLUCOSE BLD STRIP.AUTO-MCNC: 185 MG/DL (ref 65–117)
GLUCOSE BLD STRIP.AUTO-MCNC: 204 MG/DL (ref 65–117)
GLUCOSE SERPL-MCNC: 216 MG/DL (ref 65–100)
HCO3 BLDA-SCNC: 22 MMOL/L (ref 22–26)
HCT VFR BLD AUTO: 39.3 % (ref 36.6–50.3)
HGB BLD-MCNC: 12 G/DL (ref 12.1–17)
IMM GRANULOCYTES # BLD AUTO: 0 K/UL (ref 0–0.04)
IMM GRANULOCYTES NFR BLD AUTO: 0 % (ref 0–0.5)
LACOSAMIDE SERPL-MCNC: 1.2 UG/ML (ref 5–10)
LYMPHOCYTES # BLD: 1.2 K/UL (ref 0.8–3.5)
LYMPHOCYTES NFR BLD: 18 % (ref 12–49)
MAGNESIUM SERPL-MCNC: 1.7 MG/DL (ref 1.6–2.4)
MCH RBC QN AUTO: 26.8 PG (ref 26–34)
MCHC RBC AUTO-ENTMCNC: 30.5 G/DL (ref 30–36.5)
MCV RBC AUTO: 87.7 FL (ref 80–99)
MONOCYTES # BLD: 0.5 K/UL (ref 0–1)
MONOCYTES NFR BLD: 8 % (ref 5–13)
NEUTS SEG # BLD: 4.6 K/UL (ref 1.8–8)
NEUTS SEG NFR BLD: 73 % (ref 32–75)
NRBC # BLD: 0 K/UL (ref 0–0.01)
NRBC BLD-RTO: 0 PER 100 WBC
PCO2 BLDA: 40 MMHG (ref 35–45)
PH BLDA: 7.37 (ref 7.35–7.45)
PHOSPHATE SERPL-MCNC: 2.4 MG/DL (ref 2.6–4.7)
PLATELET # BLD AUTO: 146 K/UL (ref 150–400)
PMV BLD AUTO: 11.5 FL (ref 8.9–12.9)
PO2 BLDA: 206 MMHG (ref 80–100)
POTASSIUM SERPL-SCNC: 3.5 MMOL/L (ref 3.5–5.1)
PROT SERPL-MCNC: 6.7 G/DL (ref 6.4–8.2)
RBC # BLD AUTO: 4.48 M/UL (ref 4.1–5.7)
SAO2 % BLD: 99 % (ref 92–97)
SAO2% DEVICE SAO2% SENSOR NAME: ABNORMAL
SERVICE CMNT-IMP: ABNORMAL
SODIUM SERPL-SCNC: 142 MMOL/L (ref 136–145)
SPECIMEN SITE: ABNORMAL
WBC # BLD AUTO: 6.5 K/UL (ref 4.1–11.1)

## 2023-06-30 PROCEDURE — 82962 GLUCOSE BLOOD TEST: CPT

## 2023-06-30 PROCEDURE — 6370000000 HC RX 637 (ALT 250 FOR IP): Performed by: INTERNAL MEDICINE

## 2023-06-30 PROCEDURE — 6360000002 HC RX W HCPCS: Performed by: INTERNAL MEDICINE

## 2023-06-30 PROCEDURE — 94640 AIRWAY INHALATION TREATMENT: CPT

## 2023-06-30 PROCEDURE — 2580000003 HC RX 258: Performed by: INTERNAL MEDICINE

## 2023-06-30 PROCEDURE — 2700000000 HC OXYGEN THERAPY PER DAY

## 2023-06-30 PROCEDURE — 2500000003 HC RX 250 WO HCPCS: Performed by: INTERNAL MEDICINE

## 2023-06-30 PROCEDURE — 94668 MNPJ CHEST WALL SBSQ: CPT

## 2023-06-30 PROCEDURE — 82803 BLOOD GASES ANY COMBINATION: CPT

## 2023-06-30 PROCEDURE — 2000000000 HC ICU R&B

## 2023-06-30 PROCEDURE — 36415 COLL VENOUS BLD VENIPUNCTURE: CPT

## 2023-06-30 PROCEDURE — C9254 INJECTION, LACOSAMIDE: HCPCS | Performed by: INTERNAL MEDICINE

## 2023-06-30 PROCEDURE — 85025 COMPLETE CBC W/AUTO DIFF WBC: CPT

## 2023-06-30 PROCEDURE — 83735 ASSAY OF MAGNESIUM: CPT

## 2023-06-30 PROCEDURE — 84100 ASSAY OF PHOSPHORUS: CPT

## 2023-06-30 PROCEDURE — A4216 STERILE WATER/SALINE, 10 ML: HCPCS | Performed by: INTERNAL MEDICINE

## 2023-06-30 PROCEDURE — 36600 WITHDRAWAL OF ARTERIAL BLOOD: CPT

## 2023-06-30 PROCEDURE — 94667 MNPJ CHEST WALL 1ST: CPT

## 2023-06-30 PROCEDURE — 71045 X-RAY EXAM CHEST 1 VIEW: CPT

## 2023-06-30 PROCEDURE — 80053 COMPREHEN METABOLIC PANEL: CPT

## 2023-06-30 RX ORDER — MAGNESIUM SULFATE IN WATER 40 MG/ML
2000 INJECTION, SOLUTION INTRAVENOUS ONCE
Status: COMPLETED | OUTPATIENT
Start: 2023-06-30 | End: 2023-06-30

## 2023-06-30 RX ORDER — SODIUM CHLORIDE FOR INHALATION 3 %
4 VIAL, NEBULIZER (ML) INHALATION PRN
Status: DISCONTINUED | OUTPATIENT
Start: 2023-06-30 | End: 2023-06-30

## 2023-06-30 RX ORDER — SODIUM CHLORIDE FOR INHALATION 7 %
4 VIAL, NEBULIZER (ML) INHALATION 3 TIMES DAILY
Status: DISCONTINUED | OUTPATIENT
Start: 2023-06-30 | End: 2023-07-04 | Stop reason: HOSPADM

## 2023-06-30 RX ADMIN — SODIUM CHLORIDE 4 ML: 7 NEBU SOLN,3 % NEBU at 16:20

## 2023-06-30 RX ADMIN — SODIUM CHLORIDE 4 ML: 7 NEBU SOLN,3 % NEBU at 19:53

## 2023-06-30 RX ADMIN — SODIUM CHLORIDE, PRESERVATIVE FREE 20 MG: 5 INJECTION INTRAVENOUS at 09:17

## 2023-06-30 RX ADMIN — ALLOPURINOL 100 MG: 100 TABLET ORAL at 09:17

## 2023-06-30 RX ADMIN — Medication 4 ML: at 09:29

## 2023-06-30 RX ADMIN — HYDRALAZINE HYDROCHLORIDE 10 MG: 20 INJECTION INTRAMUSCULAR; INTRAVENOUS at 01:17

## 2023-06-30 RX ADMIN — LATANOPROST 1 DROP: 50 SOLUTION OPHTHALMIC at 20:20

## 2023-06-30 RX ADMIN — PRAVASTATIN SODIUM 40 MG: 40 TABLET ORAL at 20:20

## 2023-06-30 RX ADMIN — MAGNESIUM SULFATE HEPTAHYDRATE 2000 MG: 40 INJECTION, SOLUTION INTRAVENOUS at 11:34

## 2023-06-30 RX ADMIN — ENOXAPARIN SODIUM 40 MG: 100 INJECTION SUBCUTANEOUS at 09:17

## 2023-06-30 RX ADMIN — TOPIRAMATE 100 MG: 100 TABLET, FILM COATED ORAL at 20:30

## 2023-06-30 RX ADMIN — LEVETIRACETAM 1000 MG: 100 INJECTION, SOLUTION INTRAVENOUS at 09:16

## 2023-06-30 RX ADMIN — SODIUM CHLORIDE, PRESERVATIVE FREE 10 ML: 5 INJECTION INTRAVENOUS at 09:19

## 2023-06-30 RX ADMIN — LEVETIRACETAM 1000 MG: 100 INJECTION, SOLUTION INTRAVENOUS at 20:30

## 2023-06-30 RX ADMIN — LACOSAMIDE 200 MG: 10 INJECTION INTRAVENOUS at 09:17

## 2023-06-30 RX ADMIN — TOPIRAMATE 100 MG: 100 TABLET, FILM COATED ORAL at 09:17

## 2023-06-30 RX ADMIN — SENNOSIDES 8.6 MG: 8.6 TABLET, FILM COATED ORAL at 00:10

## 2023-06-30 RX ADMIN — SODIUM CHLORIDE, PRESERVATIVE FREE 10 ML: 5 INJECTION INTRAVENOUS at 20:20

## 2023-06-30 RX ADMIN — Medication 2 UNITS: at 15:20

## 2023-06-30 RX ADMIN — SENNOSIDES 8.6 MG: 8.6 TABLET, FILM COATED ORAL at 20:20

## 2023-06-30 RX ADMIN — INSULIN GLARGINE 13 UNITS: 100 INJECTION, SOLUTION SUBCUTANEOUS at 20:30

## 2023-06-30 RX ADMIN — CLOPIDOGREL BISULFATE 75 MG: 75 TABLET, FILM COATED ORAL at 09:17

## 2023-06-30 RX ADMIN — POTASSIUM PHOSPHATE, MONOBASIC POTASSIUM PHOSPHATE, DIBASIC 20 MMOL: 224; 236 INJECTION, SOLUTION, CONCENTRATE INTRAVENOUS at 11:56

## 2023-06-30 RX ADMIN — SERTRALINE HYDROCHLORIDE 25 MG: 50 TABLET ORAL at 09:17

## 2023-06-30 RX ADMIN — PRAVASTATIN SODIUM 40 MG: 40 TABLET ORAL at 00:10

## 2023-06-30 RX ADMIN — HYDRALAZINE HYDROCHLORIDE 10 MG: 20 INJECTION INTRAMUSCULAR; INTRAVENOUS at 13:08

## 2023-06-30 RX ADMIN — TOPIRAMATE 100 MG: 100 TABLET, FILM COATED ORAL at 00:10

## 2023-06-30 RX ADMIN — LACOSAMIDE 200 MG: 10 INJECTION INTRAVENOUS at 20:30

## 2023-06-30 ASSESSMENT — PAIN SCALES - WONG BAKER
WONGBAKER_NUMERICALRESPONSE: 0

## 2023-06-30 ASSESSMENT — PAIN SCALES - GENERAL
PAINLEVEL_OUTOF10: 0

## 2023-07-01 ENCOUNTER — APPOINTMENT (OUTPATIENT)
Facility: HOSPITAL | Age: 78
DRG: 100 | End: 2023-07-01
Payer: COMMERCIAL

## 2023-07-01 LAB
ANION GAP SERPL CALC-SCNC: 5 MMOL/L (ref 5–15)
BASOPHILS # BLD: 0 K/UL (ref 0–0.1)
BASOPHILS NFR BLD: 0 % (ref 0–1)
BUN SERPL-MCNC: 12 MG/DL (ref 6–20)
BUN/CREAT SERPL: 11 (ref 12–20)
CALCIUM SERPL-MCNC: 8.3 MG/DL (ref 8.5–10.1)
CHLORIDE SERPL-SCNC: 112 MMOL/L (ref 97–108)
CO2 SERPL-SCNC: 21 MMOL/L (ref 21–32)
CREAT SERPL-MCNC: 1.14 MG/DL (ref 0.7–1.3)
DIFFERENTIAL METHOD BLD: ABNORMAL
EOSINOPHIL # BLD: 0.2 K/UL (ref 0–0.4)
EOSINOPHIL NFR BLD: 2 % (ref 0–7)
ERYTHROCYTE [DISTWIDTH] IN BLOOD BY AUTOMATED COUNT: 13.4 % (ref 11.5–14.5)
GLUCOSE BLD STRIP.AUTO-MCNC: 212 MG/DL (ref 65–117)
GLUCOSE BLD STRIP.AUTO-MCNC: 230 MG/DL (ref 65–117)
GLUCOSE BLD STRIP.AUTO-MCNC: 282 MG/DL (ref 65–117)
GLUCOSE BLD STRIP.AUTO-MCNC: 282 MG/DL (ref 65–117)
GLUCOSE SERPL-MCNC: 304 MG/DL (ref 65–100)
HCT VFR BLD AUTO: 37.7 % (ref 36.6–50.3)
HGB BLD-MCNC: 11.7 G/DL (ref 12.1–17)
IMM GRANULOCYTES # BLD AUTO: 0 K/UL
IMM GRANULOCYTES NFR BLD AUTO: 0 %
LYMPHOCYTES # BLD: 1.5 K/UL (ref 0.8–3.5)
LYMPHOCYTES NFR BLD: 18 % (ref 12–49)
MAGNESIUM SERPL-MCNC: 2.1 MG/DL (ref 1.6–2.4)
MCH RBC QN AUTO: 27.1 PG (ref 26–34)
MCHC RBC AUTO-ENTMCNC: 31 G/DL (ref 30–36.5)
MCV RBC AUTO: 87.3 FL (ref 80–99)
MONOCYTES # BLD: 0.7 K/UL (ref 0–1)
MONOCYTES NFR BLD: 8 % (ref 5–13)
NEUTS SEG # BLD: 5.8 K/UL (ref 1.8–8)
NEUTS SEG NFR BLD: 72 % (ref 32–75)
NRBC # BLD: 0 K/UL (ref 0–0.01)
NRBC BLD-RTO: 0 PER 100 WBC
PHOSPHATE SERPL-MCNC: 2.3 MG/DL (ref 2.6–4.7)
PLATELET # BLD AUTO: 155 K/UL (ref 150–400)
PMV BLD AUTO: 11.3 FL (ref 8.9–12.9)
POTASSIUM SERPL-SCNC: 3.9 MMOL/L (ref 3.5–5.1)
RBC # BLD AUTO: 4.32 M/UL (ref 4.1–5.7)
RBC MORPH BLD: ABNORMAL
SERVICE CMNT-IMP: ABNORMAL
SODIUM SERPL-SCNC: 138 MMOL/L (ref 136–145)
WBC # BLD AUTO: 8.2 K/UL (ref 4.1–11.1)

## 2023-07-01 PROCEDURE — C9254 INJECTION, LACOSAMIDE: HCPCS | Performed by: INTERNAL MEDICINE

## 2023-07-01 PROCEDURE — 1100000000 HC RM PRIVATE

## 2023-07-01 PROCEDURE — 82962 GLUCOSE BLOOD TEST: CPT

## 2023-07-01 PROCEDURE — 2580000003 HC RX 258: Performed by: INTERNAL MEDICINE

## 2023-07-01 PROCEDURE — 83735 ASSAY OF MAGNESIUM: CPT

## 2023-07-01 PROCEDURE — 71045 X-RAY EXAM CHEST 1 VIEW: CPT

## 2023-07-01 PROCEDURE — 6360000002 HC RX W HCPCS: Performed by: INTERNAL MEDICINE

## 2023-07-01 PROCEDURE — 36415 COLL VENOUS BLD VENIPUNCTURE: CPT

## 2023-07-01 PROCEDURE — A4216 STERILE WATER/SALINE, 10 ML: HCPCS | Performed by: INTERNAL MEDICINE

## 2023-07-01 PROCEDURE — 2700000000 HC OXYGEN THERAPY PER DAY

## 2023-07-01 PROCEDURE — 2500000003 HC RX 250 WO HCPCS: Performed by: INTERNAL MEDICINE

## 2023-07-01 PROCEDURE — 6370000000 HC RX 637 (ALT 250 FOR IP): Performed by: INTERNAL MEDICINE

## 2023-07-01 PROCEDURE — 85025 COMPLETE CBC W/AUTO DIFF WBC: CPT

## 2023-07-01 PROCEDURE — 84100 ASSAY OF PHOSPHORUS: CPT

## 2023-07-01 PROCEDURE — 80048 BASIC METABOLIC PNL TOTAL CA: CPT

## 2023-07-01 RX ORDER — FUROSEMIDE 10 MG/ML
40 INJECTION INTRAMUSCULAR; INTRAVENOUS ONCE
Status: COMPLETED | OUTPATIENT
Start: 2023-07-01 | End: 2023-07-01

## 2023-07-01 RX ADMIN — LATANOPROST 1 DROP: 50 SOLUTION OPHTHALMIC at 21:06

## 2023-07-01 RX ADMIN — LEVETIRACETAM 1000 MG: 100 INJECTION, SOLUTION INTRAVENOUS at 08:23

## 2023-07-01 RX ADMIN — SERTRALINE HYDROCHLORIDE 25 MG: 50 TABLET ORAL at 08:24

## 2023-07-01 RX ADMIN — SODIUM CHLORIDE 4 ML: 7 NEBU SOLN,3 % NEBU at 07:35

## 2023-07-01 RX ADMIN — LEVETIRACETAM 1000 MG: 100 INJECTION, SOLUTION INTRAVENOUS at 20:59

## 2023-07-01 RX ADMIN — SODIUM CHLORIDE, PRESERVATIVE FREE 20 MG: 5 INJECTION INTRAVENOUS at 08:23

## 2023-07-01 RX ADMIN — ENOXAPARIN SODIUM 40 MG: 100 INJECTION SUBCUTANEOUS at 08:23

## 2023-07-01 RX ADMIN — CLOPIDOGREL BISULFATE 75 MG: 75 TABLET, FILM COATED ORAL at 08:23

## 2023-07-01 RX ADMIN — POTASSIUM PHOSPHATE, MONOBASIC POTASSIUM PHOSPHATE, DIBASIC 15 MMOL: 224; 236 INJECTION, SOLUTION, CONCENTRATE INTRAVENOUS at 10:47

## 2023-07-01 RX ADMIN — FUROSEMIDE 40 MG: 10 INJECTION, SOLUTION INTRAMUSCULAR; INTRAVENOUS at 12:48

## 2023-07-01 RX ADMIN — SODIUM CHLORIDE 4 ML: 7 NEBU SOLN,3 % NEBU at 13:57

## 2023-07-01 RX ADMIN — SENNOSIDES 8.6 MG: 8.6 TABLET, FILM COATED ORAL at 21:04

## 2023-07-01 RX ADMIN — Medication 4 UNITS: at 03:17

## 2023-07-01 RX ADMIN — INSULIN GLARGINE 13 UNITS: 100 INJECTION, SOLUTION SUBCUTANEOUS at 21:26

## 2023-07-01 RX ADMIN — Medication 4 UNITS: at 21:26

## 2023-07-01 RX ADMIN — HYDRALAZINE HYDROCHLORIDE 10 MG: 20 INJECTION INTRAMUSCULAR; INTRAVENOUS at 03:15

## 2023-07-01 RX ADMIN — SODIUM CHLORIDE, PRESERVATIVE FREE 10 ML: 5 INJECTION INTRAVENOUS at 08:24

## 2023-07-01 RX ADMIN — LACOSAMIDE 200 MG: 10 INJECTION INTRAVENOUS at 08:23

## 2023-07-01 RX ADMIN — ALLOPURINOL 100 MG: 100 TABLET ORAL at 08:24

## 2023-07-01 RX ADMIN — TOPIRAMATE 100 MG: 100 TABLET, FILM COATED ORAL at 21:04

## 2023-07-01 RX ADMIN — HYDRALAZINE HYDROCHLORIDE 10 MG: 20 INJECTION INTRAMUSCULAR; INTRAVENOUS at 21:49

## 2023-07-01 RX ADMIN — Medication 2 UNITS: at 15:25

## 2023-07-01 RX ADMIN — Medication 2 UNITS: at 08:22

## 2023-07-01 RX ADMIN — LACOSAMIDE 200 MG: 10 INJECTION INTRAVENOUS at 21:29

## 2023-07-01 RX ADMIN — PRAVASTATIN SODIUM 40 MG: 40 TABLET ORAL at 21:05

## 2023-07-01 ASSESSMENT — PAIN SCALES - GENERAL
PAINLEVEL_OUTOF10: 0

## 2023-07-01 ASSESSMENT — PAIN SCALES - WONG BAKER
WONGBAKER_NUMERICALRESPONSE: 0
WONGBAKER_NUMERICALRESPONSE: 0

## 2023-07-02 LAB
ANION GAP SERPL CALC-SCNC: 5 MMOL/L (ref 5–15)
BASOPHILS # BLD: 0.2 K/UL (ref 0–0.1)
BASOPHILS NFR BLD: 2 % (ref 0–1)
BUN SERPL-MCNC: 15 MG/DL (ref 6–20)
BUN/CREAT SERPL: 13 (ref 12–20)
CALCIUM SERPL-MCNC: 7.9 MG/DL (ref 8.5–10.1)
CHLORIDE SERPL-SCNC: 111 MMOL/L (ref 97–108)
CO2 SERPL-SCNC: 22 MMOL/L (ref 21–32)
COMMENT:: NORMAL
CREAT SERPL-MCNC: 1.17 MG/DL (ref 0.7–1.3)
DIFFERENTIAL METHOD BLD: ABNORMAL
EOSINOPHIL # BLD: 0.2 K/UL (ref 0–0.4)
EOSINOPHIL NFR BLD: 2 % (ref 0–7)
ERYTHROCYTE [DISTWIDTH] IN BLOOD BY AUTOMATED COUNT: 13.5 % (ref 11.5–14.5)
EST. AVERAGE GLUCOSE BLD GHB EST-MCNC: 148 MG/DL
GLUCOSE BLD STRIP.AUTO-MCNC: 177 MG/DL (ref 65–117)
GLUCOSE BLD STRIP.AUTO-MCNC: 177 MG/DL (ref 65–117)
GLUCOSE BLD STRIP.AUTO-MCNC: 252 MG/DL (ref 65–117)
GLUCOSE BLD STRIP.AUTO-MCNC: 308 MG/DL (ref 65–117)
GLUCOSE BLD STRIP.AUTO-MCNC: 364 MG/DL (ref 65–117)
GLUCOSE SERPL-MCNC: 323 MG/DL (ref 65–100)
HBA1C MFR BLD: 6.8 % (ref 4–5.6)
HCT VFR BLD AUTO: 41.7 % (ref 36.6–50.3)
HGB BLD-MCNC: 13 G/DL (ref 12.1–17)
IMM GRANULOCYTES # BLD AUTO: 0 K/UL
IMM GRANULOCYTES NFR BLD AUTO: 0 %
LYMPHOCYTES # BLD: 0.8 K/UL (ref 0.8–3.5)
LYMPHOCYTES NFR BLD: 10 % (ref 12–49)
MAGNESIUM SERPL-MCNC: 1.8 MG/DL (ref 1.6–2.4)
MCH RBC QN AUTO: 27 PG (ref 26–34)
MCHC RBC AUTO-ENTMCNC: 31.2 G/DL (ref 30–36.5)
MCV RBC AUTO: 86.7 FL (ref 80–99)
MONOCYTES # BLD: 0.5 K/UL (ref 0–1)
MONOCYTES NFR BLD: 6 % (ref 5–13)
NEUTS BAND NFR BLD MANUAL: 5 % (ref 0–6)
NEUTS SEG # BLD: 6.3 K/UL (ref 1.8–8)
NEUTS SEG NFR BLD: 75 % (ref 32–75)
NRBC # BLD: 0 K/UL (ref 0–0.01)
NRBC BLD-RTO: 0 PER 100 WBC
PHOSPHATE SERPL-MCNC: 3.6 MG/DL (ref 2.6–4.7)
PLATELET # BLD AUTO: 173 K/UL (ref 150–400)
PLATELET COMMENT: ABNORMAL
PMV BLD AUTO: 12.5 FL (ref 8.9–12.9)
POTASSIUM SERPL-SCNC: 4.6 MMOL/L (ref 3.5–5.1)
RBC # BLD AUTO: 4.81 M/UL (ref 4.1–5.7)
RBC MORPH BLD: ABNORMAL
SERVICE CMNT-IMP: ABNORMAL
SODIUM SERPL-SCNC: 138 MMOL/L (ref 136–145)
SPECIMEN HOLD: NORMAL
WBC # BLD AUTO: 8 K/UL (ref 4.1–11.1)

## 2023-07-02 PROCEDURE — 92610 EVALUATE SWALLOWING FUNCTION: CPT

## 2023-07-02 PROCEDURE — 84100 ASSAY OF PHOSPHORUS: CPT

## 2023-07-02 PROCEDURE — C9254 INJECTION, LACOSAMIDE: HCPCS | Performed by: INTERNAL MEDICINE

## 2023-07-02 PROCEDURE — 94640 AIRWAY INHALATION TREATMENT: CPT

## 2023-07-02 PROCEDURE — 85025 COMPLETE CBC W/AUTO DIFF WBC: CPT

## 2023-07-02 PROCEDURE — 2500000003 HC RX 250 WO HCPCS: Performed by: INTERNAL MEDICINE

## 2023-07-02 PROCEDURE — 6370000000 HC RX 637 (ALT 250 FOR IP): Performed by: INTERNAL MEDICINE

## 2023-07-02 PROCEDURE — 36415 COLL VENOUS BLD VENIPUNCTURE: CPT

## 2023-07-02 PROCEDURE — 83036 HEMOGLOBIN GLYCOSYLATED A1C: CPT

## 2023-07-02 PROCEDURE — 6360000002 HC RX W HCPCS: Performed by: INTERNAL MEDICINE

## 2023-07-02 PROCEDURE — 2580000003 HC RX 258: Performed by: INTERNAL MEDICINE

## 2023-07-02 PROCEDURE — 83735 ASSAY OF MAGNESIUM: CPT

## 2023-07-02 PROCEDURE — 80048 BASIC METABOLIC PNL TOTAL CA: CPT

## 2023-07-02 PROCEDURE — 1100000000 HC RM PRIVATE

## 2023-07-02 PROCEDURE — A4216 STERILE WATER/SALINE, 10 ML: HCPCS | Performed by: INTERNAL MEDICINE

## 2023-07-02 PROCEDURE — 94668 MNPJ CHEST WALL SBSQ: CPT

## 2023-07-02 PROCEDURE — 82962 GLUCOSE BLOOD TEST: CPT

## 2023-07-02 PROCEDURE — 2700000000 HC OXYGEN THERAPY PER DAY

## 2023-07-02 RX ORDER — INSULIN GLARGINE 100 [IU]/ML
26 INJECTION, SOLUTION SUBCUTANEOUS NIGHTLY
Status: DISCONTINUED | OUTPATIENT
Start: 2023-07-02 | End: 2023-07-04 | Stop reason: HOSPADM

## 2023-07-02 RX ORDER — DEXTROSE MONOHYDRATE 100 MG/ML
INJECTION, SOLUTION INTRAVENOUS CONTINUOUS PRN
Status: DISCONTINUED | OUTPATIENT
Start: 2023-07-02 | End: 2023-07-04 | Stop reason: HOSPADM

## 2023-07-02 RX ADMIN — LACOSAMIDE 200 MG: 10 INJECTION INTRAVENOUS at 23:07

## 2023-07-02 RX ADMIN — LATANOPROST 1 DROP: 50 SOLUTION OPHTHALMIC at 21:12

## 2023-07-02 RX ADMIN — SODIUM CHLORIDE, PRESERVATIVE FREE 10 ML: 5 INJECTION INTRAVENOUS at 21:14

## 2023-07-02 RX ADMIN — Medication 6 UNITS: at 02:23

## 2023-07-02 RX ADMIN — Medication 4 UNITS: at 17:48

## 2023-07-02 RX ADMIN — SENNOSIDES 8.6 MG: 8.6 TABLET, FILM COATED ORAL at 21:13

## 2023-07-02 RX ADMIN — SODIUM CHLORIDE, PRESERVATIVE FREE 10 ML: 5 INJECTION INTRAVENOUS at 00:33

## 2023-07-02 RX ADMIN — LACOSAMIDE 200 MG: 10 INJECTION INTRAVENOUS at 10:40

## 2023-07-02 RX ADMIN — SODIUM CHLORIDE 4 ML: 7 NEBU SOLN,3 % NEBU at 22:44

## 2023-07-02 RX ADMIN — SERTRALINE HYDROCHLORIDE 25 MG: 50 TABLET ORAL at 10:37

## 2023-07-02 RX ADMIN — Medication 10 UNITS: at 11:13

## 2023-07-02 RX ADMIN — PRAVASTATIN SODIUM 40 MG: 40 TABLET ORAL at 21:12

## 2023-07-02 RX ADMIN — ALLOPURINOL 100 MG: 100 TABLET ORAL at 10:36

## 2023-07-02 RX ADMIN — TOPIRAMATE 100 MG: 100 TABLET, FILM COATED ORAL at 10:37

## 2023-07-02 RX ADMIN — LEVETIRACETAM 1000 MG: 100 INJECTION, SOLUTION INTRAVENOUS at 11:04

## 2023-07-02 RX ADMIN — LEVETIRACETAM 1000 MG: 100 INJECTION, SOLUTION INTRAVENOUS at 21:12

## 2023-07-02 RX ADMIN — INSULIN GLARGINE 26 UNITS: 100 INJECTION, SOLUTION SUBCUTANEOUS at 21:05

## 2023-07-02 RX ADMIN — SODIUM CHLORIDE, PRESERVATIVE FREE 20 MG: 5 INJECTION INTRAVENOUS at 10:39

## 2023-07-02 RX ADMIN — Medication 8 UNITS: at 11:08

## 2023-07-02 RX ADMIN — SODIUM CHLORIDE, PRESERVATIVE FREE 10 ML: 5 INJECTION INTRAVENOUS at 11:06

## 2023-07-02 RX ADMIN — CLOPIDOGREL BISULFATE 75 MG: 75 TABLET, FILM COATED ORAL at 10:37

## 2023-07-02 RX ADMIN — TOPIRAMATE 100 MG: 100 TABLET, FILM COATED ORAL at 21:12

## 2023-07-02 RX ADMIN — ENOXAPARIN SODIUM 40 MG: 100 INJECTION SUBCUTANEOUS at 10:39

## 2023-07-02 ASSESSMENT — PAIN SCALES - GENERAL
PAINLEVEL_OUTOF10: 0

## 2023-07-02 ASSESSMENT — PAIN SCALES - WONG BAKER: WONGBAKER_NUMERICALRESPONSE: 0

## 2023-07-03 LAB
ANION GAP SERPL CALC-SCNC: 5 MMOL/L (ref 5–15)
BASOPHILS # BLD: 0 K/UL (ref 0–0.1)
BASOPHILS NFR BLD: 1 % (ref 0–1)
BUN SERPL-MCNC: 18 MG/DL (ref 6–20)
BUN/CREAT SERPL: 15 (ref 12–20)
CALCIUM SERPL-MCNC: 8.8 MG/DL (ref 8.5–10.1)
CHLORIDE SERPL-SCNC: 107 MMOL/L (ref 97–108)
CO2 SERPL-SCNC: 26 MMOL/L (ref 21–32)
CREAT SERPL-MCNC: 1.2 MG/DL (ref 0.7–1.3)
DIFFERENTIAL METHOD BLD: ABNORMAL
EOSINOPHIL # BLD: 0.2 K/UL (ref 0–0.4)
EOSINOPHIL NFR BLD: 2 % (ref 0–7)
ERYTHROCYTE [DISTWIDTH] IN BLOOD BY AUTOMATED COUNT: 13.5 % (ref 11.5–14.5)
GLUCOSE BLD STRIP.AUTO-MCNC: 153 MG/DL (ref 65–117)
GLUCOSE BLD STRIP.AUTO-MCNC: 221 MG/DL (ref 65–117)
GLUCOSE BLD STRIP.AUTO-MCNC: 235 MG/DL (ref 65–117)
GLUCOSE BLD STRIP.AUTO-MCNC: 299 MG/DL (ref 65–117)
GLUCOSE SERPL-MCNC: 235 MG/DL (ref 65–100)
HCT VFR BLD AUTO: 40.7 % (ref 36.6–50.3)
HGB BLD-MCNC: 12.4 G/DL (ref 12.1–17)
IMM GRANULOCYTES # BLD AUTO: 0 K/UL (ref 0–0.04)
IMM GRANULOCYTES NFR BLD AUTO: 1 % (ref 0–0.5)
LYMPHOCYTES # BLD: 1.7 K/UL (ref 0.8–3.5)
LYMPHOCYTES NFR BLD: 19 % (ref 12–49)
MAGNESIUM SERPL-MCNC: 1.9 MG/DL (ref 1.6–2.4)
MCH RBC QN AUTO: 26.7 PG (ref 26–34)
MCHC RBC AUTO-ENTMCNC: 30.5 G/DL (ref 30–36.5)
MCV RBC AUTO: 87.5 FL (ref 80–99)
MONOCYTES # BLD: 0.8 K/UL (ref 0–1)
MONOCYTES NFR BLD: 9 % (ref 5–13)
NEUTS SEG # BLD: 6 K/UL (ref 1.8–8)
NEUTS SEG NFR BLD: 68 % (ref 32–75)
NRBC # BLD: 0 K/UL (ref 0–0.01)
NRBC BLD-RTO: 0 PER 100 WBC
PHOSPHATE SERPL-MCNC: 2.4 MG/DL (ref 2.6–4.7)
PLATELET # BLD AUTO: 176 K/UL (ref 150–400)
PMV BLD AUTO: 10.7 FL (ref 8.9–12.9)
POTASSIUM SERPL-SCNC: 4.2 MMOL/L (ref 3.5–5.1)
RBC # BLD AUTO: 4.65 M/UL (ref 4.1–5.7)
SERVICE CMNT-IMP: ABNORMAL
SODIUM SERPL-SCNC: 138 MMOL/L (ref 136–145)
WBC # BLD AUTO: 8.7 K/UL (ref 4.1–11.1)

## 2023-07-03 PROCEDURE — 6370000000 HC RX 637 (ALT 250 FOR IP): Performed by: INTERNAL MEDICINE

## 2023-07-03 PROCEDURE — 2580000003 HC RX 258: Performed by: INTERNAL MEDICINE

## 2023-07-03 PROCEDURE — 84100 ASSAY OF PHOSPHORUS: CPT

## 2023-07-03 PROCEDURE — C9254 INJECTION, LACOSAMIDE: HCPCS | Performed by: INTERNAL MEDICINE

## 2023-07-03 PROCEDURE — 85025 COMPLETE CBC W/AUTO DIFF WBC: CPT

## 2023-07-03 PROCEDURE — 92526 ORAL FUNCTION THERAPY: CPT

## 2023-07-03 PROCEDURE — 36415 COLL VENOUS BLD VENIPUNCTURE: CPT

## 2023-07-03 PROCEDURE — 94640 AIRWAY INHALATION TREATMENT: CPT

## 2023-07-03 PROCEDURE — 1100000000 HC RM PRIVATE

## 2023-07-03 PROCEDURE — 83735 ASSAY OF MAGNESIUM: CPT

## 2023-07-03 PROCEDURE — 6360000002 HC RX W HCPCS: Performed by: INTERNAL MEDICINE

## 2023-07-03 PROCEDURE — 2700000000 HC OXYGEN THERAPY PER DAY

## 2023-07-03 PROCEDURE — 80048 BASIC METABOLIC PNL TOTAL CA: CPT

## 2023-07-03 PROCEDURE — 82962 GLUCOSE BLOOD TEST: CPT

## 2023-07-03 PROCEDURE — 94668 MNPJ CHEST WALL SBSQ: CPT

## 2023-07-03 RX ADMIN — SENNOSIDES 8.6 MG: 8.6 TABLET, FILM COATED ORAL at 20:40

## 2023-07-03 RX ADMIN — LATANOPROST 1 DROP: 50 SOLUTION OPHTHALMIC at 20:41

## 2023-07-03 RX ADMIN — ALLOPURINOL 100 MG: 100 TABLET ORAL at 09:23

## 2023-07-03 RX ADMIN — PRAVASTATIN SODIUM 40 MG: 40 TABLET ORAL at 20:40

## 2023-07-03 RX ADMIN — TOPIRAMATE 100 MG: 100 TABLET, FILM COATED ORAL at 09:23

## 2023-07-03 RX ADMIN — SERTRALINE HYDROCHLORIDE 25 MG: 50 TABLET ORAL at 09:23

## 2023-07-03 RX ADMIN — TOPIRAMATE 100 MG: 100 TABLET, FILM COATED ORAL at 20:40

## 2023-07-03 RX ADMIN — LACOSAMIDE 200 MG: 10 INJECTION INTRAVENOUS at 20:40

## 2023-07-03 RX ADMIN — Medication 2 UNITS: at 05:45

## 2023-07-03 RX ADMIN — SODIUM CHLORIDE, PRESERVATIVE FREE 10 ML: 5 INJECTION INTRAVENOUS at 20:41

## 2023-07-03 RX ADMIN — SODIUM CHLORIDE 4 ML: 7 NEBU SOLN,3 % NEBU at 14:47

## 2023-07-03 RX ADMIN — HYDRALAZINE HYDROCHLORIDE 10 MG: 20 INJECTION INTRAMUSCULAR; INTRAVENOUS at 00:48

## 2023-07-03 RX ADMIN — LEVETIRACETAM 1000 MG: 100 INJECTION, SOLUTION INTRAVENOUS at 20:40

## 2023-07-03 RX ADMIN — ENOXAPARIN SODIUM 40 MG: 100 INJECTION SUBCUTANEOUS at 09:23

## 2023-07-03 RX ADMIN — Medication 4 UNITS: at 19:28

## 2023-07-03 RX ADMIN — POTASSIUM & SODIUM PHOSPHATES POWDER PACK 280-160-250 MG 500 MG: 280-160-250 PACK at 09:28

## 2023-07-03 RX ADMIN — SODIUM CHLORIDE 4 ML: 7 NEBU SOLN,3 % NEBU at 09:13

## 2023-07-03 RX ADMIN — CLOPIDOGREL BISULFATE 75 MG: 75 TABLET, FILM COATED ORAL at 09:23

## 2023-07-03 RX ADMIN — INSULIN GLARGINE 26 UNITS: 100 INJECTION, SOLUTION SUBCUTANEOUS at 20:39

## 2023-07-03 RX ADMIN — SODIUM CHLORIDE, PRESERVATIVE FREE 10 ML: 5 INJECTION INTRAVENOUS at 09:24

## 2023-07-03 RX ADMIN — Medication 2 UNITS: at 11:29

## 2023-07-03 RX ADMIN — LEVETIRACETAM 1000 MG: 100 INJECTION, SOLUTION INTRAVENOUS at 09:23

## 2023-07-03 RX ADMIN — LACOSAMIDE 200 MG: 10 INJECTION INTRAVENOUS at 09:28

## 2023-07-03 ASSESSMENT — PAIN SCALES - GENERAL
PAINLEVEL_OUTOF10: 0
PAINLEVEL_OUTOF10: 0

## 2023-07-03 ASSESSMENT — PAIN SCALES - WONG BAKER
WONGBAKER_NUMERICALRESPONSE: 0
WONGBAKER_NUMERICALRESPONSE: 0

## 2023-07-04 VITALS
HEART RATE: 83 BPM | HEIGHT: 69 IN | OXYGEN SATURATION: 98 % | TEMPERATURE: 97.7 F | SYSTOLIC BLOOD PRESSURE: 160 MMHG | BODY MASS INDEX: 24.56 KG/M2 | RESPIRATION RATE: 16 BRPM | DIASTOLIC BLOOD PRESSURE: 76 MMHG | WEIGHT: 165.79 LBS

## 2023-07-04 LAB
ANION GAP SERPL CALC-SCNC: 5 MMOL/L (ref 5–15)
BASOPHILS # BLD: 0.1 K/UL (ref 0–0.1)
BASOPHILS NFR BLD: 1 % (ref 0–1)
BUN SERPL-MCNC: 19 MG/DL (ref 6–20)
BUN/CREAT SERPL: 19 (ref 12–20)
CALCIUM SERPL-MCNC: 9.2 MG/DL (ref 8.5–10.1)
CHLORIDE SERPL-SCNC: 110 MMOL/L (ref 97–108)
CO2 SERPL-SCNC: 23 MMOL/L (ref 21–32)
CREAT SERPL-MCNC: 1.01 MG/DL (ref 0.7–1.3)
DIFFERENTIAL METHOD BLD: ABNORMAL
EOSINOPHIL # BLD: 0.2 K/UL (ref 0–0.4)
EOSINOPHIL NFR BLD: 3 % (ref 0–7)
ERYTHROCYTE [DISTWIDTH] IN BLOOD BY AUTOMATED COUNT: 13.6 % (ref 11.5–14.5)
GLUCOSE BLD STRIP.AUTO-MCNC: 132 MG/DL (ref 65–117)
GLUCOSE BLD STRIP.AUTO-MCNC: 78 MG/DL (ref 65–117)
GLUCOSE SERPL-MCNC: 77 MG/DL (ref 65–100)
HCT VFR BLD AUTO: 39.5 % (ref 36.6–50.3)
HGB BLD-MCNC: 12.1 G/DL (ref 12.1–17)
IMM GRANULOCYTES # BLD AUTO: 0 K/UL (ref 0–0.04)
IMM GRANULOCYTES NFR BLD AUTO: 1 % (ref 0–0.5)
LYMPHOCYTES # BLD: 2 K/UL (ref 0.8–3.5)
LYMPHOCYTES NFR BLD: 24 % (ref 12–49)
MAGNESIUM SERPL-MCNC: 2.1 MG/DL (ref 1.6–2.4)
MCH RBC QN AUTO: 26.6 PG (ref 26–34)
MCHC RBC AUTO-ENTMCNC: 30.6 G/DL (ref 30–36.5)
MCV RBC AUTO: 86.8 FL (ref 80–99)
MONOCYTES # BLD: 0.9 K/UL (ref 0–1)
MONOCYTES NFR BLD: 11 % (ref 5–13)
NEUTS SEG # BLD: 5.1 K/UL (ref 1.8–8)
NEUTS SEG NFR BLD: 60 % (ref 32–75)
NRBC # BLD: 0 K/UL (ref 0–0.01)
NRBC BLD-RTO: 0 PER 100 WBC
PHOSPHATE SERPL-MCNC: 3.3 MG/DL (ref 2.6–4.7)
PLATELET # BLD AUTO: 205 K/UL (ref 150–400)
PMV BLD AUTO: 11.3 FL (ref 8.9–12.9)
POTASSIUM SERPL-SCNC: 4.1 MMOL/L (ref 3.5–5.1)
RBC # BLD AUTO: 4.55 M/UL (ref 4.1–5.7)
SERVICE CMNT-IMP: ABNORMAL
SERVICE CMNT-IMP: NORMAL
SODIUM SERPL-SCNC: 138 MMOL/L (ref 136–145)
WBC # BLD AUTO: 8.2 K/UL (ref 4.1–11.1)

## 2023-07-04 PROCEDURE — 6370000000 HC RX 637 (ALT 250 FOR IP): Performed by: INTERNAL MEDICINE

## 2023-07-04 PROCEDURE — 84100 ASSAY OF PHOSPHORUS: CPT

## 2023-07-04 PROCEDURE — 82962 GLUCOSE BLOOD TEST: CPT

## 2023-07-04 PROCEDURE — 83735 ASSAY OF MAGNESIUM: CPT

## 2023-07-04 PROCEDURE — 2580000003 HC RX 258: Performed by: INTERNAL MEDICINE

## 2023-07-04 PROCEDURE — 6360000002 HC RX W HCPCS: Performed by: INTERNAL MEDICINE

## 2023-07-04 PROCEDURE — C9254 INJECTION, LACOSAMIDE: HCPCS | Performed by: INTERNAL MEDICINE

## 2023-07-04 PROCEDURE — 80048 BASIC METABOLIC PNL TOTAL CA: CPT

## 2023-07-04 PROCEDURE — 85025 COMPLETE CBC W/AUTO DIFF WBC: CPT

## 2023-07-04 PROCEDURE — 2700000000 HC OXYGEN THERAPY PER DAY

## 2023-07-04 PROCEDURE — 36415 COLL VENOUS BLD VENIPUNCTURE: CPT

## 2023-07-04 RX ORDER — LACOSAMIDE 50 MG/1
200 TABLET ORAL 2 TIMES DAILY
Status: DISCONTINUED | OUTPATIENT
Start: 2023-07-04 | End: 2023-07-04

## 2023-07-04 RX ORDER — LEVETIRACETAM 500 MG/1
1000 TABLET ORAL 2 TIMES DAILY
Qty: 120 TABLET | Refills: 0 | Status: SHIPPED
Start: 2023-07-04 | End: 2023-08-03

## 2023-07-04 RX ORDER — LACOSAMIDE 50 MG/1
200 TABLET ORAL 2 TIMES DAILY
Status: DISCONTINUED | OUTPATIENT
Start: 2023-07-04 | End: 2023-07-04 | Stop reason: HOSPADM

## 2023-07-04 RX ORDER — LEVETIRACETAM 500 MG/1
1000 TABLET ORAL 2 TIMES DAILY
Status: DISCONTINUED | OUTPATIENT
Start: 2023-07-04 | End: 2023-07-04 | Stop reason: HOSPADM

## 2023-07-04 RX ORDER — LEVETIRACETAM 500 MG/1
1000 TABLET ORAL 2 TIMES DAILY
Status: DISCONTINUED | OUTPATIENT
Start: 2023-07-04 | End: 2023-07-04

## 2023-07-04 RX ADMIN — SERTRALINE HYDROCHLORIDE 25 MG: 50 TABLET ORAL at 10:01

## 2023-07-04 RX ADMIN — ENOXAPARIN SODIUM 40 MG: 100 INJECTION SUBCUTANEOUS at 10:01

## 2023-07-04 RX ADMIN — LEVETIRACETAM 1000 MG: 500 TABLET, FILM COATED ORAL at 11:28

## 2023-07-04 RX ADMIN — LACOSAMIDE 200 MG: 50 TABLET, FILM COATED ORAL at 11:28

## 2023-07-04 RX ADMIN — SODIUM CHLORIDE 4 ML: 7 NEBU SOLN,3 % NEBU at 12:03

## 2023-07-04 RX ADMIN — ALLOPURINOL 100 MG: 100 TABLET ORAL at 10:01

## 2023-07-04 RX ADMIN — TOPIRAMATE 100 MG: 100 TABLET, FILM COATED ORAL at 10:01

## 2023-07-04 RX ADMIN — SODIUM CHLORIDE, PRESERVATIVE FREE 10 ML: 5 INJECTION INTRAVENOUS at 10:02

## 2023-07-04 RX ADMIN — CLOPIDOGREL BISULFATE 75 MG: 75 TABLET, FILM COATED ORAL at 10:01

## 2023-07-04 ASSESSMENT — PAIN SCALES - GENERAL: PAINLEVEL_OUTOF10: 0

## 2023-07-04 ASSESSMENT — PAIN SCALES - WONG BAKER
WONGBAKER_NUMERICALRESPONSE: 0
WONGBAKER_NUMERICALRESPONSE: 0

## 2023-07-04 NOTE — DISCHARGE SUMMARY
Discharge Summary       PATIENT ID: Nalini Dowell  MRN: [id number]   YOB: 1945    DATE OF ADMISSION: 6/27/2023 12:34 PM    DATE OF DISCHARGE: 07/04/23     PRIMARY CARE PROVIDER: Osbaldo Diaz MD     ATTENDING PHYSICIAN: Maxwell Aguilera MD    DISCHARGING PROVIDER: Maxwell Aguilera MD    To contact this individual call [phone] and ask the  to page. If unavailable ask to be transferred the Adult Hospitalist Department. CONSULTATIONS: IP CONSULT TO NEUROLOGY  IP CONSULT TO PALLIATIVE CARE    PROCEDURES/SURGERIES: * No surgery found *     ADMITTING [address] [facility] [address] COURSE:   Nalini Dowell is a 66 y.o. male with a past medical history significant for stroke, type 2 diabetes, Alzheimer's. Normally lives in a nursing home. He has a known history of seizure disorder, on 6/27/2023 presents to the emergency department at our facility after reportedly having 2 seizures. He was given midazolam, referred to intensive care. Patient was noted to be unresponsive and was decreased respirations and was intubated and referred to intensive care. Patient was continued on AEDs, evaluated by neurology. The MRI with no acute intracranial abnormality, chronic small vessel and large vessel ischemic changes noted. Patient was subsequently extubated on 6/29. Failed swallowing, NG tube was placed for tube feeds as well as medication administration. On 7/1, stable for transfer out of intensive care. He was transferred to the floor where he continued to improve. Seen by speech and cleared for PO. NGT was removed, eating full meals. Stable for DC back to LTC on anti seizure medications. DISCHARGE DIAGNOSES / PLAN:      Seizure  Hypoxic respiratory failure  - Intubated for airway protection on arrival, now extubated  - On Keppra, lacoxamide, Topomax. Home keppra dose increased.    - CT head negative  - EEG normal  - MRI rebeca with chronic changes, volume loss   - [employer]

## 2023-07-04 NOTE — CARE COORDINATION
Transition of Care: Plan for return to [address] [place] [address] today. [facility] phone [phone] transport time set for 1:30 PM      Discharge folder located on hard chart to include ambulance form, RN to follow with AVS, MAR and call report to #[phone]      RUR: 15% Moderate   Prior Level of Functioning: Bed Bound  Disposition: [place], [facility]  If SNF Date FOC offered: 6/28/23  Date FOC received: 6/28/23  Accepting facility: [address] [place] [address] at discharge: [facility]  Caregiver Contact: Daughter García Jovel [phone]      CM called Kevan at [address] [place] [address], confirmed they can accept patient back today. Cm notified attending via [employer] CM will work on transport. CM called the daughter Kait Barton [phone] and left VM message. 12:54 PM: CM called the daughter Kait Barton again and notified of transport time. CM sent DC summary to [place] via [employer].      SHELLY DALY

## 2023-07-04 NOTE — PLAN OF CARE
Problem: Discharge Planning  Goal: Discharge to home or other facility with appropriate resources  Outcome: Completed
Problem: Discharge Planning  Goal: Discharge to home or other facility with appropriate resources  Outcome: Progressing
notified    COMMUNICATION/EDUCATION:       The patient's plan of care including recommendations, planned interventions, and recommended diet changes were discussed with: Registered nurse        Thank you,  ERIK Kraft  Minutes: 12    Problem: SLP Adult - Impaired Swallowing  Goal: By Discharge: Advance to least restrictive diet without signs or symptoms of aspiration for planned discharge setting. See evaluation for individualized goals. Description: Speech pathology goals  Initiated 7/2/2023  1.  Patient will participate in swallowing re-evaluation within 7 days    Outcome: Progressing

## 2023-07-04 NOTE — PROGRESS NOTES
0000: Bedside shift change report given to RN  (oncoming nurse) by  Gail Camejo (offgoing nurse). Report included the following information Intake/Output, MAR, Med Rec Status, and Cardiac Rhythm NSR .     0730: Bedside shift change report given to RN (oncoming nurse) by Gail Camejo  (offgoing nurse). Report included the following information Nurse Handoff Report, Index, Intake/Output, and MAR.
Daily    latanoprost (XALATAN) 0.005 % ophthalmic solution 1 drop  1 drop Both Eyes Nightly    clopidogrel (PLAVIX) tablet 75 mg  75 mg Per NG tube Daily    sodium chloride flush 0.9 % injection 5-40 mL  5-40 mL IntraVENous 2 times per day    sodium chloride flush 0.9 % injection 5-40 mL  5-40 mL IntraVENous PRN    0.9 % sodium chloride infusion   IntraVENous PRN    enoxaparin (LOVENOX) injection 40 mg  40 mg SubCUTAneous Daily    ondansetron (ZOFRAN-ODT) disintegrating tablet 4 mg  4 mg Oral Q8H PRN    Or    ondansetron (ZOFRAN) injection 4 mg  4 mg IntraVENous Q6H PRN    polyethylene glycol (GLYCOLAX) packet 17 g  17 g Orogastric Daily PRN    acetaminophen (TYLENOL) tablet 650 mg  650 mg Orogastric Q6H PRN    Or    acetaminophen (TYLENOL) suppository 650 mg  650 mg Rectal Q6H PRN    fentaNYL (SUBLIMAZE) injection 25 mcg  25 mcg IntraVENous Q1H PRN    insulin lispro (HUMALOG) injection vial 0-8 Units  0-8 Units SubCUTAneous Q6H    ipratropium 0.5 mg-albuterol 2.5 mg (DUONEB) nebulizer solution 1 Dose  1 Dose Inhalation Q4H PRN    levETIRAcetam (KEPPRA) injection 1,000 mg  1,000 mg IntraVENous Q12H    lacosamide (VIMPAT) injection 200 mg  200 mg IntraVENous BID    pravastatin (PRAVACHOL) tablet 40 mg  40 mg Orogastric Nightly    sertraline (ZOLOFT) tablet 25 mg  25 mg Orogastric Daily    senna (SENOKOT) tablet 8.6 mg  1 tablet Orogastric Nightly    topiramate (TOPAMAX) tablet 100 mg  100 mg Orogastric BID    LORazepam (ATIVAN) injection 1 mg  1 mg IntraVENous Q6H PRN     ______________________________________________________________________  EXPECTED LENGTH OF STAY: Unable to retrieve estimated LOS  ACTUAL LENGTH OF STAY:          6                 Judith Sidhu MD

## 2023-07-11 ENCOUNTER — OUTSIDE SERVICES (OUTPATIENT)
Age: 78
End: 2023-07-11
Payer: COMMERCIAL

## 2023-07-11 DIAGNOSIS — Z86.73 PERSONAL HISTORY OF TRANSIENT ISCHEMIC ATTACK (TIA), AND CEREBRAL INFARCTION WITHOUT RESIDUAL DEFICITS: ICD-10-CM

## 2023-07-11 DIAGNOSIS — I10 ESSENTIAL (PRIMARY) HYPERTENSION: ICD-10-CM

## 2023-07-11 DIAGNOSIS — I25.10 ATHEROSCLEROSIS OF NATIVE CORONARY ARTERY OF NATIVE HEART WITHOUT ANGINA PECTORIS: ICD-10-CM

## 2023-07-11 DIAGNOSIS — J96.01 ACUTE RESPIRATORY FAILURE WITH HYPOXIA (HCC): ICD-10-CM

## 2023-07-11 DIAGNOSIS — E11.65 TYPE 2 DIABETES MELLITUS WITH HYPERGLYCEMIA, WITH LONG-TERM CURRENT USE OF INSULIN (HCC): ICD-10-CM

## 2023-07-11 DIAGNOSIS — R56.9 SEIZURE (HCC): Primary | ICD-10-CM

## 2023-07-11 DIAGNOSIS — Z79.4 TYPE 2 DIABETES MELLITUS WITH HYPERGLYCEMIA, WITH LONG-TERM CURRENT USE OF INSULIN (HCC): ICD-10-CM

## 2023-07-11 PROCEDURE — 99306 1ST NF CARE HIGH MDM 50: CPT | Performed by: INTERNAL MEDICINE

## 2023-07-11 NOTE — PROGRESS NOTES
History of present illness:    Ralph Kaufman is a 66years old male with past medical history significant for type 2 diabetes mellitus dementia and history of stroke he has been living in nursing home for past several years. He was brought to emergency room after having 2 seizure reported from nursing home staff. He was given midazolam in the emergency room and was referred to intensive care unit. He was continued with his antiepileptic medications and he was evaluated by neurologist.  MRI of the brain done, showed no acute intracranial abnormalities, only chronic small vessel is a large vessel ischemic changes noted. Because of his initial seizure he was intubated and then extubated afterwards. Initially he failed swallowing, NG tube was placed for feeding and all medication administration. On July 1 he was stable to transfer out from ICU. He was transferred to floor and he continued to improve. He was seen by speech and cleared for oral feeding. He was stabilized and was transferred to [facility] rehab that he he was coming from. I am seeing him here in the rehab. He is doing well but looks very frail. He is able to communicate without any problem. He is mostly bedbound. Past medical history:    Type 2 diabetes mellitus, hypertension, history of stroke, dementia, chronic ischemic heart disease, COPD, CKD stage II, CHF, COVID-pneumonia, seizure disorder, thromboembolus, hemorrhoids, asthma. Past surgical history:    Cardiac surgery, vascular surgery, colonoscopy. Allergies:    NKDA. Social history: Former smoker, used to smoke 2 pack a day quit 1990. Never drink any alcohol. He has been nursing home resident for last 2 years. Family history: Not contributory. Review of system: Complete review of system done, right now essentially negative.     Medication:    Keppra 500 mg 2 tablet twice a day, albuterol HFA 2 puffs every 6 hours as needed, NovoLog regular insulin 4 times a day,

## 2023-08-01 ENCOUNTER — OUTSIDE SERVICES (OUTPATIENT)
Age: 78
End: 2023-08-01
Payer: COMMERCIAL

## 2023-08-01 DIAGNOSIS — Z86.73 PERSONAL HISTORY OF TRANSIENT ISCHEMIC ATTACK (TIA), AND CEREBRAL INFARCTION WITHOUT RESIDUAL DEFICITS: ICD-10-CM

## 2023-08-01 DIAGNOSIS — G40.909 NONINTRACTABLE EPILEPSY WITHOUT STATUS EPILEPTICUS, UNSPECIFIED EPILEPSY TYPE (HCC): ICD-10-CM

## 2023-08-01 DIAGNOSIS — I25.10 ATHEROSCLEROSIS OF NATIVE CORONARY ARTERY OF NATIVE HEART WITHOUT ANGINA PECTORIS: ICD-10-CM

## 2023-08-01 DIAGNOSIS — Z79.4 TYPE 2 DIABETES MELLITUS WITH HYPERGLYCEMIA, WITH LONG-TERM CURRENT USE OF INSULIN (HCC): Primary | ICD-10-CM

## 2023-08-01 DIAGNOSIS — I10 ESSENTIAL (PRIMARY) HYPERTENSION: ICD-10-CM

## 2023-08-01 DIAGNOSIS — E11.65 TYPE 2 DIABETES MELLITUS WITH HYPERGLYCEMIA, WITH LONG-TERM CURRENT USE OF INSULIN (HCC): Primary | ICD-10-CM

## 2023-08-01 PROCEDURE — 99309 SBSQ NF CARE MODERATE MDM 30: CPT | Performed by: INTERNAL MEDICINE

## 2023-08-23 ENCOUNTER — OUTSIDE SERVICES (OUTPATIENT)
Age: 78
End: 2023-08-23

## 2023-08-23 DIAGNOSIS — G40.909 NONINTRACTABLE EPILEPSY WITHOUT STATUS EPILEPTICUS, UNSPECIFIED EPILEPSY TYPE (HCC): ICD-10-CM

## 2023-08-23 DIAGNOSIS — Z79.4 TYPE 2 DIABETES MELLITUS WITH HYPERGLYCEMIA, WITH LONG-TERM CURRENT USE OF INSULIN (HCC): ICD-10-CM

## 2023-08-23 DIAGNOSIS — I10 ESSENTIAL (PRIMARY) HYPERTENSION: Primary | ICD-10-CM

## 2023-08-23 DIAGNOSIS — E11.65 TYPE 2 DIABETES MELLITUS WITH HYPERGLYCEMIA, WITH LONG-TERM CURRENT USE OF INSULIN (HCC): ICD-10-CM

## 2023-08-23 ASSESSMENT — ENCOUNTER SYMPTOMS: RESPIRATORY NEGATIVE: 1

## 2023-08-24 NOTE — PROGRESS NOTES
Subjective:      Patient ID: Wanda Ritter is a 66 y.o. male. Wanda Ritter is a 66years old male who currently resides at [facility] under the care of Dr. Radha Bhakta. [facility] medical history significant for type 2 diabetes mellitus dementia and history of stroke he has been living in nursing home for past several years. Visit with this patient on today at the request of staff to assess ability for patient to independently manage his finances. Mr. Jonas Enriquez found sitting up in bed consuming his breakfast, states that he is doing well. Admits that he is not able to independently manage finances. Reports that he has children who visit \"when they feel like it. \"   Smita Woodss that he prefers when certain relatives don't visit as it is more peaceful that way. He denies any concerns at this time. Review of Systems   Constitutional: Negative. Respiratory: Negative. Cardiovascular: Negative. Neurological: Negative. Objective:   Physical Exam  Vitals reviewed. Constitutional:       Appearance: Normal appearance. Cardiovascular:      Rate and Rhythm: Normal rate and regular rhythm. Pulses: Normal pulses. Heart sounds: Normal heart sounds. Pulmonary:      Effort: Pulmonary effort is normal.      Breath sounds: Normal breath sounds. Musculoskeletal:      Comments: Trace BLE edema   Neurological:      Mental Status: He is alert and oriented to person, place, and time. Psychiatric:         Mood and Affect: Mood normal.         Thought Content: Thought content normal.       Assessment / Plan:      Mr. Jonas Enriquez appears to be at his baseline. He is alert and oriented, however, does have periods of confusion. HTN is stable, continue losartan and metoprolol. DM stable, continue current regimen as well as diabetic diet. Epilepsy stable, continue keppra. Staff encouraged to call with questions or concerns. Will follow up as needed. Diagnosis Orders   1.  Essential (primary)

## 2023-10-03 ENCOUNTER — OUTSIDE SERVICES (OUTPATIENT)
Age: 78
End: 2023-10-03
Payer: COMMERCIAL

## 2023-10-03 DIAGNOSIS — Z79.4 TYPE 2 DIABETES MELLITUS WITH HYPERGLYCEMIA, WITH LONG-TERM CURRENT USE OF INSULIN (HCC): ICD-10-CM

## 2023-10-03 DIAGNOSIS — Z86.73 PERSONAL HISTORY OF TRANSIENT ISCHEMIC ATTACK (TIA), AND CEREBRAL INFARCTION WITHOUT RESIDUAL DEFICITS: ICD-10-CM

## 2023-10-03 DIAGNOSIS — I10 ESSENTIAL (PRIMARY) HYPERTENSION: Primary | ICD-10-CM

## 2023-10-03 DIAGNOSIS — E11.65 TYPE 2 DIABETES MELLITUS WITH HYPERGLYCEMIA, WITH LONG-TERM CURRENT USE OF INSULIN (HCC): ICD-10-CM

## 2023-10-03 DIAGNOSIS — G40.909 NONINTRACTABLE EPILEPSY WITHOUT STATUS EPILEPTICUS, UNSPECIFIED EPILEPSY TYPE (HCC): ICD-10-CM

## 2023-10-03 PROCEDURE — 99309 SBSQ NF CARE MODERATE MDM 30: CPT | Performed by: INTERNAL MEDICINE

## 2023-10-11 ENCOUNTER — OUTSIDE SERVICES (OUTPATIENT)
Age: 78
End: 2023-10-11
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
## 2023-10-11 DIAGNOSIS — E11.65 TYPE 2 DIABETES MELLITUS WITH HYPERGLYCEMIA, WITH LONG-TERM CURRENT USE OF INSULIN (HCC): ICD-10-CM

## 2023-10-11 DIAGNOSIS — I10 ESSENTIAL (PRIMARY) HYPERTENSION: ICD-10-CM

## 2023-10-11 DIAGNOSIS — Z79.4 TYPE 2 DIABETES MELLITUS WITH HYPERGLYCEMIA, WITH LONG-TERM CURRENT USE OF INSULIN (HCC): ICD-10-CM

## 2023-10-11 ASSESSMENT — ENCOUNTER SYMPTOMS: RESPIRATORY NEGATIVE: 1

## 2023-11-29 ENCOUNTER — OUTSIDE SERVICES (OUTPATIENT)
Age: 78
End: 2023-11-29

## 2023-11-29 DIAGNOSIS — Z79.4 TYPE 2 DIABETES MELLITUS WITH HYPERGLYCEMIA, WITH LONG-TERM CURRENT USE OF INSULIN (HCC): ICD-10-CM

## 2023-11-29 DIAGNOSIS — I10 ESSENTIAL (PRIMARY) HYPERTENSION: ICD-10-CM

## 2023-11-29 DIAGNOSIS — E11.65 TYPE 2 DIABETES MELLITUS WITH HYPERGLYCEMIA, WITH LONG-TERM CURRENT USE OF INSULIN (HCC): ICD-10-CM

## 2023-11-29 DIAGNOSIS — G40.909 NONINTRACTABLE EPILEPSY WITHOUT STATUS EPILEPTICUS, UNSPECIFIED EPILEPSY TYPE (HCC): ICD-10-CM

## 2023-11-29 DIAGNOSIS — Z91.81 HISTORY OF FALL: ICD-10-CM

## 2023-11-29 DIAGNOSIS — Z91.148 MEDICATION NON-COMPLIANCE DUE TO EXCESSIVE PILL BURDEN: Primary | ICD-10-CM

## 2023-11-29 NOTE — PROGRESS NOTES
Subjective:      Patient ID: Joi Clemente is a 66 y.o. male. Joi Clemente is a 66years old male who currently resides at [facility] under the care of Dr. Mounika Olvera. [facility] medical history significant for type 2 diabetes mellitus dementia and history of stroke he has been living in nursing home for past several years. Visit with this patient on today at the request of nursing as he has been refusing many of his medications. Reportedly told nurse that there were too many pills. Mr. Sheryle Bruckner found lying in bed, states that he is doing well. Admits that he has refused his medications recently as he does not want to take so many pills. Reports that he is willing to take his medications in liquid form as liquids go down easier and there is less chance of him choking. No other concerns at this time. Past Medical History:   Diagnosis Date    Alzheimer's disease ([address])     Arthritis     GOUT    Cancer ([address]) 09/20/2016    PROSTATE    Chronic ischemic heart disease, unspecified 4/18/2011    Chronic obstructive pulmonary disease ([address])     CHRONIC BRONCHITIS    CKD (chronic kidney disease), stage II     Congestive heart failure, unspecified 4/18/2011    (ON 9/20/16 PT & DTR STATE THEY DON'T REMEMBER THIS DIAGNOSIS)    Hypertension     Pneumonia due to COVID-19 virus 8/27/2020    Seizures ([address])     STARTED 2005; \"BLACK-OUT [place] SEIZURES\", DTR SATES    Stroke ([address])     MULTIPLE MINISTROKES, DTR REPORTS; LEFT-SIDED WEAKNESS    Thromboembolus ([address])     left leg  (NO PE)    Type 2 diabetes mellitus ([address])     Unspecified asthma(493[address]) 4/18/2011         Review of Systems    Objective:   Physical Exam  Cardiovascular:      Rate and Rhythm: Normal rate and regular rhythm. Pulses: Normal pulses. Heart sounds: Normal heart sounds. Pulmonary:      Effort: Pulmonary effort is normal.      Breath sounds: Normal breath sounds. Musculoskeletal:         General: Normal range of motion.

## 2024-01-24 ENCOUNTER — TELEPHONE (OUTPATIENT)
Facility: CLINIC | Age: 79
End: 2024-01-24

## 2024-01-24 NOTE — TELEPHONE ENCOUNTER
PATIENT'S DAUGHTER NEEDS TO KNOW THE DATE DR HERRON 1ST DIAGNOSED WITH DEMENTIA.  SHE CAN BE REACHED [phone].

## (undated) DEVICE — SET ADMIN 16ML TBNG L100IN 2 Y INJ SITE IV PIGGY BK DISP

## (undated) DEVICE — SYR 3ML LL TIP 1/10ML GRAD --

## (undated) DEVICE — Device

## (undated) DEVICE — NEONATAL-ADULT SPO2 SENSOR: Brand: NELLCOR

## (undated) DEVICE — 1200 GUARD II KIT W/5MM TUBE W/O VAC TUBE: Brand: GUARDIAN

## (undated) DEVICE — SOLIDIFIER MEDC 1200ML -- CONVERT TO 356117

## (undated) DEVICE — NEEDLE HYPO 18GA L1.5IN PNK S STL HUB POLYPR SHLD REG BVL

## (undated) DEVICE — Z DISCONTINUED PER MEDLINE LINE GAS SAMPLING O2/CO2 LNG AD 13 FT NSL W/ TBNG FILTERLINE

## (undated) DEVICE — CATH IV AUTOGRD BC BLU 22GA 25 -- INSYTE

## (undated) DEVICE — BASIN EMESIS 500CC ROSE 250/CS 60/PLT: Brand: MEDEGEN MEDICAL PRODUCTS, LLC

## (undated) DEVICE — (D)SYR 10ML 1/5ML GRAD NSAF -- PKGING CHANGE USE ITEM 338027

## (undated) DEVICE — TOWEL 4 PLY TISS 19X30 SUE WHT

## (undated) DEVICE — Device: Brand: MEDEX